# Patient Record
Sex: MALE | Race: WHITE | NOT HISPANIC OR LATINO | URBAN - METROPOLITAN AREA
[De-identification: names, ages, dates, MRNs, and addresses within clinical notes are randomized per-mention and may not be internally consistent; named-entity substitution may affect disease eponyms.]

---

## 2023-02-25 LAB
CREATININE (MG/DL) IN SER/PLAS: 0.77 MG/DL (ref 0.5–1.3)
GFR MALE: >90 ML/MIN/1.73M2

## 2023-04-18 LAB
ERYTHROCYTE DISTRIBUTION WIDTH (RATIO) BY AUTOMATED COUNT: 22.9 % (ref 11.5–14.5)
ERYTHROCYTE MEAN CORPUSCULAR HEMOGLOBIN CONCENTRATION (G/DL) BY AUTOMATED: 30.7 G/DL (ref 32–36)
ERYTHROCYTE MEAN CORPUSCULAR VOLUME (FL) BY AUTOMATED COUNT: 94 FL (ref 80–100)
ERYTHROCYTES (10*6/UL) IN BLOOD BY AUTOMATED COUNT: 4.15 X10E12/L (ref 4.5–5.9)
HEMATOCRIT (%) IN BLOOD BY AUTOMATED COUNT: 39.1 % (ref 41–52)
HEMOGLOBIN (G/DL) IN BLOOD: 12 G/DL (ref 13.5–17.5)
LEUKOCYTES (10*3/UL) IN BLOOD BY AUTOMATED COUNT: 41.7 X10E9/L (ref 4.4–11.3)
PLATELETS (10*3/UL) IN BLOOD AUTOMATED COUNT: 110 X10E9/L (ref 150–450)

## 2023-04-19 LAB
APPEARANCE, URINE: CLEAR
BILIRUBIN, URINE: NEGATIVE
BLOOD, URINE: NEGATIVE
COLOR, URINE: YELLOW
GLUCOSE, URINE: ABNORMAL MG/DL
KETONES, URINE: NEGATIVE MG/DL
LEUKOCYTE ESTERASE, URINE: NEGATIVE
NITRITE, URINE: NEGATIVE
PH, URINE: 7 (ref 5–8)
PROTEIN, URINE: NEGATIVE MG/DL
SPECIFIC GRAVITY, URINE: 1.03 (ref 1–1.03)
URINE CULTURE: NO GROWTH
UROBILINOGEN, URINE: <2 MG/DL (ref 0–1.9)

## 2023-05-02 LAB — SARS-COV-2 RESULT: NOT DETECTED

## 2023-05-03 ENCOUNTER — HOSPITAL ENCOUNTER (INPATIENT)
Dept: DATA CONVERSION | Facility: HOSPITAL | Age: 58
Discharge: HOME | End: 2023-05-11
Attending: SURGERY | Admitting: SURGERY
Payer: OTHER MISCELLANEOUS

## 2023-05-03 DIAGNOSIS — E11.649 TYPE 2 DIABETES MELLITUS WITH HYPOGLYCEMIA WITHOUT COMA (MULTI): ICD-10-CM

## 2023-05-03 DIAGNOSIS — C18.9 MALIGNANT NEOPLASM OF COLON, UNSPECIFIED (MULTI): ICD-10-CM

## 2023-05-03 DIAGNOSIS — N99.0 POSTPROCEDURAL (ACUTE) (CHRONIC) KIDNEY FAILURE: ICD-10-CM

## 2023-05-03 DIAGNOSIS — E78.5 HYPERLIPIDEMIA, UNSPECIFIED: ICD-10-CM
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
## 2023-05-03 DIAGNOSIS — C78.6 SECONDARY MALIGNANT NEOPLASM OF RETROPERITONEUM AND PERITONEUM (MULTI): ICD-10-CM

## 2023-05-03 DIAGNOSIS — K56.7 ILEUS, UNSPECIFIED (MULTI): ICD-10-CM

## 2023-05-03 DIAGNOSIS — C78.7 SECONDARY MALIGNANT NEOPLASM OF LIVER AND INTRAHEPATIC BILE DUCT (MULTI): ICD-10-CM

## 2023-05-03 LAB
ALANINE AMINOTRANSFERASE (SGPT) (U/L) IN SER/PLAS: 145 U/L (ref 10–52)
ALBUMIN (G/DL) IN SER/PLAS: 3.2 G/DL (ref 3.4–5)
ALKALINE PHOSPHATASE (U/L) IN SER/PLAS: 114 U/L (ref 33–120)
ANION GAP IN SER/PLAS: 12 MMOL/L (ref 10–20)
ASPARTATE AMINOTRANSFERASE (SGOT) (U/L) IN SER/PLAS: 140 U/L (ref 9–39)
BILIRUBIN TOTAL (MG/DL) IN SER/PLAS: 0.7 MG/DL (ref 0–1.2)
CALCIUM (MG/DL) IN SER/PLAS: 7.9 MG/DL (ref 8.6–10.6)
CARBON DIOXIDE, TOTAL (MMOL/L) IN SER/PLAS: 23 MMOL/L (ref 21–32)
CHLORIDE (MMOL/L) IN SER/PLAS: 106 MMOL/L (ref 98–107)
CREATININE (MG/DL) IN SER/PLAS: 0.74 MG/DL (ref 0.5–1.3)
GFR MALE: >90 ML/MIN/1.73M2
GLUCOSE (MG/DL) IN SER/PLAS: 265 MG/DL (ref 74–99)
PATIENT TEMPERATURE: 37 DEGREES C
POCT ANION GAP, ARTERIAL: 12 MMOL/L (ref 10–25)
POCT ANION GAP, ARTERIAL: 12 MMOL/L (ref 10–25)
POCT ANION GAP, ARTERIAL: 13 MMOL/L (ref 10–25)
POCT ANION GAP, ARTERIAL: 13 MMOL/L (ref 10–25)
POCT ANION GAP, ARTERIAL: 14 MMOL/L (ref 10–25)
POCT BASE EXCESS, ARTERIAL: -4.8 MMOL/L (ref -2–3)
POCT BASE EXCESS, ARTERIAL: -5.1 MMOL/L (ref -2–3)
POCT BASE EXCESS, ARTERIAL: -5.9 MMOL/L (ref -2–3)
POCT CHLORIDE, ARTERIAL: 100 MMOL/L (ref 98–107)
POCT CHLORIDE, ARTERIAL: 103 MMOL/L (ref 98–107)
POCT CHLORIDE, ARTERIAL: 104 MMOL/L (ref 98–107)
POCT GLUCOSE, ARTERIAL: 303 MG/DL (ref 74–99)
POCT GLUCOSE, ARTERIAL: 312 MG/DL (ref 74–99)
POCT GLUCOSE, ARTERIAL: 354 MG/DL (ref 74–99)
POCT GLUCOSE, ARTERIAL: 371 MG/DL (ref 74–99)
POCT GLUCOSE, ARTERIAL: 438 MG/DL (ref 74–99)
POCT GLUCOSE: 277 MG/DL (ref 74–99)
POCT GLUCOSE: 286 MG/DL (ref 74–99)
POCT GLUCOSE: 354 MG/DL (ref 74–99)
POCT HCO3, ARTERIAL: 20.2 MMOL/L (ref 22–26)
POCT HCO3, ARTERIAL: 20.7 MMOL/L (ref 22–26)
POCT HCO3, ARTERIAL: 20.7 MMOL/L (ref 22–26)
POCT HCO3, ARTERIAL: 21.1 MMOL/L (ref 22–26)
POCT HCO3, ARTERIAL: 21.2 MMOL/L (ref 22–26)
POCT HEMATOCRIT, ARTERIAL: 36 % (ref 41–52)
POCT HEMATOCRIT, ARTERIAL: 38 % (ref 41–52)
POCT HEMATOCRIT, ARTERIAL: 39 % (ref 41–52)
POCT HEMATOCRIT, ARTERIAL: 40 % (ref 41–52)
POCT HEMATOCRIT, ARTERIAL: 43 % (ref 41–52)
POCT HEMOGLOBIN, ARTERIAL: 11.9 G/DL (ref 13.5–17.5)
POCT HEMOGLOBIN, ARTERIAL: 12.7 G/DL (ref 13.5–17.5)
POCT HEMOGLOBIN, ARTERIAL: 13.1 G/DL (ref 13.5–17.5)
POCT HEMOGLOBIN, ARTERIAL: 13.4 G/DL (ref 13.5–17.5)
POCT HEMOGLOBIN, ARTERIAL: 14.3 G/DL (ref 13.5–17.5)
POCT IONIZED CALCIUM, ARTERIAL: 1.2 MMOL/L (ref 1.1–1.33)
POCT IONIZED CALCIUM, ARTERIAL: 1.21 MMOL/L (ref 1.1–1.33)
POCT IONIZED CALCIUM, ARTERIAL: 1.24 MMOL/L (ref 1.1–1.33)
POCT IONIZED CALCIUM, ARTERIAL: 1.24 MMOL/L (ref 1.1–1.33)
POCT IONIZED CALCIUM, ARTERIAL: 1.26 MMOL/L (ref 1.1–1.33)
POCT LACTATE, ARTERIAL: 1.7 MMOL/L (ref 0.4–2)
POCT LACTATE, ARTERIAL: 2.1 MMOL/L (ref 0.4–2)
POCT LACTATE, ARTERIAL: 2.2 MMOL/L (ref 0.4–2)
POCT LACTATE, ARTERIAL: 2.4 MMOL/L (ref 0.4–2)
POCT LACTATE, ARTERIAL: 3.4 MMOL/L (ref 0.4–2)
POCT OXY HEMOGLOBIN, ARTERIAL: 93.9 % (ref 94–98)
POCT OXY HEMOGLOBIN, ARTERIAL: 95.9 % (ref 94–98)
POCT OXY HEMOGLOBIN, ARTERIAL: 96.1 % (ref 94–98)
POCT OXY HEMOGLOBIN, ARTERIAL: 96.5 % (ref 94–98)
POCT OXY HEMOGLOBIN, ARTERIAL: 97 % (ref 94–98)
POCT PCO2, ARTERIAL: 41 MMHG (ref 38–42)
POCT PCO2, ARTERIAL: 41 MMHG (ref 38–42)
POCT PCO2, ARTERIAL: 43 MMHG (ref 38–42)
POCT PCO2, ARTERIAL: 44 MMHG (ref 38–42)
POCT PCO2, ARTERIAL: 44 MMHG (ref 38–42)
POCT PH, ARTERIAL: 7.28 (ref 7.38–7.42)
POCT PH, ARTERIAL: 7.28 (ref 7.38–7.42)
POCT PH, ARTERIAL: 7.3 (ref 7.38–7.42)
POCT PH, ARTERIAL: 7.3 (ref 7.38–7.42)
POCT PH, ARTERIAL: 7.32 (ref 7.38–7.42)
POCT PO2, ARTERIAL: 141 MMHG (ref 85–95)
POCT PO2, ARTERIAL: 144 MMHG (ref 85–95)
POCT PO2, ARTERIAL: 150 MMHG (ref 85–95)
POCT PO2, ARTERIAL: 154 MMHG (ref 85–95)
POCT PO2, ARTERIAL: 79 MMHG (ref 85–95)
POCT POTASSIUM, ARTERIAL: 3.8 MMOL/L (ref 3.5–5.3)
POCT POTASSIUM, ARTERIAL: 3.8 MMOL/L (ref 3.5–5.3)
POCT POTASSIUM, ARTERIAL: 4.1 MMOL/L (ref 3.5–5.3)
POCT POTASSIUM, ARTERIAL: 4.1 MMOL/L (ref 3.5–5.3)
POCT POTASSIUM, ARTERIAL: 5 MMOL/L (ref 3.5–5.3)
POCT SO2, ARTERIAL: 95 % (ref 94–100)
POCT SO2, ARTERIAL: 97 % (ref 94–100)
POCT SO2, ARTERIAL: 98 % (ref 94–100)
POCT SODIUM, ARTERIAL: 130 MMOL/L (ref 136–145)
POCT SODIUM, ARTERIAL: 132 MMOL/L (ref 136–145)
POCT SODIUM, ARTERIAL: 132 MMOL/L (ref 136–145)
POCT SODIUM, ARTERIAL: 133 MMOL/L (ref 136–145)
POCT SODIUM, ARTERIAL: 133 MMOL/L (ref 136–145)
POTASSIUM (MMOL/L) IN SER/PLAS: 3.5 MMOL/L (ref 3.5–5.3)
PROTEIN TOTAL: 4.8 G/DL (ref 6.4–8.2)
SODIUM (MMOL/L) IN SER/PLAS: 137 MMOL/L (ref 136–145)
UREA NITROGEN (MG/DL) IN SER/PLAS: 17 MG/DL (ref 6–23)

## 2023-05-04 LAB
ALANINE AMINOTRANSFERASE (SGPT) (U/L) IN SER/PLAS: 133 U/L (ref 10–52)
ALANINE AMINOTRANSFERASE (SGPT) (U/L) IN SER/PLAS: 150 U/L (ref 10–52)
ALBUMIN (G/DL) IN SER/PLAS: 3.6 G/DL (ref 3.4–5)
ALBUMIN (G/DL) IN SER/PLAS: 3.9 G/DL (ref 3.4–5)
ALKALINE PHOSPHATASE (U/L) IN SER/PLAS: 117 U/L (ref 33–120)
ALKALINE PHOSPHATASE (U/L) IN SER/PLAS: 136 U/L (ref 33–120)
ANION GAP IN SER/PLAS: 17 MMOL/L (ref 10–20)
ANION GAP IN SER/PLAS: 19 MMOL/L (ref 10–20)
ASPARTATE AMINOTRANSFERASE (SGOT) (U/L) IN SER/PLAS: 106 U/L (ref 9–39)
ASPARTATE AMINOTRANSFERASE (SGOT) (U/L) IN SER/PLAS: 84 U/L (ref 9–39)
BILIRUBIN TOTAL (MG/DL) IN SER/PLAS: 0.9 MG/DL (ref 0–1.2)
BILIRUBIN TOTAL (MG/DL) IN SER/PLAS: 1.1 MG/DL (ref 0–1.2)
CALCIUM (MG/DL) IN SER/PLAS: 9 MG/DL (ref 8.6–10.6)
CALCIUM (MG/DL) IN SER/PLAS: 9.4 MG/DL (ref 8.6–10.6)
CARBON DIOXIDE, TOTAL (MMOL/L) IN SER/PLAS: 24 MMOL/L (ref 21–32)
CARBON DIOXIDE, TOTAL (MMOL/L) IN SER/PLAS: 25 MMOL/L (ref 21–32)
CHLORIDE (MMOL/L) IN SER/PLAS: 98 MMOL/L (ref 98–107)
CHLORIDE (MMOL/L) IN SER/PLAS: 99 MMOL/L (ref 98–107)
CREATININE (MG/DL) IN SER/PLAS: 1.02 MG/DL (ref 0.5–1.3)
CREATININE (MG/DL) IN SER/PLAS: 1.31 MG/DL (ref 0.5–1.3)
ERYTHROCYTE DISTRIBUTION WIDTH (RATIO) BY AUTOMATED COUNT: 20.3 % (ref 11.5–14.5)
ERYTHROCYTE DISTRIBUTION WIDTH (RATIO) BY AUTOMATED COUNT: 20.4 % (ref 11.5–14.5)
ERYTHROCYTE MEAN CORPUSCULAR HEMOGLOBIN CONCENTRATION (G/DL) BY AUTOMATED: 30.8 G/DL (ref 32–36)
ERYTHROCYTE MEAN CORPUSCULAR HEMOGLOBIN CONCENTRATION (G/DL) BY AUTOMATED: 31 G/DL (ref 32–36)
ERYTHROCYTE MEAN CORPUSCULAR VOLUME (FL) BY AUTOMATED COUNT: 100 FL (ref 80–100)
ERYTHROCYTE MEAN CORPUSCULAR VOLUME (FL) BY AUTOMATED COUNT: 99 FL (ref 80–100)
ERYTHROCYTES (10*6/UL) IN BLOOD BY AUTOMATED COUNT: 4.17 X10E12/L (ref 4.5–5.9)
ERYTHROCYTES (10*6/UL) IN BLOOD BY AUTOMATED COUNT: 4.39 X10E12/L (ref 4.5–5.9)
GFR MALE: 63 ML/MIN/1.73M2
GFR MALE: 85 ML/MIN/1.73M2
GLUCOSE (MG/DL) IN SER/PLAS: 299 MG/DL (ref 74–99)
GLUCOSE (MG/DL) IN SER/PLAS: 328 MG/DL (ref 74–99)
HEMATOCRIT (%) IN BLOOD BY AUTOMATED COUNT: 41.3 % (ref 41–52)
HEMATOCRIT (%) IN BLOOD BY AUTOMATED COUNT: 44.1 % (ref 41–52)
HEMOGLOBIN (G/DL) IN BLOOD: 12.8 G/DL (ref 13.5–17.5)
HEMOGLOBIN (G/DL) IN BLOOD: 13.6 G/DL (ref 13.5–17.5)
LACTATE (MMOL/L) IN SER/PLAS: 2.6 MMOL/L (ref 0.4–2)
LACTATE (MMOL/L) IN SER/PLAS: 2.9 MMOL/L (ref 0.4–2)
LEUKOCYTES (10*3/UL) IN BLOOD BY AUTOMATED COUNT: 35.2 X10E9/L (ref 4.4–11.3)
LEUKOCYTES (10*3/UL) IN BLOOD BY AUTOMATED COUNT: 35.4 X10E9/L (ref 4.4–11.3)
MAGNESIUM (MG/DL) IN SER/PLAS: 1.66 MG/DL (ref 1.6–2.4)
MAGNESIUM (MG/DL) IN SER/PLAS: 2.78 MG/DL (ref 1.6–2.4)
NRBC (PER 100 WBCS) BY AUTOMATED COUNT: 0 /100 WBC (ref 0–0)
NRBC (PER 100 WBCS) BY AUTOMATED COUNT: 0 /100 WBC (ref 0–0)
PLATELETS (10*3/UL) IN BLOOD AUTOMATED COUNT: 183 X10E9/L (ref 150–450)
PLATELETS (10*3/UL) IN BLOOD AUTOMATED COUNT: 198 X10E9/L (ref 150–450)
POCT GLUCOSE: 233 MG/DL (ref 74–99)
POCT GLUCOSE: 282 MG/DL (ref 74–99)
POCT GLUCOSE: 328 MG/DL (ref 74–99)
POCT GLUCOSE: 360 MG/DL (ref 74–99)
POTASSIUM (MMOL/L) IN SER/PLAS: 4.7 MMOL/L (ref 3.5–5.3)
POTASSIUM (MMOL/L) IN SER/PLAS: 4.7 MMOL/L (ref 3.5–5.3)
PROTEIN TOTAL: 5.9 G/DL (ref 6.4–8.2)
PROTEIN TOTAL: 6.3 G/DL (ref 6.4–8.2)
SODIUM (MMOL/L) IN SER/PLAS: 134 MMOL/L (ref 136–145)
SODIUM (MMOL/L) IN SER/PLAS: 138 MMOL/L (ref 136–145)
UREA NITROGEN (MG/DL) IN SER/PLAS: 15 MG/DL (ref 6–23)
UREA NITROGEN (MG/DL) IN SER/PLAS: 21 MG/DL (ref 6–23)

## 2023-05-05 LAB
ALANINE AMINOTRANSFERASE (SGPT) (U/L) IN SER/PLAS: 87 U/L (ref 10–52)
ALBUMIN (G/DL) IN SER/PLAS: 3.1 G/DL (ref 3.4–5)
ALKALINE PHOSPHATASE (U/L) IN SER/PLAS: 109 U/L (ref 33–120)
ANION GAP IN SER/PLAS: 14 MMOL/L (ref 10–20)
ASPARTATE AMINOTRANSFERASE (SGOT) (U/L) IN SER/PLAS: 50 U/L (ref 9–39)
BILIRUBIN TOTAL (MG/DL) IN SER/PLAS: 0.8 MG/DL (ref 0–1.2)
CALCIUM (MG/DL) IN SER/PLAS: 8.4 MG/DL (ref 8.6–10.6)
CARBON DIOXIDE, TOTAL (MMOL/L) IN SER/PLAS: 25 MMOL/L (ref 21–32)
CHLORIDE (MMOL/L) IN SER/PLAS: 98 MMOL/L (ref 98–107)
CREATININE (MG/DL) IN SER/PLAS: 0.91 MG/DL (ref 0.5–1.3)
ERYTHROCYTE DISTRIBUTION WIDTH (RATIO) BY AUTOMATED COUNT: 19.6 % (ref 11.5–14.5)
ERYTHROCYTE MEAN CORPUSCULAR HEMOGLOBIN CONCENTRATION (G/DL) BY AUTOMATED: 32.9 G/DL (ref 32–36)
ERYTHROCYTE MEAN CORPUSCULAR VOLUME (FL) BY AUTOMATED COUNT: 95 FL (ref 80–100)
ERYTHROCYTES (10*6/UL) IN BLOOD BY AUTOMATED COUNT: 3.28 X10E12/L (ref 4.5–5.9)
ESTIMATED AVERAGE GLUCOSE FOR HBA1C: 223 MG/DL
GFR MALE: >90 ML/MIN/1.73M2
GLUCOSE (MG/DL) IN SER/PLAS: 220 MG/DL (ref 74–99)
HEMATOCRIT (%) IN BLOOD BY AUTOMATED COUNT: 31.3 % (ref 41–52)
HEMOGLOBIN (G/DL) IN BLOOD: 10.3 G/DL (ref 13.5–17.5)
HEMOGLOBIN A1C/HEMOGLOBIN TOTAL IN BLOOD: 9.4 %
LEUKOCYTES (10*3/UL) IN BLOOD BY AUTOMATED COUNT: 29.2 X10E9/L (ref 4.4–11.3)
MAGNESIUM (MG/DL) IN SER/PLAS: 2.07 MG/DL (ref 1.6–2.4)
NRBC (PER 100 WBCS) BY AUTOMATED COUNT: 0 /100 WBC (ref 0–0)
PLATELETS (10*3/UL) IN BLOOD AUTOMATED COUNT: 129 X10E9/L (ref 150–450)
POCT GLUCOSE: 150 MG/DL (ref 74–99)
POCT GLUCOSE: 209 MG/DL (ref 74–99)
POCT GLUCOSE: 219 MG/DL (ref 74–99)
POCT GLUCOSE: 220 MG/DL (ref 74–99)
POCT GLUCOSE: 254 MG/DL (ref 74–99)
POCT GLUCOSE: 264 MG/DL (ref 74–99)
POCT GLUCOSE: 286 MG/DL (ref 74–99)
POTASSIUM (MMOL/L) IN SER/PLAS: 4.1 MMOL/L (ref 3.5–5.3)
PROTEIN TOTAL: 5.1 G/DL (ref 6.4–8.2)
SODIUM (MMOL/L) IN SER/PLAS: 133 MMOL/L (ref 136–145)
UREA NITROGEN (MG/DL) IN SER/PLAS: 18 MG/DL (ref 6–23)

## 2023-05-06 LAB
ALBUMIN (G/DL) IN SER/PLAS: 3.2 G/DL (ref 3.4–5)
ANION GAP IN SER/PLAS: 16 MMOL/L (ref 10–20)
CALCIUM (MG/DL) IN SER/PLAS: 8.9 MG/DL (ref 8.6–10.6)
CARBON DIOXIDE, TOTAL (MMOL/L) IN SER/PLAS: 25 MMOL/L (ref 21–32)
CHLORIDE (MMOL/L) IN SER/PLAS: 98 MMOL/L (ref 98–107)
CREATININE (MG/DL) IN SER/PLAS: 0.62 MG/DL (ref 0.5–1.3)
ERYTHROCYTE DISTRIBUTION WIDTH (RATIO) BY AUTOMATED COUNT: 18.6 % (ref 11.5–14.5)
ERYTHROCYTE MEAN CORPUSCULAR HEMOGLOBIN CONCENTRATION (G/DL) BY AUTOMATED: 30.7 G/DL (ref 32–36)
ERYTHROCYTE MEAN CORPUSCULAR VOLUME (FL) BY AUTOMATED COUNT: 100 FL (ref 80–100)
ERYTHROCYTES (10*6/UL) IN BLOOD BY AUTOMATED COUNT: 3.32 X10E12/L (ref 4.5–5.9)
GFR MALE: >90 ML/MIN/1.73M2
GLUCOSE (MG/DL) IN SER/PLAS: 199 MG/DL (ref 74–99)
HEMATOCRIT (%) IN BLOOD BY AUTOMATED COUNT: 33.2 % (ref 41–52)
HEMOGLOBIN (G/DL) IN BLOOD: 10.2 G/DL (ref 13.5–17.5)
LEUKOCYTES (10*3/UL) IN BLOOD BY AUTOMATED COUNT: 23.8 X10E9/L (ref 4.4–11.3)
MAGNESIUM (MG/DL) IN SER/PLAS: 1.8 MG/DL (ref 1.6–2.4)
NRBC (PER 100 WBCS) BY AUTOMATED COUNT: 0 /100 WBC (ref 0–0)
PHOSPHATE (MG/DL) IN SER/PLAS: 2.6 MG/DL (ref 2.5–4.9)
PLATELETS (10*3/UL) IN BLOOD AUTOMATED COUNT: 144 X10E9/L (ref 150–450)
POCT GLUCOSE: 158 MG/DL (ref 74–99)
POCT GLUCOSE: 171 MG/DL (ref 74–99)
POCT GLUCOSE: 171 MG/DL (ref 74–99)
POCT GLUCOSE: 198 MG/DL (ref 74–99)
POCT GLUCOSE: 199 MG/DL (ref 74–99)
POCT GLUCOSE: 205 MG/DL (ref 74–99)
POTASSIUM (MMOL/L) IN SER/PLAS: 3.9 MMOL/L (ref 3.5–5.3)
SODIUM (MMOL/L) IN SER/PLAS: 135 MMOL/L (ref 136–145)
UREA NITROGEN (MG/DL) IN SER/PLAS: 15 MG/DL (ref 6–23)

## 2023-05-07 LAB
ALBUMIN (G/DL) IN SER/PLAS: 2.7 G/DL (ref 3.4–5)
ALBUMIN (G/DL) IN SER/PLAS: 3.2 G/DL (ref 3.4–5)
ANION GAP IN SER/PLAS: 12 MMOL/L (ref 10–20)
ANION GAP IN SER/PLAS: 19 MMOL/L (ref 10–20)
CALCIUM (MG/DL) IN SER/PLAS: 8.9 MG/DL (ref 8.6–10.6)
CALCIUM (MG/DL) IN SER/PLAS: 9.6 MG/DL (ref 8.6–10.6)
CARBON DIOXIDE, TOTAL (MMOL/L) IN SER/PLAS: 27 MMOL/L (ref 21–32)
CARBON DIOXIDE, TOTAL (MMOL/L) IN SER/PLAS: 29 MMOL/L (ref 21–32)
CHLORIDE (MMOL/L) IN SER/PLAS: 101 MMOL/L (ref 98–107)
CHLORIDE (MMOL/L) IN SER/PLAS: 97 MMOL/L (ref 98–107)
CREATININE (MG/DL) IN SER/PLAS: 0.62 MG/DL (ref 0.5–1.3)
CREATININE (MG/DL) IN SER/PLAS: 0.66 MG/DL (ref 0.5–1.3)
ERYTHROCYTE DISTRIBUTION WIDTH (RATIO) BY AUTOMATED COUNT: 18.2 % (ref 11.5–14.5)
ERYTHROCYTE MEAN CORPUSCULAR HEMOGLOBIN CONCENTRATION (G/DL) BY AUTOMATED: 31.1 G/DL (ref 32–36)
ERYTHROCYTE MEAN CORPUSCULAR VOLUME (FL) BY AUTOMATED COUNT: 98 FL (ref 80–100)
ERYTHROCYTES (10*6/UL) IN BLOOD BY AUTOMATED COUNT: 4.05 X10E12/L (ref 4.5–5.9)
GFR MALE: >90 ML/MIN/1.73M2
GFR MALE: >90 ML/MIN/1.73M2
GLUCOSE (MG/DL) IN SER/PLAS: 186 MG/DL (ref 74–99)
GLUCOSE (MG/DL) IN SER/PLAS: 262 MG/DL (ref 74–99)
HEMATOCRIT (%) IN BLOOD BY AUTOMATED COUNT: 39.6 % (ref 41–52)
HEMOGLOBIN (G/DL) IN BLOOD: 12.3 G/DL (ref 13.5–17.5)
LEUKOCYTES (10*3/UL) IN BLOOD BY AUTOMATED COUNT: 24.4 X10E9/L (ref 4.4–11.3)
MAGNESIUM (MG/DL) IN SER/PLAS: 1.9 MG/DL (ref 1.6–2.4)
NRBC (PER 100 WBCS) BY AUTOMATED COUNT: 0 /100 WBC (ref 0–0)
PHOSPHATE (MG/DL) IN SER/PLAS: 3.2 MG/DL (ref 2.5–4.9)
PHOSPHATE (MG/DL) IN SER/PLAS: 4 MG/DL (ref 2.5–4.9)
PLATELETS (10*3/UL) IN BLOOD AUTOMATED COUNT: 198 X10E9/L (ref 150–450)
POCT GLUCOSE: 180 MG/DL (ref 74–99)
POCT GLUCOSE: 196 MG/DL (ref 74–99)
POCT GLUCOSE: 250 MG/DL (ref 74–99)
POCT GLUCOSE: 261 MG/DL (ref 74–99)
POTASSIUM (MMOL/L) IN SER/PLAS: 3.5 MMOL/L (ref 3.5–5.3)
POTASSIUM (MMOL/L) IN SER/PLAS: 4 MMOL/L (ref 3.5–5.3)
SODIUM (MMOL/L) IN SER/PLAS: 138 MMOL/L (ref 136–145)
SODIUM (MMOL/L) IN SER/PLAS: 139 MMOL/L (ref 136–145)
UREA NITROGEN (MG/DL) IN SER/PLAS: 18 MG/DL (ref 6–23)
UREA NITROGEN (MG/DL) IN SER/PLAS: 21 MG/DL (ref 6–23)

## 2023-05-08 LAB
ALBUMIN (G/DL) IN SER/PLAS: 3 G/DL (ref 3.4–5)
ANION GAP IN SER/PLAS: 17 MMOL/L (ref 10–20)
CALCIUM (MG/DL) IN SER/PLAS: 8.8 MG/DL (ref 8.6–10.6)
CARBON DIOXIDE, TOTAL (MMOL/L) IN SER/PLAS: 26 MMOL/L (ref 21–32)
CHLORIDE (MMOL/L) IN SER/PLAS: 104 MMOL/L (ref 98–107)
CREATININE (MG/DL) IN SER/PLAS: 0.52 MG/DL (ref 0.5–1.3)
ERYTHROCYTE DISTRIBUTION WIDTH (RATIO) BY AUTOMATED COUNT: 18 % (ref 11.5–14.5)
ERYTHROCYTE MEAN CORPUSCULAR HEMOGLOBIN CONCENTRATION (G/DL) BY AUTOMATED: 30.3 G/DL (ref 32–36)
ERYTHROCYTE MEAN CORPUSCULAR VOLUME (FL) BY AUTOMATED COUNT: 100 FL (ref 80–100)
ERYTHROCYTES (10*6/UL) IN BLOOD BY AUTOMATED COUNT: 2.97 X10E12/L (ref 4.5–5.9)
GFR MALE: >90 ML/MIN/1.73M2
GLUCOSE (MG/DL) IN SER/PLAS: 138 MG/DL (ref 74–99)
HEMATOCRIT (%) IN BLOOD BY AUTOMATED COUNT: 29.7 % (ref 41–52)
HEMOGLOBIN (G/DL) IN BLOOD: 9 G/DL (ref 13.5–17.5)
LEUKOCYTES (10*3/UL) IN BLOOD BY AUTOMATED COUNT: 10.9 X10E9/L (ref 4.4–11.3)
MAGNESIUM (MG/DL) IN SER/PLAS: 1.61 MG/DL (ref 1.6–2.4)
NRBC (PER 100 WBCS) BY AUTOMATED COUNT: 0 /100 WBC (ref 0–0)
PHOSPHATE (MG/DL) IN SER/PLAS: 3.1 MG/DL (ref 2.5–4.9)
PLATELETS (10*3/UL) IN BLOOD AUTOMATED COUNT: 137 X10E9/L (ref 150–450)
POCT GLUCOSE: 115 MG/DL (ref 74–99)
POCT GLUCOSE: 151 MG/DL (ref 74–99)
POCT GLUCOSE: 83 MG/DL (ref 74–99)
POTASSIUM (MMOL/L) IN SER/PLAS: 3.8 MMOL/L (ref 3.5–5.3)
SODIUM (MMOL/L) IN SER/PLAS: 143 MMOL/L (ref 136–145)
UREA NITROGEN (MG/DL) IN SER/PLAS: 16 MG/DL (ref 6–23)

## 2023-05-09 LAB
ALBUMIN (G/DL) IN SER/PLAS: 3 G/DL (ref 3.4–5)
ANION GAP IN SER/PLAS: 13 MMOL/L (ref 10–20)
CALCIUM (MG/DL) IN SER/PLAS: 8.9 MG/DL (ref 8.6–10.6)
CARBON DIOXIDE, TOTAL (MMOL/L) IN SER/PLAS: 31 MMOL/L (ref 21–32)
CHLORIDE (MMOL/L) IN SER/PLAS: 105 MMOL/L (ref 98–107)
CREATININE (MG/DL) IN SER/PLAS: 0.5 MG/DL (ref 0.5–1.3)
GFR MALE: >90 ML/MIN/1.73M2
GLUCOSE (MG/DL) IN SER/PLAS: 87 MG/DL (ref 74–99)
MAGNESIUM (MG/DL) IN SER/PLAS: 1.73 MG/DL (ref 1.6–2.4)
PHOSPHATE (MG/DL) IN SER/PLAS: 4.2 MG/DL (ref 2.5–4.9)
POCT GLUCOSE: 107 MG/DL (ref 74–99)
POCT GLUCOSE: 117 MG/DL (ref 74–99)
POCT GLUCOSE: 93 MG/DL (ref 74–99)
POCT GLUCOSE: 95 MG/DL (ref 74–99)
POTASSIUM (MMOL/L) IN SER/PLAS: 3.8 MMOL/L (ref 3.5–5.3)
SODIUM (MMOL/L) IN SER/PLAS: 145 MMOL/L (ref 136–145)
UREA NITROGEN (MG/DL) IN SER/PLAS: 13 MG/DL (ref 6–23)

## 2023-05-10 LAB
ALBUMIN (G/DL) IN SER/PLAS: 2.6 G/DL (ref 3.4–5)
ANION GAP IN SER/PLAS: 14 MMOL/L (ref 10–20)
CALCIUM (MG/DL) IN SER/PLAS: 8.2 MG/DL (ref 8.6–10.6)
CARBON DIOXIDE, TOTAL (MMOL/L) IN SER/PLAS: 27 MMOL/L (ref 21–32)
CHLORIDE (MMOL/L) IN SER/PLAS: 103 MMOL/L (ref 98–107)
CREATININE (MG/DL) IN SER/PLAS: 0.48 MG/DL (ref 0.5–1.3)
GFR MALE: >90 ML/MIN/1.73M2
GLUCOSE (MG/DL) IN SER/PLAS: 73 MG/DL (ref 74–99)
MAGNESIUM (MG/DL) IN SER/PLAS: 1.55 MG/DL (ref 1.6–2.4)
PHOSPHATE (MG/DL) IN SER/PLAS: 3.3 MG/DL (ref 2.5–4.9)
POCT GLUCOSE: 139 MG/DL (ref 74–99)
POCT GLUCOSE: 143 MG/DL (ref 74–99)
POCT GLUCOSE: 82 MG/DL (ref 74–99)
POTASSIUM (MMOL/L) IN SER/PLAS: 3.8 MMOL/L (ref 3.5–5.3)
SODIUM (MMOL/L) IN SER/PLAS: 140 MMOL/L (ref 136–145)
UREA NITROGEN (MG/DL) IN SER/PLAS: 11 MG/DL (ref 6–23)

## 2023-05-11 LAB
POCT GLUCOSE: 113 MG/DL (ref 74–99)
POCT GLUCOSE: 131 MG/DL (ref 74–99)

## 2023-06-05 LAB
COMPLETE PATHOLOGY REPORT: NORMAL
CONVERTED CLINICAL DIAGNOSIS-HISTORY: NORMAL
CONVERTED FINAL DIAGNOSIS: NORMAL
CONVERTED FINAL REPORT PDF LINK TO COPY AND PASTE: NORMAL
CONVERTED GROSS DESCRIPTION: NORMAL
CONVERTED SYNOPTIC DIAGNOSIS: NORMAL

## 2023-09-14 NOTE — PROGRESS NOTES
Service: Endocrinology     Subjective Data:   JOSE JUAN MARIE is a 57 year old Male who is Hospital Day # 5 and POD #4 for 1. Hepatic artery infusion pump ;2. Regional lymphadenectomy;3. Open cholecystectomy;4. Intraoperative ultrasound with interpretation  of imaging ;5. Peritonectomy.    Overnight Events: Patient had an uneventful night.   Additional Information:    pt's glucose remains elevated. pt informed of plan to increase glargine at HS. pt agrees hyperglycemia is likely 2/2 pain, which is improving at this time.  we reviewed  cost of CGM supplies - pt and his wife agree to trial 1 month of tee samples to decide if $75/month copay is valuable to their home glucose management     ROS- denies CP, SOB, cough, HA, n/v, constipation/diarrhea, depression/anxiety, night sweats, new rash, or change in vision. endorses fatigue, metallic taste in his mouth    Objective Data:     Objective Information:      T   P  R  BP   MAP  SpO2   Value  36.6  121  18  115/82      93%  Date/Time 5/7 11:26 5/7 11:26 5/7 11:26 5/7 11:26    5/7 11:26  Range  (36C - 36.9C )  (84 - 121 )  (18 - 20 )  (115 - 145 )/ (73 - 86 )    (89% - 95% )   As of 07-May-2023 09:58:00, patient is on 2 L/min of oxygen via nasal cannula.  Highest temp of 36.9 C was recorded at 5/7 5:19      Pain reported at 5/7 7:44: 5 = Moderate         Weights   5/6 14:09: Weight in kg (Weight (kg))  107  5/6 14:09: Weight in lbs ((lbs))  235.8         Weights   5/6 14:09: Weight in kg (Weight (kg))  107  5/6 14:09: Weight in lbs ((lbs))  235.8    Physical Exam Narrative:  ·  Physical Exam:    Constitutional: ill appearing, awake/alert/oriented x3, lying in bed   Eyes: EOMI, clear sclera  ENMT: mucous membranes moist, no apparent injury, no lesions seen  Head/Neck: Neck supple, no apparent injury  Respiratory/Thorax: Patent airways, good chest expansion, thorax symmetric, CTAB  Cardiovascular: regular rate and rhythm, s1 and s2 distinct, 2+ equal pulses of the  extremities  Gastrointestinal: Nondistended, soft, non-tender, no rebound tenderness or guarding  Musculoskeletal: ROM intact, normal strength  Extremities: normal extremities, no edema  Neurological: alert and oriented x3, CN's grossly intact, normal strength  Psychological: appropriate mood and behavior  Skin: Warm and dry, no lesions, no rashes      Medication:    Medications:      CENTRAL NERVOUS SYSTEM AGENTS:    1. HYDROmorphone PCA 15 mg/ NaCL 0.9% 30 mL:  2.6  IV PCA  <Continuous>    2. Ondansetron Injectable:  4  mg  IntraVenous Push  Every 6 Hours   PRN       3. Scopolamine TransDermal:  1  patch  TransDermal  Every 72 Hours    4. Methocarbamol Injectable:  1000  mg  IntraVenous Push  Every 8 Hours      COAGULATION MODIFIERS:    1. Enoxaparin SubCutaneous:  40  mg  SubCutaneous  Every 24 Hours      GASTROINTESTINAL AGENTS:    1. Pantoprazole Injectable:  40  mg  IntraVenous Push  Every 24 Hours      METABOLIC AGENTS:    1. Insulin Glargine (Lantus) Injectable:  30  unit(s)  SubCutaneous  At Bedtime    2. Insulin Lispro Moderate Corrective Scale:  unit(s)  SubCutaneous  Every 6 Hours    3. Dextrose 50% in Water Injectable:  25  gram(s)  IntraVenous Push  Every 15 Minutes   PRN       4. Glucagon Injectable:  1  mg  IntraMuscular  Every 15 Minutes   PRN         MISCELLANEOUS AGENTS:    1. Naloxone Injectable:  0.2  mg  IntraVenous Push  Once   PRN         NUTRITIONAL PRODUCTS:    1. Lactated Ringers Infusion:  1000  mL  IntraVenous  <Continuous>    2. Lactated Ringers IV Bolus:  500  mL  IntraVenous Piggyback  Once    3. Lactated Ringers IV Bolus:  500  mL  IntraVenous Piggyback  Once    4. Lactated Ringers IV Bolus:  500  mL  IntraVenous Piggyback  Once      TOPICAL AGENTS:    1. Lidocaine 2% (UROJET) Topical Gel:  5  mL  Topical  Once    2. Phenol Topical Spray:  1  spray(s)  Topical  4 Times a Day   PRN             Currently Suspended Medications       --------------------------------    1. Pregabalin:   100  mg  Oral  2 Times a Day      Recent Lab Results:    Results:        I have reviewed these laboratory results:    Renal Function Panel  Trending View      Result 07-May-2023 09:59:00  06-May-2023 11:57:00    Glucose, Serum 262   H   199   H       135   L    K 4.0   3.9    CL 97   L   98    Bicarbonate, Serum 27   25    Anion Gap, Serum 19   16    BUN 21   15    CREAT 0.66   0.62    GFR Male >90   >90    Calcium, Serum 9.6   8.9    Phosphorus, Serum 4.0   2.6    ALB 3.2   L   3.2   L        Glucose_POCT  Trending View      Result 07-May-2023 03:19:00  06-May-2023 20:41:00  06-May-2023 18:09:00  06-May-2023 13:56:00  06-May-2023 10:04:00  06-May-2023 04:39:00  06-May-2023 00:34:00  05-May-2023 21:17:00  05-May-2023 17:06:00    Glucose-POCT 261   H   205   H   171   H   198   H   199   H   158   H   171   H   150   H   220   H            I have reviewed these laboratory results:    Glucose_POCT  07-May-2023 16:04:00      Result Value    Glucose-POCT  196   H       Assessment and Plan:   Daily Risk Screen:  ·  Does patient have an indwelling urinary catheter? n/a consulting service   ·  Does patient have a central line? n/a consulting service     Code Status:  ·  Code Status Full Code     Assessment:    JOSE JUAN MARIE is a 57 year old Male who presented for hepatic artery infusion pump with Dr Arenas.     Endocrinology consulted for DM2 with hyperglycemia   A1C due for measurement  home regimen: glyburide 5mg BID  managed by: PCP     ASSESSMENT: DM2 with hyperglycemia in setting of post-operative stress  - target glucose <180mg/dL     PLAN:   - increase glargine to 30u qHS  - continue lispro moderate ssi q6hr   - POCT glucose q6hr  - hypoglycemia protocol   - CGM costs reviewed with pt     Dispo: pt is likely to d/c on insulin with tee 2 CGM sensor samples and [facility] f/u on days of oncology appointments.     Patient seen and examined.   Plan communicated to primary team via doc halo to pager  [phone]    Sharon Osborne PA-C  Pager [phone] or Doc Halo  Please contact endocrinology fellow on-call after 5p - page [phone]      Attestation:   Note Completion:  Provider/Team Pager # [phone]         Electronic Signatures:  Sharon Osborne (PAC)  (Signed 07-May-2023 16:46)   Authored: Service, Subjective Data, Objective Data, Assessment  and Plan, Note Completion      Last Updated: 07-May-2023 16:46 by Sharon Osborne (PAC)

## 2023-09-14 NOTE — DISCHARGE SUMMARY
Send Summary:   Discharge Summary Providers:  Provider Role Provider Name   · Referring Rajinder Arenas   · Consulting Devin   · Attending Rajinder Arensa   · Consulting Jorge Alberto Gomez   · Nurse  Practitioner Arlette Mcrae   · Primary Collette, Son Tom       Note Recipients: Rajinder Arenas MD       Discharge:    Summary:   Admission Date: .03-May-2023 05:54:00   Discharge Date: 11-May-2023   Attending Physician at Discharge: Rajinder Arenas   Admission Reason: Colorectal cancer   Final Discharge Diagnoses: Colorectal cancer   Procedures: Date: 03-May-2023 12:34:00  Procedure Name: open right hemicolectomy, primary anastomosis    Date: 03-May-2023 20:49:00  Procedure Name: 1. Hepatic artery infusion pump   2. Regional lymphadenectomy  3. Open cholecystectomy  4. Intraoperative ultrasound with interpretation of imaging   5. Peritonectomy   6. Right colectomy (Dr. Raymundo, colorectal surgery)   Condition at Discharge: Satisfactory   Disposition at Discharge: Home Health Care - New   Vital Signs:        T   P  R  BP   MAP  SpO2   Value  36.6  85  18  132/84      96%  Date/Time 5/11 4:55 5/11 4:55 5/11 4:55 5/11 4:55    5/11 4:55  Range  (36.4C - 36.7C )  (77 - 85 )  (18 - 18 )  (122 - 145 )/ (75 - 85 )    (93% - 96% )    Date:            Weight/Scale Type:  Height:   06-May-2023 14:09  107  kg / bed       Physical Exam:    Neurological: Awake, alert, conversive  Respiratory/Thorax: even, unlabored  Genitourinary: voiding  Gastrointestinal: soft, NT, ND.  midline well approximated with staples; scant serosang drainage from inferior portion of incision.  LUQ incision well approximated with sutures.  Skin: warm, dry  Musculoskeletal: FLORIAN  Eyes: non-icteric  Extremities: no edema  Psychological: appropriate mood/affect   Hospital Course:    57 yr old male with right colon cancer and synchronous colorectal liver mets who underwent right colectomy, peritonectomy, IOUS, cholecystectomy, lymphadenectomy,  AMY pump  placement on 5/3 with James Arenas/Zackary).  [facility].  Post-op course c/b oliguria - resolved with fluid resuscitation and ileus requiring NGT placement.  Almanza removed POD # and passed TOV.  Diet advanced as tolerated after  NGT removal.  IV medication transitioned to oral with adequate PO intake.  HAP pump study POD 2 WNL.  DC home POD 8 on extended DVT prophylaxis.       Discharge Information:    and Continuing Care:   Lab Results - Pending:    Surgical Pathology Drawn at 03-May-2023 13:58:00  Radiology Results - Pending: None   Discharge Instructions:    Activity:           activity as tolerated.          May shower..            May not return to school/work.            May not drive while taking narcotics.            No pushing, pulling, or lifting objects greater than 10 pounds for 6 week(s).            Weight-bearing Instructions: weight-bearing as tolerated.      Nutrition/Diet:           resume normal diet,  diabetic/carbohydrate counted          Encourage Fluids:   Drink plenty of a variety of fluids to prevent dehydration. Signs of dehydration are: dry mouth, dark yellow urine in small amounts, and dizziness with change in position or increased feeling of weakness/tiredness.    Wound Care:           Wound Site:   abdomen          Wound Type:   surgical incision          Cleanse With:   soap and water          Cover With:   no dressing, leave open to air          Instructions:   no lotions, creams, or tub soaks          Other Instructions:   You have staples along your incision. These will come out in 1-2 weeks.  This incision may get wet, pat dry and cover as needed to protect your clothing, otherwise you may leave this incision open to air.          Wound Site:   abdomen          Wound Type:   surgical incision          Cleanse With:   soap and water          Cover With:   no dressing, leave open to air          Instructions:   no lotions, creams, or tub soaks          Other  Instructions:   You have surgical glue over your incision. You may shower and use soap and water over your incision. Pat dry. The surgical glue with slowly dissolve and fall away.    Additional Orders:           Blood Glucose Monitoring:   before meals          Additional Instructions:   Bowel function will be irregular at first. You may experience some loose stool alternating with constipation.  This is normal. As your diet advances and you begin to eat more regularly, your stool pattern will also become  more regular.    For diarrhea stools incorporate foods from the BRAT diet.  This is a bland diet that consists of foods low in fiber: bananas, rice, applesauce and toast are staples of the diet.  You can also add tea, tapioca and yogurt if you would like.  Be sure to  drink plenty of fluids if you are having diarrhea, as you can become dehydrated quickly.    Drink plenty of a variety of fluids to prevent dehydration. Signs of dehydration are: dry mouth, dark yellow urine in small amounts, and dizziness with change in position or increased feeling of weakness/tiredness.  ------------------------------------------------------------------------------------------------------------    Insulin Regimen  Lantus (long acting): 18 units daily  Lispro (short acting): per sliding scale 3x day before meals   Hypoglycemia Protocol 0 unit(s) if Blood glucose is between 0 - 70  0 unit(s) if Blood glucose is between 71 - 150  2 unit(s) if Blood glucose is between 151 - 200  4 unit(s) if Blood glucose is between 201 - 250  6 unit(s) if Blood glucose is between 251 - 300  8 unit(s) if Blood glucose is between 301 - 350  10 unit(s) if Blood glucose is between 351 - 400  Notify Physician if Blood glucose greater than 400    ------------------------------------------------------------------------------------------------------------  Enoxaparin (Lovenox®) is a blood thinner used to prevent or treat blood clots.  Enoxaparin (Lovenox®) is  administered only by injection under the skin using a small needle and syringe. Injections can be given once or twice a day.  You are taking enoxaparin (Lovenox®) for __prevention of blood clots___.  You will continue to take enoxaparin (Lovenox) for __28 days post-op__.    It is important that you give the injections at the same time each day.  It is important that you continue your enoxaparin until told to stop.  If you forget an injection, contact your doctor for advice as to when you should give your next injection.   Let everyone involved in your care, such as a dentist or other provider, know that you are taking enoxaparin (Lovenox®).  Side Effects:   - Skin bruises, itching, and bleeding around the injection site, can occur.  - If bleeding does occur, it may take a little longer than usual to stop. Let your doctor know if you develop a  rash of dark red spots under the skin.  Signs of bleeding that you should call your doctor:   - Gums that bleed after brushing your teeth lasting more than 15 minutes.  - A nose bleed that lasts for more than 15 minutes.  - Bleeding from a cut that does not stop in 15 minutes.  - Urine that looks red, or urine that is dark like coffee.  - Stool (BMs) that are covered with blood, or are black.  - Vomit that is bright red or vomit that looks like coffee.    How to administer the injection:  1. Wash your hands with soap and water. Dry your hands.  2. Sit down or lie flat in a comfortable position.  3. Select an area on the right or left side of your stomach (at least 2 inches away from your belly button), the back of your upper arm or side of your upper thigh.  4. Clean the area with an alcohol swabs. Allow the area to dry.  5. Carefully pull off the needle cap from the syringe and discard cap. Leave the air bubble in the syringe.   6. Hold the syringe like a pencil in the hand you write with.  7. With your other hand, gently pinch around the cleansed area to make a fold in the  skin. Continue to hold the skin fold throughout the injection.  8. Press the needle straight down and all the way into the skin fold.  9. Press down on the plunger as far as it will go with your finger until the syringe is empty.  10. Remove the needle by pulling it straight out.  11. DO NOT rub the injection site.  12. Point the needle down and away from yourself and others. Push down on the plunger to release the safety shield.  13. Drop the used syringe into a thick plastic disposable container.     Home Care Certification:           Skilled Disciplines Ordered:   RN/LPN,  PT    Home Care Services:           Home Care Skilled Service:   assessment,  follow up teaching,  infusions/injectables/medications,  Rehab (PT/OT/SP eval and treat),  wound care    Follow Up Appointments:    Follow-Up Appointment 01:           Physician/Dept/Service:   Dr Arenas          Reason for Referral:   post-op appointment          Scheduled Date/Time:   15-May-2023 11:20          Location:   [facility]: [address] [place] [address] A          Phone Number:   [phone]    Follow-Up Appointment 02:           Physician/Dept/Service:   Melinda Draw, Sandy Cruz CNP, & 2hr infusion (1st dose AMY pump)          Scheduled Date/Time:   17-May-2023 13:00          Location:   [address], [address] [place] [id number]    Follow-Up Appointment 03:           Physician/Dept/Service:   Dr. Regina Raymundo          Scheduled Date/Time:   30-May-2023 11:00          Location:   [address] [place] [address]          Phone Number:   [phone] option #2    Discharge Medications: Home Medication   pregabalin 100 mg oral capsule - 2 cap(s) orally in the morning, and 1 cap(s) orally in the evening  Multiple Vitamins with Minerals oral tablet - 1 tab(s) orally once a day  omega-3 polyunsaturated fatty acids - 1 cap(s) orally 2 times a day  simvastatin 40 mg oral tablet - 1  tab(s) orally once a day (at bedtime)  Vitamin D3 400 intl units (10 mcg) oral tablet - 1 tab(s) orally once a day  loratadine 10 mg oral tablet - 1 tab(s) orally once a day for 3 days following chemo  acetaminophen 325 mg oral tablet - 2 tab(s) orally every 6 hours  sennosides-docusate 8.6 mg-50 mg oral tablet - 2 tab(s) orally 2 times a day  Lantus Solostar Pen 100 units/mL subcutaneous solution - 18 unit(s) subcutaneous once a day   Insulin Lispro KwikPen 100 units/mL injectable solution - 3x day per sliding scale before meals  Pen Needles 31 G 8 mm - 4x day    lisinopril 10 mg oral tablet - 1 tab(s) oral once a day  pantoprazole 40 mg oral delayed release tablet - 1 tab(s) oral once a day  enoxaparin 40 mg/0.4 mL injectable solution - 40 milligram(s) injectable once a day      PRN Medication   polyethylene glycol 3350 oral powder for reconstitution - 17 gram(s) orally once a day, As needed, Constipation  traMADol 50 mg oral tablet - 1 tab(s) orally every 4 to 6 hours x 7 days, As Needed -for pain   Dx: G89.18  ondansetron 4 mg oral tablet - 1 tab(s) orally 3 times a day, As Needed - for nausea and vomiting  prochlorperazine 10 mg oral tablet - 1 tab(s) orally 3 times a day, As Needed - for nausea and vomiting     DNR Status:   ·  Code Status Code Status order at time of discharge: Full Code       Electronic Signatures:  Arlette Mcrae (APRN-CNP)  (Signed 11-May-2023 08:12)   Authored: Send Summary, Summary Content, Ongoing Care,  DNR Status, Note Completion      Last Updated: 11-May-2023 08:12 by Arlette Mcrae (APRN-CNP)

## 2023-09-14 NOTE — PROGRESS NOTES
Service: Colorectal Surgery     Subjective Data:   JOSE JUAN MARIE is a 57 year old Male who is Hospital Day # 8 and POD #7 for 1. Hepatic artery infusion pump ;2. Regional lymphadenectomy;3. Open cholecystectomy;4. Intraoperative ultrasound with interpretation  of imaging ;5. Peritonectomy.     Patient doing well. Pain controlled. Denies nausea. Passing flatus and having bowel movements.    Objective Data:     Objective Information:      T   P  R  BP   MAP  SpO2   Value  36.6  82  18  145/82      94%  Date/Time 5/10 5:50 5/10 5:50 5/10 5:50 5/10 5:50    5/10 5:50  Range  (36.2C - 37C )  (79 - 87 )  (17 - 18 )  (127 - 149 )/ (76 - 88 )    (93% - 95% )  Highest temp of 37 C was recorded at 5/9 21:33      Pain reported at 5/10 6:00: 4 = Moderate    Physical Exam Narrative:  ·  Physical Exam:    Gen: NAD, resting in bed, A&O x4  Pulm: nonlabored respiratory effort on room air  Cardio: regular rate and rhythm  Abd: soft, non distended, appropriate tenderness to palpation, incisions well-approximated with staples in place, no drainage or erythema  Ext: no edema  MSK: baseline ROM  Neuro: NFD   Psych: appropriate mood and behavior    Recent Lab Results:    Results:        RFP: 5/9/2023 09:32  NA+        Cl-     BUN  /                         145    105    13  /  --------------------------------  Glucose                ---------------------------  87    K+     HCO3-   Creat \                         3.8    31    0.50  \  Calcium : 8.9Anion Gap : 13          Albumin : 3.0 L    Phos : 4.2      Assessment and Plan:   Comorbidities:  ·  Comorbidity Other     Code Status:  ·  Code Status Full Code     Assessment:    57M post op from right hemicolectomy with Dr. Raymundo and AMY pump, regional lymphadenectomy, and cholecystectomy with Dr. Arenas  Developed post-op ileus 5/7, NGT placed with immediate return of 2.9L bilious fluid. Patient now has NG removed and is improving.    Recommend GI soft diet and all PO  medications  Encouraged incentive spirometry, ambulation  Rest of care per primary team  Possible discharge later today vs tomorrow if patient doing well and tolerating diet  Colorectal surgery will continue to follow. Appreciate excellent care per surgical oncology team.    Patient discussed with Dr. Zackary Trent, PGY4  [facility] Colorectal Surgery [id number]        Attestation:   Note Completion:  I am a:  Resident/Fellow   Attending Attestation I reviewed the resident/fellow?s documentation and discussed the patient with the resident/fellow.  I agree with the resident/fellow?s medical  decision making as documented in the note.          Electronic Signatures:  Kareem Trent (Resident))  (Signed 10-May-2023 08:43)   Authored: Service, Subjective Data, Objective Data, Assessment  and Plan, Note Completion  Regina Arriaga)  (Signed 11-May-2023 16:14)   Authored: Note Completion   Co-Signer: Service, Subjective Data, Objective Data, Assessment and Plan, Note Completion      Last Updated: 11-May-2023 16:14 by Regina Arriaga)

## 2023-09-14 NOTE — PROGRESS NOTES
Service: Surgical Oncology     Subjective Data:   JOSE JUAN MARIE is a 57 year old Male who is Hospital Day # 4 and POD #3 for 1. Hepatic artery infusion pump ;2. Regional lymphadenectomy;3. Open cholecystectomy;4. Intraoperative ultrasound with interpretation  of imaging ;5. Peritonectomy.     Patient had an isolated temperature to 38.5 overnight that resolved without additional intervention. Some mild nausea and burping this AM, but has been OOB. No flatus.    Objective Data:     Objective Information:      T   P  R  BP   MAP  SpO2   Value  36  92  20  128/78      93%  Date/Time 5/6 10:16 5/6 10:16 5/6 10:16 5/6 10:16    5/6 10:16  Range  (36C - 38.5C )  (92 - 105 )  (16 - 20 )  (98 - 128 )/ (61 - 78 )    (93% - 96% )   As of 06-May-2023 10:16:00, patient is on 2 L/min of oxygen via nasal cannula.  Highest temp of 38.5 C was recorded at 5/5 22:09      Pain reported at 5/6 8:39: 5 = Moderate    Physical Exam by System:    Constitutional: lying in bed, NAD   Eyes: EOMI. sclerae non-icteric   Respiratory/Thorax: Non labored on 2LNC   Cardiovascular: Regular rate   Gastrointestinal: abdomen soft, mildly distended,  appropriately tender to palpation.  Incisions c/d/i, pump site without erythema   Genitourinary: Almanza in place   Extremities: No edema   Neurological: Grossly intact   Psychological: Appropriate mood and affect   Skin: Warm and dry. No rash or lesion.     Medication:    Medications:          Continuous Medications       --------------------------------    1. HYDROmorphone PCA 15 mg/ NaCL 0.9% 30 mL:  2.6  IV PCA  <Continuous>    2. Lactated Ringers Infusion:  1000  mL  IntraVenous  <Continuous>         Scheduled Medications       --------------------------------    1. Acetaminophen:  650  mg  Oral  Every 6 Hours    2. Docusate:  100  mg  Oral  2 Times a Day    3. Enoxaparin SubCutaneous:  40  mg  SubCutaneous  Every 24 Hours    4. Insulin Glargine (Lantus) Injectable:  20  unit(s)  SubCutaneous  At  Bedtime    5. Insulin Lispro Moderate Corrective Scale:  unit(s)  SubCutaneous  Every 6 Hours    6. Methocarbamol Injectable:  1000  mg  IntraVenous Push  Every 8 Hours    7. Pregabalin:  100  mg  Oral  2 Times a Day    8. Scopolamine TransDermal:  1  patch  TransDermal  Every 72 Hours         PRN Medications       --------------------------------    1. Calcium Carbonate Chewable:  500  mg  Oral  Every 2 Hours    2. Dextrose 50% in Water Injectable:  25  gram(s)  IntraVenous Push  Every 15 Minutes    3. Glucagon Injectable:  1  mg  IntraMuscular  Every 15 Minutes    4. Naloxone Injectable:  0.2  mg  IntraVenous Push  Once    5. Ondansetron Injectable:  4  mg  IntraVenous Push  Every 6 Hours        Recent Lab Results:    Results:    CBC: 5/6/2023 11:57              \     Hgb     /                              \     10.2 L    /  WBC  ----------------  Plt               23.8 H    ----------------    144 L            /     Hct     \                              /     33.2 L    \            RBC: 3.32 L    MCV: 100           RFP: 5/6/2023 11:57  NA+        Cl-     BUN  /                         135 L   98    15  /  --------------------------------  Glucose                ---------------------------  199 H    K+     HCO3-   Creat \                         3.9    25    0.62  \  Calcium : 8.9Anion Gap : 16          Albumin : 3.2 L    Phos : 2.6      Radiology Results:    Results:        Impression:    1. Quantitative shunt detection imaging demonstrating a 2.5 percent  shunt of Tc 99m MAA activity to the lungs.  2. Heterogenous radiotracer activity throughout the right and left  lobe of the liver surrounding a photopenic mass predominantly within  hepatic segment 4B consistent with known hepatic metastasis; no  extrahepatic activity is identified.  3. Expected postsurgical changes including small volume  intraperitoneal fluid and gas.      NM Hepatic Artery Pump Flow [May  5 2023 12:10PM]      Impression:    1.  No  unexpected radiopaque foreign body.     Xray Abdomen AP View [May  3 2023  2:40PM]      Impression:    1. 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 Otherwise unremarkableappearance of abdominal vasculature.  2. Redemonstration of multiple liver lesions consistent with  metastases, better appreciated on MRI liver 03/20/2023.      CT Angio Abdomen [Apr 13 2023  3:16PM]      Assessment and Plan:   Daily Risk Screen:  ·  Does patient have an indwelling urinary catheter? yes   ·  Plan for indwelling urinary catheter removal today? no   ·  The patient continues to require indwelling urinary catheterization for perioperative use for selected surgical procedures     Comorbidities:  ·  Comorbidity Other     Code Status:  ·  Code Status Full Code     Assessment:    Narciso Marrero is a 57-year-old male postop day 1 from AMY pump, regional lymphadenopathy, open cholecystectomy, appendectomy and right hemicolectomy with Dr. Granados.    Neuro   - Continue PCA for pain control, Continue home Lyrica, ambit pump in place  -  Robaxin and Tylenol    CV   - No home meds    Resp  -  encourage IS and OOB, wean O2    GI   - continue clear liquid diet, As needed Zofran  - Pump study completed    /Renal   -Maintain Almanza --> DC  - MIVF at 50, strict I's/O  - Trend electrolytes replete as needed    Heme  - Daily CBC  - Lovenox    ID   - No abx    Endo   - Sliding scale insulin Q6H  - 10u Lantus  - Appreciate endocrinology recs    RNF, anticipate home when medically appropriate    discussed with attending physician Dr. Shaffer.    Alee Dorman PGY2  Surgical Oncology, [id number]        Attestation:   Note Completion:  I am a:  Resident/Fellow   Attending Attestation I saw and evaluated the patient.  I personally obtained the key and critical portions of the history and physical exam or was physically present for key and  critical portions performed by the resident/fellow. I reviewed the resident/fellow?s  documentation and discussed the patient with the resident/fellow.  I agree with the resident/fellow?s medical decision making as documented in the note.     I personally evaluated the patient on 06-May-2023   Comments/ Additional Findings    Distetnded- NGT placed. 3L out over day.          Electronic Signatures:  Alee Dorman (Resident))  (Signed 06-May-2023 13:51)   Authored: Service, Subjective Data, Objective Data, Assessment  and Plan, Note Completion  Wm Shaffer)  (Signed 08-May-2023 14:17)   Authored: Note Completion   Co-Signer: Service, Subjective Data, Objective Data, Assessment and Plan, Note Completion      Last Updated: 08-May-2023 14:17 by Wm Shaffer)

## 2023-09-14 NOTE — PROGRESS NOTES
Service: Colorectal Surgery     Subjective Data:   JOSE JUAN MARIE is a 57 year old Male who is Hospital Day # 4 and POD #3 for 1. Hepatic artery infusion pump ;2. Regional lymphadenectomy;3. Open cholecystectomy;4. Intraoperative ultrasound with interpretation  of imaging ;5. Peritonectomy.    Additional Information:    No acute events overnight. Feels a little nauseous this morning. Hiccuping. Feels distended.    Objective Data:     Objective Information:      T   P  R  BP   MAP  SpO2   Value  36  92  20  128/78      93%  Date/Time 5/6 10:16 5/6 10:16 5/6 10:16 5/6 10:16    5/6 10:16  Range  (36C - 38.5C )  (92 - 105 )  (16 - 20 )  (98 - 128 )/ (61 - 78 )    (93% - 96% )   As of 06-May-2023 10:16:00, patient is on 2 L/min of oxygen via nasal cannula.  Highest temp of 38.5 C was recorded at 5/5 22:09      Pain reported at 5/6 8:39: 5 = Moderate    Physical Exam Narrative:  ·  Physical Exam:    Gen: NAD, resting in bed, A&O x4  Pulm: nonlabored respiratory effort on room air  Cardio: regular heart rate  Abd: soft, mildly distended, incisions well-approximated with staples in place, no drainage or erythema  Ext: no edema  MSK: baseline ROM  Neuro: NFD   Psych: appropriate mood and behavior    Assessment and Plan:   Daily Risk Screen:  ·  Does patient have an indwelling urinary catheter? yes   ·  Plan for indwelling urinary catheter removal today? no    ·  The patient continues to require indwelling urinary catheterization for perioperative use for selected surgical procedures   ·  Does patient have a central line? n/a consulting service      Comorbidities:  ·  Comorbidity Other     Code Status:  ·  Code Status Full Code     Assessment:    57M post op from right hemicolectomy with Dr. Raymundo and AMY pump, regional lymphadenectomy, and cholecystectomy with Dr. Arenas    - Pain control per primary team  - Maintain CLD today, patient still endorsing some burping and hiccuping. Reiterated slowing down oral  intake to prevent nausea.  - Encourage IS use and OOB/ambulation  - Monitor for bowel function  - Recommend riggs removal to minimize risk of UTI  - Rest of care per primary team  - Colorectal surgery will continue to follow. Appreciate excellent care per surgical oncology team.    Patient discussed with Dr. Isaac Mehta MD  PGY-1, General Surgery  Colorectal Surgery [id number]      Attestation:   Note Completion:  I am a:  Resident/Fellow   Attending Attestation I saw and evaluated the patient.  I personally obtained the key and critical portions of the history and physical exam or was physically present for key and  critical portions performed by the resident/fellow. I reviewed the resident/fellow?s documentation and discussed the patient with the resident/fellow.  I agree with the resident/fellow?s medical decision making as documented in the note.     I personally evaluated the patient on 06-May-2023         Electronic Signatures:  Kristina Mehta (Resident))  (Signed 06-May-2023 11:46)   Authored: Service, Subjective Data, Objective Data, Assessment  and Plan, Note Completion  Edouard Gray)  (Signed 07-May-2023 06:49)   Authored: Assessment and Plan, Note Completion   Co-Signer: Service, Subjective Data, Objective Data, Assessment and Plan, Note Completion      Last Updated: 07-May-2023 06:49 by Edouard Gray)

## 2023-09-14 NOTE — H&P
History & Physical Reviewed:   I have reviewed the History and Physical dated:  17-Apr-2023   History and Physical reviewed and relevant findings noted. Patient examined to review pertinent physical  findings.: No significant changes   Home Medications Reviewed: no changes noted   Allergies Reviewed: no changes noted       ERAS (Enhanced Recovery After Surgery):  ·  ERAS Patient: no     Consent:   COVID-19 Consent:  ·  COVID-19 Risk Consent Surgeon has reviewed key risks related to the risk of edilia COVID-19 and if they contract COVID-19 what the risks are.     Attestation:   Note Completion:  I am a:  Resident/Fellow   Attending Attestation I saw and evaluated the patient.  I personally obtained the key and critical portions of the history and physical exam or was physically present for key and  critical portions performed by the resident/fellow. I reviewed the resident/fellow?s documentation and discussed the patient with the resident/fellow.  I agree with the resident/fellow?s medical decision making as documented in the note.     I personally evaluated the patient on 03-May-2023         Electronic Signatures:  Julio Ch (Fellow))  (Signed 03-May-2023 08:15)   Authored: History & Physical Reviewed, ERAS, Consent,  Note Completion  Rajinder Arenas)  (Signed 04-May-2023 09:01)   Authored: Note Completion   Co-Signer: History & Physical Reviewed, ERAS, Consent, Note Completion      Last Updated: 04-May-2023 09:01 by Rajinder Arenas)

## 2023-09-14 NOTE — PROGRESS NOTES
Service: Surgical Oncology     Subjective Data:   JOSE JUAN MARIE is a 57 year old Male who is Hospital Day # 7 and POD #6 for 1. Hepatic artery infusion pump ;2. Regional lymphadenectomy;3. Open cholecystectomy;4. Intraoperative ultrasound with interpretation  of imaging ;5. Peritonectomy.    Additional Information:    No acute events overnight. Nausea improved with placement of NGT with 4850 of bilious output in the past 24 hrs. Unsure if passing flatus, but thinks possible. No  BM. Denies chest pain, shortness of breath.    Objective Data:     Objective Information:      T   P  R  BP   MAP  SpO2   Value  36.5  86  18  129/79      94%  Date/Time 5/9 9:06 5/9 9:06 5/9 9:06 5/9 9:06    5/9 9:06  Range  (36.4C - 37C )  (56 - 97 )  (16 - 18 )  (107 - 149 )/ (59 - 88 )    (91% - 95% )   As of 08-May-2023 19:00:00, patient is on 1 L/min of oxygen via room air.  Highest temp of 37 C was recorded at 5/8 21:04      Pain reported at 5/9 6:01: 4 = Moderate    Physical Exam by System:    Constitutional: lying in bed, NAD   Eyes: EOMI. sclerae non-icteric   Respiratory/Thorax: Non labored on RA   Cardiovascular: Regular rate   Gastrointestinal: abdomen soft, mildly distended,  appropriately tender to palpation.  Incisions closed with staples, c/d/i, pump site closed with sutures without erythema. NGT in place with thin bilious output.   Genitourinary: voiding independently   Extremities: No edema   Neurological: Grossly intact   Psychological: Appropriate mood and affect   Skin: Warm and dry. No rash or lesion.     Medication:    Medications:          Continuous Medications       --------------------------------    1. HYDROmorphone PCA 15 mg/ NaCL 0.9% 30 mL:  2.6  IV PCA  <Continuous>    2. Lactated Ringers Infusion:  1000  mL  IntraVenous  <Continuous>         Scheduled Medications       --------------------------------    1. Enoxaparin SubCutaneous:  40  mg  SubCutaneous  Every 24 Hours    2. Insulin Glargine  (Lantus) Injectable:  25  unit(s)  SubCutaneous  At Bedtime    3. Insulin Lispro Moderate Corrective Scale:  unit(s)  SubCutaneous  Every 6 Hours    4. Lidocaine 2% (UROJET) Topical Gel:  5  mL  Topical  Once    5. Methocarbamol Injectable:  1000  mg  IntraVenous Push  Every 8 Hours    6. Pantoprazole Injectable:  40  mg  IntraVenous Push  Every 24 Hours    7. Scopolamine TransDermal:  1  patch  TransDermal  Every 72 Hours         PRN Medications       --------------------------------    1. Dextrose 50% in Water Injectable:  25  gram(s)  IntraVenous Push  Every 15 Minutes    2. Glucagon Injectable:  1  mg  IntraMuscular  Every 15 Minutes    3. Naloxone Injectable:  0.2  mg  IntraVenous Push  Once    4. Ondansetron Injectable:  4  mg  IntraVenous Push  Every 6 Hours    5. Phenol Topical Spray:  1  spray(s)  Topical  4 Times a Day           Currently Suspended Medications       --------------------------------    1. Pregabalin:  100  mg  Oral  2 Times a Day      Recent Lab Results:    Results:    CBC: 5/7/2023 09:59              \     Hgb     /                              \     12.3 L    /  WBC  ----------------  Plt               24.4 H    ----------------    198              /     Hct     \                              /     39.6 L    \            RBC: 4.05 L    MCV: 98           RFP: 5/7/2023 17:22  NA+        Cl-     BUN  /                         138    101    18  /  --------------------------------  Glucose                ---------------------------  186 H    K+     HCO3-   Creat \                         3.5    29    0.62  \  Calcium : 8.9Anion Gap : 12          Albumin : 2.7 L    Phos : 3.2      Radiology Results:    Results:        Impression:    1. Quantitative shunt detection imaging demonstrating a 2.5 percent  shunt of Tc 99m MAA activity to the lungs.  2. Heterogenous radiotracer activity throughout the right and left  lobe of the liver surrounding a photopenic mass predominantly within  hepatic  segment 4B consistent with known hepatic metastasis; no  extrahepatic activity is identified.  3. Expected postsurgical changes including small volume  intraperitoneal fluid and gas.      NM Hepatic Artery Pump Flow [May  5 2023 12:10PM]      Impression:    1.  No unexpected radiopaque foreign body.     Xray Abdomen AP View [May  3 2023  2:40PM]      Impression:    1. 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 Otherwise unremarkableappearance of abdominal vasculature.  2. Redemonstration of multiple liver lesions consistent with  metastases, better appreciated on MRI liver 03/20/2023.      CT Angio Abdomen [Apr 13 2023  3:16PM]      Assessment and Plan:   Daily Risk Screen:  ·  Does patient have an indwelling urinary catheter? yes   ·  Plan for indwelling urinary catheter removal today? yes     Comorbidities:  ·  Comorbidity Other     Code Status:  ·  Code Status Full Code     Assessment:    Narciso Marrero is a 57-year-old male postop day 1 from AMY pump, regional lymphadenopathy, open cholecystectomy, appendectomy and right hemicolectomy with Dr. Granados.    Neuro   - Continue PCA for pain control, Continue home Lyrica  -  Robaxin and Tylenol    CV   - No home meds  - if tachycardia or shortness of breath-> troponins and EKG    Resp  -  encourage IS and OOB, wean O2    GI  - NPO, NGT placed 5/7 --> gravity trial today  - Pump study completed    /Renal  - passed TOV 5/7  - MIVF at 100, strict I's/O  - replete fluids as needed  - Trend electrolytes, replete as needed    Heme  - Daily CBC  - Lovenox    ID   - No abx    Endo   - Sliding scale insulin Q6H  - 25U glargine  - Appreciate endocrinology recs    RNF, anticipate home when medically appropriate    Seen and discussed with senior Dr. Tomas and attending physician Dr. Arenas.    Alee Dorman PGY2  Surgical Oncology, [id number]        Attestation:   Note Completion:  I am a:  Resident/Fellow   Attending Attestation I saw and evaluated  the patient.  I personally obtained the key and critical portions of the history and physical exam or was physically present for key and  critical portions performed by the resident/fellow. I reviewed the resident/fellow?s documentation and discussed the patient with the resident/fellow.  I agree with the resident/fellow?s medical decision making as documented in the note.     I personally evaluated the patient on 09-May-2023         Electronic Signatures:  Alee Dorman (Resident))  (Signed 09-May-2023 09:27)   Authored: Service, Subjective Data, Objective Data, Assessment  and Plan, Note Completion  Rajinder Arenas)  (Signed 09-May-2023 22:06)   Authored: Note Completion   Co-Signer: Service, Subjective Data, Objective Data, Assessment and Plan, Note Completion      Last Updated: 09-May-2023 22:06 by Rajinder Arenas)

## 2023-09-14 NOTE — PROGRESS NOTES
Service: Endocrinology     Subjective Data:   JOSE JUAN MARIE is a 57 year old Male who is Hospital Day # 9 and POD #8 for 1. Hepatic artery infusion pump ;2. Regional lymphadenectomy;3. Open cholecystectomy;4. Intraoperative ultrasound with interpretation  of imaging ;5. Peritonectomy.    Overnight Events: Patient had an uneventful night.   Additional Information:    pt's glucose at goal this AM. CGM placed by betty colo RN and insulin handout given to pt for discharge recs. follow up scheduled in diabetes center     we reviewed cost of CGM supplies - pt and his wife agree to trial tee samples to decide if $75/month copay is valuable to their home glucose management     ROS- denies CP, SOB, cough, HA, n/v, constipation/diarrhea, depression/anxiety, night sweats, new rash, or change in vision. endorses fatigue, metallic taste in his mouth    Objective Data:     Objective Information:      T   P  R  BP   MAP  SpO2   Value  36  89  18  128/82      94%  Date/Time 5/11 9:49 5/11 9:49 5/11 9:49 5/11 9:49    5/11 9:49  Range  (36C - 36.7C )  (77 - 89 )  (18 - 18 )  (122 - 145 )/ (75 - 85 )    (93% - 96% )      Pain reported at 5/10 21:04: 3 = Mild    Physical Exam Narrative:  ·  Physical Exam:    Constitutional: ill appearing, awake/alert/oriented x3, lying in bed   Eyes: EOMI, clear sclera  ENMT: mucous membranes moist, no apparent injury, no lesions seen  Head/Neck: Neck supple, no apparent injury  Respiratory/Thorax: Patent airways, good chest expansion, thorax symmetric  Cardiovascular: 2+ equal pulses of the extremities  Gastrointestinal: Nondistended, soft, non-tender, no rebound tenderness or guarding  Musculoskeletal: ROM intact, normal strength  Extremities: normal extremities, no edema  Neurological: alert and oriented x3, CN's grossly intact, normal strength  Psychological: appropriate mood and behavior  Skin: Warm and dry, no lesions, no rashes      Medication:    Medications:      CENTRAL NERVOUS  SYSTEM AGENTS:    1. oxyCODONE Immediate Release:  5  mg  Oral  Every 4 Hours   PRN       2. traMADol:  50  mg  Oral  Every 6 Hours   PRN       3. Pregabalin:  100  mg  Oral  2 Times a Day    4. Ondansetron Injectable:  4  mg  IntraVenous Push  Every 6 Hours   PRN       5. Scopolamine TransDermal:  1  patch  TransDermal  Every 72 Hours    6. Methocarbamol:  500  mg  Oral  4 Times a Day      COAGULATION MODIFIERS:    1. Enoxaparin SubCutaneous:  40  mg  SubCutaneous  Every 24 Hours      GASTROINTESTINAL AGENTS:    1. Polyethylene Glycol:  17  gram(s)  Oral  Daily   PRN       2. Pantoprazole:  40  mg  Oral  Daily      METABOLIC AGENTS:    1. Insulin Glargine (Lantus) Injectable:  18  unit(s)  SubCutaneous  At Bedtime    2. Insulin Lispro Mild Corrective Scale:  unit(s)  SubCutaneous  Every 6 Hours    3. Dextrose 50% in Water Injectable:  25  gram(s)  IntraVenous Push  Every 15 Minutes   PRN       4. Glucagon Injectable:  1  mg  IntraMuscular  Every 15 Minutes   PRN         Recent Lab Results:    Results:        RFP: 5/9/2023 09:32  NA+        Cl-     BUN  /                         145    105    13  /  --------------------------------  Glucose                ---------------------------  87    K+     HCO3-   Creat \                         3.8    31    0.50  \  Calcium : 8.9Anion Gap : 13          Albumin : 3.0 L    Phos : 4.2        I have reviewed these laboratory results:    Glucose_POCT  Trending View      Result 11-May-2023 04:57:00  11-May-2023 00:03:00  10-May-2023 17:30:00  10-May-2023 11:37:00  10-May-2023 05:48:00  09-May-2023 23:49:00  09-May-2023 13:31:00    Glucose-POCT 113   H   131   H   143   H   139   H   82   107   H   93        Renal Function Panel  10-May-2023 07:09:00      Result Value    Glucose, Serum  73   L   NA  140    K  3.8    CL  103    Bicarbonate, Serum  27    Anion Gap, Serum  14    BUN  11    CREAT  0.48   L   GFR Male  >90    Calcium, Serum  8.2   L   Phosphorus, Serum  3.3   "  ALB  2.6   L       Assessment and Plan:   Daily Risk Screen:  ·  Does patient have an indwelling urinary catheter? n/a consulting service   ·  Does patient have a central line? n/a consulting service     Code Status:  ·  Code Status Full Code     Assessment:    JOSE JUAN MARIE is a 57 year old Male who presented for hepatic artery infusion pump with Dr Arenas.     Endocrinology consulted for DM2 with hyperglycemia   A1C due for measurement  home regimen: glyburide 5mg BID  managed by: PCP     ASSESSMENT: DM2 with hyperglycemia in setting of post-operative stress  - target glucose <180mg/dL     PLAN:   - continue glargine to 18u qHS  - adjust lispro to mild ssi q6hr   - POCT glucose q6hr  - hypoglycemia protocol   - CGM costs reviewed with pt   - CGM placed and homegoing insulin regimen handout given, met with Brunilda Baron RN for diabetes education    Dispo: pt to d/c home on glargine 18u qHS and lispro mild sliding scale (2u: 50 > 150) with meals TID   - please order insulin glargine/basaglar U-100 insulin pen \"inject 18 units under the skin once every morning\" 60 days = 4 pens  - please order insulin lispro U-100 insulin pen \"inject sliding scale with meals  = 0u, 151-200 = 2u, 201-250 = 4u, 251-300 = 6u, 301-350 = 8u, 351-400+ = 10u, use up to 25u daily\" 60 days = 4 pens   - please order insulin pen needles 32G 4mm for QID injections, 90 days = 400 pen needles  - please order glucose testing supplies for QID glucose measurement, 90 days = 1 glucose meter, 400 lancets and 400 strips  - please order alcohol swabs  - please order a form of glucagon rescue kit such as baqsimi or Gvoke hypopen  ***write in comment section on all supplies ordered: \"substitutions may be made by pharmacist depending on insurance coverage and what the pharmacy has available\"    Will follow up in  diabetes center with Kristina Be CNP 6/28/23 at 1:15 PM  [facility]- [address] [place] [address]        Patient " seen and examined.  Plan communicated to primary team via doc sebastien Flynn PA-C  Please contact me via doc halo with any questions  Please contact endocrinology fellow on-call after 5p and weekends - page [phone]        Attestation:   Note Completion:  Provider/Team Pager # [phone]         Electronic Signatures:  Yenny Flynn (PAC)  (Signed 11-May-2023 16:06)   Authored: Service, Subjective Data, Objective Data, Assessment  and Plan, Note Completion      Last Updated: 11-May-2023 16:06 by Yenny Flynn (PAC)

## 2023-09-14 NOTE — PROGRESS NOTES
Service: Colorectal Surgery     Subjective Data:   JOSE JUAN MARIE is a 57 year old Male who is Hospital Day # 3 and POD #2 for 1. Hepatic artery infusion pump ;2. Regional lymphadenectomy;3. Open cholecystectomy;4. Intraoperative ultrasound with interpretation  of imaging ;5. Peritonectomy.     NAEO. Still has some burping, no nausea. No emesis. Denies flatus or BM. Pain is well controlled. Almanza still in place.    Objective Data:     Objective Information:      T   P  R  BP   MAP  SpO2   Value  37.1  99  18  116/67      93%  Date/Time 5/5 10:57 5/5 10:57 5/5 10:57 5/5 10:57    5/5 10:57  Range  (36.5C - 37.4C )  (94 - 126 )  (16 - 18 )  (89 - 116 )/ (60 - 72 )    (93% - 98% )   As of 05-May-2023 09:00:00, patient is on 2 L/min of oxygen via nasal cannula with humidification.  Highest temp of 37.4 C was recorded at 5/4 18:15      Pain reported at 5/5 9:00: 5 = Moderate    Physical Exam Narrative:  ·  Physical Exam:    Gen: NAD, resting in bed, A&O x4  Pulm: CTAB, no increased WOB  Cardio: RRR, radial pulse 2+  Abd: soft, incisions c/d/i. Pumps removed by anesthesia. ND, no rebound or guarding  Ext: no edema  MSK: baseline ROM  Neuro: NFD     Recent Lab Results:    Results:    CBC: 5/5/2023 11:09              \     Hgb     /                              \     10.3 L    /  WBC  ----------------  Plt               29.2 H    ----------------    129 L            /     Hct     \                              /     31.3 L    \            RBC: 3.28 L    MCV: 95           CMP: 5/4/2023 16:25  NA+        Cl-     BUN  /                         134 L   98    21  /  --------------------------------  Glucose                ---------------------------  299 H    K+     HCO3-   Creat \                         4.7    24    1.31 H \           \  T Bili  /                    \  0.9  /  AST  x ---- x ALT        84 Hx ---- x 133 H         /  Alk P   \               /  117  \  Calcium : 9.0     Anion Gap : 17     Albumin :  3.6     T Protein : 5.9 L          Assessment and Plan:   Daily Risk Screen:  ·  Does patient have an indwelling urinary catheter? n/a consulting service      Comorbidities:  ·  Comorbidity Other     Code Status:  ·  Code Status Full Code     Assessment:    57M post op from right hemicolectomy with Dr. Raymundo and AMY pump, regional lymphadenectomy, and cholecystectomy with Dr. Arenas    - Pain control per primary team  - Maintain CLD today, patient still endorsing some burping and hiccuping. Discussed with patient about slowing down oral intake to prevent nausea  - Encourage IS use and OOB/ambulation  - Monitor for bowel function  - Nausea control per primary  - Rest of care per primary team  - Colorectal surgery will continue to follow    Patient seen and discussed with Dr. Zackary Montoya MD  PGY-1, General Surgery  Colorectal Surgery [id number]      Attestation:   Note Completion:  I am a:  Resident/Fellow   Attending Attestation I saw and evaluated the patient.  I personally obtained the key and critical portions of the history and physical exam or was physically present for key and  critical portions performed by the resident/fellow. I reviewed the resident/fellow?s documentation and discussed the patient with the resident/fellow.  I agree with the resident/fellow?s medical decision making as documented in the note.     I personally evaluated the patient on 05-May-2023         Electronic Signatures:  Laney Montoya (Resident))  (Signed 05-May-2023 13:10)   Authored: Service, Subjective Data, Objective Data, Assessment  and Plan, Note Completion  Regina Arriaga)  (Signed 05-May-2023 15:15)   Authored: Assessment and Plan, Note Completion   Co-Signer: Service, Subjective Data, Objective Data, Assessment and Plan, Note Completion      Last Updated: 05-May-2023 15:15 by Regina Arriaga)

## 2023-09-14 NOTE — PROGRESS NOTES
Service: Anesthesia - Pain     Subjective Data:   JOSE JUAN MARIE is a 57 year old Male who is Hospital Day # 2 and POD #1 for 1. Hepatic artery infusion pump ;2. Regional lymphadenectomy;3. Open cholecystectomy;4. Intraoperative ultrasound with interpretation  of imaging ;5. Peritonectomy.    Additional Information:    Postop Pain HPI -   Palliative: relieved with IV analgesics and regional local anesthetics  Provocative: movement  Quality:  burning and aching  Radiation:  none  Severity: 6/10  Timing: constant    24-HOUR OPIOID CONSUMPTION:  Dilaudid 0.8mg  Dilaudid PCA 8.2mg  Pregabalin 100mg    Objective Data:     Objective Information:      T   P  R  BP   MAP  SpO2   Value  36.9  116  16  104/72      94%  Date/Time 5/4 6:00 5/4 6:00 5/4 6:00 5/4 6:00    5/4 6:00  Range  (36.6C - 36.9C )  (92 - 116 )  (16 - 18 )  (101 - 118 )/ (63 - 76 )    (94% - 98% )   As of 03-May-2023 20:17:00, patient is on 2 L/min of oxygen via nasal cannula.  Highest temp of 36.9 C was recorded at 5/4 6:00      Pain reported at 5/4 5:54: 6 = Moderate    ---- Intake and Output  -----  Mn/Dy/Year Time  Intake   Output  Net  May 4, 2023 6:00 am  1532   200  1332  May 3, 2023 10:00 pm  [phone] [id number]    The Intake and Output Totals for the last 24 hours are:      Intake   Output  Net      [phone]    Physical Exam Narrative:  ·  Physical Exam:    Physical Exam:  Constitutional:  no distress, alert and cooperative  Eyes: clear sclera  Head/Neck: No apparent injury, trachea midline  Respiratory/Thorax: Patent airways, thorax symmetric, breathing comfortably  Cardiovascular: no pitting edema  Gastrointestinal: Nondistended  Musculoskeletal: ROM intact  Extremities: no clubbing  Neurological: alert, cabral x4  Psychological: Appropriate affect      Recent Lab Results:    Results:        CMP: 5/3/2023 15:06  NA+        Cl-     BUN  /                         137    106    17  /  --------------------------------  Glucose                 ---------------------------  265 H    K+     HCO3-   Creat \                         3.5    23    0.74  \           \  T Bili  /                    \  0.7  /  AST  x ---- x ALT        140 Hx ---- x 145 H         /  Alk P   \               /  114  \  Calcium : 7.9 L    Anion Gap : 12     Albumin : 3.2 L     T Protein : 4.8 L            ---------- Recent Arterial Blood Gas Results----------     5/3/2023 15:13  pO2 79  24 h range: ( 79 - 154 )  pH 7.30  24 h range: ( 7.28 - 7.32 )  pCO2 41  24 h range: ( 41 - 44 )  SO2 95  24 h range: ( 95 - 98 )  Base Excess -5.9  24 h range: ( -5.9 - -4.8 )null    Assessment and Plan:   Daily Risk Screen:  ·  Does patient have an indwelling urinary catheter? n/a consulting service     Comorbidities:  ·  Comorbidity Other     Code Status:  ·  Code Status Full Code     Assessment:    JOSE JUAN MARIE is a 57 year old Male who presents for hepatic artery infusion pump with Dr Arenas. Acute Pain consulted for block for postoperative pain control.     Plan:  - Bilateral quadratus lumborum blocks preoperatively with catheters placed on 5/3/23 and Ambit pump with Ropivacaine 0.2%/NaCl 0.9% 500mL, Rate 14 cc/hr, will refill today  - Ambit pump medication will not interfere with pain medication prescribed by primary team  - No Lidoderm patches while catheters in place  - Acute pain service will follow while catheters in place  - Continue Tylenol and Robaxin  - Consider adding Toradol     Acute Pain Service Resident  pg [phone] ph [phone]    Attestation:   Note Completion:  I am a:  Resident/Fellow   Attending Attestation I saw and evaluated the patient.  I personally obtained the key and critical portions of the history and physical exam or was physically present for key and  critical portions performed by the resident/fellow. I reviewed the resident/fellow?s documentation and discussed the patient with the resident/fellow.  I agree with the resident/fellow?s medical decision making  as documented in the note.     I personally evaluated the patient on 04-May-2023         Electronic Signatures:  Gwen Cobb)  (Signed 04-May-2023 13:30)   Authored: Assessment and Plan, Note Completion   Co-Signer: Subjective Data, Assessment and Plan, Note Completion  Keeley Modi (Resident))  (Signed 04-May-2023 10:22)   Authored: Service, Subjective Data, Objective Data, Assessment  and Plan, Note Completion      Last Updated: 04-May-2023 13:30 by Gwen Cobb)

## 2023-09-14 NOTE — PROGRESS NOTES
Service: Colorectal Surgery     Subjective Data:   JOSE JUAN MARIE is a 57 year old Male who is Hospital Day # 7 and POD #6 for 1. Hepatic artery infusion pump ;2. Regional lymphadenectomy;3. Open cholecystectomy;4. Intraoperative ultrasound with interpretation  of imaging ;5. Peritonectomy.     Patient slightly hesitant to remove NG. Wishes to finish gravity trial prior to removal. Denies nausea.    Objective Data:     Objective Information:      T   P  R  BP   MAP  SpO2   Value  36.5  86  18  129/79      94%  Date/Time 5/9 9:06 5/9 9:06 5/9 9:06 5/9 9:06 5/9 9:06  Range  (36.4C - 37C )  (56 - 97 )  (16 - 18 )  (107 - 149 )/ (59 - 88 )    (91% - 95% )   As of 08-May-2023 19:00:00, patient is on 1 L/min of oxygen via room air.  Highest temp of 37 C was recorded at 5/8 21:04      Pain reported at 5/9 6:01: 4 = Moderate    Physical Exam Narrative:  ·  Physical Exam:    Gen: NAD, resting in bed, A&O x4  HEENT: NGT in place with minimal output  Pulm: nonlabored respiratory effort on room air  Cardio: regular rate and rhythm  Abd: soft, distended, incisions well-approximated with staples in place, no drainage or erythema  Ext: no edema  MSK: baseline ROM  Neuro: NFD   Psych: appropriate mood and behavior    Assessment and Plan:   Comorbidities:  ·  Comorbidity Other     Code Status:  ·  Code Status Full Code     Assessment:    57M post op from right hemicolectomy with Dr. Raymundo and AMY pump, regional lymphadenectomy, and cholecystectomy with Dr. Arenas  Developed post-op ileus 5/7, NGT placed with immediate return of 2.9L bilious fluid. Output has slowed down since the initial placement however.    Recommend NPO with sips and chips once NG removed, IVF hydration per primary  NG tube placed on gravity trial today for 4 hours. Will follow up patient toleration and output once placed back to suction.  If minimal output and patient tolerates then he can have the NG tube removed.   Encouraged incentive  spirometry, ambulation  Rest of care per primary team  Colorectal surgery will continue to follow. Appreciate excellent care per surgical oncology team.    Patient discussed with Dr. Zackary Trent, PGY4  [facility] Colorectal Surgery [id number]        Attestation:   Note Completion:  I am a:  Resident/Fellow   Attending Attestation I saw and evaluated the patient.  I personally obtained the key and critical portions of the history and physical exam or was physically present for key and  critical portions performed by the resident/fellow. I reviewed the resident/fellow?s documentation and discussed the patient with the resident/fellow.  I agree with the resident/fellow?s medical decision making as documented in the note.     I personally evaluated the patient on 09-May-2023         Electronic Signatures:  Kareem Trent (Resident))  (Signed 09-May-2023 10:29)   Authored: Service, Subjective Data, Objective Data, Assessment  and Plan, Note Completion  Regina Arrigaa)  (Signed 10-May-2023 07:38)   Authored: Note Completion   Co-Signer: Service, Subjective Data, Objective Data, Assessment and Plan, Note Completion      Last Updated: 10-May-2023 07:38 by Regina Arriaga)

## 2023-09-14 NOTE — PROGRESS NOTES
Service: Anesthesia - Pain     Subjective Data:   JOSE JUAN MARIE is a 57 year old Male who is Hospital Day # 3 and POD #2 for 1. Hepatic artery infusion pump ;2. Regional lymphadenectomy;3. Open cholecystectomy;4. Intraoperative ultrasound with interpretation  of imaging ;5. Peritonectomy.    Additional Information:    Postop Pain HPI -   Palliative: relieved with IV analgesics and regional local anesthetics  Provocative: movement  Quality:  burning and aching  Radiation:  none  Severity: 6/10  Timing: constant    24-HOUR OPIOID CONSUMPTION: PCA - 3.6mg    Non-narcotics: Tylenol, Robaxin, lyrica      Objective Data:     Objective Information:      T   P  R  BP   MAP  SpO2   Value  37.2  97  18  105/68      96%  Date/Time 5/5 3:53 5/5 3:53 5/5 3:53 5/5 3:53    5/5 3:53  Range  (36.5C - 37.4C )  (94 - 126 )  (16 - 18 )  (89 - 114 )/ (60 - 72 )    (93% - 98% )   As of 04-May-2023 19:45:00, patient is on 2 L/min of oxygen via nasal cannula with humidification.  Highest temp of 37.4 C was recorded at 5/4 18:15      Pain reported at 5/5 3:53: 5 = Moderate    Physical Exam Narrative:  ·  Physical Exam:    Physical Exam:  Constitutional:  no distress, alert and cooperative  Eyes: clear sclera  Head/Neck: No apparent injury, trachea midline  Respiratory/Thorax: Patent airways, thorax symmetric, breathing comfortably  Cardiovascular: no pitting edema  Gastrointestinal: Nondistended  Musculoskeletal: ROM intact  Extremities: no clubbing  Neurological: alert, cabral x4  Psychological: Appropriate affect      Recent Lab Results:    Results:    CBC: 5/4/2023 16:25              \     Hgb     /                              \     12.8 L    /  WBC  ----------------  Plt               35.4 H    ----------------    183              /     Hct     \                              /     41.3       \            RBC: 4.17 L    MCV: 99           CMP: 5/4/2023 16:25  NA+        Cl-     BUN  /                         134 L   98    21   /  --------------------------------  Glucose                ---------------------------  299 H    K+     HCO3-   Creat \                         4.7    24    1.31 H \           \  T Bili  /                    \  0.9  /  AST  x ---- x ALT        84 Hx ---- x 133 H         /  Alk P   \               /  117  \  Calcium : 9.0     Anion Gap : 17     Albumin : 3.6     T Protein : 5.9 L          Assessment and Plan:   Daily Risk Screen:  ·  Does patient have an indwelling urinary catheter? n/a consulting service   ·  Does patient have a central line? n/a consulting service      Comorbidities:  ·  Comorbidity Other     Code Status:  ·  Code Status Full Code     Assessment:    JOSE JUAN MARIE is a 57 year old Male who presents for hepatic artery infusion pump with Dr Arenas. Acute Pain consulted for block for postoperative pain control.     Plan:  - Bilateral quadratus lumborum blocks preoperatively with catheters placed on 5/3/23 and Ambit pump with Ropivacaine 0.2%/NaCl 0.9% 500mL, Rate 14 cc/hr, administered 5cc ropivacaine 0.5% bolus each  side and catheters removed after bolus on 5/5  - Acute pain service will sign off    Acute Pain Service Resident  pg [phone] ph [phone]    Attestation:   Note Completion:  I am a:  Resident/Fellow   Attending Attestation I saw and evaluated the patient.  I personally obtained the key and critical portions of the history and physical exam or was physically present for key and  critical portions performed by the resident/fellow. I reviewed the resident/fellow?s documentation and discussed the patient with the resident/fellow.  I agree with the resident/fellow?s medical decision making as documented in the note.     I personally evaluated the patient on 05-May-2023         Electronic Signatures:  Delmi Alanis)  (Signed 05-May-2023 12:56)   Authored: Assessment and Plan, Note Completion   Co-Signer: Subjective Data, Objective Data, Assessment and Plan, Note Completion  Domenico Rubin  (MD (Resident))  (Signed 05-May-2023 06:21)   Authored: Service, Subjective Data, Objective Data, Assessment  and Plan  Keeley Modi (MD (Resident))  (Signed 05-May-2023 09:26)   Authored: Subjective Data, Objective Data, Assessment  and Plan, Note Completion      Last Updated: 05-May-2023 12:56 by Delmi Alanis)

## 2023-09-14 NOTE — PROGRESS NOTES
Service: Surgical Oncology     Subjective Data:   JOSE JUAN MARIE is a 57 year old Male who is Hospital Day # 8 and POD #7 for 1. Hepatic artery infusion pump ;2. Regional lymphadenectomy;3. Open cholecystectomy;4. Intraoperative ultrasound with interpretation  of imaging ;5. Peritonectomy.    Additional Information:    No acute events overnight. Patient tolerating CLD without nausea or vomiting.  Continuing to pass flatus and having bowel movements.     Objective Data:     Objective Information:      T   P  R  BP   MAP  SpO2   Value  36.6  81  18  124/75      96%  Date/Time 5/10 13:32 5/10 13:32 5/10 13:32 5/10 13:32    5/10 13:32  Range  (36.2C - 37C )  (77 - 87 )  (17 - 18 )  (124 - 149 )/ (75 - 88 )    (93% - 96% )  Highest temp of 37 C was recorded at  21:33      Pain reported at 5/10 6:00: 4 = Moderate    Physical Exam by System:    Constitutional: lying in bed, NAD   Eyes: EOMI. sclerae non-icteric   Respiratory/Thorax: Non labored on RA   Cardiovascular: Regular rate   Gastrointestinal: abdomen soft, mildly distended,  appropriately tender to palpation.  Incisions closed with staples, c/d/i, pump site closed with sutures without erythema.   Genitourinary: voiding independently   Extremities: No edema   Neurological: Grossly intact   Psychological: Appropriate mood and affect   Skin: Warm and dry. No rash or lesion.     Medication:    Medications:          Continuous Medications       --------------------------------    1. Lactated Ringers Infusion:  1000  mL  IntraVenous  <Continuous>         Scheduled Medications       --------------------------------    1. Docusate 50 mg - Senna 8.6 m  tablet(s)  Oral  2 Times a Day    2. Enoxaparin SubCutaneous:  40  mg  SubCutaneous  Every 24 Hours    3. Insulin Glargine (Lantus) Injectable:  18  unit(s)  SubCutaneous  At Bedtime    4. Insulin Lispro Mild Corrective Scale:  unit(s)  SubCutaneous  Every 6 Hours    5. Methocarbamol:  500  mg  Oral  4 Times  a Day    6. Pantoprazole:  40  mg  Oral  Daily    7. Pregabalin:  100  mg  Oral  2 Times a Day    8. Scopolamine TransDermal:  1  patch  TransDermal  Every 72 Hours         PRN Medications       --------------------------------    1. Dextrose 50% in Water Injectable:  25  gram(s)  IntraVenous Push  Every 15 Minutes    2. Glucagon Injectable:  1  mg  IntraMuscular  Every 15 Minutes    3. Ondansetron Injectable:  4  mg  IntraVenous Push  Every 6 Hours    4. oxyCODONE Immediate Release:  5  mg  Oral  Every 4 Hours    5. Polyethylene Glycol:  17  gram(s)  Oral  Daily    6. traMADol:  50  mg  Oral  Every 6 Hours        Recent Lab Results:    Results:        RFP: 5/10/2023 07:09  NA+        Cl-     BUN  /                         140    103    11  /  --------------------------------  Glucose                ---------------------------  73 L    K+     HCO3-   Creat \                         3.8    27    0.48 L \  Calcium : 8.2 LAnion Gap : 14          Albumin : 2.6 L     Phos : 3.3      Radiology Results:    Results:        Impression:    1. Quantitative shunt detection imaging demonstrating a 2.5 percent  shunt of Tc 99m MAA activity to the lungs.  2. Heterogenous radiotracer activity throughout the right and left  lobe of the liver surrounding a photopenic mass predominantly within  hepatic segment 4B consistent with known hepatic metastasis; no  extrahepatic activity is identified.  3. Expected postsurgical changes including small volume  intraperitoneal fluid and gas.      NM Hepatic Artery Pump Flow [May  5 2023 12:10PM]      Impression:    1.  No unexpected radiopaque foreign body.     Xray Abdomen AP View [May  3 2023  2:40PM]      Impression:    1. 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 Otherwise unremarkableappearance of abdominal vasculature.  2. Redemonstration of multiple liver lesions consistent with  metastases, better appreciated on MRI liver 03/20/2023.      CT Angio  Abdomen [Apr 13 2023  3:16PM]      Assessment and Plan:   Daily Risk Screen:  ·  Does patient have an indwelling urinary catheter? yes   ·  Plan for indwelling urinary catheter removal today? yes     Comorbidities:  ·  Comorbidity Other     Code Status:  ·  Code Status Full Code     Assessment:    Narciso Marrero is a 57-year-old male postop day 1 from AMY pump, regional lymphadenopathy, open cholecystectomy, appendectomy and right hemicolectomy with Dr. Granados.    Neuro   - Transition to PO pain meds, Continue home Lyrica  -  Robaxin and Tylenol    CV   - No home meds  - if tachycardia or shortness of breath-> troponins and EKG    Resp  -  encourage IS and OOB, wean O2    GI  - Advance to soft diet  - Pump study completed    /Renal  - passed TOV 5/7  - HLIV, strict I's/O  - replete fluids as needed  - Trend electrolytes, replete as needed    Heme  - Daily CBC  - Lovenox    ID   - No abx    Endo   - mild sliding scale insulin Q6H  - 18U glargine  - Appreciate endocrinology recs    RNF, anticipate home when medically appropriate    Seen and discussed with senior Dr. Tomas and attending physician Dr. Arenas.    May Al, PGY1  Surgical Oncology, [id number]        Attestation:   Note Completion:  I am a:  Resident/Fellow   Attending Attestation I reviewed the resident/fellow?s documentation and discussed the patient with the resident/fellow.  I agree with the resident/fellow?s medical  decision making as documented in the note.          Electronic Signatures:  Rajinder Arenas)  (Signed 11-May-2023 12:40)   Authored: Note Completion   Co-Signer: Service, Subjective Data, Objective Data, Assessment and Plan, Note Completion  May Al (Resident))  (Signed 10-May-2023 17:18)   Authored: Service, Subjective Data, Objective Data, Assessment  and Plan, Note Completion      Last Updated: 11-May-2023 12:40 by Rajinder Arenas)

## 2023-09-14 NOTE — PROGRESS NOTES
Service: Surgical Oncology     Subjective Data:   JOSE JUAN MARIE is a 57 year old Male who is Hospital Day # 3 and POD #2 for 1. Hepatic artery infusion pump ;2. Regional lymphadenectomy;3. Open cholecystectomy;4. Intraoperative ultrasound with interpretation  of imaging ;5. Peritonectomy.     1L bolus overnight for oliguria. Pain controlled. Mild nausea no flatus.    Objective Data:     Objective Information:      T   P  R  BP   MAP  SpO2   Value  37.1  99  18  116/67      93%  Date/Time 5/5 10:57 5/5 10:57 5/5 10:57 5/5 10:57    5/5 10:57  Range  (36.5C - 37.4C )  (94 - 126 )  (16 - 18 )  (89 - 116 )/ (60 - 72 )    (93% - 98% )   As of 05-May-2023 09:00:00, patient is on 2 L/min of oxygen via nasal cannula with humidification.  Highest temp of 37.4 C was recorded at 5/4 18:15      Pain reported at 5/5 9:00: 5 = Moderate    Physical Exam by System:    Constitutional: lying in bed, NAD   Eyes: EOMI. sclerae non-icteric   Respiratory/Thorax: Non labored on 2LNC   Cardiovascular: Regular rate   Gastrointestinal: abdomen soft, mildly distended,  appropriately tender to palpation.  Incisions c/d/i   Genitourinary: Almanza in place   Extremities: No edema   Neurological: Grossly intact   Psychological: Appropriate mood and affect   Skin: Warm and dry. No rash or lesion.     Recent Lab Results:    Results:    CBC: 5/4/2023 16:25              \     Hgb     /                              \     12.8 L    /  WBC  ----------------  Plt               35.4 H    ----------------    183              /     Hct     \                              /     41.3       \            RBC: 4.17 L    MCV: 99           CMP: 5/4/2023 16:25  NA+        Cl-     BUN  /                         134 L   98    21  /  --------------------------------  Glucose                ---------------------------  299 H    K+     HCO3-   Creat \                         4.7    24    1.31 H \           \  T Bili  /                    \  0.9  /  AST  x  ---- x ALT        84 Hx ---- x 133 H         /  Alk P   \               /  117  \  Calcium : 9.0     Anion Gap : 17     Albumin : 3.6     T Protein : 5.9 L          Assessment and Plan:   Daily Risk Screen:  ·  Does patient have an indwelling urinary catheter? yes   ·  Plan for indwelling urinary catheter removal today? no   ·  The patient continues to require indwelling urinary catheterization for perioperative use for selected surgical procedures     Comorbidities:  ·  Comorbidity Other     Code Status:  ·  Code Status Full Code     Assessment:    Narciso Marrero is a 57-year-old male postop day 1 from AMY pump, regional lymphadenopathy, open cholecystectomy, appendectomy and right hemicolectomy with Dr. Granados.    Neuro   - Continue PCA for pain control, Continue home Lyrica, ambit pump in place  -  Robaxin and Tylenol    CV   - No home meds    Resp  -  encourage IS and OOB, wean O2    GI   - continue clear liquid diet, As needed Zofran  - Pump study completed    /Renal   -Maintain Almanza - consider dc at midnight  - MIVF at 50, strict I's/O  - Trend electrolytes replete as needed    Heme  - Daily CBC  - Lovenox    ID   - No abx    Endo   - Sliding scale insulin Q6H  - 10u Lantus  - Appreciate endocrinology recs    RNF, anticipate home when medically appropriate    discussed with attending physician Dr. Shaffer.    China Tomas MD  General Surgery, PGY5  Surgical Oncology, [id number]        Attestation:   Note Completion:  I am a:  Resident/Fellow   Attending Attestation I saw and evaluated the patient.  I personally obtained the key and critical portions of the history and physical exam or was physically present for key and  critical portions performed by the resident/fellow. I reviewed the resident/fellow?s documentation and discussed the patient with the resident/fellow.  I agree with the resident/fellow?s medical decision making as documented in the note.     I personally evaluated the patient on  05-May-2023   Comments/ Additional Findings    A little bit of ileus, doing fine otherwise. Likely will need a few more days. He did have a fever and a high wbc, so watching closely.          Electronic Signatures:  Khushbu Tomas (Resident))  (Signed 05-May-2023 13:07)   Authored: Service, Subjective Data, Objective Data, Assessment  and Plan, Note Completion  Wm Shaffer)  (Signed 06-May-2023 10:31)   Authored: Note Completion   Co-Signer: Subjective Data, Objective Data, Assessment and Plan, Note Completion      Last Updated: 06-May-2023 10:31 by Wm Shaffer)

## 2023-09-14 NOTE — PROGRESS NOTES
Service: Colorectal Surgery     Subjective Data:   JOSE JUAN MARIE is a 57 year old Male who is Hospital Day # 6 and POD #5 for 1. Hepatic artery infusion pump ;2. Regional lymphadenectomy;3. Open cholecystectomy;4. Intraoperative ultrasound with interpretation  of imaging ;5. Peritonectomy.     Patient feels better with NG in place. Still having hiccups.    Objective Data:     Objective Information:      T   P  R  BP   MAP  SpO2   Value  36.5  88  16  124/79      94%  Date/Time 5/8 9:33 5/8 9:33 5/8 9:33 5/8 9:33    5/8 9:33  Range  (36C - 37.1C )  (84 - 121 )  (16 - 18 )  (107 - 145 )/ (65 - 84 )    (89% - 98% )   As of 08-May-2023 06:30:00, patient is on 1 L/min of oxygen via nasal cannula with humidification.  Highest temp of 37.1 C was recorded at 5/7 22:06      Pain reported at 5/8 8:36: 4 = Moderate    Physical Exam Narrative:  ·  Physical Exam:    Gen: NAD, resting in bed, A&O x4  HEENT: NGT in place  Pulm: nonlabored respiratory effort on room air  Cardio: regular rate and rhythm  Abd: soft, distended, incisions well-approximated with staples in place, no drainage or erythema  Ext: no edema  MSK: baseline ROM  Neuro: NFD   Psych: appropriate mood and behavior    Recent Lab Results:    Results:    CBC: 5/8/2023 08:31              \     Hgb     /                              \     9.0 L    /  WBC  ----------------  Plt               10.9       ----------------    137 L            /     Hct     \                              /     29.7 L    \            RBC: 2.97 L    MCV: 100           RFP: 5/8/2023 08:31  NA+        Cl-     BUN  /                         143    104    16  /  --------------------------------  Glucose                ---------------------------  138 H    K+     HCO3-   Creat \                         3.8    26    0.52  \  Calcium : 8.8Anion Gap : 17          Albumin : 3.0 L    Phos : 3.1      Assessment and Plan:   Comorbidities:  ·  Comorbidity Other     Code Status:  ·  Code  Status Full Code     Assessment:    57M post op from right hemicolectomy with Dr. Raymundo and AMY pump, regional lymphadenectomy, and cholecystectomy with Dr. Arenas  Developed post-op ileus 5/7, NGT placed with immediate return of 2.9L bilious fluid. Over 4L of output in last 24 hours.    Recommend NPO, IVF hydration, NGT drainage until resolution of ileus. Agree with fluid resuscitation for high volume nasogastric suction  Encouraged incentive spirometry, ambulation  Rest of care per primary team  Colorectal surgery will continue to follow. Appreciate excellent care per surgical oncology team.    Patient discussed with Dr. Isaac Trent, PGY4  [facility] Colorectal Surgery [id number]        Attestation:   Note Completion:  I am a:  Resident/Fellow   Attending Attestation I saw and evaluated the patient.  I personally obtained the key and critical portions of the history and physical exam or was physically present for key and  critical portions performed by the resident/fellow. I reviewed the resident/fellow?s documentation and discussed the patient with the resident/fellow.  I agree with the resident/fellow?s medical decision making as documented in the note.     I personally evaluated the patient on 08-May-2023         Electronic Signatures:  Kareem Trent (Resident))  (Signed 08-May-2023 11:30)   Authored: Service, Subjective Data, Objective Data, Assessment  and Plan, Note Completion  Regina Arriaga)  (Signed 08-May-2023 12:51)   Authored: Note Completion   Co-Signer: Service, Subjective Data, Objective Data, Assessment and Plan, Note Completion      Last Updated: 08-May-2023 12:51 by Regina Arriaga)

## 2023-09-14 NOTE — PROGRESS NOTES
Service: Colorectal Surgery     Subjective Data:   JOSE JUAN MARIE is a 57 year old Male who is Hospital Day # 2 and POD #1 for 1. Hepatic artery infusion pump ;2. Regional lymphadenectomy;3. Open cholecystectomy;4. Intraoperative ultrasound with interpretation  of imaging ;5. Peritonectomy.     NAEO. Endorses nausea with burping. No emesis. Denies flatus or BM. Pain is poorly controlled at this time.    Objective Data:     Objective Information:      T   P  R  BP   MAP  SpO2   Value  36.9  116  16  104/72      94%  Date/Time 5/4 6:00 5/4 6:00 5/4 6:00 5/4 6:00    5/4 6:00  Range  (36.6C - 36.9C )  (92 - 116 )  (16 - 18 )  (101 - 118 )/ (63 - 76 )    (94% - 98% )   As of 04-May-2023 07:49:00, patient is on 2 L/min of oxygen via nasal cannula.  Highest temp of 36.9 C was recorded at 5/4 6:00      Pain reported at 5/4 7:49: 7 = Severe    Physical Exam Narrative:  ·  Physical Exam:    Gen: NAD, resting in bed, A&O x4  Pulm: CTAB, no increased WOB  Cardio: RRR, radial pulse 2+  Abd: soft, incisional TTP, incisions with some mild strikethrough, pump in place, ND, no rebound or guarding  Ext: no edema  MSK: baseline ROM  Neuro: NFD     Recent Lab Results:    Results:    CBC: 5/4/2023 07:48              \     Hgb     /                              \     13.6       /  WBC  ----------------  Plt               35.2 H    ----------------    198              /     Hct     \                              /     44.1       \            RBC: 4.39 L    MCV: 100           CMP: 5/4/2023 07:43  NA+        Cl-     BUN  /                         138    99    15  /  --------------------------------  Glucose                ---------------------------  328 H    K+     HCO3-   Creat \                         4.7    25    1.02  \           \  T Bili  /                    \  1.1  /  AST  x ---- x ALT        106 Hx ---- x 150 H         /  Alk P   \               /  136 H \  Calcium : 9.4     Anion Gap : 19     Albumin : 3.9     T  Protein : 6.3 L            ---------- Recent Arterial Blood Gas Results----------     5/3/2023 15:13  pO2 79  24 h range: ( 79 - 154 )  pH 7.30  24 h range: ( 7.28 - 7.3 )  pCO2 41  24 h range: ( 41 - 44 )  SO2 95  24 h range: ( 95 - 98 )  Base Excess -5.9  24 h range: ( -5.9 - -5.1 )null    Assessment and Plan:   Daily Risk Screen:  ·  Does patient have an indwelling urinary catheter? n/a consulting service   ·  Does patient have a central line? n/a consulting service      Comorbidities:  ·  Comorbidity Other     Code Status:  ·  Code Status Full Code     Assessment:    57M post op from right hemicolectomy with Dr. Raymundo and AMY pump, regional lymphadenectomy, and cholecystectomy with Dr. Arenas    - Will discuss pain control with primary team  - Maintain CLD today. Discussed with patient about slowing down oral intake to prevent nausea  - Encourage IS use and OOB/ambulation today  - Monitor for bowel function  - Nausea control per primary  - Discussed with attending    Note requires attending review.    Kareem Trent, PGY4  Surgical Resident  On [facility].    Attestation:   Note Completion:  I am a:  Resident/Fellow   Attending Attestation I saw and evaluated the patient.  I personally obtained the key and critical portions of the history and physical exam or was physically present for key and  critical portions performed by the resident/fellow. I reviewed the resident/fellow?s documentation and discussed the patient with the resident/fellow.  I agree with the resident/fellow?s medical decision making as documented in the note.     I personally evaluated the patient on 04-May-2023         Electronic Signatures:  Kareem Trent (Resident))  (Signed 04-May-2023 10:10)   Authored: Service, Subjective Data, Objective Data, Assessment  and Plan, Note Completion  Regina Arriaga)  (Signed 05-May-2023 07:05)   Authored: Assessment and Plan, Note Completion   Co-Signer: Service, Subjective Data, Objective  Data, Assessment and Plan, Note Completion      Last Updated: 05-May-2023 07:05 by Regina Arriaga)

## 2023-09-14 NOTE — PROGRESS NOTES
Service: Endocrinology     Subjective Data:   JOSE JUAN MARIE is a 57 year old Male who is Hospital Day # 7 and POD #6 for 1. Hepatic artery infusion pump ;2. Regional lymphadenectomy;3. Open cholecystectomy;4. Intraoperative ultrasound with interpretation  of imaging ;5. Peritonectomy.    Overnight Events: Patient had an uneventful night.   Additional Information:    pt's glucose at goal this AM. pt informed of plan to decrease glargine at HS.     we reviewed cost of CGM supplies - pt and his wife agree to trial 1 month of tee samples to decide if $75/month copay is valuable to their home glucose management     ROS- denies CP, SOB, cough, HA, n/v, constipation/diarrhea, depression/anxiety, night sweats, new rash, or change in vision. endorses fatigue, metallic taste in his mouth    Objective Data:     Objective Information:      T   P  R  BP   MAP  SpO2   Value  36.2  84  17  144/87      95%  Date/Time 5/9 13:26 5/9 13:26 5/9 13:26 5/9 13:26    5/9 13:26  Range  (36.2C - 37C )  (56 - 97 )  (16 - 18 )  (107 - 149 )/ (59 - 88 )    (91% - 95% )   As of 08-May-2023 19:00:00, patient is on 1 L/min of oxygen via room air.  Highest temp of 37 C was recorded at 5/8 21:04      Pain reported at 5/9 6:01: 4 = Moderate    Physical Exam Narrative:  ·  Physical Exam:    Constitutional: ill appearing, awake/alert/oriented x3, lying in bed   Eyes: EOMI, clear sclera  ENMT: mucous membranes moist, no apparent injury, no lesions seen  Head/Neck: Neck supple, no apparent injury  Respiratory/Thorax: Patent airways, good chest expansion, thorax symmetric, CTAB  Cardiovascular: regular rate and rhythm, s1 and s2 distinct, 2+ equal pulses of the extremities  Gastrointestinal: Nondistended, soft, non-tender, no rebound tenderness or guarding  Musculoskeletal: ROM intact, normal strength  Extremities: normal extremities, no edema  Neurological: alert and oriented x3, CN's grossly intact, normal strength  Psychological:  appropriate mood and behavior  Skin: Warm and dry, no lesions, no rashes      Recent Lab Results:    Results:        RFP: 5/9/2023 09:32  NA+        Cl-     BUN  /                         145    105    13  /  --------------------------------  Glucose                ---------------------------  87    K+     HCO3-   Creat \                         3.8    31    0.50  \  Calcium : 8.9Anion Gap : 13          Albumin : 3.0 L    Phos : 4.2      Assessment and Plan:   Daily Risk Screen:  ·  Does patient have an indwelling urinary catheter? n/a consulting service   ·  Does patient have a central line? n/a consulting service     Code Status:  ·  Code Status Full Code     Assessment:    JOSE JUAN MARIE is a 57 year old Male who presented for hepatic artery infusion pump with Dr Arenas.     Endocrinology consulted for DM2 with hyperglycemia   A1C due for measurement  home regimen: glyburide 5mg BID  managed by: PCP     ASSESSMENT: DM2 with hyperglycemia in setting of post-operative stress  - target glucose <180mg/dL     PLAN:   - decrease glargine to 18u qHS  - adjust lispro to mild ssi q6hr   - POCT glucose q6hr  - hypoglycemia protocol   - CGM costs reviewed with pt     Dispo: pt is likely to d/c on insulin with tee 2 CGM sensor samples and [facility] f/u on days of oncology appointments.     Patient seen and examined.   Plan communicated to primary team via erasmo crowe to pager [phone]    Sharon Osborne PA-C  Pager [phone] or Erasmo Crowe  Please contact endocrinology fellow on-call after 5p - page [phone]      Attestation:   Note Completion:  Provider/Team Pager # [phone]         Electronic Signatures:  Sharon Osborne (PAC)  (Signed 09-May-2023 13:39)   Authored: Service, Subjective Data, Objective Data, Assessment  and Plan, Note Completion      Last Updated: 09-May-2023 13:39 by Sharon Osborne (PAC)

## 2023-09-14 NOTE — PROGRESS NOTES
Service: Endocrinology     Subjective Data:   JOSE JUAN MARIE is a 57 year old Male who is Hospital Day # 6 and POD #5 for 1. Hepatic artery infusion pump ;2. Regional lymphadenectomy;3. Open cholecystectomy;4. Intraoperative ultrasound with interpretation  of imaging ;5. Peritonectomy.    Overnight Events: Patient had an uneventful night.   Additional Information:    pt's glucose at goal this AM. pt informed of plan to decrease glargine at HS.     we reviewed cost of CGM supplies - pt and his wife agree to trial 1 month of tee samples to decide if $75/month copay is valuable to their home glucose management     ROS- denies CP, SOB, cough, HA, n/v, constipation/diarrhea, depression/anxiety, night sweats, new rash, or change in vision. endorses fatigue, metallic taste in his mouth    Objective Data:     Objective Information:      T   P  R  BP   MAP  SpO2   Value  36.5  88  16  124/79      94%  Date/Time 5/8 9:33 5/8 9:33 5/8 9:33 5/8 9:33    5/8 9:33  Range  (36C - 37.1C )  (84 - 121 )  (16 - 18 )  (107 - 145 )/ (65 - 84 )    (89% - 98% )   As of 08-May-2023 06:30:00, patient is on 1 L/min of oxygen via nasal cannula with humidification.  Highest temp of 37.1 C was recorded at 5/7 22:06      Pain reported at 5/8 8:36: 4 = Moderate    Physical Exam Narrative:  ·  Physical Exam:    Constitutional: ill appearing, awake/alert/oriented x3, lying in bed   Eyes: EOMI, clear sclera  ENMT: mucous membranes moist, no apparent injury, no lesions seen  Head/Neck: Neck supple, no apparent injury  Respiratory/Thorax: Patent airways, good chest expansion, thorax symmetric, CTAB  Cardiovascular: regular rate and rhythm, s1 and s2 distinct, 2+ equal pulses of the extremities  Gastrointestinal: Nondistended, soft, non-tender, no rebound tenderness or guarding  Musculoskeletal: ROM intact, normal strength  Extremities: normal extremities, no edema  Neurological: alert and oriented x3, CN's grossly intact, normal  strength  Psychological: appropriate mood and behavior  Skin: Warm and dry, no lesions, no rashes      Medication:    Medications:      CENTRAL NERVOUS SYSTEM AGENTS:    1. HYDROmorphone PCA 15 mg/ NaCL 0.9% 30 mL:  2.6  IV PCA  <Continuous>    2. Ondansetron Injectable:  4  mg  IntraVenous Push  Every 6 Hours   PRN       3. Scopolamine TransDermal:  1  patch  TransDermal  Every 72 Hours    4. Methocarbamol Injectable:  1000  mg  IntraVenous Push  Every 8 Hours      COAGULATION MODIFIERS:    1. Enoxaparin SubCutaneous:  40  mg  SubCutaneous  Every 24 Hours      GASTROINTESTINAL AGENTS:    1. Pantoprazole Injectable:  40  mg  IntraVenous Push  Every 24 Hours      METABOLIC AGENTS:    1. Insulin Glargine (Lantus) Injectable:  25  unit(s)  SubCutaneous  At Bedtime    2. Insulin Lispro Moderate Corrective Scale:  unit(s)  SubCutaneous  Every 6 Hours    3. Dextrose 50% in Water Injectable:  25  gram(s)  IntraVenous Push  Every 15 Minutes   PRN       4. Glucagon Injectable:  1  mg  IntraMuscular  Every 15 Minutes   PRN         MISCELLANEOUS AGENTS:    1. Naloxone Injectable:  0.2  mg  IntraVenous Push  Once   PRN         NUTRITIONAL PRODUCTS:    1. Lactated Ringers Infusion:  1000  mL  IntraVenous  <Continuous>      TOPICAL AGENTS:    1. Lidocaine 2% (UROJET) Topical Gel:  5  mL  Topical  Once    2. Phenol Topical Spray:  1  spray(s)  Topical  4 Times a Day   PRN             Currently Suspended Medications       --------------------------------    1. Pregabalin:  100  mg  Oral  2 Times a Day      Recent Lab Results:    Results:        I have reviewed these laboratory results:    Renal Function Panel  Trending View      Result 08-May-2023 08:31:00  07-May-2023 17:22:00  07-May-2023 09:59:00  06-May-2023 11:57:00    Glucose, Serum 138   H   186   H   262   H   199   H       138   139   135   L    K 3.8   3.5   4.0   3.9       101   97   L   98    Bicarbonate, Serum 26   29   27   25    Anion Gap, Serum 17   12    19   16    BUN 16   18   21   15    CREAT 0.52   0.62   0.66   0.62    GFR Male >90   >90   >90   >90    Calcium, Serum 8.8   8.9   9.6   8.9    Phosphorus, Serum 3.1   3.2   4.0   2.6    ALB 3.0   L   2.7   L   3.2   L   3.2   L        Glucose_POCT  Trending View      Result 08-May-2023 06:35:00  07-May-2023 22:11:00  07-May-2023 16:04:00  07-May-2023 11:34:00  07-May-2023 03:19:00  06-May-2023 20:41:00  06-May-2023 18:09:00  06-May-2023 13:56:00    Glucose-POCT 115   H   180   H   196   H   250   H   261   H   205   H   171   H   198   H            I have reviewed these laboratory results:    Glucose_POCT  08-May-2023 12:54:00      Result Value    Glucose-POCT  151   H       Assessment and Plan:   Daily Risk Screen:  ·  Does patient have an indwelling urinary catheter? n/a consulting service   ·  Does patient have a central line? n/a consulting service     Code Status:  ·  Code Status Full Code     Assessment:    JOSE JUAN MARIE is a 57 year old Male who presented for hepatic artery infusion pump with Dr Arenas.     Endocrinology consulted for DM2 with hyperglycemia   A1C due for measurement  home regimen: glyburide 5mg BID  managed by: PCP     ASSESSMENT: DM2 with hyperglycemia in setting of post-operative stress  - target glucose <180mg/dL     PLAN:   - decrease glargine to 25u qHS  - continue lispro moderate ssi q6hr   - POCT glucose q6hr  - hypoglycemia protocol   - CGM costs reviewed with pt     Dispo: pt is likely to d/c on insulin with tee 2 CGM sensor samples and  Endocrine f/u on days of oncology appointments.     Patient seen and examined.   Plan communicated to primary team via doc halo to pager [phone]    Sharon Osborne PA-C  Pager [phone] or Doc Halo  Please contact endocrinology fellow on-call after 5p - page [phone]      Attestation:   Note Completion:  Provider/Team Pager # [phone]         Electronic Signatures:  Sharon Osborne (PAC)  (Signed 08-May-2023 15:31)   Authored: Service,  Subjective Data, Objective Data, Assessment  and Plan, Note Completion      Last Updated: 08-May-2023 15:31 by Sharon Osborne (PAC)

## 2023-09-14 NOTE — PROGRESS NOTES
Service: Endocrinology     Subjective Data:   JOSE JUAN MARIE is a 57 year old Male who is Hospital Day # 4 and POD #3 for 1. Hepatic artery infusion pump ;2. Regional lymphadenectomy;3. Open cholecystectomy;4. Intraoperative ultrasound with interpretation  of imaging ;5. Peritonectomy.    Overnight Events: Patient had an uneventful night.   Additional Information:    pt's glucose remains elevated. pt informed of plan to decrease ssi frequency to q6hr. we reviewed cost of CGM supplies - pt and his wife agree to trial 1 month of  tee samples to decide if $75/month copay is valuable to their home glucose management     ROS- denies CP, SOB, cough, HA, n/v, constipation/diarrhea, depression/anxiety, night sweats, new rash, or change in vision. endorses fatigue, metallic taste in his mouth    Objective Data:     Objective Information:      T   P  R  BP   MAP  SpO2   Value  36  92  20  128/78      93%  Date/Time 5/6 10:16 5/6 10:16 5/6 10:16 5/6 10:16    5/6 10:16  Range  (36C - 38.5C )  (92 - 105 )  (16 - 20 )  (98 - 128 )/ (61 - 78 )    (93% - 96% )   As of 05-May-2023 22:09:00, patient is on 2 L/min of oxygen via nasal cannula.  Highest temp of 38.5 C was recorded at 5/5 22:09      Pain reported at 5/6 8:39: 5 = Moderate    Physical Exam Narrative:  ·  Physical Exam:    Constitutional: ill appearing, awake/alert/oriented x3, lying in bed   Eyes: EOMI, clear sclera  ENMT: mucous membranes moist, no apparent injury, no lesions seen  Head/Neck: Neck supple, no apparent injury  Respiratory/Thorax: Patent airways, good chest expansion, thorax symmetric, CTAB  Cardiovascular: regular rate and rhythm, s1 and s2 distinct, 2+ equal pulses of the extremities  Gastrointestinal: Nondistended, soft, non-tender, no rebound tenderness or guarding  Musculoskeletal: ROM intact, normal strength  Extremities: normal extremities, no edema  Neurological: alert and oriented x3, CN's grossly intact, normal strength  Psychological:  appropriate mood and behavior  Skin: Warm and dry, no lesions, no rashes      Medication:    Medications:      CENTRAL NERVOUS SYSTEM AGENTS:    1. Acetaminophen:  650  mg  Oral  Every 6 Hours    2. HYDROmorphone PCA 15 mg/ NaCL 0.9% 30 mL:  2.6  IV PCA  <Continuous>    3. Pregabalin:  100  mg  Oral  2 Times a Day    4. Ondansetron Injectable:  4  mg  IntraVenous Push  Every 6 Hours   PRN       5. Scopolamine TransDermal:  1  patch  TransDermal  Every 72 Hours    6. Methocarbamol Injectable:  1000  mg  IntraVenous Push  Every 8 Hours      COAGULATION MODIFIERS:    1. Enoxaparin SubCutaneous:  40  mg  SubCutaneous  Every 24 Hours      GASTROINTESTINAL AGENTS:    1. Calcium Carbonate Chewable:  500  mg  Oral  Every 2 Hours   PRN       2. Docusate:  100  mg  Oral  2 Times a Day      METABOLIC AGENTS:    1. Insulin Glargine (Lantus) Injectable:  20  unit(s)  SubCutaneous  At Bedtime    2. Insulin Lispro Moderate Corrective Scale:  unit(s)  SubCutaneous  Every 6 Hours    3. Dextrose 50% in Water Injectable:  25  gram(s)  IntraVenous Push  Every 15 Minutes   PRN       4. Glucagon Injectable:  1  mg  IntraMuscular  Every 15 Minutes   PRN         MISCELLANEOUS AGENTS:    1. Naloxone Injectable:  0.2  mg  IntraVenous Push  Once   PRN         NUTRITIONAL PRODUCTS:    1. Lactated Ringers Infusion:  1000  mL  IntraVenous  <Continuous>      Recent Lab Results:    Results:        I have reviewed these laboratory results:    Glucose_POCT  Trending View      Result 06-May-2023 10:04:00  06-May-2023 04:39:00  06-May-2023 00:34:00  05-May-2023 21:17:00  05-May-2023 17:06:00  05-May-2023 14:07:00  05-May-2023 11:36:00  05-May-2023 04:00:00  05-May-2023 02:34:00  05-May-2023 00:40:00  04-May-2023 21:17:00  04-May-2023 16:27:00  04-May-2023 12:15:00    Glucose-POCT 199   H   158   H   171   H   150   H   220   H   254   H   209   H   219   H   264   H   286   H   233   H   282   H   328   H        Comprehensive Metabolic Panel  Trending  View      Result 05-May-2023 11:08:00  04-May-2023 16:25:00    Glucose, Serum 220   H   299   H       L   134   L    K 4.1   4.7    CL 98   98    Bicarbonate, Serum 25   24    Anion Gap, Serum 14   17    BUN 18   21    CREAT 0.91   1.31   H    GFR Male >90   63    Calcium, Serum 8.4   L   9.0    ALB 3.1   L   3.6    ALKP 109   117    T Pro 5.1   L   5.9   L    T Bili 0.8   0.9    Alanine Aminotransferase, Serum 87   H   133   H    Aspartate Transaminase, Serum 50   H   84   H        Hemoglobin A1C, Level  05-May-2023 11:08:00      Result Value    Estimated Average Glucose  223    Hemoglobin A1C, Level  9.4 Diagnosis of Diabetes-Adults Non-Diabetic: < or = 5.6% Increased risk for developing diabetes: 5.7-6.4% Diagnostic of diabetes: > or = 6.5%.     Monitoring  of Diabetes              Age (y)     Therapeutic Goal (%) Adults:          >1   A       Assessment and Plan:   Daily Risk Screen:  ·  Does patient have an indwelling urinary catheter? n/a consulting service   ·  Does patient have a central line? n/a consulting service     Code Status:  ·  Code Status Full Code     Assessment:    JOSE JUAN MARIE is a 57 year old Male who presented for hepatic artery infusion pump with Dr Arenas.     Endocrinology consulted for DM2 with hyperglycemia   A1C due for measurement  home regimen: glyburide 5mg BID  managed by: PCP     ASSESSMENT: DM2 with hyperglycemia in setting of post-operative stress  - target glucose <180mg/dL     PLAN:   - continue glargine 20u qHS  - adjust lispro moderate ssi to q6hr   - POCT glucose q6hr  - hypoglycemia protocol   - CGM costs reviewed with pt     Dispo: pt is likely to d/c on insulin with tee 2 CGM sensor samples and  Endocrine f/u on days of oncology appointments.     Patient seen and examined.   Plan communicated to primary team via doc halo to pager [phone]    Sharon Osborne PA-C  Pager [phone] or Doc [facility]  Please contact endocrinology fellow on-call after 5p - page  [phone]      Attestation:   Note Completion:  Provider/Team Pager # [phone]         Electronic Signatures:  Sharon Osborne (PAC)  (Signed 06-May-2023 13:04)   Authored: Service, Subjective Data, Objective Data, Assessment  and Plan, Note Completion      Last Updated: 06-May-2023 13:04 by Sharon Osborne (PAC)

## 2023-09-14 NOTE — PROGRESS NOTES
Service: Surgical Oncology     Subjective Data:   JOSE JUAN MARIE is a 57 year old Male who is Hospital Day # 5 and POD #4 for 1. Hepatic artery infusion pump ;2. Regional lymphadenectomy;3. Open cholecystectomy;4. Intraoperative ultrasound with interpretation  of imaging ;5. Peritonectomy.    Additional Information:    Patient with nausea overnight. 2 episodes of small volume (<50cc) thin bilious emesis. Nausea slightly improved with zofran and changing of scop patch. Continuing  to endorse burping. Patient had BM, thin and liquid. Passed TOV.     Objective Data:     Objective Information:      T   P  R  BP   MAP  SpO2   Value  36  98  18  139/84      94%  Date/Time 5/7 0:58 5/7 0:58 5/7 0:58 5/7 0:58    5/7 0:58  Range  (36C - 36.6C )  (90 - 101 )  (18 - 20 )  (118 - 139 )/ (73 - 86 )    (93% - 95% )   As of 07-May-2023 00:58:00, patient is on 2 L/min of oxygen via nasal cannula.      Pain reported at 5/6 21:30: 5 = Moderate    Physical Exam by System:    Constitutional: lying in bed, NAD   Eyes: EOMI. sclerae non-icteric   Respiratory/Thorax: Non labored on 2LNC   Cardiovascular: Regular rate   Gastrointestinal: abdomen soft, mildly distended,  appropriately tender to palpation.  Incisions closed with staples, c/d/i, pump site without erythema   Genitourinary: voiding independently   Extremities: No edema   Neurological: Grossly intact   Psychological: Appropriate mood and affect   Skin: Warm and dry. No rash or lesion.     Medication:    Medications:          Continuous Medications       --------------------------------    1. HYDROmorphone PCA 15 mg/ NaCL 0.9% 30 mL:  2.6  IV PCA  <Continuous>    2. Lactated Ringers Infusion:  1000  mL  IntraVenous  <Continuous>         Scheduled Medications       --------------------------------    1. Acetaminophen:  650  mg  Oral  Every 6 Hours    2. Docusate:  100  mg  Oral  2 Times a Day    3. Enoxaparin SubCutaneous:  40  mg  SubCutaneous  Every 24 Hours    4.  Insulin Glargine (Lantus) Injectable:  20  unit(s)  SubCutaneous  At Bedtime    5. Insulin Lispro Moderate Corrective Scale:  unit(s)  SubCutaneous  Every 6 Hours    6. Methocarbamol Injectable:  1000  mg  IntraVenous Push  Every 8 Hours    7. Pregabalin:  100  mg  Oral  2 Times a Day    8. Scopolamine TransDermal:  1  patch  TransDermal  Every 72 Hours         PRN Medications       --------------------------------    1. Calcium Carbonate Chewable:  500  mg  Oral  Every 2 Hours    2. Dextrose 50% in Water Injectable:  25  gram(s)  IntraVenous Push  Every 15 Minutes    3. Glucagon Injectable:  1  mg  IntraMuscular  Every 15 Minutes    4. Naloxone Injectable:  0.2  mg  IntraVenous Push  Once    5. Ondansetron Injectable:  4  mg  IntraVenous Push  Every 6 Hours        Recent Lab Results:    Results:    CBC: 5/6/2023 11:57              \     Hgb     /                              \     10.2 L    /  WBC  ----------------  Plt               23.8 H    ----------------    144 L            /     Hct     \                              /     33.2 L    \            RBC: 3.32 L    MCV: 100           RFP: 5/6/2023 11:57  NA+        Cl-     BUN  /                         135 L   98    15  /  --------------------------------  Glucose                ---------------------------  199 H    K+     HCO3-   Creat \                         3.9    25    0.62  \  Calcium : 8.9Anion Gap : 16          Albumin : 3.2 L    Phos : 2.6      Radiology Results:    Results:        Impression:    1. Quantitative shunt detection imaging demonstrating a 2.5 percent  shunt of Tc 99m MAA activity to the lungs.  2. Heterogenous radiotracer activity throughout the right and left  lobe of the liver surrounding a photopenic mass predominantly within  hepatic segment 4B consistent with known hepatic metastasis; no  extrahepatic activity is identified.  3. Expected postsurgical changes including small volume  intraperitoneal fluid and gas.      NM  Hepatic Artery Pump Flow [May  5 2023 12:10PM]      Impression:    1.  No unexpected radiopaque foreign body.     Xray Abdomen AP View [May  3 2023  2:40PM]      Impression:    1. 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 Otherwise unremarkableappearance of abdominal vasculature.  2. Redemonstration of multiple liver lesions consistent with  metastases, better appreciated on MRI liver 03/20/2023.      CT Angio Abdomen [Apr 13 2023  3:16PM]      Assessment and Plan:   Daily Risk Screen:  ·  Does patient have an indwelling urinary catheter? yes   ·  Plan for indwelling urinary catheter removal today? no   ·  The patient continues to require indwelling urinary catheterization for perioperative use for selected surgical procedures     Comorbidities:  ·  Comorbidity Other     Code Status:  ·  Code Status Full Code     Assessment:    Narciso Marrero is a 57-year-old male postop day 1 from AMY pump, regional lymphadenopathy, open cholecystectomy, appendectomy and right hemicolectomy with Dr. Granados.    Neuro   - Continue PCA for pain control, Continue home Lyrica, ambit pump in place  -  Robaxin and Tylenol    CV   - No home meds  - if tachycardia or shortness of breath-> troponins and EKG    Resp  -  encourage IS and OOB, wean O2    GI  - NPO, place NGT, KUB for NGT placement  - Pump study completed    /Renal   -passed TOV 5/7  - MIVF at 100, strict I's/O  - Trend electrolytes replete as needed    Heme  - Daily CBC  - Lovenox    ID   - No abx    Endo   - Sliding scale insulin Q6H  - 10u Lantus  - Appreciate endocrinology recs    RNF, anticipate home when medically appropriate    Seen and discussed with fellow Dr. Ch and attending physician Dr. Shaffer.    May Al PGY1  Surgical Oncology, [id number]        Attestation:   Note Completion:  I am a:  Resident/Fellow   Attending Attestation I saw and evaluated the patient.  I personally obtained the key and critical portions of the history  and physical exam or was physically present for key and  critical portions performed by the resident/fellow. I reviewed the resident/fellow?s documentation and discussed the patient with the resident/fellow.  I agree with the resident/fellow?s medical decision making as documented in the note.     I personally evaluated the patient on 07-May-2023         Electronic Signatures:  May Al (Resident))  (Signed 07-May-2023 09:22)   Authored: Service, Subjective Data, Objective Data, Assessment  and Plan, Note Completion  Wm Shaffer)  (Signed 08-May-2023 14:21)   Authored: Note Completion   Co-Signer: Assessment and Plan, Note Completion      Last Updated: 08-May-2023 14:21 by Wm Shaffer)

## 2023-09-14 NOTE — PROGRESS NOTES
Service: Surgical Oncology     Subjective Data:   JOSE JUAN MARIE is a 57 year old Male who is Hospital Day # 6 and POD #5 for 1. Hepatic artery infusion pump ;2. Regional lymphadenectomy;3. Open cholecystectomy;4. Intraoperative ultrasound with interpretation  of imaging ;5. Peritonectomy.    Additional Information:    No acute events overnight. Nausea improved with placement of NGT with 4850 of bilious output in the past 24 hrs. Unsure if passing flatus, but thinks possible. No  BM. Denies chest pain, shortness of breath.    Objective Data:     Objective Information:      T   P  R  BP   MAP  SpO2   Value  36.9  87  16  116/73      95%  Date/Time 5/8 4:30 5/8 4:30 5/8 4:30 5/8 4:30    5/8 6:30  Range  (36C - 37.1C )  (84 - 121 )  (16 - 18 )  (107 - 145 )/ (65 - 84 )    (89% - 98% )   As of 08-May-2023 06:30:00, patient is on 1 L/min of oxygen via nasal cannula with humidification.  Highest temp of 37.1 C was recorded at 5/7 22:06      Pain reported at 5/8 5:53: 5 = Moderate    Physical Exam by System:    Constitutional: lying in bed, NAD   Eyes: EOMI. sclerae non-icteric   Respiratory/Thorax: Non labored on 2LNC   Cardiovascular: Regular rate   Gastrointestinal: abdomen soft, mildly distended,  appropriately tender to palpation.  Incisions closed with staples, c/d/i, pump site closed with sutures without erythema. NGT in place with thin bilious output.   Genitourinary: voiding independently   Extremities: No edema   Neurological: Grossly intact   Psychological: Appropriate mood and affect   Skin: Warm and dry. No rash or lesion.     Medication:    Medications:          Continuous Medications       --------------------------------    1. HYDROmorphone PCA 15 mg/ NaCL 0.9% 30 mL:  2.6  IV PCA  <Continuous>    2. Lactated Ringers Infusion:  1000  mL  IntraVenous  <Continuous>         Scheduled Medications       --------------------------------    1. Enoxaparin SubCutaneous:  40  mg  SubCutaneous  Every 24 Hours     2. Insulin Glargine (Lantus) Injectable:  30  unit(s)  SubCutaneous  At Bedtime    3. Insulin Lispro Moderate Corrective Scale:  unit(s)  SubCutaneous  Every 6 Hours    4. Lidocaine 2% (UROJET) Topical Gel:  5  mL  Topical  Once    5. Methocarbamol Injectable:  1000  mg  IntraVenous Push  Every 8 Hours    6. Pantoprazole Injectable:  40  mg  IntraVenous Push  Every 24 Hours    7. Scopolamine TransDermal:  1  patch  TransDermal  Every 72 Hours         PRN Medications       --------------------------------    1. Dextrose 50% in Water Injectable:  25  gram(s)  IntraVenous Push  Every 15 Minutes    2. Glucagon Injectable:  1  mg  IntraMuscular  Every 15 Minutes    3. Naloxone Injectable:  0.2  mg  IntraVenous Push  Once    4. Ondansetron Injectable:  4  mg  IntraVenous Push  Every 6 Hours    5. Phenol Topical Spray:  1  spray(s)  Topical  4 Times a Day           Currently Suspended Medications       --------------------------------    1. Pregabalin:  100  mg  Oral  2 Times a Day      Recent Lab Results:    Results:    CBC: 5/7/2023 09:59              \     Hgb     /                              \     12.3 L    /  WBC  ----------------  Plt               24.4 H    ----------------    198              /     Hct     \                              /     39.6 L    \            RBC: 4.05 L    MCV: 98           RFP: 5/7/2023 17:22  NA+        Cl-     BUN  /                         138    101    18  /  --------------------------------  Glucose                ---------------------------  186 H    K+     HCO3-   Creat \                         3.5    29    0.62  \  Calcium : 8.9Anion Gap : 12          Albumin : 2.7 L    Phos : 3.2      Radiology Results:    Results:        Impression:    1. Quantitative shunt detection imaging demonstrating a 2.5 percent  shunt of Tc 99m MAA activity to the lungs.  2. Heterogenous radiotracer activity throughout the right and left  lobe of the liver surrounding a photopenic mass  predominantly within  hepatic segment 4B consistent with known hepatic metastasis; no  extrahepatic activity is identified.  3. Expected postsurgical changes including small volume  intraperitoneal fluid and gas.      NM Hepatic Artery Pump Flow [May  5 2023 12:10PM]      Impression:    1.  No unexpected radiopaque foreign body.     Xray Abdomen AP View [May  3 2023  2:40PM]      Impression:    1. 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 Otherwise unremarkableappearance of abdominal vasculature.  2. Redemonstration of multiple liver lesions consistent with  metastases, better appreciated on MRI liver 03/20/2023.      CT Angio Abdomen [Apr 13 2023  3:16PM]      Assessment and Plan:   Daily Risk Screen:  ·  Does patient have an indwelling urinary catheter? yes   ·  Plan for indwelling urinary catheter removal today? no   ·  The patient continues to require indwelling urinary catheterization for perioperative use for selected surgical procedures     Comorbidities:  ·  Comorbidity Other     Code Status:  ·  Code Status Full Code     Assessment:    Narciso Marrero is a 57-year-old male postop day 1 from AMY pump, regional lymphadenopathy, open cholecystectomy, appendectomy and right hemicolectomy with Dr. Granados.    Neuro   - Continue PCA for pain control, Continue home Lyrica, ambit pump in place  -  Robaxin and Tylenol    CV   - No home meds  - if tachycardia or shortness of breath-> troponins and EKG    Resp  -  encourage IS and OOB, wean O2    GI  - NPO, NGT placed 5/7  - Pump study completed  - will hold off on CT scan if WBC downtrending    /Renal  - passed TOV 5/7  - MIVF at 100, strict I's/O  - replete fluids as needed  - Trend electrolytes, replete as needed    Heme  - Daily CBC  - Lovenox    ID   - No abx    Endo   - Sliding scale insulin Q6H  - 10u Lantus  - Appreciate endocrinology recs    RNF, anticipate home when medically appropriate    Seen and discussed with senior   Genoveva and attending physician Dr. Arenas.    May Al PGY1  Surgical Oncology, [id number]        Attestation:   Note Completion:  I am a:  Resident/Fellow   Attending Attestation I saw and evaluated the patient.  I personally obtained the key and critical portions of the history and physical exam or was physically present for key and  critical portions performed by the resident/fellow. I reviewed the resident/fellow?s documentation and discussed the patient with the resident/fellow.  I agree with the resident/fellow?s medical decision making as documented in the note.     I personally evaluated the patient on 08-May-2023         Electronic Signatures:  Rajinder Arenas)  (Signed 08-May-2023 19:52)   Authored: Note Completion   Co-Signer: Service, Subjective Data, Objective Data, Assessment and Plan, Note Completion  May Al (Resident))  (Signed 08-May-2023 07:57)   Authored: Service, Subjective Data, Objective Data, Assessment  and Plan, Note Completion      Last Updated: 08-May-2023 19:52 by Rajinder Arenas)

## 2023-09-14 NOTE — PROGRESS NOTES
Service: Endocrinology      Subjective Data:   JOSE JUAN MARIE is a 57 year old Male who is Hospital Day # 3 and POD #2 for 1. Hepatic artery infusion pump ;2. Regional lymphadenectomy;3. Open cholecystectomy;4. Intraoperative ultrasound with interpretation  of imaging ;5. Peritonectomy.    Overnight Events: Patient had an uneventful night.     Additional Information:    pt's glucose remains elevated. pt informed of plan to increase glargine at HS and to adhere to q4hr ssi with family at bedside.     ROS- denies CP, SOB, cough, HA, n/v, constipation/diarrhea, depression/anxiety, night sweats, new rash, or change in vision. endorses fatigue, metallic taste in his mouth      Objective Data:     Objective Information:      T   P  R  BP   MAP  SpO2   Value  37.1  99  18  116/67      93%  Date/Time 5/5 10:57 5/5 10:57 5/5 10:57 5/5 10:57    5/5 10:57  Range  (36.5C - 37.4C )  (94 - 126 )  (16 - 18 )  (89 - 116 )/ (60 - 72 )    (93% - 98% )   As of 05-May-2023 09:00:00, patient is on 2 L/min of oxygen via nasal cannula with humidification.  Highest temp of 37.4 C was recorded at 5/4 18:15      Pain reported at 5/5 9:00: 5 = Moderate    Physical Exam Narrative:  ·  Physical Exam:    Constitutional: ill appearing, awake/alert/oriented x3, lying in bed   Eyes: EOMI, clear sclera  ENMT: mucous membranes moist, no apparent injury, no lesions seen  Head/Neck: Neck supple, no apparent injury  Respiratory/Thorax: Patent airways, good chest expansion, thorax symmetric, CTAB  Cardiovascular: regular rate and rhythm, s1 and s2 distinct, 2+ equal pulses of the extremities  Gastrointestinal: Nondistended, soft, non-tender, no rebound tenderness or guarding  Musculoskeletal: ROM intact, normal strength  Extremities: normal extremities, no edema  Neurological: alert and oriented x3, CN's grossly intact, normal strength  Psychological: appropriate mood and behavior  Skin: Warm and dry, no lesions, no rashes         Medication:    Medications:      CENTRAL NERVOUS SYSTEM AGENTS:    1. Acetaminophen:  650  mg  Oral  Every 6 Hours    2. HYDROmorphone PCA 15 mg/ NaCL 0.9% 30 mL:  2.6  IV PCA  <Continuous>    3. Pregabalin:  100  mg  Oral  2 Times a Day    4. Ondansetron Injectable:  4  mg  IntraVenous Push  Every 6 Hours   PRN       5. Scopolamine TransDermal:  1  patch  TransDermal  Every 72 Hours    6. Methocarbamol Injectable:  1000  mg  IntraVenous Push  Every 8 Hours      COAGULATION MODIFIERS:    1. Enoxaparin SubCutaneous:  40  mg  SubCutaneous  Every 24 Hours      GASTROINTESTINAL AGENTS:    1. Docusate:  100  mg  Oral  2 Times a Day      METABOLIC AGENTS:    1. Insulin Glargine (Lantus) Injectable:  20  unit(s)  SubCutaneous  At Bedtime    2. Insulin Lispro Moderate Corrective Scale:  unit(s)  SubCutaneous  Every 4 Hours    3. Dextrose 50% in Water Injectable:  25  gram(s)  IntraVenous Push  Every 15 Minutes   PRN       4. Glucagon Injectable:  1  mg  IntraMuscular  Every 15 Minutes   PRN         MISCELLANEOUS AGENTS:    1. Naloxone Injectable:  0.2  mg  IntraVenous Push  Once   PRN         NUTRITIONAL PRODUCTS:    1. Lactated Ringers Infusion:  1000  mL  IntraVenous  <Continuous>        Recent Lab Results:    Results:        I have reviewed these laboratory results:    Glucose_POCT  Trending View      Result 05-May-2023 17:06:00  05-May-2023 14:07:00  05-May-2023 11:36:00  05-May-2023 04:00:00  05-May-2023 02:34:00  05-May-2023 00:40:00  04-May-2023 21:17:00  04-May-2023 16:27:00  04-May-2023 12:15:00  04-May-2023 06:02:00  03-May-2023 21:40:00    Glucose-POCT 220   H   254   H   209   H   219   H   264   H   286   H   233   H   282   H   328   H   360   H   277   H        Comprehensive Metabolic Panel  Trending View      Result 05-May-2023 11:08:00  04-May-2023 16:25:00  04-May-2023 07:43:00    Glucose, Serum 220   H   299   H   328   H       L   134   L   138    K 4.1   4.7   4.7    CL 98   98   99     Bicarbonate, Serum 25   24   25    Anion Gap, Serum 14   17   19    BUN 18   21   15    CREAT 0.91   1.31   H   1.02    GFR Male >90   63   85    Calcium, Serum 8.4   L   9.0   9.4    ALB 3.1   L   3.6   3.9    ALKP 109   117   136   H    T Pro 5.1   L   5.9   L   6.3   L    T Bili 0.8   0.9   1.1    Alanine Aminotransferase, Serum 87   H   133   H   150   H    Aspartate Transaminase, Serum 50   H   84   H   106   H        Hemoglobin A1C, Level  05-May-2023 11:08:00      Result Value    Estimated Average Glucose  223    Hemoglobin A1C, Level  9.4 Diagnosis of Diabetes-Adults Non-Diabetic: < or = 5.6% Increased risk for developing diabetes: 5.7-6.4% Diagnostic of diabetes: > or = 6.5%.     Monitoring  of Diabetes              Age (y)     Therapeutic Goal (%) Adults:          >1   A       Assessment and Plan:   Daily Risk Screen:  ·  Does patient have an indwelling urinary catheter? n/a consulting service   ·  Does patient have a central line? n/a consulting service      Code Status:  ·  Code Status Full Code     Assessment:    JOSE JUAN MARIE is a 57 year old Male who presented for hepatic artery infusion pump with Dr Arenas.     Endocrinology consulted for DM2 with hyperglycemia   A1C due for measurement  home regimen: glyburide 5mg BID  managed by: PCP     ASSESSMENT: DM2 with hyperglycemia in setting of post-operative stress  - target glucose <180mg/dL     PLAN:   - increase glargine 20u qHS  - continue lispro moderate ssi q4hr   - POCT glucose ACHS  - hypoglycemia protocol     Dispo: pt is likely to d/c on insulin. will assess cost of CGM to wear home for improved ease of compliance     Patient seen and examined.   Plan communicated to primary team via doc halo to pager [phone]    Sharon Osborne PA-C  Pager [phone] or Doc [facility]  Please contact endocrinology fellow on-call after 5p - page [phone]        Attestation:   Note Completion:  Provider/Team Pager # [phone]         Electronic Signatures:  [place]  Sharon (JAMEL)  (Signed 05-May-2023 17:29)   Authored: Service, Subjective Data, Objective Data, Assessment  and Plan, Note Completion      Last Updated: 05-May-2023 17:29 by Sharon Osborne (PAC)

## 2023-09-14 NOTE — PROGRESS NOTES
Service: Surgical Oncology     Subjective Data:   JOSE JUAN MARIE is a 57 year old Male who is Hospital Day # 2 and POD #1 for 1. Hepatic artery infusion pump ;2. Regional lymphadenectomy;3. Open cholecystectomy;4. Intraoperative ultrasound with interpretation  of imaging ;5. Peritonectomy.    Additional Information:    No acute events overnight.  Patient does endorse some nausea along with burping this AM.  Pain is minimally controlled with PCA pump, feels that it does not help  much when he pushes his button.  Not passing flatus.  Tachycardic to 116 this morning.    Objective Data:     Objective Information:      T   P  R  BP   MAP  SpO2   Value  36.9  116  16  104/72      94%  Date/Time 5/4 6:00 5/4 6:00 5/4 6:00 5/4 6:00    5/4 6:00  Range  (36.6C - 36.9C )  (92 - 116 )  (16 - 18 )  (101 - 118 )/ (63 - 76 )    (94% - 98% )   As of 03-May-2023 20:17:00, patient is on 2 L/min of oxygen via nasal cannula.  Highest temp of 36.9 C was recorded at 5/4 6:00      Pain reported at 5/4 5:54: 6 = Moderate    Physical Exam by System:    Constitutional: lying in bed, NAD   Eyes: EOMI. sclerae non-icteric   Head/Neck: Normocephalic, atraumatic   Respiratory/Thorax: non-labored breathing on 2L NC   Cardiovascular: non-cyanotic   Gastrointestinal: abdomen soft, non-distended, appropriately  tender to palpation. Midline and left lower quadrant incisions dressed with island dressing with some strikethrough.  Pump in place in left abdomen.   Genitourinary: Almanza in place draining greenish tinged  urine   Musculoskeletal: FLORIAN x 4   Extremities: No edema   Neurological: Grossly intact   Psychological: Appropriate mood and affect   Skin: Warm and dry. No rash or lesion.     Medication:    Medications:          Continuous Medications       --------------------------------    1. HYDROmorphone PCA 15 mg/ NaCL 0.9% 30 mL:  2.6  IV PCA  <Continuous>    2. Lactated Ringers Infusion:  1000  mL  IntraVenous  <Continuous>    3.  Ropivacaine 0.2%/ Ambit Pump 500 mL:  1000  mg  Peripheral Nerve  <Continuous>         Scheduled Medications       --------------------------------    1. Acetaminophen:  650  mg  Oral  Every 6 Hours    2. Docusate:  100  mg  Oral  2 Times a Day    3. Heparin SubCutaneous:  5000  unit(s)  SubCutaneous  Once    4. Insulin Lispro Mild Corrective Scale:  unit(s)  SubCutaneous  Every 6 Hours    5. Methocarbamol Injectable:  1000  mg  IntraVenous Push  Every 8 Hours    6. Pregabalin:  100  mg  Oral  2 Times a Day    7. Scopolamine TransDermal:  1  patch  TransDermal  Every 72 Hours         PRN Medications       --------------------------------    1. Dextrose 50% in Water Injectable:  25  gram(s)  IntraVenous Push  Every 15 Minutes    2. Glucagon Injectable:  1  mg  IntraMuscular  Every 15 Minutes    3. Naloxone Injectable:  0.2  mg  IntraVenous Push  Once    4. Ondansetron Injectable:  4  mg  IntraVenous Push  Every 6 Hours        Recent Lab Results:    Results:        CMP: 5/3/2023 15:06  NA+        Cl-     BUN  /                         137    106    17  /  --------------------------------  Glucose                ---------------------------  265 H    K+     HCO3-   Creat \                         3.5    23    0.74  \           \  T Bili  /                    \  0.7  /  AST  x ---- x ALT        140 Hx ---- x 145 H         /  Alk P   \               /  114  \  Calcium : 7.9 L    Anion Gap : 12     Albumin : 3.2 L     T Protein : 4.8 L            ---------- Recent Arterial Blood Gas Results----------     5/3/2023 15:13  pO2 79  24 h range: ( 79 - 154 )  pH 7.30  24 h range: ( 7.28 - 7.32 )  pCO2 41  24 h range: ( 41 - 44 )  SO2 95  24 h range: ( 95 - 98 )  Base Excess -5.9  24 h range: ( -5.9 - -4.8 )null    Radiology Results:    Results:        Impression:    1.  No unexpected radiopaque foreign body.     Xray Abdomen AP View [May  3 2023  2:40PM]      Assessment and Plan:   Daily Risk Screen:  ·  Does patient  have an indwelling urinary catheter? yes   ·  Plan for indwelling urinary catheter removal today? no   ·  The patient continues to require indwelling urinary catheterization for perioperative use for selected surgical procedures     Code Status:  ·  Code Status Full Code     Assessment:    Narciso Marrero is a 57-year-old male postop day 1 from AMY pump, regional lymphadenopathy, open cholecystectomy, appendectomy and right hemicolectomy with Dr. Granados.    Neuro - Continue PCA for pain control, Continue home Lyrica, ambit pump in place  add Robaxin and Tylenol  CV - No acute concerns, continue Q4 vitals  Resp -  encourage IS and OOB  GI - continue clear liquid diet, As needed Zofran, AMY pump study 5/5  /Renal -Maintain Almanza, MIVF at 75, strict I's/O, Trend electrolytes replete as needed  Heme - Start Lovenox if creatinine appropriate follow-up a.m. CBC  ID - Completed perioperative antibiotics  Endo - Sliding scale insulin Q6H  Prophy - SCDs, Heparin/Lovenox, Incentive Spirometer  Dispo - cares per RNF    Patient seen and discussed with senior resident Dr. Tomas, discussed with attending physician Dr. Arenas.    May Al MD  General Surgery, PGY1  Surgical Oncology, [id number]        Attestation:   Note Completion:  I am a:  Resident/Fellow   Attending Attestation I saw and evaluated the patient.  I personally obtained the key and critical portions of the history and physical exam or was physically present for key and  critical portions performed by the resident/fellow. I reviewed the resident/fellow?s documentation and discussed the patient with the resident/fellow.  I agree with the resident/fellow?s medical decision making as documented in the note.     I personally evaluated the patient on 04-May-2023         Electronic Signatures:  Rajinder Arenas)  (Signed 04-May-2023 09:11)   Authored: Note Completion   Co-Signer: Service, Subjective Data, Objective Data, Assessment and Plan, Note  Completion  May Al (Resident))  (Signed 04-May-2023 07:21)   Authored: Service, Subjective Data, Objective Data, Assessment  and Plan, Note Completion      Last Updated: 04-May-2023 09:11 by Rajinder Arenas)

## 2023-09-29 VITALS — WEIGHT: 218.03 LBS | HEIGHT: 71 IN | BODY MASS INDEX: 30.52 KG/M2

## 2023-09-29 PROBLEM — G89.18 POSTOPERATIVE PAIN: Status: RESOLVED | Noted: 2023-09-29 | Resolved: 2023-09-29

## 2023-09-29 PROBLEM — R73.9 HYPERGLYCEMIA, DRUG-INDUCED: Status: RESOLVED | Noted: 2023-09-29 | Resolved: 2023-09-29

## 2023-09-29 PROBLEM — T50.905A HYPERGLYCEMIA, DRUG-INDUCED: Status: ACTIVE | Noted: 2023-09-29

## 2023-09-29 PROBLEM — G89.18 POSTOPERATIVE PAIN: Status: ACTIVE | Noted: 2023-09-29

## 2023-09-29 PROBLEM — T81.89XA DELAYED SURGICAL WOUND HEALING: Status: ACTIVE | Noted: 2023-09-29

## 2023-09-29 PROBLEM — T50.905A HYPERGLYCEMIA, DRUG-INDUCED: Status: RESOLVED | Noted: 2023-09-29 | Resolved: 2023-09-29

## 2023-09-29 PROBLEM — T81.89XA DELAYED SURGICAL WOUND HEALING: Status: RESOLVED | Noted: 2023-09-29 | Resolved: 2023-09-29

## 2023-09-29 PROBLEM — C18.2 MALIGNANT NEOPLASM OF ASCENDING COLON (MULTI): Status: ACTIVE | Noted: 2023-09-29

## 2023-09-29 PROBLEM — R73.9 HYPERGLYCEMIA, DRUG-INDUCED: Status: ACTIVE | Noted: 2023-09-29

## 2023-09-29 PROBLEM — B02.9 SHINGLES: Status: ACTIVE | Noted: 2023-09-29

## 2023-09-29 PROBLEM — C78.7 METASTATIC CARCINOMA TO LIVER (MULTI): Status: ACTIVE | Noted: 2023-09-29

## 2023-09-29 RX ORDER — HEPARIN SODIUM,PORCINE/PF 10 UNIT/ML
50 SYRINGE (ML) INTRAVENOUS AS NEEDED
Status: CANCELLED | OUTPATIENT
Start: 2023-10-02

## 2023-09-29 RX ORDER — HEPARIN 100 UNIT/ML
500 SYRINGE INTRAVENOUS AS NEEDED
Status: CANCELLED | OUTPATIENT
Start: 2023-10-02

## 2023-10-02 DIAGNOSIS — C18.2 MALIGNANT NEOPLASM OF ASCENDING COLON (MULTI): Primary | ICD-10-CM

## 2023-10-02 NOTE — OP NOTE
PROCEDURE DETAILS    Preoperative Diagnosis:  synchronous colorectal liver metastases, right colon cancer  Postoperative Diagnosis:  synchronous colorectal liver metastases, right colon cancer  Surgeon: Dagoberto   Resident/Fellow/Other Assistant: Jing    Procedure:  1. Hepatic artery infusion pump   2. Regional lymphadenectomy  3. Open cholecystectomy  4. Intraoperative ultrasound with interpretation of imaging   5. Peritonectomy   6. Right colectomy (Dr. Raymundo, colorectal surgery)  Estimated Blood Loss: 50 ml   Findings: bilobar liver metastases, limited peritoneal disease (R diaphragm and R retroperitoneum)  Specimens(s) Collected: yes,  gallbladder, hepatic artery lymph node    IV Fluids: 3000 ml crystalloid, 750 ml 5% albumin   Urine Output: 600 ml  Drains and/or Catheters: na   Implants: AMY pump        Operative Report:   The patient was identified in  preop holding and brought to the OR, where he was placed on the OR table in supine position with L arm tucked. All pressure points were padded appropriately. Following induction of general endotracheal anesthesia, the patient's abdomen was prepped & draped  in standard sterile fashion.    A midline laparotomy incision was made from the xiphoid process to below the umbilicus. The midline fascia was scored and was grasped with Kocher claps, exposing the preperitoneal fat pad. This was mobilized en bloc with the round ligament off the anterior  abdominal wall and ligated at the level of the umbilical fissure and divided. Specimen was discarded. A Clark retractor was placed to aid with exposure.    I discovered small-volume peritoneal based disease on the peritoneum overlying Gerota's fascia and on the R hemidiaphragm. There was a total of ~6 nodules. These were excised and sent for routine pathology. I called Dr. Russ from the OR and discussed  the findings, and given the very low volume disease (PCI 2), we felt placing the pump was still in  the patient's best interest, but I would forego liver ablation.    At this point, the AMY pump was passed on to the field and was warmed and primed, confirming proper function and flow.    A top-down cholecystectomy was performed in standard fashion. The hepatocystic triangle was cleared of fibrofatty tissue, identifying the cystic artery and duct. These were then ligated with 3-0 silk ties and clips and divided. The gallbladder was sent  for routine analysis. I then proceeded with portal node dissection. The peritoneum at the base of the station 8A lymph node was scored. Using a combination of cautery and LigaSure, the node was mobilized off the common hepatic artery, exposing the GDA.  The node was sent routine. I excised additional nodes more proximally along the proper hepatic artery (12a) and lateral aspect of the bile duct (12b). The common hepatic artery was circumferentially dissected and encircled with a vessel loop. I also isolated  the proper hepatic artery and this was also encircled with a vessel loop. All small vessels along the lateral aspect of the proper hepatic artery, bile duct, and portal vein were divided using the LigaSure. I then dissected distally along the GDA. Small  lateral branches were ligated with 4-0 silk on the artery side and clips on the tissue side. Dissection proceeded distally for about 3cm. I then performed a partial Kocher maneuver, dividing all the small periduodenal vessels at the base of the hepatoduodenal  ligament.    At this point, I turned the case over to Dr. Raymundo, who performed a R colectomy. This will be dictated separately.    I scrubbed back in when Dr. Raymundo was finished. A transverse incision was made in the left lower quadrant at the midpoint between the ASIS and costal margin. Dissection was taken down to the anterior rectus fascia. I then raised an inferior lipocutaneous  flap to accommodate the AMY pump and ensure the access septum would be below  the level of the incision. I passed a tonsil clamp through the anterior abdominal wall into the pump pocket and the catheter was passed into the abdomen. The pump was then secured  to the anterior fascia at all four pump fixation points using 0 Prolene. The pump was accessed with the special bolus needle and flow was confirmed.    Next, the distal GDA was ligated with a 2-0 silk tie. Bulldog clamps were placed on the proper hepatic artery and common hepatic artery. I created a transverse arteriotomy with an 11-blade scalpel. The AMY pump catheter was cut to size and passed into  the GDA, terminating at the LINN/GDA junction. I secured the catheter in place with 2 3-0 silk ties, placed below each of the distal most beads on the catheter. I then secured the catheter at a third point using the same 2-0 silk tie used to ligate the  distal GDA. After securing each tie in place, I confirmed flow through the bolus port after removing the bulldog clamps. There was no evidence of back-bleeding from the arterial access site.    Next, we performed an on-table perfusion study using methylene blue. 10ml of methylene blue were injected through the bolus port. There was rapid perfusion of the hepatic parenchyma. I saw a couple of small lymphatics around the bile duct which were subsequently  divided using the LigaSure. There was no extrahepatic perfusion to the stomach, duodenum, or pancreas. The pump was then flushed with 10ml of heparinized saline.    The pump pocket was then closed in 2 layers using running 2-0 Vicryl deep and running 2-0 Nylons in vertical mattress fashion on the skin. Fascia of the midline was closed with running #1 looped PDS. Skin at the midline was closed with staples. Sponge/needle/instrument  counts were correct x2. Patient was awakened from GETA and taken to PACU in satisfactory condition.    Please note that given case complexity and lack of available, qualified house staff, Dr. Ch's presence was  necessary to safely perform my part of the procedure and will bill a modifier 82 assistant.                            Attestation:   Note Completion:  Attending Attestation I was present for the entire procedure   I am a: Resident/Fellow         Electronic Signatures:  Julio Ch (Fellow))  (Signed 03-May-2023 20:55)   Authored: Post-Operative Note, Chart Review, Note Completion  Rajinder Arenas)  (Signed 04-May-2023 09:24)   Authored: Post-Operative Note, Chart Review, Note Completion   Co-Signer: Post-Operative Note, Chart Review, Note Completion      Last Updated: 04-May-2023 09:24 by Rajinder Arenas)

## 2023-10-02 NOTE — OP NOTE
PROCEDURE DETAILS    Preoperative Diagnosis:  stage IV colon cancer  Postoperative Diagnosis:  stage IV colon cancer  Surgeon: Zackary  Resident/Fellow/Other Assistant: Radha    Procedure:  open right hemicolectomy, primary anastomosis  Estimated Blood Loss: 10 ml  Blood Replaced: none  Findings: ascending colon primary tumor, tattoo ink visualized. stapled end to side anastomosis  Specimens(s) Collected: yes,  right colon         Operative Report:   Procedure:  Informed consent was obtained including discussion of risks, benefits, and alternatives. The patient was brought to the operating room and placed supine. Sequential compression devices were placed. Perioperative antibiotics were given. Subcutaneous heparin  was given.  Huddle was completed in accordance with hospital procedures. Anesthesia was induced and endotracheal tube was placed. The patient was placed in lithotomy with all pressure points padded.   The abdomen was prepped and draped in the usual fashion. Almanza catheter was placed under sterile conditions. Time out was completed.   Dr Arenas began the procedure and opened and explored the abdomen. He did his dissection. Prior to placing the pump, I performed an open R colectomy. The right colon was mobilized laterally, opening up the white line and  the colon and mesentery  from the retroperitoneum in the avascular plane. The retroperitoneal structures, including the duodenum and ureter were swept down. This was taken around the hepatic flexure and the proximal transverse colon was mobilized. The omentum was taken off in  the avascular plane. The ileocolic and right branch of the middle colic were identified. The sites of transection on the colon and small bowel were determined. Windows were made on the mesentery on either side and it was divided. The R branch of the middle  colic and the ileocolic were both clamped and tied; high ligations were perofmred. Care was taken to stay away  from the duodenum and pancreas. Towels were laid down.  The terminal ileum was divided and a purse string suture of 0 prolene was placed. The  anvil from a 29 EEA stapler was placed. Some surrounding fat was cleared. A colotomy was made on the specimen side and the stapler handle was introduced. It was brought out adjacent to the tenia and the anastomosis was created. The stapler was withdrawn.  Donuts were intact. The anastomosis was inspected. The mucosa was healthy and the staple line hemostatic. The colon was then divided, inclusive of the colotomy, with a CHRISTINA 75 stapler.  The specimen was passed off the field. All staple lines were oversewn.     Gloves were changed and the dirty instruments removed. The bowel was placed back into the abdomen. Hemostasis was adequate. The procedure was then turned back over to Dr. Arenas.     Counts were reported correct at the end of this portion the procedure. I was present and scrubbed throughout..      Anna Synoptic Op Report:  Colon Resection (for colon cancer)  Operation performed with curative intent: no  Tumor location: ascending colon  Extent of colon and vascular resection: right hemicolectomy - ileocolic, right colic (if present)                    Attestation:   Note Completion:  Attending Attestation I was present for the entire procedure    I am a: Resident/Fellow         Electronic Signatures:  Regina Arriaga)  (Signed 05-May-2023 08:32)   Authored: Post-Operative Note, Chart Review, Note Completion   Co-Signer: Post-Operative Note, Chart Review, Note Completion  Antwan Garcia (Resident))  (Signed 03-May-2023 12:35)   Authored: Post-Operative Note, Chart Review, Note Completion      Last Updated: 05-May-2023 08:32 by Regina Arriaga)

## 2023-10-03 ENCOUNTER — HOSPITAL ENCOUNTER (OUTPATIENT)
Dept: RADIOLOGY | Facility: HOSPITAL | Age: 58
Discharge: HOME | End: 2023-10-03
Payer: COMMERCIAL

## 2023-10-03 ENCOUNTER — LAB (OUTPATIENT)
Dept: HEMATOLOGY/ONCOLOGY | Facility: CLINIC | Age: 58
End: 2023-10-03
Payer: COMMERCIAL

## 2023-10-03 VITALS
TEMPERATURE: 97.2 F | DIASTOLIC BLOOD PRESSURE: 75 MMHG | SYSTOLIC BLOOD PRESSURE: 111 MMHG | HEIGHT: 71 IN | WEIGHT: 216.05 LBS | RESPIRATION RATE: 16 BRPM | HEART RATE: 90 BPM | OXYGEN SATURATION: 96 % | BODY MASS INDEX: 30.25 KG/M2

## 2023-10-03 DIAGNOSIS — C78.7 SECONDARY MALIGNANT NEOPLASM OF LIVER AND INTRAHEPATIC BILE DUCT (MULTI): ICD-10-CM

## 2023-10-03 DIAGNOSIS — Z51.11 ENCOUNTER FOR ANTINEOPLASTIC CHEMOTHERAPY: ICD-10-CM

## 2023-10-03 DIAGNOSIS — C18.2 MALIGNANT NEOPLASM OF ASCENDING COLON (MULTI): ICD-10-CM

## 2023-10-03 DIAGNOSIS — C18.2 MALIGNANT NEOPLASM OF ASCENDING COLON (MULTI): Primary | ICD-10-CM

## 2023-10-03 DIAGNOSIS — C78.6 SECONDARY MALIGNANT NEOPLASM OF RETROPERITONEUM AND PERITONEUM (MULTI): ICD-10-CM

## 2023-10-03 LAB
ALBUMIN SERPL BCP-MCNC: 3.7 G/DL (ref 3.4–5)
ALP SERPL-CCNC: 175 U/L (ref 33–120)
ALT SERPL W P-5'-P-CCNC: 85 U/L (ref 10–52)
ANION GAP SERPL CALC-SCNC: 13 MMOL/L (ref 10–20)
AST SERPL W P-5'-P-CCNC: 58 U/L (ref 9–39)
BASOPHILS # BLD AUTO: 0.01 X10*3/UL (ref 0–0.1)
BASOPHILS NFR BLD AUTO: 0.1 %
BILIRUB SERPL-MCNC: 0.7 MG/DL (ref 0–1.2)
BUN SERPL-MCNC: 21 MG/DL (ref 6–23)
CALCIUM SERPL-MCNC: 9.3 MG/DL (ref 8.6–10.3)
CHLORIDE SERPL-SCNC: 104 MMOL/L (ref 98–107)
CO2 SERPL-SCNC: 24 MMOL/L (ref 21–32)
CREAT SERPL-MCNC: 0.74 MG/DL (ref 0.5–1.3)
EOSINOPHIL # BLD AUTO: 0.09 X10*3/UL (ref 0–0.7)
EOSINOPHIL NFR BLD AUTO: 1.1 %
ERYTHROCYTE [DISTWIDTH] IN BLOOD BY AUTOMATED COUNT: 17.1 % (ref 11.5–14.5)
GFR SERPL CREATININE-BSD FRML MDRD: >90 ML/MIN/1.73M*2
GLUCOSE SERPL-MCNC: 165 MG/DL (ref 74–99)
HCT VFR BLD AUTO: 40.2 % (ref 41–52)
HGB BLD-MCNC: 13.1 G/DL (ref 13.5–17.5)
IMM GRANULOCYTES # BLD AUTO: 0.02 X10*3/UL (ref 0–0.7)
IMM GRANULOCYTES NFR BLD AUTO: 0.2 % (ref 0–0.9)
LYMPHOCYTES # BLD AUTO: 1.09 X10*3/UL (ref 1.2–4.8)
LYMPHOCYTES NFR BLD AUTO: 13.1 %
MCH RBC QN AUTO: 30 PG (ref 26–34)
MCHC RBC AUTO-ENTMCNC: 32.6 G/DL (ref 32–36)
MCV RBC AUTO: 92 FL (ref 80–100)
MONOCYTES # BLD AUTO: 1 X10*3/UL (ref 0.1–1)
MONOCYTES NFR BLD AUTO: 12 %
NEUTROPHILS # BLD AUTO: 6.1 X10*3/UL (ref 1.2–7.7)
NEUTROPHILS NFR BLD AUTO: 73.5 %
NRBC BLD-RTO: ABNORMAL /100{WBCS}
PLATELET # BLD AUTO: 119 X10*3/UL (ref 150–450)
PMV BLD AUTO: 9.9 FL (ref 7.5–11.5)
POTASSIUM SERPL-SCNC: 4 MMOL/L (ref 3.5–5.3)
PROT SERPL-MCNC: 6.7 G/DL (ref 6.4–8.2)
RBC # BLD AUTO: 4.36 X10*6/UL (ref 4.5–5.9)
SODIUM SERPL-SCNC: 137 MMOL/L (ref 136–145)
WBC # BLD AUTO: 8.3 X10*3/UL (ref 4.4–11.3)

## 2023-10-03 PROCEDURE — 74177 CT ABD & PELVIS W/CONTRAST: CPT

## 2023-10-03 PROCEDURE — 36591 DRAW BLOOD OFF VENOUS DEVICE: CPT

## 2023-10-03 PROCEDURE — 36415 COLL VENOUS BLD VENIPUNCTURE: CPT

## 2023-10-03 PROCEDURE — 80053 COMPREHEN METABOLIC PANEL: CPT

## 2023-10-03 PROCEDURE — 85025 COMPLETE CBC W/AUTO DIFF WBC: CPT

## 2023-10-03 PROCEDURE — 74177 CT ABD & PELVIS W/CONTRAST: CPT | Performed by: RADIOLOGY

## 2023-10-03 PROCEDURE — 2550000001 HC RX 255 CONTRASTS: Performed by: STUDENT IN AN ORGANIZED HEALTH CARE EDUCATION/TRAINING PROGRAM

## 2023-10-03 PROCEDURE — 71260 CT THORAX DX C+: CPT | Performed by: RADIOLOGY

## 2023-10-03 RX ORDER — ALBUTEROL SULFATE 0.83 MG/ML
3 SOLUTION RESPIRATORY (INHALATION) AS NEEDED
Status: CANCELLED | OUTPATIENT
Start: 2023-10-04

## 2023-10-03 RX ORDER — PROCHLORPERAZINE EDISYLATE 5 MG/ML
10 INJECTION INTRAMUSCULAR; INTRAVENOUS EVERY 6 HOURS PRN
Status: CANCELLED | OUTPATIENT
Start: 2023-10-04

## 2023-10-03 RX ORDER — LORAZEPAM 2 MG/ML
1 INJECTION INTRAMUSCULAR AS NEEDED
Status: CANCELLED | OUTPATIENT
Start: 2023-10-04

## 2023-10-03 RX ORDER — DIPHENHYDRAMINE HYDROCHLORIDE 50 MG/ML
50 INJECTION INTRAMUSCULAR; INTRAVENOUS AS NEEDED
Status: CANCELLED | OUTPATIENT
Start: 2023-10-04

## 2023-10-03 RX ORDER — PROCHLORPERAZINE MALEATE 10 MG
10 TABLET ORAL EVERY 6 HOURS PRN
Status: CANCELLED | OUTPATIENT
Start: 2023-10-04

## 2023-10-03 RX ORDER — PALONOSETRON 0.05 MG/ML
0.25 INJECTION, SOLUTION INTRAVENOUS ONCE
Status: CANCELLED | OUTPATIENT
Start: 2023-10-04

## 2023-10-03 RX ORDER — EPINEPHRINE 0.3 MG/.3ML
0.3 INJECTION SUBCUTANEOUS EVERY 5 MIN PRN
Status: CANCELLED | OUTPATIENT
Start: 2023-10-04

## 2023-10-03 RX ORDER — FAMOTIDINE 10 MG/ML
20 INJECTION INTRAVENOUS ONCE AS NEEDED
Status: CANCELLED | OUTPATIENT
Start: 2023-10-04

## 2023-10-03 RX ADMIN — IOHEXOL 75 ML: 350 INJECTION, SOLUTION INTRAVENOUS at 14:35

## 2023-10-03 NOTE — PROGRESS NOTES
"Narciso Marrero is a 57 y.o. male  Pt verified by name and   Pt here for CT scan access and pretreatment labs. Pt verified he had a power port but states he does not have his card.   Pt was accessed with PP needle and pt request to stay access for treatment tomorrow at [facility] after FUV with Dr. Russ. Dressing was dated by this RN  Pt reports doing \"ok\" following chemo but states this last chemo was a little bit more challenging. Pt c/o diarrhea with minimal relief from imodium. Denies change in diet, denies abdominal pain or discomfort. Denies foul smell. Pt has provider visit tomorrow. Pt was sent to scan with needle flushed per policy. Pt denied further needs and was dc'd from infusion in stable condition.   "

## 2023-10-04 ENCOUNTER — INFUSION (OUTPATIENT)
Dept: HEMATOLOGY/ONCOLOGY | Facility: HOSPITAL | Age: 58
End: 2023-10-04
Payer: COMMERCIAL

## 2023-10-04 ENCOUNTER — APPOINTMENT (OUTPATIENT)
Dept: HEMATOLOGY/ONCOLOGY | Facility: HOSPITAL | Age: 58
End: 2023-10-04
Payer: COMMERCIAL

## 2023-10-04 ENCOUNTER — LAB (OUTPATIENT)
Dept: HEMATOLOGY/ONCOLOGY | Facility: HOSPITAL | Age: 58
End: 2023-10-04
Payer: COMMERCIAL

## 2023-10-04 ENCOUNTER — OFFICE VISIT (OUTPATIENT)
Dept: HEMATOLOGY/ONCOLOGY | Facility: HOSPITAL | Age: 58
End: 2023-10-04
Payer: COMMERCIAL

## 2023-10-04 ENCOUNTER — NUTRITION (OUTPATIENT)
Dept: HEMATOLOGY/ONCOLOGY | Facility: HOSPITAL | Age: 58
End: 2023-10-04
Payer: COMMERCIAL

## 2023-10-04 VITALS
HEART RATE: 89 BPM | WEIGHT: 210.54 LBS | RESPIRATION RATE: 18 BRPM | HEIGHT: 71 IN | TEMPERATURE: 97.5 F | OXYGEN SATURATION: 97 % | DIASTOLIC BLOOD PRESSURE: 73 MMHG | BODY MASS INDEX: 29.48 KG/M2 | SYSTOLIC BLOOD PRESSURE: 114 MMHG

## 2023-10-04 VITALS — HEIGHT: 71 IN | BODY MASS INDEX: 29.48 KG/M2 | WEIGHT: 210.54 LBS

## 2023-10-04 DIAGNOSIS — C18.2 MALIGNANT NEOPLASM OF ASCENDING COLON (MULTI): ICD-10-CM

## 2023-10-04 DIAGNOSIS — R19.5 LOOSE STOOLS: ICD-10-CM

## 2023-10-04 DIAGNOSIS — R19.5 LOOSE STOOLS: Primary | ICD-10-CM

## 2023-10-04 LAB
ALBUMIN SERPL BCP-MCNC: 3.7 G/DL (ref 3.4–5)
ALP SERPL-CCNC: 170 U/L (ref 33–120)
ALT SERPL W P-5'-P-CCNC: 84 U/L (ref 10–52)
ANION GAP SERPL CALC-SCNC: 12 MMOL/L (ref 10–20)
AST SERPL W P-5'-P-CCNC: 53 U/L (ref 9–39)
BASOPHILS # BLD AUTO: 0.02 X10*3/UL (ref 0–0.1)
BASOPHILS NFR BLD AUTO: 0.3 %
BILIRUB SERPL-MCNC: 0.8 MG/DL (ref 0–1.2)
BUN SERPL-MCNC: 17 MG/DL (ref 6–23)
CALCIUM SERPL-MCNC: 9.5 MG/DL (ref 8.6–10.3)
CHLORIDE SERPL-SCNC: 102 MMOL/L (ref 98–107)
CO2 SERPL-SCNC: 26 MMOL/L (ref 21–32)
CREAT SERPL-MCNC: 0.74 MG/DL (ref 0.5–1.3)
EOSINOPHIL # BLD AUTO: 0.02 X10*3/UL (ref 0–0.7)
EOSINOPHIL NFR BLD AUTO: 0.3 %
ERYTHROCYTE [DISTWIDTH] IN BLOOD BY AUTOMATED COUNT: 17.2 % (ref 11.5–14.5)
GFR SERPL CREATININE-BSD FRML MDRD: >90 ML/MIN/1.73M*2
GLUCOSE SERPL-MCNC: 161 MG/DL (ref 74–99)
HCT VFR BLD AUTO: 39.8 % (ref 41–52)
HGB BLD-MCNC: 13.1 G/DL (ref 13.5–17.5)
IMM GRANULOCYTES # BLD AUTO: 0.02 X10*3/UL (ref 0–0.7)
IMM GRANULOCYTES NFR BLD AUTO: 0.3 % (ref 0–0.9)
LYMPHOCYTES # BLD AUTO: 0.69 X10*3/UL (ref 1.2–4.8)
LYMPHOCYTES NFR BLD AUTO: 11.6 %
MCH RBC QN AUTO: 30.1 PG (ref 26–34)
MCHC RBC AUTO-ENTMCNC: 32.9 G/DL (ref 32–36)
MCV RBC AUTO: 92 FL (ref 80–100)
MONOCYTES # BLD AUTO: 1.07 X10*3/UL (ref 0.1–1)
MONOCYTES NFR BLD AUTO: 18 %
NEUTROPHILS # BLD AUTO: 4.12 X10*3/UL (ref 1.2–7.7)
NEUTROPHILS NFR BLD AUTO: 69.5 %
NRBC BLD-RTO: 0 /100 WBCS (ref 0–0)
PLATELET # BLD AUTO: 137 X10*3/UL (ref 150–450)
PMV BLD AUTO: 9.7 FL (ref 7.5–11.5)
POTASSIUM SERPL-SCNC: 3.7 MMOL/L (ref 3.5–5.3)
PROT SERPL-MCNC: 6.7 G/DL (ref 6.4–8.2)
RBC # BLD AUTO: 4.35 X10*6/UL (ref 4.5–5.9)
SODIUM SERPL-SCNC: 136 MMOL/L (ref 136–145)
WBC # BLD AUTO: 5.9 X10*3/UL (ref 4.4–11.3)

## 2023-10-04 PROCEDURE — A4216 STERILE WATER/SALINE, 10 ML: HCPCS | Performed by: STUDENT IN AN ORGANIZED HEALTH CARE EDUCATION/TRAINING PROGRAM

## 2023-10-04 PROCEDURE — 36591 DRAW BLOOD OFF VENOUS DEVICE: CPT

## 2023-10-04 PROCEDURE — 80053 COMPREHEN METABOLIC PANEL: CPT

## 2023-10-04 PROCEDURE — 74177 CT ABD & PELVIS W/CONTRAST: CPT

## 2023-10-04 PROCEDURE — 99215 OFFICE O/P EST HI 40 MIN: CPT | Mod: 25 | Performed by: STUDENT IN AN ORGANIZED HEALTH CARE EDUCATION/TRAINING PROGRAM

## 2023-10-04 PROCEDURE — 99215 OFFICE O/P EST HI 40 MIN: CPT | Performed by: STUDENT IN AN ORGANIZED HEALTH CARE EDUCATION/TRAINING PROGRAM

## 2023-10-04 PROCEDURE — 96374 THER/PROPH/DIAG INJ IV PUSH: CPT

## 2023-10-04 PROCEDURE — 2500000004 HC RX 250 GENERAL PHARMACY W/ HCPCS (ALT 636 FOR OP/ED): Performed by: STUDENT IN AN ORGANIZED HEALTH CARE EDUCATION/TRAINING PROGRAM

## 2023-10-04 PROCEDURE — 2500000001 HC RX 250 WO HCPCS SELF ADMINISTERED DRUGS (ALT 637 FOR MEDICARE OP): Performed by: STUDENT IN AN ORGANIZED HEALTH CARE EDUCATION/TRAINING PROGRAM

## 2023-10-04 PROCEDURE — 96361 HYDRATE IV INFUSION ADD-ON: CPT

## 2023-10-04 PROCEDURE — 3008F BODY MASS INDEX DOCD: CPT | Performed by: STUDENT IN AN ORGANIZED HEALTH CARE EDUCATION/TRAINING PROGRAM

## 2023-10-04 PROCEDURE — 96523 IRRIG DRUG DELIVERY DEVICE: CPT

## 2023-10-04 PROCEDURE — 85025 COMPLETE CBC W/AUTO DIFF WBC: CPT

## 2023-10-04 PROCEDURE — 1036F TOBACCO NON-USER: CPT | Performed by: STUDENT IN AN ORGANIZED HEALTH CARE EDUCATION/TRAINING PROGRAM

## 2023-10-04 RX ORDER — PROCHLORPERAZINE EDISYLATE 5 MG/ML
10 INJECTION INTRAMUSCULAR; INTRAVENOUS EVERY 6 HOURS PRN
Status: DISCONTINUED | OUTPATIENT
Start: 2023-10-04 | End: 2023-10-04 | Stop reason: HOSPADM

## 2023-10-04 RX ORDER — LORAZEPAM 2 MG/ML
1 INJECTION INTRAMUSCULAR AS NEEDED
Status: DISCONTINUED | OUTPATIENT
Start: 2023-10-04 | End: 2023-10-04 | Stop reason: HOSPADM

## 2023-10-04 RX ORDER — HEPARIN SODIUM,PORCINE/PF 10 UNIT/ML
50 SYRINGE (ML) INTRAVENOUS AS NEEDED
Status: CANCELLED | OUTPATIENT
Start: 2023-10-04

## 2023-10-04 RX ORDER — DIPHENHYDRAMINE HYDROCHLORIDE 50 MG/ML
50 INJECTION INTRAMUSCULAR; INTRAVENOUS AS NEEDED
Status: DISCONTINUED | OUTPATIENT
Start: 2023-10-04 | End: 2023-10-04 | Stop reason: HOSPADM

## 2023-10-04 RX ORDER — ALBUTEROL SULFATE 0.83 MG/ML
3 SOLUTION RESPIRATORY (INHALATION) AS NEEDED
Status: DISCONTINUED | OUTPATIENT
Start: 2023-10-04 | End: 2023-10-04 | Stop reason: HOSPADM

## 2023-10-04 RX ORDER — LOPERAMIDE HYDROCHLORIDE 2 MG/1
4 CAPSULE ORAL ONCE
Status: COMPLETED | OUTPATIENT
Start: 2023-10-04 | End: 2023-10-04

## 2023-10-04 RX ORDER — LOPERAMIDE HCL 2 MG
4 TABLET ORAL ONCE
Status: CANCELLED | OUTPATIENT
Start: 2023-10-04 | End: 2023-10-04

## 2023-10-04 RX ORDER — EPINEPHRINE 0.3 MG/.3ML
0.3 INJECTION SUBCUTANEOUS EVERY 5 MIN PRN
Status: DISCONTINUED | OUTPATIENT
Start: 2023-10-04 | End: 2023-10-04 | Stop reason: HOSPADM

## 2023-10-04 RX ORDER — SODIUM CHLORIDE 9 MG/ML
1000 INJECTION, SOLUTION INTRAVENOUS ONCE
Status: COMPLETED | OUTPATIENT
Start: 2023-10-04 | End: 2023-10-04

## 2023-10-04 RX ORDER — FAMOTIDINE 10 MG/ML
20 INJECTION INTRAVENOUS ONCE AS NEEDED
Status: DISCONTINUED | OUTPATIENT
Start: 2023-10-04 | End: 2023-10-04 | Stop reason: HOSPADM

## 2023-10-04 RX ORDER — HEPARIN 100 UNIT/ML
500 SYRINGE INTRAVENOUS AS NEEDED
Status: CANCELLED | OUTPATIENT
Start: 2023-10-04

## 2023-10-04 RX ORDER — PROCHLORPERAZINE MALEATE 10 MG
10 TABLET ORAL EVERY 6 HOURS PRN
Status: DISCONTINUED | OUTPATIENT
Start: 2023-10-04 | End: 2023-10-04 | Stop reason: HOSPADM

## 2023-10-04 RX ORDER — SODIUM CHLORIDE 9 MG/ML
1000 INJECTION, SOLUTION INTRAVENOUS ONCE
Status: CANCELLED | OUTPATIENT
Start: 2023-10-04 | End: 2023-10-04

## 2023-10-04 RX ADMIN — LOPERAMIDE HYDROCHLORIDE 4 MG: 2 CAPSULE ORAL at 11:47

## 2023-10-04 RX ADMIN — SODIUM CHLORIDE 1000 ML/HR: 9 INJECTION, SOLUTION INTRAVENOUS at 11:46

## 2023-10-04 RX ADMIN — DEXAMETHASONE SODIUM PHOSPHATE 12 MG: 4 INJECTION INTRA-ARTICULAR; INTRALESIONAL; INTRAMUSCULAR; INTRAVENOUS; SOFT TISSUE at 13:13

## 2023-10-04 RX ADMIN — HEPARIN SODIUM: 20000 INJECTION, SOLUTION INTRAVENOUS; SUBCUTANEOUS at 13:59

## 2023-10-04 ASSESSMENT — PATIENT HEALTH QUESTIONNAIRE - PHQ9
5. POOR APPETITE OR OVEREATING: SEVERAL DAYS
6. FEELING BAD ABOUT YOURSELF - OR THAT YOU ARE A FAILURE OR HAVE LET YOURSELF OR YOUR FAMILY DOWN: NOT AT ALL
2. FEELING DOWN, DEPRESSED, IRRITABLE, OR HOPELESS: 0
9. THOUGHTS THAT YOU WOULD BE BETTER OFF DEAD, OR OF HURTING YOURSELF: 0
7. TROUBLE CONCENTRATING ON THINGS, SUCH AS READING THE NEWSPAPER OR WATCHING TELEVISION: 0
1. LITTLE INTEREST OR PLEASURE IN DOING THINGS: 0
7. TROUBLE CONCENTRATING ON THINGS, SUCH AS READING THE NEWSPAPER OR WATCHING TELEVISION: NOT AT ALL
4. FEELING TIRED OR HAVING LITTLE ENERGY: 0
2. FEELING DOWN, DEPRESSED, IRRITABLE, OR HOPELESS: NOT AT ALL
3. TROUBLE FALLING OR STAYING ASLEEP OR SLEEPING TOO MUCH: 0
10. IF YOU CHECKED OFF ANY PROBLEMS, HOW DIFFICULT HAVE THESE PROBLEMS MADE IT FOR YOU TO DO YOUR WORK, TAKE CARE OF THINGS AT HOME, OR GET ALONG WITH OTHER PEOPLE: NOT DIFFICULT AT ALL
4. FEELING TIRED OR HAVING LITTLE ENERGY: 0
SUM OF ALL RESPONSES TO PHQ QUESTIONS 1-9: 1
7. TROUBLE CONCENTRATING ON THINGS, SUCH AS READING THE NEWSPAPER OR WATCHING TELEVISION: NOT AT ALL
5. POOR APPETITE OR OVEREATING: SEVERAL DAYS
6. FEELING BAD ABOUT YOURSELF - OR THAT YOU ARE A FAILURE OR HAVE LET YOURSELF OR YOUR FAMILY DOWN: 0
2. FEELING DOWN, DEPRESSED, IRRITABLE, OR HOPELESS: 0
9. THOUGHTS THAT YOU WOULD BE BETTER OFF DEAD, OR OF HURTING YOURSELF: 0
1. LITTLE INTEREST OR PLEASURE IN DOING THINGS: NOT AT ALL
1. LITTLE INTEREST OR PLEASURE IN DOING THINGS: 0
4. FEELING TIRED OR HAVING LITTLE ENERGY: NOT AT ALL
SUM OF ALL RESPONSES TO PHQ QUESTIONS 1-9: 1
8. MOVING OR SPEAKING SO SLOWLY THAT OTHER PEOPLE COULD HAVE NOTICED. OR THE OPPOSITE, BEING SO FIGETY OR RESTLESS THAT YOU HAVE BEEN MOVING AROUND A LOT MORE THAN USUAL: NOT AT ALL
8. MOVING OR SPEAKING SO SLOWLY THAT OTHER PEOPLE COULD HAVE NOTICED. OR THE OPPOSITE, BEING SO FIGETY OR RESTLESS THAT YOU HAVE BEEN MOVING AROUND A LOT MORE THAN USUAL: NOT AT ALL
1. LITTLE INTEREST OR PLEASURE IN DOING THINGS: 0
3. TROUBLE FALLING OR STAYING ASLEEP OR SLEEPING TOO MUCH: 0
3. TROUBLE FALLING OR STAYING ASLEEP OR SLEEPING TOO MUCH: 0
SUM OF ALL RESPONSES TO PHQ QUESTIONS 1-9: 1
4. FEELING TIRED OR HAVING LITTLE ENERGY: NOT AT ALL
4. FEELING TIRED OR HAVING LITTLE ENERGY: 0
5. POOR APPETITE OR OVEREATING: SEVERAL DAYS
3. TROUBLE FALLING OR STAYING ASLEEP OR SLEEPING TOO MUCH: NOT AT ALL
10. IF YOU CHECKED OFF ANY PROBLEMS, HOW DIFFICULT HAVE THESE PROBLEMS MADE IT FOR YOU TO DO YOUR WORK, TAKE CARE OF THINGS AT HOME, OR GET ALONG WITH OTHER PEOPLE: NOT DIFFICULT AT ALL
7. TROUBLE CONCENTRATING ON THINGS, SUCH AS READING THE NEWSPAPER OR WATCHING TELEVISION: 0
3. TROUBLE FALLING OR STAYING ASLEEP OR SLEEPING TOO MUCH: NOT AT ALL
5. POOR APPETITE OR OVEREATING: SEVERAL DAYS
1. LITTLE INTEREST OR PLEASURE IN DOING THINGS: NOT AT ALL
9. THOUGHTS THAT YOU WOULD BE BETTER OFF DEAD, OR OF HURTING YOURSELF: NOT AT ALL
3. TROUBLE FALLING OR STAYING ASLEEP OR SLEEPING TOO MUCH: NOT AT ALL
SUM OF ALL RESPONSES TO PHQ QUESTIONS 1-9: 1
SUM OF ALL RESPONSES TO PHQ QUESTIONS 1-9: 1
5. POOR APPETITE OR OVEREATING: SEVERAL DAYS
1. LITTLE INTEREST OR PLEASURE IN DOING THINGS: NOT AT ALL
8. MOVING OR SPEAKING SO SLOWLY THAT OTHER PEOPLE COULD HAVE NOTICED. OR THE OPPOSITE, BEING SO FIGETY OR RESTLESS THAT YOU HAVE BEEN MOVING AROUND A LOT MORE THAN USUAL: NOT AT ALL
5. POOR APPETITE OR OVEREATING: 1
9. THOUGHTS THAT YOU WOULD BE BETTER OFF DEAD, OR OF HURTING YOURSELF: 0
10. IF YOU CHECKED OFF ANY PROBLEMS, HOW DIFFICULT HAVE THESE PROBLEMS MADE IT FOR YOU TO DO YOUR WORK, TAKE CARE OF THINGS AT HOME, OR GET ALONG WITH OTHER PEOPLE: NOT DIFFICULT AT ALL
7. TROUBLE CONCENTRATING ON THINGS, SUCH AS READING THE NEWSPAPER OR WATCHING TELEVISION: NOT AT ALL
4. FEELING TIRED OR HAVING LITTLE ENERGY: NOT AT ALL
1. LITTLE INTEREST OR PLEASURE IN DOING THINGS: NOT AT ALL
8. MOVING OR SPEAKING SO SLOWLY THAT OTHER PEOPLE COULD HAVE NOTICED. OR THE OPPOSITE, BEING SO FIGETY OR RESTLESS THAT YOU HAVE BEEN MOVING AROUND A LOT MORE THAN USUAL: NOT AT ALL
8. MOVING OR SPEAKING SO SLOWLY THAT OTHER PEOPLE COULD HAVE NOTICED. OR THE OPPOSITE, BEING SO FIGETY OR RESTLESS THAT YOU HAVE BEEN MOVING AROUND A LOT MORE THAN USUAL: NOT AT ALL
2. FEELING DOWN, DEPRESSED OR HOPELESS: NOT AT ALL
2. FEELING DOWN, DEPRESSED, IRRITABLE, OR HOPELESS: 0
8. MOVING OR SPEAKING SO SLOWLY THAT OTHER PEOPLE COULD HAVE NOTICED. OR THE OPPOSITE, BEING SO FIGETY OR RESTLESS THAT YOU HAVE BEEN MOVING AROUND A LOT MORE THAN USUAL: NOT AT ALL
9. THOUGHTS THAT YOU WOULD BE BETTER OFF DEAD, OR OF HURTING YOURSELF: NOT AT ALL
8. MOVING OR SPEAKING SO SLOWLY THAT OTHER PEOPLE COULD HAVE NOTICED. OR THE OPPOSITE, BEING SO FIGETY OR RESTLESS THAT YOU HAVE BEEN MOVING AROUND A LOT MORE THAN USUAL: NOT AT ALL
8. MOVING OR SPEAKING SO SLOWLY THAT OTHER PEOPLE COULD HAVE NOTICED. OR THE OPPOSITE, BEING SO FIGETY OR RESTLESS THAT YOU HAVE BEEN MOVING AROUND A LOT MORE THAN USUAL: 0
1. LITTLE INTEREST OR PLEASURE IN DOING THINGS: NOT AT ALL
6. FEELING BAD ABOUT YOURSELF - OR THAT YOU ARE A FAILURE OR HAVE LET YOURSELF OR YOUR FAMILY DOWN: NOT AT ALL
6. FEELING BAD ABOUT YOURSELF - OR THAT YOU ARE A FAILURE OR HAVE LET YOURSELF OR YOUR FAMILY DOWN: NOT AT ALL
9. THOUGHTS THAT YOU WOULD BE BETTER OFF DEAD, OR OF HURTING YOURSELF: 0
2. FEELING DOWN, DEPRESSED, IRRITABLE, OR HOPELESS: 0
4. FEELING TIRED OR HAVING LITTLE ENERGY: NOT AT ALL
9. THOUGHTS THAT YOU WOULD BE BETTER OFF DEAD, OR OF HURTING YOURSELF: NOT AT ALL
7. TROUBLE CONCENTRATING ON THINGS, SUCH AS READING THE NEWSPAPER OR WATCHING TELEVISION: NOT AT ALL
8. MOVING OR SPEAKING SO SLOWLY THAT OTHER PEOPLE COULD HAVE NOTICED. OR THE OPPOSITE, BEING SO FIGETY OR RESTLESS THAT YOU HAVE BEEN MOVING AROUND A LOT MORE THAN USUAL: 0
5. POOR APPETITE OR OVEREATING: SEVERAL DAYS
3. TROUBLE FALLING OR STAYING ASLEEP OR SLEEPING TOO MUCH: NOT AT ALL
6. FEELING BAD ABOUT YOURSELF - OR THAT YOU ARE A FAILURE OR HAVE LET YOURSELF OR YOUR FAMILY DOWN: NOT AT ALL
6. FEELING BAD ABOUT YOURSELF - OR THAT YOU ARE A FAILURE OR HAVE LET YOURSELF OR YOUR FAMILY DOWN: NOT AT ALL
9. THOUGHTS THAT YOU WOULD BE BETTER OFF DEAD, OR OF HURTING YOURSELF: NOT AT ALL
6. FEELING BAD ABOUT YOURSELF - OR THAT YOU ARE A FAILURE OR HAVE LET YOURSELF OR YOUR FAMILY DOWN: 0
2. FEELING DOWN, DEPRESSED OR HOPELESS: NOT AT ALL
5. POOR APPETITE OR OVEREATING: SEVERAL DAYS
3. TROUBLE FALLING OR STAYING ASLEEP OR SLEEPING TOO MUCH: NOT AT ALL
6. FEELING BAD ABOUT YOURSELF - OR THAT YOU ARE A FAILURE OR HAVE LET YOURSELF OR YOUR FAMILY DOWN: NOT AT ALL
9. THOUGHTS THAT YOU WOULD BE BETTER OFF DEAD, OR OF HURTING YOURSELF: NOT AT ALL
2. FEELING DOWN, DEPRESSED, IRRITABLE, OR HOPELESS: NOT AT ALL
10. IF YOU CHECKED OFF ANY PROBLEMS, HOW DIFFICULT HAVE THESE PROBLEMS MADE IT FOR YOU TO DO YOUR WORK, TAKE CARE OF THINGS AT HOME, OR GET ALONG WITH OTHER PEOPLE: NOT DIFFICULT AT ALL
8. MOVING OR SPEAKING SO SLOWLY THAT OTHER PEOPLE COULD HAVE NOTICED. OR THE OPPOSITE, BEING SO FIGETY OR RESTLESS THAT YOU HAVE BEEN MOVING AROUND A LOT MORE THAN USUAL: NOT AT ALL
1. LITTLE INTEREST OR PLEASURE IN DOING THINGS: NOT AT ALL
4. FEELING TIRED OR HAVING LITTLE ENERGY: NOT AT ALL
7. TROUBLE CONCENTRATING ON THINGS, SUCH AS READING THE NEWSPAPER OR WATCHING TELEVISION: 0
1. LITTLE INTEREST OR PLEASURE IN DOING THINGS: 0
3. TROUBLE FALLING OR STAYING ASLEEP OR SLEEPING TOO MUCH: NOT AT ALL
9. THOUGHTS THAT YOU WOULD BE BETTER OFF DEAD, OR OF HURTING YOURSELF: NOT AT ALL
2. FEELING DOWN, DEPRESSED, IRRITABLE, OR HOPELESS: NOT AT ALL
5. POOR APPETITE OR OVEREATING: SEVERAL DAYS
5. POOR APPETITE OR OVEREATING: 1
6. FEELING BAD ABOUT YOURSELF - OR THAT YOU ARE A FAILURE OR HAVE LET YOURSELF OR YOUR FAMILY DOWN: NOT AT ALL
7. TROUBLE CONCENTRATING ON THINGS, SUCH AS READING THE NEWSPAPER OR WATCHING TELEVISION: NOT AT ALL
6. FEELING BAD ABOUT YOURSELF - OR THAT YOU ARE A FAILURE OR HAVE LET YOURSELF OR YOUR FAMILY DOWN: 0
9. THOUGHTS THAT YOU WOULD BE BETTER OFF DEAD, OR OF HURTING YOURSELF: NOT AT ALL
1. LITTLE INTEREST OR PLEASURE IN DOING THINGS: NOT AT ALL
2. FEELING DOWN, DEPRESSED, IRRITABLE, OR HOPELESS: NOT AT ALL
6. FEELING BAD ABOUT YOURSELF - OR THAT YOU ARE A FAILURE OR HAVE LET YOURSELF OR YOUR FAMILY DOWN: 0
SUM OF ALL RESPONSES TO PHQ QUESTIONS 1-9: 1
7. TROUBLE CONCENTRATING ON THINGS, SUCH AS READING THE NEWSPAPER OR WATCHING TELEVISION: 0
3. TROUBLE FALLING OR STAYING ASLEEP OR SLEEPING TOO MUCH: NOT AT ALL
2. FEELING DOWN, DEPRESSED OR HOPELESS: NOT AT ALL
4. FEELING TIRED OR HAVING LITTLE ENERGY: 0
3. TROUBLE FALLING OR STAYING ASLEEP OR SLEEPING TOO MUCH: 0
4. FEELING TIRED OR HAVING LITTLE ENERGY: NOT AT ALL
SUM OF ALL RESPONSES TO PHQ QUESTIONS 1-9: 1
7. TROUBLE CONCENTRATING ON THINGS, SUCH AS READING THE NEWSPAPER OR WATCHING TELEVISION: NOT AT ALL
8. MOVING OR SPEAKING SO SLOWLY THAT OTHER PEOPLE COULD HAVE NOTICED. OR THE OPPOSITE, BEING SO FIGETY OR RESTLESS THAT YOU HAVE BEEN MOVING AROUND A LOT MORE THAN USUAL: 0
8. MOVING OR SPEAKING SO SLOWLY THAT OTHER PEOPLE COULD HAVE NOTICED. OR THE OPPOSITE, BEING SO FIGETY OR RESTLESS THAT YOU HAVE BEEN MOVING AROUND A LOT MORE THAN USUAL: 0
SUM OF ALL RESPONSES TO PHQ QUESTIONS 1-9: 1
1. LITTLE INTEREST OR PLEASURE IN DOING THINGS: NOT AT ALL
7. TROUBLE CONCENTRATING ON THINGS, SUCH AS READING THE NEWSPAPER OR WATCHING TELEVISION: NOT AT ALL
5. POOR APPETITE OR OVEREATING: 1
7. TROUBLE CONCENTRATING ON THINGS, SUCH AS READING THE NEWSPAPER OR WATCHING TELEVISION: NOT AT ALL
3. TROUBLE FALLING OR STAYING ASLEEP OR SLEEPING TOO MUCH: NOT AT ALL
9. THOUGHTS THAT YOU WOULD BE BETTER OFF DEAD, OR OF HURTING YOURSELF: NOT AT ALL
6. FEELING BAD ABOUT YOURSELF - OR THAT YOU ARE A FAILURE OR HAVE LET YOURSELF OR YOUR FAMILY DOWN: NOT AT ALL
10. IF YOU CHECKED OFF ANY PROBLEMS, HOW DIFFICULT HAVE THESE PROBLEMS MADE IT FOR YOU TO DO YOUR WORK, TAKE CARE OF THINGS AT HOME, OR GET ALONG WITH OTHER PEOPLE: NOT DIFFICULT AT ALL
4. FEELING TIRED OR HAVING LITTLE ENERGY: NOT AT ALL
4. FEELING TIRED OR HAVING LITTLE ENERGY: NOT AT ALL
5. POOR APPETITE OR OVEREATING: 1
10. IF YOU CHECKED OFF ANY PROBLEMS, HOW DIFFICULT HAVE THESE PROBLEMS MADE IT FOR YOU TO DO YOUR WORK, TAKE CARE OF THINGS AT HOME, OR GET ALONG WITH OTHER PEOPLE: NOT DIFFICULT AT ALL
2. FEELING DOWN, DEPRESSED OR HOPELESS: NOT AT ALL

## 2023-10-04 ASSESSMENT — PAIN SCALES - GENERAL: PAINLEVEL: 0-NO PAIN

## 2023-10-04 NOTE — PROGRESS NOTES
"NUTRITION Assessment NOTE  Reason for Visit:  Narciso Marrero is a 57 y.o. male who presents for nausea, diarrhea, taste changes, and poor intake    FOLFOX D1C1, HepNS via AMY. Pt. Seen in infusion w/ wife. Sleeping at time of assessment, but wife was able to provide information.    Anthropometrics:  Anthropometrics  Height: 180.5 cm (5' 11.06\")  Weight: 95.5 kg (210 lb 8.6 oz)  BMI (Calculated): 29.31  IBW/kg (Dietitian Calculated): 78.2 kg  Percent of IBW: 122 %  Weight Change  Weight History / % Weight Change: Recent wt. loss in the last week d/t poor intake.  Significant Weight Loss: Yes  Interpretation of Weight Loss: >2% in 1 week        Wt Readings from Last 10 Encounters:   10/04/23 95.5 kg (210 lb 8.6 oz)   10/04/23 95.5 kg (210 lb 8.6 oz)   10/03/23 98 kg (216 lb 0.8 oz)   09/29/23 98.9 kg (218 lb 0.6 oz)   06/29/23 101 kg (223 lb)   06/28/23 101 kg (223 lb 1.7 oz)   06/09/23 101 kg (222 lb 7 oz)   04/17/23 104 kg (229 lb 5.9 oz)   03/24/23 105 kg (231 lb 7.7 oz)   03/24/23 102 kg (224 lb 1 oz)        Food And Nutrient Intake:  Food and Nutrient History  Food and Nutrient History: Typically good intake, last few days is been worse  Fluid Intake: \"Usually good\" Sugar free gingerale, sugar free powerade, mostly water.  Food Allergy: None.  GI Symptoms: nausea, diarrhea (Zofran, good effect. Diarrhea in the last \"few\" days.)  Oral Problems: dysgeusia (Takes sugar free Jolly ranchers to good effect.)     Food Intake  Amount of Food: Limited food in the last ~3 days d/t not feeling well.  Meal 3: Fajita steak, onions, and pepper (a few bites)    Food Supplement Intake  Oral Nutrition Supplements: Premier Protein (1/day)    Medication and Complementary/Alternative Medicine Use  Prescription Medication Use: FOLFOX      Nutrition Focused Physical Exam:  Subcutaneous Fat Loss  Orbital Fat Pads: Well nourshed (slightly bulging fat pads)  Buccal Fat Pads: Well nourished (full, rounded cheeks)  Triceps: Defer  Ribs: " "Defer  Muscle Wasting  Temporalis: Well nourished (well-defined muscle)  Pectoralis (Clavicular Region): Defer  Deltoid/Trapezius: Defer  Interosseous: Defer  Trapezius/Infraspinatus/Supraspinatus (Scapular Region): Defer  Quadriceps: Defer  Gastrocnemius: Defer  Edema  Edema: none  Physical Findings (Nutrition Deficiency/Toxicity)  Hair: Negative  Eyes: Negative  Mouth: Negative  Nails: Negative  Skin: Negative  Shakes, recipes.       Energy Needs  Calculated Energy Needs Using Equations  Height: 180.5 cm (5' 11.06\")  Weight Used for Equation Calculations: 95.5 kg (210 lb 8.6 oz)  [place]- . Jeor Equation (Overweight or Obese Patients): 1798  Total Energy Needs:  (7548-1537)  Estimated Energy Needs  Total Energy Estimated Needs (kCal):  (4796-6175)  Method for Estimating Needs: 25-28 kcals/kg IBW.  Estimated Fluid Needs  Total Fluid Estimated Needs (mL): 2200 mL  Method for Estimating Needs: 1 mL/kcal  Estimated Protein Needs  Total Protein Estimated Needs (g):  ()  Method for Estimating Needs: 1.3 g protein/kg IBW.  Weight and Growth Recommendation  Desired Growth Pattern: Maintain current wt.        Diagnosis   Malnutrition Diagnosis  Patient has Malnutrition Diagnosis: Yes  Diagnosis Status: New  Malnutrition Diagnosis: Severe malnutrition related to disease or condition  As Evidenced by: </= 50% of EEN for >/= 5 days; wt. loss of >2% in 1 week.       Interventions/Recommendations   Food and Nutrition Delivery  Meals & Snacks: Energy-modified diet, Carbohydrate-modified diet  Goals: High calorie food choices w/ consistent carbs.  Medical Food Supplement: Premier Protein  Goals: TID  Nutrition Education  Nutrition Education Content: Content related nutrition education (Handouts for: High Calorie Shakes, Taste changes)  Coordination of Nutrition Care by a Nutrition Professional  Collaboration and referral of nutrition care: Collaboration by nutrition professional with other providers  Goals: Will " follow.        There are no Patient Instructions on file for this visit.    Monitoring and Evaluation   Food/Nutrient Related History Monitoring  Monitoring and Evaluation Plan: Energy intake  Energy Intake: Estimated energy intake  Criteria: 2000  Additional Plan: Premier protein shakes TID        Time Spent  Prep time on day of patient encounter: 10 minutes  Time spent directly with patient, family or caregiver: 20 minutes  Additional Time Spent on Patient Care Activities: 5 minutes  Documentation Time: 30 minutes  Other Time Spent: 0 minutes  Total: 65 minutes        Readiness to Change : Excellent  Level of Understanding : Excellent  Anticipated Compliant : Excellent

## 2023-10-05 DIAGNOSIS — C18.2 MALIGNANT NEOPLASM OF ASCENDING COLON (MULTI): Primary | ICD-10-CM
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
## 2023-10-06 ENCOUNTER — HOSPITAL ENCOUNTER (INPATIENT)
Facility: HOSPITAL | Age: 58
LOS: 6 days | Discharge: HOME | DRG: 392 | End: 2023-10-12
Attending: STUDENT IN AN ORGANIZED HEALTH CARE EDUCATION/TRAINING PROGRAM | Admitting: STUDENT IN AN ORGANIZED HEALTH CARE EDUCATION/TRAINING PROGRAM
Payer: COMMERCIAL

## 2023-10-06 ENCOUNTER — APPOINTMENT (OUTPATIENT)
Dept: HEMATOLOGY/ONCOLOGY | Facility: CLINIC | Age: 58
End: 2023-10-06
Payer: COMMERCIAL

## 2023-10-06 ENCOUNTER — APPOINTMENT (OUTPATIENT)
Dept: RADIOLOGY | Facility: HOSPITAL | Age: 58
DRG: 392 | End: 2023-10-06
Payer: COMMERCIAL

## 2023-10-06 ENCOUNTER — APPOINTMENT (OUTPATIENT)
Dept: CARDIOLOGY | Facility: HOSPITAL | Age: 58
DRG: 392 | End: 2023-10-06
Payer: COMMERCIAL

## 2023-10-06 DIAGNOSIS — E11.9 TYPE 2 DIABETES MELLITUS WITHOUT COMPLICATION, UNSPECIFIED WHETHER LONG TERM INSULIN USE (MULTI): ICD-10-CM

## 2023-10-06 DIAGNOSIS — R19.7 DIARRHEA, UNSPECIFIED TYPE: ICD-10-CM

## 2023-10-06 DIAGNOSIS — R50.9 FEVER, UNSPECIFIED FEVER CAUSE: Primary | ICD-10-CM

## 2023-10-06 LAB
ALBUMIN SERPL BCP-MCNC: 3.5 G/DL (ref 3.4–5)
ALP SERPL-CCNC: 181 U/L (ref 33–120)
ALT SERPL W P-5'-P-CCNC: 65 U/L (ref 10–52)
ANION GAP BLDV CALCULATED.4IONS-SCNC: 11 MMOL/L (ref 10–25)
ANION GAP SERPL CALC-SCNC: 18 MMOL/L (ref 10–20)
APPEARANCE UR: ABNORMAL
AST SERPL W P-5'-P-CCNC: 30 U/L (ref 9–39)
ATRIAL RATE: 124 BPM
BASE EXCESS BLDV CALC-SCNC: -2 MMOL/L (ref -2–3)
BASOPHILS # BLD AUTO: 0.02 X10*3/UL (ref 0–0.1)
BASOPHILS NFR BLD AUTO: 0.4 %
BILIRUB SERPL-MCNC: 0.9 MG/DL (ref 0–1.2)
BILIRUB UR STRIP.AUTO-MCNC: NEGATIVE MG/DL
BODY TEMPERATURE: 37 DEGREES CELSIUS
BUN SERPL-MCNC: 16 MG/DL (ref 6–23)
CA-I BLDV-SCNC: 1.21 MMOL/L (ref 1.1–1.33)
CALCIUM SERPL-MCNC: 8.9 MG/DL (ref 8.6–10.6)
CHLORIDE BLDV-SCNC: 102 MMOL/L (ref 98–107)
CHLORIDE SERPL-SCNC: 102 MMOL/L (ref 98–107)
CO2 SERPL-SCNC: 20 MMOL/L (ref 21–32)
COLOR UR: ABNORMAL
CREAT SERPL-MCNC: 0.83 MG/DL (ref 0.5–1.3)
EOSINOPHIL # BLD AUTO: 0 X10*3/UL (ref 0–0.7)
EOSINOPHIL NFR BLD AUTO: 0 %
ERYTHROCYTE [DISTWIDTH] IN BLOOD BY AUTOMATED COUNT: 17.1 % (ref 11.5–14.5)
GFR SERPL CREATININE-BSD FRML MDRD: >90 ML/MIN/1.73M*2
GLUCOSE BLD MANUAL STRIP-MCNC: 130 MG/DL (ref 74–99)
GLUCOSE BLDV-MCNC: 196 MG/DL (ref 74–99)
GLUCOSE SERPL-MCNC: 181 MG/DL (ref 74–99)
GLUCOSE UR STRIP.AUTO-MCNC: NEGATIVE MG/DL
HCO3 BLDV-SCNC: 22.3 MMOL/L (ref 22–26)
HCT VFR BLD AUTO: 41 % (ref 41–52)
HCT VFR BLD EST: 41 % (ref 41–52)
HGB BLD-MCNC: 13.9 G/DL (ref 13.5–17.5)
HGB BLDV-MCNC: 13.7 G/DL (ref 13.5–17.5)
IMM GRANULOCYTES # BLD AUTO: 0.03 X10*3/UL (ref 0–0.7)
IMM GRANULOCYTES NFR BLD AUTO: 0.5 % (ref 0–0.9)
KETONES UR STRIP.AUTO-MCNC: ABNORMAL MG/DL
LACTATE BLDV-SCNC: 2 MMOL/L (ref 0.4–2)
LDH SERPL L TO P-CCNC: 130 U/L (ref 84–246)
LEUKOCYTE ESTERASE UR QL STRIP.AUTO: NEGATIVE
LYMPHOCYTES # BLD AUTO: 0.55 X10*3/UL (ref 1.2–4.8)
LYMPHOCYTES NFR BLD AUTO: 9.9 %
MAGNESIUM SERPL-MCNC: 1.31 MG/DL (ref 1.6–2.4)
MCH RBC QN AUTO: 30.4 PG (ref 26–34)
MCHC RBC AUTO-ENTMCNC: 33.9 G/DL (ref 32–36)
MCV RBC AUTO: 90 FL (ref 80–100)
MONOCYTES # BLD AUTO: 1.24 X10*3/UL (ref 0.1–1)
MONOCYTES NFR BLD AUTO: 22.4 %
MUCOUS THREADS #/AREA URNS AUTO: ABNORMAL /LPF
NEUTROPHILS # BLD AUTO: 3.7 X10*3/UL (ref 1.2–7.7)
NEUTROPHILS NFR BLD AUTO: 66.8 %
NITRITE UR QL STRIP.AUTO: NEGATIVE
NRBC BLD-RTO: 0 /100 WBCS (ref 0–0)
OXYHGB MFR BLDV: 76.5 % (ref 45–75)
P AXIS: 67 DEGREES
P OFFSET: 199 MS
P ONSET: 125 MS
PCO2 BLDV: 36 MM HG (ref 41–51)
PH BLDV: 7.4 PH (ref 7.33–7.43)
PH UR STRIP.AUTO: 5 [PH]
PHOSPHATE SERPL-MCNC: 3.3 MG/DL (ref 2.5–4.9)
PLATELET # BLD AUTO: 137 X10*3/UL (ref 150–450)
PMV BLD AUTO: 9.2 FL (ref 7.5–11.5)
PO2 BLDV: 48 MM HG (ref 35–45)
POTASSIUM BLDV-SCNC: 3.4 MMOL/L (ref 3.5–5.3)
POTASSIUM SERPL-SCNC: 3.4 MMOL/L (ref 3.5–5.3)
PR INTERVAL: 176 MS
PROT SERPL-MCNC: 6.1 G/DL (ref 6.4–8.2)
PROT UR STRIP.AUTO-MCNC: ABNORMAL MG/DL
Q ONSET: 213 MS
QRS COUNT: 21 BEATS
QRS DURATION: 92 MS
QT INTERVAL: 306 MS
QTC CALCULATION(BAZETT): 439 MS
QTC FREDERICIA: 389 MS
R AXIS: -24 DEGREES
RBC # BLD AUTO: 4.57 X10*6/UL (ref 4.5–5.9)
RBC # UR STRIP.AUTO: ABNORMAL /UL
RBC #/AREA URNS AUTO: >20 /HPF
SAO2 % BLDV: 77 % (ref 45–75)
SODIUM BLDV-SCNC: 132 MMOL/L (ref 136–145)
SODIUM SERPL-SCNC: 137 MMOL/L (ref 136–145)
SP GR UR STRIP.AUTO: 1.03
SQUAMOUS #/AREA URNS AUTO: ABNORMAL /HPF
T AXIS: 57 DEGREES
T OFFSET: 366 MS
UROBILINOGEN UR STRIP.AUTO-MCNC: <2 MG/DL
VENTRICULAR RATE: 124 BPM
WBC # BLD AUTO: 5.5 X10*3/UL (ref 4.4–11.3)
WBC #/AREA URNS AUTO: ABNORMAL /HPF

## 2023-10-06 PROCEDURE — 87506 IADNA-DNA/RNA PROBE TQ 6-11: CPT

## 2023-10-06 PROCEDURE — 82947 ASSAY GLUCOSE BLOOD QUANT: CPT

## 2023-10-06 PROCEDURE — 2500000004 HC RX 250 GENERAL PHARMACY W/ HCPCS (ALT 636 FOR OP/ED)

## 2023-10-06 PROCEDURE — 96366 THER/PROPH/DIAG IV INF ADDON: CPT

## 2023-10-06 PROCEDURE — 99285 EMERGENCY DEPT VISIT HI MDM: CPT | Performed by: STUDENT IN AN ORGANIZED HEALTH CARE EDUCATION/TRAINING PROGRAM

## 2023-10-06 PROCEDURE — 96375 TX/PRO/DX INJ NEW DRUG ADDON: CPT

## 2023-10-06 PROCEDURE — 99222 1ST HOSP IP/OBS MODERATE 55: CPT

## 2023-10-06 PROCEDURE — 99285 EMERGENCY DEPT VISIT HI MDM: CPT | Mod: 25 | Performed by: STUDENT IN AN ORGANIZED HEALTH CARE EDUCATION/TRAINING PROGRAM

## 2023-10-06 PROCEDURE — 83615 LACTATE (LD) (LDH) ENZYME: CPT | Performed by: EMERGENCY MEDICINE

## 2023-10-06 PROCEDURE — 96365 THER/PROPH/DIAG IV INF INIT: CPT

## 2023-10-06 PROCEDURE — 74177 CT ABD & PELVIS W/CONTRAST: CPT | Performed by: RADIOLOGY

## 2023-10-06 PROCEDURE — 82374 ASSAY BLOOD CARBON DIOXIDE: CPT | Performed by: EMERGENCY MEDICINE

## 2023-10-06 PROCEDURE — 74177 CT ABD & PELVIS W/CONTRAST: CPT

## 2023-10-06 PROCEDURE — 81001 URINALYSIS AUTO W/SCOPE: CPT | Performed by: STUDENT IN AN ORGANIZED HEALTH CARE EDUCATION/TRAINING PROGRAM

## 2023-10-06 PROCEDURE — 84100 ASSAY OF PHOSPHORUS: CPT | Performed by: STUDENT IN AN ORGANIZED HEALTH CARE EDUCATION/TRAINING PROGRAM

## 2023-10-06 PROCEDURE — 2500000001 HC RX 250 WO HCPCS SELF ADMINISTERED DRUGS (ALT 637 FOR MEDICARE OP)

## 2023-10-06 PROCEDURE — 87493 C DIFF AMPLIFIED PROBE: CPT | Performed by: STUDENT IN AN ORGANIZED HEALTH CARE EDUCATION/TRAINING PROGRAM

## 2023-10-06 PROCEDURE — 87040 BLOOD CULTURE FOR BACTERIA: CPT | Performed by: STUDENT IN AN ORGANIZED HEALTH CARE EDUCATION/TRAINING PROGRAM

## 2023-10-06 PROCEDURE — 2550000001 HC RX 255 CONTRASTS: Performed by: EMERGENCY MEDICINE

## 2023-10-06 PROCEDURE — 2500000004 HC RX 250 GENERAL PHARMACY W/ HCPCS (ALT 636 FOR OP/ED): Performed by: STUDENT IN AN ORGANIZED HEALTH CARE EDUCATION/TRAINING PROGRAM

## 2023-10-06 PROCEDURE — 1210000001 HC SEMI-PRIVATE ROOM DAILY

## 2023-10-06 PROCEDURE — 2500000005 HC RX 250 GENERAL PHARMACY W/O HCPCS

## 2023-10-06 PROCEDURE — 2580000001 HC RX 258 IV SOLUTIONS: Performed by: STUDENT IN AN ORGANIZED HEALTH CARE EDUCATION/TRAINING PROGRAM

## 2023-10-06 PROCEDURE — 80053 COMPREHEN METABOLIC PANEL: CPT | Performed by: STUDENT IN AN ORGANIZED HEALTH CARE EDUCATION/TRAINING PROGRAM

## 2023-10-06 PROCEDURE — 85025 COMPLETE CBC W/AUTO DIFF WBC: CPT | Performed by: EMERGENCY MEDICINE

## 2023-10-06 PROCEDURE — 93010 ELECTROCARDIOGRAM REPORT: CPT | Performed by: STUDENT IN AN ORGANIZED HEALTH CARE EDUCATION/TRAINING PROGRAM

## 2023-10-06 PROCEDURE — 2500000002 HC RX 250 W HCPCS SELF ADMINISTERED DRUGS (ALT 637 FOR MEDICARE OP, ALT 636 FOR OP/ED)

## 2023-10-06 PROCEDURE — 87075 CULTR BACTERIA EXCEPT BLOOD: CPT | Performed by: STUDENT IN AN ORGANIZED HEALTH CARE EDUCATION/TRAINING PROGRAM

## 2023-10-06 PROCEDURE — 83735 ASSAY OF MAGNESIUM: CPT | Performed by: STUDENT IN AN ORGANIZED HEALTH CARE EDUCATION/TRAINING PROGRAM

## 2023-10-06 PROCEDURE — 85025 COMPLETE CBC W/AUTO DIFF WBC: CPT | Performed by: STUDENT IN AN ORGANIZED HEALTH CARE EDUCATION/TRAINING PROGRAM

## 2023-10-06 PROCEDURE — 36415 COLL VENOUS BLD VENIPUNCTURE: CPT | Performed by: STUDENT IN AN ORGANIZED HEALTH CARE EDUCATION/TRAINING PROGRAM

## 2023-10-06 PROCEDURE — 96361 HYDRATE IV INFUSION ADD-ON: CPT

## 2023-10-06 PROCEDURE — 96368 THER/DIAG CONCURRENT INF: CPT

## 2023-10-06 PROCEDURE — 82435 ASSAY OF BLOOD CHLORIDE: CPT | Performed by: STUDENT IN AN ORGANIZED HEALTH CARE EDUCATION/TRAINING PROGRAM

## 2023-10-06 PROCEDURE — 2580000001 HC RX 258 IV SOLUTIONS

## 2023-10-06 PROCEDURE — 84075 ASSAY ALKALINE PHOSPHATASE: CPT | Performed by: EMERGENCY MEDICINE

## 2023-10-06 PROCEDURE — 96367 TX/PROPH/DG ADDL SEQ IV INF: CPT

## 2023-10-06 PROCEDURE — S0119 ONDANSETRON 4 MG: HCPCS

## 2023-10-06 PROCEDURE — 83615 LACTATE (LD) (LDH) ENZYME: CPT | Performed by: STUDENT IN AN ORGANIZED HEALTH CARE EDUCATION/TRAINING PROGRAM

## 2023-10-06 PROCEDURE — 93005 ELECTROCARDIOGRAM TRACING: CPT

## 2023-10-06 RX ORDER — PREGABALIN 50 MG/1
50 CAPSULE ORAL 3 TIMES DAILY
COMMUNITY
Start: 2023-09-04 | End: 2023-11-01 | Stop reason: SDUPTHER

## 2023-10-06 RX ORDER — ONDANSETRON 4 MG/1
TABLET, FILM COATED ORAL
Status: COMPLETED
Start: 2023-10-06 | End: 2023-10-06

## 2023-10-06 RX ORDER — POTASSIUM CHLORIDE 20 MEQ/1
TABLET, EXTENDED RELEASE ORAL
Status: COMPLETED
Start: 2023-10-06 | End: 2023-10-06

## 2023-10-06 RX ORDER — SIMVASTATIN 40 MG/1
40 TABLET, FILM COATED ORAL NIGHTLY
COMMUNITY
Start: 2023-09-07 | End: 2024-04-17 | Stop reason: HOSPADM

## 2023-10-06 RX ORDER — VANCOMYCIN HYDROCHLORIDE 1 G/200ML
1000 INJECTION, SOLUTION INTRAVENOUS ONCE
Status: COMPLETED | OUTPATIENT
Start: 2023-10-06 | End: 2023-10-06

## 2023-10-06 RX ORDER — MAGNESIUM SULFATE HEPTAHYDRATE 40 MG/ML
2 INJECTION, SOLUTION INTRAVENOUS ONCE
Status: COMPLETED | OUTPATIENT
Start: 2023-10-06 | End: 2023-10-06

## 2023-10-06 RX ORDER — ONDANSETRON 4 MG/1
4 TABLET, FILM COATED ORAL EVERY 8 HOURS PRN
Status: DISCONTINUED | OUTPATIENT
Start: 2023-10-06 | End: 2023-10-12 | Stop reason: HOSPADM

## 2023-10-06 RX ORDER — PREGABALIN 100 MG/1
100 CAPSULE ORAL 3 TIMES DAILY
COMMUNITY
Start: 2023-01-08 | End: 2023-12-29 | Stop reason: ALTCHOICE

## 2023-10-06 RX ORDER — INSULIN LISPRO 100 [IU]/ML
3 INJECTION, SOLUTION INTRAVENOUS; SUBCUTANEOUS
COMMUNITY
Start: 2023-06-29 | End: 2023-10-12 | Stop reason: HOSPADM

## 2023-10-06 RX ORDER — HEPARIN SODIUM,PORCINE/PF 10 UNIT/ML
50 SYRINGE (ML) INTRAVENOUS EVERY 12 HOURS
Status: DISCONTINUED | OUTPATIENT
Start: 2023-10-06 | End: 2023-10-08

## 2023-10-06 RX ORDER — PANTOPRAZOLE SODIUM 40 MG/1
40 TABLET, DELAYED RELEASE ORAL
COMMUNITY
Start: 2023-02-16

## 2023-10-06 RX ORDER — HEPARIN SODIUM (PORCINE) LOCK FLUSH IV SOLN 100 UNIT/ML 100 UNIT/ML
500 SOLUTION INTRAVENOUS ONCE AS NEEDED
Status: DISCONTINUED | OUTPATIENT
Start: 2023-10-06 | End: 2023-10-08

## 2023-10-06 RX ORDER — SIMVASTATIN 40 MG/1
40 TABLET, FILM COATED ORAL NIGHTLY
Status: DISCONTINUED | OUTPATIENT
Start: 2023-10-06 | End: 2023-10-12 | Stop reason: HOSPADM

## 2023-10-06 RX ORDER — PREGABALIN 75 MG/1
150 CAPSULE ORAL 3 TIMES DAILY
Status: DISCONTINUED | OUTPATIENT
Start: 2023-10-06 | End: 2023-10-12 | Stop reason: HOSPADM

## 2023-10-06 RX ORDER — LOPERAMIDE HYDROCHLORIDE 2 MG/1
2 CAPSULE ORAL 4 TIMES DAILY PRN
COMMUNITY
End: 2023-10-12 | Stop reason: HOSPADM

## 2023-10-06 RX ORDER — GLUCOSAM/CHONDRO/HERB 149/HYAL 750-100 MG
1 TABLET ORAL DAILY
COMMUNITY
End: 2024-01-22 | Stop reason: WASHOUT

## 2023-10-06 RX ORDER — INSULIN LISPRO 100 [IU]/ML
0-5 INJECTION, SOLUTION INTRAVENOUS; SUBCUTANEOUS
Status: DISCONTINUED | OUTPATIENT
Start: 2023-10-06 | End: 2023-10-12 | Stop reason: HOSPADM

## 2023-10-06 RX ORDER — PREGABALIN 100 MG/1
CAPSULE ORAL
Status: COMPLETED
Start: 2023-10-06 | End: 2023-10-06

## 2023-10-06 RX ORDER — ONDANSETRON HYDROCHLORIDE 8 MG/1
8 TABLET, FILM COATED ORAL EVERY 8 HOURS PRN
Status: ON HOLD | COMMUNITY
Start: 2023-01-16 | End: 2024-03-16 | Stop reason: ALTCHOICE

## 2023-10-06 RX ORDER — ENOXAPARIN SODIUM 100 MG/ML
40 INJECTION SUBCUTANEOUS EVERY 24 HOURS
Status: DISCONTINUED | OUTPATIENT
Start: 2023-10-06 | End: 2023-10-12 | Stop reason: HOSPADM

## 2023-10-06 RX ORDER — OXYCODONE AND ACETAMINOPHEN 5; 325 MG/1; MG/1
1 TABLET ORAL EVERY 6 HOURS PRN
Status: DISCONTINUED | OUTPATIENT
Start: 2023-10-06 | End: 2023-10-12 | Stop reason: HOSPADM

## 2023-10-06 RX ORDER — OXYCODONE HYDROCHLORIDE AND ACETAMINOPHEN 5; 325 MG/1; MG/1
1 TABLET ORAL 2 TIMES DAILY PRN
COMMUNITY
Start: 2023-09-19 | End: 2023-12-29 | Stop reason: ALTCHOICE

## 2023-10-06 RX ORDER — HEPARIN SODIUM,PORCINE/PF 10 UNIT/ML
50 SYRINGE (ML) INTRAVENOUS AS NEEDED
Status: DISCONTINUED | OUTPATIENT
Start: 2023-10-06 | End: 2023-10-08

## 2023-10-06 RX ORDER — SODIUM CHLORIDE, SODIUM LACTATE, POTASSIUM CHLORIDE, CALCIUM CHLORIDE 600; 310; 30; 20 MG/100ML; MG/100ML; MG/100ML; MG/100ML
150 INJECTION, SOLUTION INTRAVENOUS CONTINUOUS
Status: ACTIVE | OUTPATIENT
Start: 2023-10-06 | End: 2023-10-07

## 2023-10-06 RX ORDER — INSULIN GLARGINE 100 [IU]/ML
10 INJECTION, SOLUTION SUBCUTANEOUS NIGHTLY
Status: DISCONTINUED | OUTPATIENT
Start: 2023-10-06 | End: 2023-10-12 | Stop reason: HOSPADM

## 2023-10-06 RX ORDER — DEXTROSE 50 % IN WATER (D50W) INTRAVENOUS SYRINGE
25
Status: DISCONTINUED | OUTPATIENT
Start: 2023-10-06 | End: 2023-10-12 | Stop reason: HOSPADM

## 2023-10-06 RX ORDER — PROCHLORPERAZINE MALEATE 10 MG
10 TABLET ORAL EVERY 6 HOURS PRN
Status: DISCONTINUED | OUTPATIENT
Start: 2023-10-06 | End: 2023-10-12 | Stop reason: HOSPADM

## 2023-10-06 RX ORDER — PREGABALIN 50 MG/1
100 CAPSULE ORAL 3 TIMES DAILY
Status: DISCONTINUED | OUTPATIENT
Start: 2023-10-06 | End: 2023-10-06

## 2023-10-06 RX ORDER — INSULIN GLARGINE 100 [IU]/ML
22 INJECTION, SOLUTION SUBCUTANEOUS NIGHTLY
COMMUNITY
Start: 2023-07-04 | End: 2023-10-12 | Stop reason: HOSPADM

## 2023-10-06 RX ORDER — ACETAMINOPHEN 325 MG/1
650 TABLET ORAL EVERY 6 HOURS PRN
Status: ON HOLD | COMMUNITY
End: 2024-03-16 | Stop reason: ALTCHOICE

## 2023-10-06 RX ORDER — PROCHLORPERAZINE MALEATE 10 MG
10 TABLET ORAL EVERY 6 HOURS PRN
COMMUNITY
Start: 2023-03-01 | End: 2024-01-09 | Stop reason: ALTCHOICE

## 2023-10-06 RX ORDER — ONDANSETRON HYDROCHLORIDE 2 MG/ML
4 INJECTION, SOLUTION INTRAVENOUS ONCE
Status: COMPLETED | OUTPATIENT
Start: 2023-10-06 | End: 2023-10-06

## 2023-10-06 RX ORDER — CHOLECALCIFEROL (VITAMIN D3) 25 MCG
1000 TABLET ORAL DAILY
Status: ON HOLD | COMMUNITY
End: 2024-03-16 | Stop reason: ALTCHOICE

## 2023-10-06 RX ORDER — DEXTROSE MONOHYDRATE 100 MG/ML
0.3 INJECTION, SOLUTION INTRAVENOUS ONCE AS NEEDED
Status: DISCONTINUED | OUTPATIENT
Start: 2023-10-06 | End: 2023-10-12 | Stop reason: HOSPADM

## 2023-10-06 RX ORDER — PANTOPRAZOLE SODIUM 40 MG/1
40 TABLET, DELAYED RELEASE ORAL
Status: DISCONTINUED | OUTPATIENT
Start: 2023-10-07 | End: 2023-10-12 | Stop reason: HOSPADM

## 2023-10-06 RX ORDER — POTASSIUM CHLORIDE 20 MEQ/1
40 TABLET, EXTENDED RELEASE ORAL ONCE
Status: COMPLETED | OUTPATIENT
Start: 2023-10-06 | End: 2023-10-06

## 2023-10-06 RX ADMIN — PREGABALIN 150 MG: 75 CAPSULE ORAL at 21:51

## 2023-10-06 RX ADMIN — SIMVASTATIN 40 MG: 20 TABLET, FILM COATED ORAL at 21:51

## 2023-10-06 RX ADMIN — SODIUM CHLORIDE, POTASSIUM CHLORIDE, SODIUM LACTATE AND CALCIUM CHLORIDE 1000 ML: 600; 310; 30; 20 INJECTION, SOLUTION INTRAVENOUS at 17:06

## 2023-10-06 RX ADMIN — MAGNESIUM SULFATE HEPTAHYDRATE 2 G: 40 INJECTION, SOLUTION INTRAVENOUS at 09:00

## 2023-10-06 RX ADMIN — IOHEXOL 80 ML: 350 INJECTION, SOLUTION INTRAVENOUS at 09:02

## 2023-10-06 RX ADMIN — PREGABALIN 100 MG: 100 CAPSULE ORAL at 14:53

## 2023-10-06 RX ADMIN — PREGABALIN 100 MG: 50 CAPSULE ORAL at 14:53

## 2023-10-06 RX ADMIN — POTASSIUM CHLORIDE 40 MEQ: 20 TABLET, EXTENDED RELEASE ORAL at 14:52

## 2023-10-06 RX ADMIN — VANCOMYCIN HYDROCHLORIDE 1000 MG: 1 INJECTION, SOLUTION INTRAVENOUS at 06:00

## 2023-10-06 RX ADMIN — SODIUM CHLORIDE, POTASSIUM CHLORIDE, SODIUM LACTATE AND CALCIUM CHLORIDE 150 ML/HR: 600; 310; 30; 20 INJECTION, SOLUTION INTRAVENOUS at 18:54

## 2023-10-06 RX ADMIN — SODIUM CHLORIDE, POTASSIUM CHLORIDE, SODIUM LACTATE AND CALCIUM CHLORIDE 1000 ML: 600; 310; 30; 20 INJECTION, SOLUTION INTRAVENOUS at 04:26

## 2023-10-06 RX ADMIN — ONDANSETRON HYDROCHLORIDE 4 MG: 4 TABLET, FILM COATED ORAL at 15:10

## 2023-10-06 RX ADMIN — PIPERACILLIN SODIUM AND TAZOBACTAM SODIUM 4.5 G: 4; .5 INJECTION, SOLUTION INTRAVENOUS at 06:11

## 2023-10-06 RX ADMIN — POTASSIUM CHLORIDE 40 MEQ: 1500 TABLET, EXTENDED RELEASE ORAL at 14:52

## 2023-10-06 RX ADMIN — ONDANSETRON 4 MG: 2 INJECTION INTRAMUSCULAR; INTRAVENOUS at 06:11

## 2023-10-06 RX ADMIN — INSULIN GLARGINE 10 UNITS: 100 INJECTION, SOLUTION SUBCUTANEOUS at 21:51

## 2023-10-06 ASSESSMENT — PAIN SCALES - GENERAL
PAINLEVEL_OUTOF10: 0 - NO PAIN

## 2023-10-06 ASSESSMENT — LIFESTYLE VARIABLES
HAVE YOU EVER FELT YOU SHOULD CUT DOWN ON YOUR DRINKING: NO
HAVE PEOPLE ANNOYED YOU BY CRITICIZING YOUR DRINKING: NO
EVER HAD A DRINK FIRST THING IN THE MORNING TO STEADY YOUR NERVES TO GET RID OF A HANGOVER: NO
EVER FELT BAD OR GUILTY ABOUT YOUR DRINKING: NO

## 2023-10-06 ASSESSMENT — PAIN DESCRIPTION - PROGRESSION: CLINICAL_PROGRESSION: NOT CHANGED

## 2023-10-06 ASSESSMENT — COLUMBIA-SUICIDE SEVERITY RATING SCALE - C-SSRS
1. IN THE PAST MONTH, HAVE YOU WISHED YOU WERE DEAD OR WISHED YOU COULD GO TO SLEEP AND NOT WAKE UP?: NO
2. HAVE YOU ACTUALLY HAD ANY THOUGHTS OF KILLING YOURSELF?: NO
6. HAVE YOU EVER DONE ANYTHING, STARTED TO DO ANYTHING, OR PREPARED TO DO ANYTHING TO END YOUR LIFE?: NO

## 2023-10-06 ASSESSMENT — PAIN - FUNCTIONAL ASSESSMENT: PAIN_FUNCTIONAL_ASSESSMENT: 0-10

## 2023-10-06 NOTE — H&P
Subjective   Chief Complaint: diarrhea x 4 days    HPI:   Narciso Marrero is  57 y.o. male w/ Stage IV KRAS + colon cancer of the ascending colon with liver and peritoneal mets (on treatment of FUdR through AMY pump and FOLFOX, last dose 9/20) presenting to ED w/ diarrhea x 4 days.     He notes that his diarrhea started abruptly 4 days ago. Says it is watery. Denies blood or dark bowel movements. Noted 1 episode of red stool but reportedly ate red jello that morning. He does not associate the diarrhea with meals. He also notes accompanying lower abdominal pain that worsens if he eats or drinks anything. He is also nauseous for the last day, and has had 1 episodes of dark green vomiting. He endorsed 1 episode of red-tinged vomit, but that was after he ate the red jello. Pt also reports a fever of 102 F at home. He reports no recent antibiotic use, no one else in the household has had symptoms, no recent travel.     Recently seen on 10/4 in infusion clinic for next cycle of FOLFOX, which was held d/t nausea and diarrhea. He was given 1 L NS and decadron with attempt to get stool test for C diff. He was to RTC in 2 weeks to review CT results and assess if he can receive next cycle of FOLFOX + FUdR.     In the ED  Vitals:   T 98.4, HR 93, /65, RR 18, SpO2 95 on RA  Labs  - CBC: wbc 5.5, Hgb 13.9, plt 137  - BMP: Na 137, K 3.4, Cl 102, HCO3 20, BUN 16, Cr 0.83, glu 181  - LFT: Ca 8.9, tprot 6.1, alb 3.5, alkphos 181, AST 30, ALT 65, tbili 0.9  - Electrolytes: PO4 3.3, Mg 1.31  - Blood gas: pH 7.4, pCO2 36, pO2 48  - ,   - U/A: 1+ protein, >20 rbc, 1+ ketones, no bili, <2 urobilinogen, no nitrite, no leukesterase  - Bcx x2 drawn    Imaging:  CT A/P 10/6: There is diffuse small and large bowel thickening with mural hyperenhancement and fluid content noted, consistent with enterocolitis likely secondary to ongoing chemotherapy treatments versus infectious etiology    In the ED,   Mag repleted, vanc 2 g, zosyn,  1 L LR bolus    Cardiac/GI Hx  Infectious/Oncologic Timeline:   Primary oncologist: Dr. Russ  1/2023: 20 lb unexpected weight loss and RUQ pain.  - 1/8/2023: CT Abdomen and Pelvis w/ IV contrast: Multiple infiltrative hepatic lesions, the largest measuring upwards of 13.4 cm in dimension, affecting multiple hepatic segments  - 1/8/2023 CT chest: no acute findings or evidence of metastatic disease in the chest  -1/9/2023 liver core biopsy: metastatic mucinous adenocarcinoma involving liver, consistent with colonic primary  - 1/19/2023 colonoscopy: invasive moderately differentiated adenocarcinoma, colonic type  - 1/2023-4/12/23: FOLFOX x 6 cycles. First 2 cycles with bevacizumab  - 3/14/23: CT chest: no evidence of metastatic diseae in the chest  - 3/14/23: CT liver: focal circumferential wall thickening in the ascending colon with adjacent pericoloc lymphadenopathy and mesenteric stranding, consistent with patient's known malignancy  - 5/3/23: AMY pump placed, right hemicolectomy, open cholecystectomy and peritonectomy  - 5/31/23: midline incision dehisced, holding systemic chemotherapy pending wound healing  - 6/28/23: wound vac to abdominal wound  - 7/21/23: wound vac removed  - 8/8/23: CT Chest Abdomen Pelvis: (1) multiple pulmonary nodules concerning for intrathoracic spread of malignancy. In particular, there is a new 1.4 cm nodule w/in base of lingula that was not visualized on CT abdomen examination. (2) Interval development of peritoneal carcinomatosis with numerous small peritoneal implants. (3) Mild interval decrease in size of hepatic metastatic lesions  - 8/23/23: restart FOLFOX  - 10/3/23: Interval decrease in size of dominant hepatic metastatic lesion and several peritoneal nodules  - 10/4/23: Held FOLFOX d/t nausea and diarrhea.     CEA 2/1/23: 4400  ctDNA 4/17/23: 0  - systemic chemotherapy being held d/t wound vac  - FOLFOX 8/9/23    PMHx:   - HLD  - HTN  - herpes zoster     SurgHx:   Past  Surgical History:   Procedure Laterality Date    CT ABDOMEN ANGIOGRAM W AND/OR WO IV CONTRAST  4/13/2023    CT ABDOMEN ANGIOGRAM W AND/OR WO IV CONTRAST POR [id number] CT       Allergies:   No Known Allergies    SocHx:   - former smoker 2ppd x 10 years  - no etoh  - no recreational drug use    FamHx:   No family history on file.    Home Medications@ fd  - simvastatin 40 mg   - ondansetron 4 mg q8hr prn  - pantoprazole 40 mg   - prochlorperazine 10 mg q6hr prn  - pregabalin 150 mg tid  - lantus 22 units  - humalog 3 units w/ meals     Review of Symptoms:   Please see HPI for description of pertinent ROS  Constitutional: negative for chills, weight loss, weight gain fatigue, weakness. Notes decreased appetite.  HEENT: negative for headache, changes in vision or hearing, congestion, sore throat.  Respiratory: negative for SOB, cough, hemoptysis, wheezing  Cardiovascular: negative for chest pain, palpitations, orthopnea, PND  GI: please see HPI  : negative for frequency, urgency, dysuria, hematuria, incontinence  MSK: negative for myalgia, arthralgia, decreased joint ROM, LE swelling  Skin: negative for rash, wounds  Heme/lymph: negative for easy bruising, bleeding, epistaxis  Neuro: negative for LOC, numbness, tingling, tremor, vertigo, dizziness       Objective     Vitals:  Vitals:    10/06/23 1040   BP: 109/65   Pulse: 93   Resp: 18   Temp:    SpO2: 95%       No intake/output data recorded.    Physical exam:  Constitutional: in NAD  HEENT: sclerae anicteric, EOM grossly intact  CV: RRR, no murmurs noted  Pulm: CTAB, no increased WOB  GI: tender to palpation in lower quadrants b/l, hypertrophic skin present at midline 4 cm scar  Skin: warm and dry  Ext: bilateral LE without pitting edema   Neuro: alert and conversant  Psych: affect appropriate    Labs:  Results for orders placed or performed during the hospital encounter of 10/06/23 (from the past 24 hour(s))   CBC with Differential   Result Value Ref Range    WBC 5.5  4.4 - 11.3 x10*3/uL    nRBC 0.0 0.0 - 0.0 /100 WBCs    RBC 4.57 4.50 - 5.90 x10*6/uL    Hemoglobin 13.9 13.5 - 17.5 g/dL    Hematocrit 41.0 41.0 - 52.0 %    MCV 90 80 - 100 fL    MCH 30.4 26.0 - 34.0 pg    MCHC 33.9 32.0 - 36.0 g/dL    RDW 17.1 (H) 11.5 - 14.5 %    Platelets 137 (L) 150 - 450 x10*3/uL    MPV 9.2 7.5 - 11.5 fL    Neutrophils % 66.8 40.0 - 80.0 %    Immature Granulocytes %, Automated 0.5 0.0 - 0.9 %    Lymphocytes % 9.9 13.0 - 44.0 %    Monocytes % 22.4 2.0 - 10.0 %    Eosinophils % 0.0 0.0 - 6.0 %    Basophils % 0.4 0.0 - 2.0 %    Neutrophils Absolute 3.70 1.20 - 7.70 x10*3/uL    Immature Granulocytes Absolute, Automated 0.03 0.00 - 0.70 x10*3/uL    Lymphocytes Absolute 0.55 (L) 1.20 - 4.80 x10*3/uL    Monocytes Absolute 1.24 (H) 0.10 - 1.00 x10*3/uL    Eosinophils Absolute 0.00 0.00 - 0.70 x10*3/uL    Basophils Absolute 0.02 0.00 - 0.10 x10*3/uL   Comprehensive Metabolic Panel   Result Value Ref Range    Glucose 181 (H) 74 - 99 mg/dL    Sodium 137 136 - 145 mmol/L    Potassium 3.4 (L) 3.5 - 5.3 mmol/L    Chloride 102 98 - 107 mmol/L    Bicarbonate 20 (L) 21 - 32 mmol/L    Anion Gap 18 10 - 20 mmol/L    Urea Nitrogen 16 6 - 23 mg/dL    Creatinine 0.83 0.50 - 1.30 mg/dL    eGFR >90 >60 mL/min/1.73m*2    Calcium 8.9 8.6 - 10.6 mg/dL    Albumin 3.5 3.4 - 5.0 g/dL    Alkaline Phosphatase 181 (H) 33 - 120 U/L    Total Protein 6.1 (L) 6.4 - 8.2 g/dL    AST 30 9 - 39 U/L    Bilirubin, Total 0.9 0.0 - 1.2 mg/dL    ALT 65 (H) 10 - 52 U/L   Lactate Dehydrogenase   Result Value Ref Range     84 - 246 U/L   Phosphorus   Result Value Ref Range    Phosphorus 3.3 2.5 - 4.9 mg/dL   Magnesium   Result Value Ref Range    Magnesium 1.31 (L) 1.60 - 2.40 mg/dL   Blood Gas Venous Full Panel   Result Value Ref Range    POCT pH, Venous 7.40 7.33 - 7.43 pH    POCT pCO2, Venous 36 (L) 41 - 51 mm Hg    POCT pO2, Venous 48 (H) 35 - 45 mm Hg    POCT SO2, Venous 77 (H) 45 - 75 %    POCT Oxy Hemoglobin, Venous 76.5 (H) 45.0  - 75.0 %    POCT Hematocrit Calculated, Venous 41.0 41.0 - 52.0 %    POCT Sodium, Venous 132 (L) 136 - 145 mmol/L    POCT Potassium, Venous 3.4 (L) 3.5 - 5.3 mmol/L    POCT Chloride, Venous 102 98 - 107 mmol/L    POCT Ionized Calicum, Venous 1.21 1.10 - 1.33 mmol/L    POCT Glucose, Venous 196 (H) 74 - 99 mg/dL    POCT Lactate, Venous 2.0 0.4 - 2.0 mmol/L    POCT Base Excess, Venous -2.0 -2.0 - 3.0 mmol/L    POCT HCO3 Calculated, Venous 22.3 22.0 - 26.0 mmol/L    POCT Hemoglobin, Venous 13.7 13.5 - 17.5 g/dL    POCT Anion Gap, Venous 11.0 10.0 - 25.0 mmol/L    Patient Temperature 37.0 degrees Celsius   Urinalysis with Reflex Microscopic   Result Value Ref Range    Color, Urine Lucy (N) Straw, Yellow    Appearance, Urine Hazy (N) Clear    Specific Gravity, Urine 1.033 1.005 - 1.035    pH, Urine 5.0 5.0, 5.5, 6.0, 6.5, 7.0, 7.5, 8.0    Protein, Urine 30 (1+) (N) NEGATIVE mg/dL    Glucose, Urine NEGATIVE NEGATIVE mg/dL    Blood, Urine MODERATE (2+) (A) NEGATIVE    Ketones, Urine 20 (1+) (A) NEGATIVE mg/dL    Bilirubin, Urine NEGATIVE NEGATIVE    Urobilinogen, Urine <2.0 <2.0 mg/dL    Nitrite, Urine NEGATIVE NEGATIVE    Leukocyte Esterase, Urine NEGATIVE NEGATIVE   Urinalysis Microscopic Only   Result Value Ref Range    WBC, Urine 1-5 1-5, NONE /HPF    RBC, Urine >20 (A) NONE, 1-2, 3-5 /HPF    Squamous Epithelial Cells, Urine 1-9 (SPARSE) Reference range not established. /HPF    Mucus, Urine 4+ Reference range not established. /LPF   Blood Culture    Specimen: Peripheral Venipuncture; Blood culture   Result Value Ref Range    Blood Culture Loaded on Instrument - Culture in progress    Blood Culture    Specimen: Peripheral Venipuncture; Blood culture   Result Value Ref Range    Blood Culture Loaded on Instrument - Culture in progress    ECG 12 lead   Result Value Ref Range    Ventricular Rate 124 BPM    Atrial Rate 124 BPM    MI Interval 176 ms    QRS Duration 92 ms    QT Interval 306 ms    QTC Calculation(Bazett) 439 ms     P Axis 67 degrees    R Axis -24 degrees    T Axis 57 degrees    QRS Count 21 beats    Q Onset 213 ms    P Onset 125 ms    P Offset 199 ms    T Offset 366 ms    QTC Fredericia 389 ms       Imaging:  CT abdomen pelvis w IV contrast    Result Date: 10/6/2023  Interpreted By:  Hodan No, STUDY: CT ABDOMEN PELVIS W IV CONTRAST;  10/6/2023 9:01 am   INDICATION: Per EMR: 57-year-old male with history adenocarcinoma of the ascending colon with Mets of the liver diagnosed January 2023. Presents to ED with  diarrhea and fever. Currently on FOLFOX and floxuridine treatment.   COMPARISON: CT abdomen and pelvis 10/03/2023   ACCESSION NUMBER(S): [id number]   ORDERING CLINICIAN: BARBIE RUBIO   TECHNIQUE: CT of the abdomen and pelvis was performed.  Standard contiguous axial images were obtained at 3 mm slice thickness through the abdomen and pelvis. Coronal and sagittal reconstructions at 3 mm slice thickness were performed.   80 ml of contrast Omnipaque 350 were administered intravenously without immediate complication.   FINDINGS: LOWER CHEST: Stable appearance pulmonary nodule in the left lingula measuring 0.9 cm (series 205, image 69), seen previously on prior CT 10/03/2023. Mild dependent atelectasis in the right lower lung base. The heart is normal in size without pericardial effusion. No pleural effusion is present. Visualized distal esophagus appears normal.   ABDOMEN:   LIVER: Redemonstration of multiple hypodense lesions in the liver with similar size and appearance when compared to prior CT 10/03/2023, and consistent with known liver metastasis.   BILE DUCTS: The intrahepatic and extrahepatic ducts are not dilated.   GALLBLADDER: The gallbladder is nondistended and without evidence of radiopaque stones.   PANCREAS: The pancreas appears unremarkable.   SPLEEN: The spleen is normal in size.   ADRENAL GLANDS: Bilateral adrenal glands appear normal.   KIDNEYS AND URETERS: The kidneys are normal in size and enhance  symmetrically. Multiple hypodense, fluid attenuating lesions overall similar in size and appearance when compared to prior CT 10/03/2023, and consistent with simple renal cysts. No hydroureteronephrosis identified. The previously seen radiopaque calculus within the right distal ureter has migrated into the bladder lumen. No additional nephroureterolithiasis is identified.   PELVIS:   BLADDER: The urinary bladder appears normal without abnormal wall thickening. Radiopaque stone noted identified in the right side of the bladder lumen.   REPRODUCTIVE ORGANS: No pelvic masses.   BOWEL: The stomach is normal in appearance with mildly thickened walls, likely secondary to stomach decompression. Stable postsurgical change with right partial colectomy. There is diffuse small and large bowel thickening with mural hyperenhancement and fluid content noted, consistent with enterocolitis. Sigmoid diverticulosis without evidence of diverticulitis. The appendix is not definitely visualized. There is however no pericecal stranding or fluid.   VESSELS: There is no aneurysmal dilatation of the abdominal aorta. The IVC appears normal. Hepatic artery infusion pump in place with catheter tip within the common hepatic artery. Moderate atherosclerosis in the abdominal aorta and its branches.   PERITONEUM/RETROPERITONEUM/LYMPH NODES: Overall similar appearance numerous peritoneal deposits consistent with peritoneal carcinomatosis that was previously described on CT 10/03/2023. No ascites or free air, no fluid collection.   BONES AND ABDOMINAL WALL: Stable appearance of right iliac screw and healing fractures involving the right posterior ribs 8 and 9. Degenerative discogenic disease is noted in the lower thoracic and lumbar spine. Stable postsurgical changes involving the ventral abdominal wall.       1.  There is diffuse small and large bowel thickening with mural hyperenhancement and fluid content noted, consistent with enterocolitis  likely secondary to ongoing chemotherapy treatments versus infectious etiology. 2. Additional findings as above.   I personally reviewed the images/study and I agree with the findings as stated. This study was interpreted at [facility], [place], [place].   MACRO: None     Dictation workstation:   [id number]    ECG 12 lead    Result Date: 10/6/2023  Please see ED Provider Note for formal interpretation Confirmed by Norm Roberto ([id number]) on 10/6/2023 5:02:07 AM       Assessment/Plan   Narciso Marrero is  57 y.o. male w/ Stage IV KRAS + colon cancer of the ascending colon with liver and peritoneal mets (on treatment of FUdR through AMY pump and FOLFOX, last dose 9/20) presenting to ED w/ diarrhea x 4 days. Patient presents with acute non-bloody diarrhea. Given acuity, most likely infectious given temperature at home. Although has been HDS and afebrile in the ED. Infection needs to be ruled out. It could be chemotherapy-induced, but his last dose of FOLFOX was on 9/20 and he has tolerated his previous cycles with minimal sx which makes this less likely on the differential. Even lower on the differential is IBD. He has no hx of crohn's or UC, and the acuity suggests against this.     #Diarrhea, most likely infectious  #Nausea  :: s/p vanc/zosyn in ED  ::CT A/P 10/6 shows diffuse small and large bowel thickening with mural hyperenhancement and fluid content noted, consistent with enterocolitis  ::afebrile, no leukocytosis  :: missed last dose of FOLFOX d/t nausea and vomiting  - C diff  - stool pathogen panel   - will give loperamide if infectious work-up negative  - continue home ondansetron 4 mg q8hr prn   - continue home prochlorperazine 10 mg q6hr prn    #Hypomagnesemia  :: s/p 2 mg IV mag in ED  - continue to monitor    #Hypokalemia  :: s/p K Cl 20 mEq   - continue to monitor    #Stage IV Colon Cancer  :: Follows with Dr. Russ   :: currently on FOLFOX and FUDR through AMY  pump  :: ctDNA 9/20/23: 3.67  :: 9/20/23: 905  - follow-up with Dr. Russ and Sandy Cruz in 2 weeks for start of next FOLFOX cycle    #HLD  - continue home simvastatin 40 mg daily    #Chronic pain  ::Has crushed shoulder injury 2/2 work-related accident  ::follows with [facility] management [facility]  - continue home pregabalin 150 mg tid  - continue percoset 5-325 mg q6hr as needed    #GERD  - continue home pantoprazole 40 mg daily    #Type 2 DM  :: home regimen: lantus 22 units, humalog 3 units w/ meals  - lantus 10 units  - mild insulin sliding scale    F: none  E: replete as needed  N: carb-controlled  A: lovenox 40 mg   Code status: FULL CODE (confirmed on admission)  NOK: Emperatriz Marrero [phone]    To be seen and discussed with Dr. Enciso,  Alea Sherman MD MPH  PGY-1 Internal Medicine

## 2023-10-06 NOTE — ED TRIAGE NOTES
Pt. With hx of colon cancer, last chemo 9/20. Fever of 102 @ home, took tylenol @ 2300. No cough, shortness of breath, chest pain, denies urinary symptoms.

## 2023-10-06 NOTE — PROGRESS NOTES
Patient was handed off to me from the previous team. For full history, physical, and prior ED course, please see previous provider note prior to patient handoff. This is an addendum to the record.     HOSPITAL COURSE/MEDICAL DECISION MAKING:  Patient is a 57-year-old male with past medical history of colon cancer currently on chemotherapy, last session was Friday presenting to the emergency department today due to diarrhea and fever.  At time of signout was pending CT abdomen pelvis to rule out any underlying infectious process that may be contributing to this patient's fever and GI symptoms.  No acute intra-abdominal processes were noted, diarrhea was however noted, no acute progression of his cancer noted either.  As such, patient was stable for admission.  Patient was admitted to medical oncology for further management.    Final diagnoses:   [R50.9] Fever, unspecified fever cause   [R19.7] Diarrhea, unspecified type       DISPOSITION:   Admit to med onc    Nena Mathur MD   Emergency Medicine Resident, PGY3  [facility]

## 2023-10-06 NOTE — PROGRESS NOTES
"Pharmacy Medication History Review    Narciso Marrero is a 57 y.o. male admitted for Fever, unspecified fever cause. Pharmacy reviewed the patient's [id number] medications and allergies for accuracy.    The list below reflectives the updated PTA list. Please review each medication in order reconciliation for additional clarification and justification.  Prior to Admission Medications   Prescriptions Last Dose Informant Patient Reported? Taking?   HumaLOG KwikPen Insulin 100 unit/mL injection 10/5/2023  Yes Yes   Sig: Inject 3 Units under the skin 3 times a day with meals. With sliding scale as directed   Lantus Solostar U-100 Insulin 100 unit/mL (3 mL) pen 10/5/2023  Yes Yes   Sig: Inject 22 Units under the skin once daily at bedtime.   Lyrica 100 mg capsule 10/5/2023  Yes Yes   Sig: Take 1 capsule (100 mg) by mouth 3 times a day. Take with 50 mg for a total daily dose of 150 mg three times daily   Percocet 5-325 mg tablet Unknown  Yes Yes   Sig: Take 1 tablet by mouth 2 times a day as needed for moderate pain (4 - 6) or severe pain (7 - 10).   acetaminophen (Tylenol) 325 mg tablet Unknown  Yes No   Sig: Take 2 tablets (650 mg) by mouth every 6 hours if needed for mild pain (1 - 3).   cholecalciferol (Vitamin D-3) 25 MCG (1000 UT) tablet 10/5/2023  Yes No   Sig: Take 1 tablet (1,000 Units) by mouth once daily.   loperamide (Imodium) 2 mg capsule 10/5/2023  Yes No   Sig: Take 1 capsule (2 mg) by mouth 4 times a day as needed for diarrhea.   multivitamin capsule 10/5/2023  Yes Yes   Sig: Take 1 capsule by mouth once daily.   ondansetron (Zofran) 8 mg tablet Unknown  Yes Yes   Sig: Take 1 tablet (8 mg) by mouth every 8 hours if needed for nausea or vomiting.   pantoprazole (ProtoNix) 40 mg EC tablet 10/5/2023  Yes Yes   Sig: Take 1 tablet (40 mg) by mouth once daily in the morning. Take before meals.   pen needle, diabetic 31 gauge x 5/16\" needle   No No   Sig: USE FOUR TIMES A DAY   pregabalin (Lyrica) 50 mg " capsule Unknown  Yes Yes   Sig: Take 1 capsule (50 mg) by mouth 3 times a day. Take with 100 mg for a total daily dose of 150 mg three times daily   prochlorperazine (Compazine) 10 mg tablet Unknown  Yes Yes   Sig: Take 1 tablet (10 mg) by mouth every 6 hours if needed for nausea or vomiting.   psyllium (Metamucil) 3.4 gram packet Unknown  Yes No   Sig: Take 1 packet by mouth once daily as needed (constipation).   simvastatin (Zocor) 40 mg tablet 10/5/2023  Yes Yes   Sig: Take 1 tablet (40 mg) by mouth once daily at bedtime.      Facility-Administered Medications: None     The list below reflectives the updated allergy list. Please review each documented allergy for additional clarification and justification.  Allergies  Reviewed by Patricia Mitchell PharmD on 10/6/2023   No Known Allergies       Sources used: Pharmacy dispense history, OARRs, patient interview (great historian- knew drug dose and frequency; spouse available for additional support), heme/onc note from 9/20    Below are additional concerns with the patient's PTA list.  -pt states that 100 mg lyrica is from pain management and 50 mg lyrica is from oncology. States that both team knows about total daily dose of 150 TID    Patricia Mitchell PharmD  Transitions of Care Pharmacist  Medication reconciliation complete  Please reach out via [employer] secure chat for questions, or if no response call [phone] or [facility]

## 2023-10-06 NOTE — ED PROVIDER NOTES
HPI   Chief Complaint   Patient presents with    Fever       57-year-old male with history of colon cancer currently on chemotherapy presenting to the emergency department with diarrhea as well as fever.  Ports his last chemo session was this past week.  Reports having significant amounts of diarrhea the past week, inability to keep food or liquids down, fever at home today for which she took Tylenol.  Was unable to get infusion yesterday because he has not been feeling well.  Denies any headache, neck pain, abdominal pain, chest pain, shortness of breath, rashes, joint pain or swelling.  Denies any blood in his stool.  Denies any urinary frequency or urgency.      History provided by:  Patient                      No data recorded                Patient History   History reviewed. No pertinent past medical history.  Past Surgical History:   Procedure Laterality Date    CT ABDOMEN ANGIOGRAM W AND/OR WO IV CONTRAST  4/13/2023    CT ABDOMEN ANGIOGRAM W AND/OR WO IV CONTRAST POR [id number] CT     No family history on file.  Social History     Tobacco Use    Smoking status: Former     Types: Cigarettes    Smokeless tobacco: Never   Substance Use Topics    Alcohol use: Not on file    Drug use: Not on file       Physical Exam   ED Triage Vitals [10/06/23 0211]   Temp Heart Rate Resp BP   36.4 °C (97.6 °F) (!) 125 16 110/71      SpO2 Temp Source Heart Rate Source Patient Position   94 % Oral -- --      BP Location FiO2 (%)     -- --       Physical Exam  Vitals and nursing note reviewed.   Constitutional:       General: He is not in acute distress.     Appearance: He is well-developed.   HENT:      Head: Normocephalic and atraumatic.   Eyes:      Conjunctiva/sclera: Conjunctivae normal.   Cardiovascular:      Rate and Rhythm: Regular rhythm. Tachycardia present.      Heart sounds: No murmur heard.  Pulmonary:      Effort: Pulmonary effort is normal. No respiratory distress.      Breath sounds: Normal breath sounds.    Abdominal:      General: Abdomen is flat.      Palpations: Abdomen is soft.      Tenderness: There is no abdominal tenderness.      Comments: Multiple well-healed abdominal scars are present.   Musculoskeletal:         General: No swelling.      Cervical back: Neck supple.   Skin:     General: Skin is warm and dry.      Capillary Refill: Capillary refill takes less than 2 seconds.   Neurological:      Mental Status: He is alert.   Psychiatric:         Mood and Affect: Mood normal.         ED Course & MDM   ED Course as of 10/07/23 1804   Fri Oct 06, 2023   0512 Blood gas with normal lactate, white blood cell count normal.  Metabolic panel largely unremarkable. [SH]   0515 EKG obtained at 0218 demonstrating sinus tachycardia with rate of 124, leftward axis, normal intervals, no ST segment or T wave signs of ischemia. [SH]      ED Course User Index  [SH] Chuck Conte MD         Diagnoses as of 10/07/23 1804   Fever, unspecified fever cause   Diarrhea, unspecified type       Medical Decision Making  57-year-old male with history of colon cancer on chemotherapy presenting to the emergency department with fever.  Has been having diarrhea for the past few days, had chemo canceled yesterday because of not feeling well.  On arrival to the ED, patient vital signs notable for tachycardia, but he is hemodynamically stable, no respiratory distress.  Given his likely immunocompromise status on chemotherapy, will initiate sepsis work-up, and start patient on broad-spectrum antibiotics.  Will obtain CT of the abdomen pelvis to look for potential infectious etiology in the abdomen as well as possible source of his diarrheal illness.  We will send C. difficile sample.    Patient will be signed out at 7 AM pending results of his CT.  He remained hemodynamically stable under my care.  Anticipate patient will require admission for further work-up of his possible sepsis.  Patient agreeable to this.    Problems Addressed:  Diarrhea,  unspecified type: acute illness or injury with systemic symptoms  Fever, unspecified fever cause: acute illness or injury    Amount and/or Complexity of Data Reviewed  Labs: ordered. Decision-making details documented in ED Course.  Radiology: ordered.  ECG/medicine tests: ordered and independent interpretation performed. Decision-making details documented in ED Course.    Risk  Decision regarding hospitalization.        Procedure  Procedures     Chuck Conte MD  Resident  10/07/23 7270

## 2023-10-06 NOTE — SIGNIFICANT EVENT
Please see intern H&P for full details.     HPI  Narciso Marrero is a 57-year-old man with a past medical history of stage IV ascending colon cancer (primary oncologist Dr. Russ, metastasis to the liver, diagnosed in 2023, on FOLFOX, last infusion , on FUdR via AMY pump), T2DM, HTN who presents with four days who presented to the ED today following several days of nausea and diarrhea. He states that his diarrhea started about 4 days ago, and he has been going hourly. The diarrhea appears clear/like mucous. He endorses feeling slightly lightheaded after standing up. He does report a one-time fever of 102 last night, however denies any fevers/chills since then. He was supposed to get systemic chemotherapy this past Wednesday, however this was held due to his symptoms. He denies shortness of breath, chest pain, cough, abdominal pain.     In the ED:  - Vitals:   T 36.3, , /71, RR 16, SpO2 94% on RA  - Labs:   CBC: WBC 5.5, Hgb 13.9, plt 137   BMP: Na 137, K 3.4, Cl 102, HCO3 20, BUN 16, Cr 0.83, glu 181   LFT: Ca 8.9, tprot 6.1, alb 3.5, alkphos 181, AST 30, ALT 65, tbili 0.9   Electrolytes: Mg 1.31   VBG: pH 7.4, pCO2 36, lactate 2.0              UA: 1+ protein, negative nitrite/LE   BCx x2 drawn    - Imagin. There is diffuse small and large bowel thickening with mural  hyperenhancement and fluid content noted, consistent with  enterocolitis likely secondary to ongoing chemotherapy treatments  versus infectious etiology.    ED COURSE:   1L fluids given in addition to one dose of vancomycin/zosyn. Magnesium repleted.     Physical exam:   General: Appears stated age, normal hygiene, in no acute distress.  Head: Normocephalic, atraumatic.  Eyes: No icterus, EOMI.  ENT: No nasal discharge, dry mucus membranes.  Cardiovascular: Regular rate/rhythm, no murmurs, trace leg edema.  Respiratory: Clear to auscultation bilaterally, no wheezes/rales/rhonchi, normal respiratory effort.  Abdominal:  Soft, non-tender to palpation, abdominal scars present from AMY pump.   Skin: Warm and dry, no bruises or rashes noted.  Neurologic: Alert & oriented x 3, no focal deficits.  MSK: Normal ROM, 5/5 strength in all extremities, no joint swelling noted.  Psychiatric: Cooperative, appropriate mood and affect.    Assessment & Plan  Narciso Marrero is a 57-year-old man with a past medical history of stage IV ascending colon cancer (primary oncologist Dr. Russ, metastasis to the liver, diagnosed in January 2023, on FOLFOX, last infusion 9/20, on FUdR via AMY pump), T2DM, HTN who presents with four days who presented to the ED today following several days of nausea and diarrhea. Suspect acute chemotherapy-related diarrhea. Patient is not neutropenic and afebrile, thus will defer further antibiotics. C Diff/enteric pathogen panel pending. Continuing fluid resuscitation.     #Diarrhea  :: Suspect chemotherapy-related  :: No recent immunotherapy (last in February, bevacizumab)  :: CT A/P showing enterocolitis likely secondary to ongoing chemotherapy  - S/p 1L in ED, will bolus another L LR now, start maintenance 150 cc/hr overnight  - F/u C Diff/stool pathogens; if C Diff negative, start loperamide  - Defer further antibiotics for now given afebrile, not neutropenic; follow-up blood cultures    #Stage IV colon cancer  - Dr. Russ notified of admission    #T2DM  - Glargine 10 units (home dose 22) given minimal PO intake  - SSI    #MISC  - Continue pregabalin 100 mg TID for chronic work-related injury  - Continue simvastatin 40 mg  - Continue pantoprazole 40 mg    F: 1L bolus, 150 cc/hr maintenance  E: Replete PRN  N: Diabetic  A: PIV    DVT ppx: Lovenox  GI ppx: Home PPI    Code Status: Full (confirmed on admission)  Surrogate Medical Decision-maker: Emperatriz Marrero (wife, [phone])    Patient to be formally staffed in the AM.    Al Luna  Internal Medicine, PGY-2

## 2023-10-06 NOTE — ED NOTES
Report given to oncoming RN who will assume care at this time     Surekha Silverman RN  10/06/23 0710

## 2023-10-07 LAB
ALBUMIN SERPL BCP-MCNC: 3 G/DL (ref 3.4–5)
ALP SERPL-CCNC: 142 U/L (ref 33–120)
ALT SERPL W P-5'-P-CCNC: 39 U/L (ref 10–52)
ANION GAP SERPL CALC-SCNC: 14 MMOL/L (ref 10–20)
AST SERPL W P-5'-P-CCNC: 19 U/L (ref 9–39)
BILIRUB SERPL-MCNC: 0.7 MG/DL (ref 0–1.2)
BUN SERPL-MCNC: 9 MG/DL (ref 6–23)
C COLI+JEJ+UPSA DNA STL QL NAA+PROBE: NOT DETECTED
CALCIUM SERPL-MCNC: 8.6 MG/DL (ref 8.6–10.6)
CHLORIDE SERPL-SCNC: 105 MMOL/L (ref 98–107)
CO2 SERPL-SCNC: 21 MMOL/L (ref 21–32)
CREAT SERPL-MCNC: 0.62 MG/DL (ref 0.5–1.3)
EC STX1 GENE STL QL NAA+PROBE: NOT DETECTED
EC STX2 GENE STL QL NAA+PROBE: NOT DETECTED
ERYTHROCYTE [DISTWIDTH] IN BLOOD BY AUTOMATED COUNT: 17.2 % (ref 11.5–14.5)
GFR SERPL CREATININE-BSD FRML MDRD: >90 ML/MIN/1.73M*2
GLUCOSE BLD MANUAL STRIP-MCNC: 159 MG/DL (ref 74–99)
GLUCOSE BLD MANUAL STRIP-MCNC: 169 MG/DL (ref 74–99)
GLUCOSE SERPL-MCNC: 129 MG/DL (ref 74–99)
HCT VFR BLD AUTO: 36.3 % (ref 41–52)
HGB BLD-MCNC: 12.9 G/DL (ref 13.5–17.5)
MAGNESIUM SERPL-MCNC: 1.69 MG/DL (ref 1.6–2.4)
MCH RBC QN AUTO: 31.1 PG (ref 26–34)
MCHC RBC AUTO-ENTMCNC: 35.5 G/DL (ref 32–36)
MCV RBC AUTO: 88 FL (ref 80–100)
NOROVIRUS GI + GII RNA STL NAA+PROBE: NOT DETECTED
NRBC BLD-RTO: 0 /100 WBCS (ref 0–0)
PHOSPHATE SERPL-MCNC: 3.4 MG/DL (ref 2.5–4.9)
PLATELET # BLD AUTO: 145 X10*3/UL (ref 150–450)
PMV BLD AUTO: 9.1 FL (ref 7.5–11.5)
POTASSIUM SERPL-SCNC: 3.4 MMOL/L (ref 3.5–5.3)
PROT SERPL-MCNC: 5.3 G/DL (ref 6.4–8.2)
RBC # BLD AUTO: 4.15 X10*6/UL (ref 4.5–5.9)
RV RNA STL NAA+PROBE: NOT DETECTED
SALMONELLA DNA STL QL NAA+PROBE: NOT DETECTED
SHIGELLA DNA SPEC QL NAA+PROBE: NOT DETECTED
SODIUM SERPL-SCNC: 137 MMOL/L (ref 136–145)
V CHOLERAE DNA STL QL NAA+PROBE: NOT DETECTED
WBC # BLD AUTO: 5.9 X10*3/UL (ref 4.4–11.3)
Y ENTEROCOL DNA STL QL NAA+PROBE: NOT DETECTED

## 2023-10-07 PROCEDURE — S0119 ONDANSETRON 4 MG: HCPCS

## 2023-10-07 PROCEDURE — 2500000002 HC RX 250 W HCPCS SELF ADMINISTERED DRUGS (ALT 637 FOR MEDICARE OP, ALT 636 FOR OP/ED)

## 2023-10-07 PROCEDURE — 2500000005 HC RX 250 GENERAL PHARMACY W/O HCPCS

## 2023-10-07 PROCEDURE — 84100 ASSAY OF PHOSPHORUS: CPT

## 2023-10-07 PROCEDURE — 84075 ASSAY ALKALINE PHOSPHATASE: CPT

## 2023-10-07 PROCEDURE — 2500000001 HC RX 250 WO HCPCS SELF ADMINISTERED DRUGS (ALT 637 FOR MEDICARE OP)

## 2023-10-07 PROCEDURE — 2580000001 HC RX 258 IV SOLUTIONS

## 2023-10-07 PROCEDURE — 82947 ASSAY GLUCOSE BLOOD QUANT: CPT

## 2023-10-07 PROCEDURE — 2500000004 HC RX 250 GENERAL PHARMACY W/ HCPCS (ALT 636 FOR OP/ED)

## 2023-10-07 PROCEDURE — 85027 COMPLETE CBC AUTOMATED: CPT

## 2023-10-07 PROCEDURE — 83735 ASSAY OF MAGNESIUM: CPT

## 2023-10-07 PROCEDURE — 1210000001 HC SEMI-PRIVATE ROOM DAILY

## 2023-10-07 RX ORDER — VANCOMYCIN HYDROCHLORIDE 25 MG/ML
125 KIT ORAL 4 TIMES DAILY
Status: DISCONTINUED | OUTPATIENT
Start: 2023-10-07 | End: 2023-10-08 | Stop reason: RX

## 2023-10-07 RX ORDER — POTASSIUM CHLORIDE 20 MEQ/1
40 TABLET, EXTENDED RELEASE ORAL ONCE
Status: COMPLETED | OUTPATIENT
Start: 2023-10-07 | End: 2023-10-07

## 2023-10-07 RX ADMIN — VANCOMYCIN HYDROCHLORIDE 125 MG: KIT at 21:45

## 2023-10-07 RX ADMIN — PREGABALIN 150 MG: 75 CAPSULE ORAL at 21:44

## 2023-10-07 RX ADMIN — PREGABALIN 150 MG: 75 CAPSULE ORAL at 15:17

## 2023-10-07 RX ADMIN — ONDANSETRON HYDROCHLORIDE 4 MG: 4 TABLET, FILM COATED ORAL at 17:37

## 2023-10-07 RX ADMIN — PREGABALIN 150 MG: 75 CAPSULE ORAL at 08:56

## 2023-10-07 RX ADMIN — SODIUM CHLORIDE, POTASSIUM CHLORIDE, SODIUM LACTATE AND CALCIUM CHLORIDE 150 ML/HR: 600; 310; 30; 20 INJECTION, SOLUTION INTRAVENOUS at 01:48

## 2023-10-07 RX ADMIN — POTASSIUM CHLORIDE 40 MEQ: 1500 TABLET, EXTENDED RELEASE ORAL at 07:36

## 2023-10-07 RX ADMIN — PANTOPRAZOLE SODIUM 40 MG: 40 TABLET, DELAYED RELEASE ORAL at 07:36

## 2023-10-07 RX ADMIN — ENOXAPARIN SODIUM 40 MG: 40 INJECTION SUBCUTANEOUS at 15:16

## 2023-10-07 RX ADMIN — INSULIN GLARGINE 10 UNITS: 100 INJECTION, SOLUTION SUBCUTANEOUS at 21:45

## 2023-10-07 RX ADMIN — SIMVASTATIN 40 MG: 20 TABLET, FILM COATED ORAL at 21:45

## 2023-10-07 ASSESSMENT — PAIN SCALES - GENERAL
PAINLEVEL_OUTOF10: 0 - NO PAIN
PAINLEVEL_OUTOF10: 0 - NO PAIN

## 2023-10-07 NOTE — HOSPITAL COURSE
Narciso Marrero is  57 y.o. male w/ Stage IV KRAS + colon cancer of the ascending colon with liver and peritoneal mets (on treatment of FUdR through AMY pump and FOLFOX, last dose 9/20) presenting to ED w/ diarrhea x 4 days. He was slightly hypotensive with BP of 109/65 on presentation. Stool pathogen panel was negative and C diff was negative. While waiting for the C diff to result, he was started on empiric vancomycin for C diff treatment on 10/7. On 10/9, his vancomycin was discontinued. His diarrhea was controlled with loperamide 4 mg q6hr and lomotil 2.5 mg. He had fluid repletion and electrolyte repletion as needed.    While in the hospital, his insulin regimen was changed d/t poor PO intake. His lantus was decreased to 10 units and his mealtime insulin was discontinued. He is to follow-up with endocrinology for titration up back to home dosing once his oral intake starts to increase

## 2023-10-07 NOTE — DISCHARGE INSTRUCTIONS
Dear Narciso Marrero,    You were admitted to [facility] for diarrhea. We tested your diarrhea for infectious causes and these tests were negative. As your blood pressure was low from your multiple episodes of diarrhea, we maintained your hydration through IV fluids. We were able to help you control your diarrhea with loperamide 4 mg q6hr and lomotil 2.5 mg. While in the hospital, we    Thank you for choosing  for your care,   It was a pleasure taking care of you,   Your  Team     Please follow-up with the following providers:   - Sandy Farmer (oncology) 10/18  - Mariela Meyers (endocrinology) 10/24    Medication changes:   -START: lomotil 2.5 mg as needed  -CHANGE:   loperamide 2 mg (1 pill) to 4 mg (2 pills) every 6 hours as needed  - lantus 22 units -> lantus 10 units->please reach out to your endocrinologist about adjusting your lantus once you start to eat more regularly    -STOP:   - aspart 3 units with meals->please reach out to your endocrinologist about restarting your aspart once you start to eat more regularly

## 2023-10-07 NOTE — PROGRESS NOTES
Subjective   NAEON    Still having multiple bowel movements. Hungry and wishes to try eating. Still has lower abdominal pain.     Denies fevers, chills, chest pain, shortness of breath        Objective     Vitals:  Vitals:    10/07/23 1415   BP: 114/76   Pulse: 77   Resp: 20   Temp: 36 °C (96.8 °F)   SpO2: 96%       I/O last 3 completed shifts:  In: 1000 (10.5 mL/kg) [IV Piggyback:1000]  Out: - (0 mL/kg)   Weight: 95.3 kg   No intake/output data recorded.    Physical exam:  Constitutional: in NAD  HEENT: sclerae anicteric, EOM grossly intact  CV: RRR, no murmurs noted  Pulm: CTAB, no increased WOB  GI: tender to palpation in lower quadrants b/l (improved from yesterday), hypertrophic skin present at midline 4 cm scar  Skin: warm and dry  Ext: bilateral LE without pitting edema   Neuro: alert and conversant  Psych: affect appropriate    Medications:  enoxaparin, 40 mg, subcutaneous, q24h  heparin flush, 50 Units, intra-catheter, q12h  insulin glargine, 10 Units, subcutaneous, Nightly  insulin lispro, 0-5 Units, subcutaneous, TID with meals  pantoprazole, 40 mg, oral, Daily before breakfast  pregabalin, 150 mg, oral, TID  simvastatin, 40 mg, oral, Nightly  vancomycin, 125 mg, oral, 4x daily         PRN medications: dextrose 10 % in water (D10W), dextrose, glucagon, heparin flush, heparin lock flush (porcine), ondansetron, oxyCODONE-acetaminophen, prochlorperazine    Labs:  Results for orders placed or performed during the hospital encounter of 10/06/23 (from the past 24 hour(s))   Stool Pathogen Panel, PCR    Specimen: Stool   Result Value Ref Range    Campylobacter Group Not Detected Not Detected    Salmonella species Not Detected Not Detected    Shigella species Not Detected Not Detected    Vibrio Group Not Detected Not Detected    Yersinia Enterocolitica Not Detected Not Detected    Shiga Toxin 1 Not Detected Not Detected    Shiga Toxin 2 Not Detected Not Detected    Norovirus GI/GII Not Detected Not Detected     Rotavirus A Not Detected Not Detected   POCT GLUCOSE   Result Value Ref Range    POCT Glucose 130 (H) 74 - 99 mg/dL   Comprehensive Metabolic Panel   Result Value Ref Range    Glucose 129 (H) 74 - 99 mg/dL    Sodium 137 136 - 145 mmol/L    Potassium 3.4 (L) 3.5 - 5.3 mmol/L    Chloride 105 98 - 107 mmol/L    Bicarbonate 21 21 - 32 mmol/L    Anion Gap 14 10 - 20 mmol/L    Urea Nitrogen 9 6 - 23 mg/dL    Creatinine 0.62 0.50 - 1.30 mg/dL    eGFR >90 >60 mL/min/1.73m*2    Calcium 8.6 8.6 - 10.6 mg/dL    Albumin 3.0 (L) 3.4 - 5.0 g/dL    Alkaline Phosphatase 142 (H) 33 - 120 U/L    Total Protein 5.3 (L) 6.4 - 8.2 g/dL    AST 19 9 - 39 U/L    Bilirubin, Total 0.7 0.0 - 1.2 mg/dL    ALT 39 10 - 52 U/L   Magnesium   Result Value Ref Range    Magnesium 1.69 1.60 - 2.40 mg/dL   CBC   Result Value Ref Range    WBC 5.9 4.4 - 11.3 x10*3/uL    nRBC 0.0 0.0 - 0.0 /100 WBCs    RBC 4.15 (L) 4.50 - 5.90 x10*6/uL    Hemoglobin 12.9 (L) 13.5 - 17.5 g/dL    Hematocrit 36.3 (L) 41.0 - 52.0 %    MCV 88 80 - 100 fL    MCH 31.1 26.0 - 34.0 pg    MCHC 35.5 32.0 - 36.0 g/dL    RDW 17.2 (H) 11.5 - 14.5 %    Platelets 145 (L) 150 - 450 x10*3/uL    MPV 9.1 7.5 - 11.5 fL   Phosphorus   Result Value Ref Range    Phosphorus 3.4 2.5 - 4.9 mg/dL       Imaging:       Assessment/Plan     Narciso Marrero is  57 y.o. male w/ Stage IV KRAS + colon cancer of the ascending colon with liver and peritoneal mets (on treatment of FUdR through AMY pump and FOLFOX, last dose 9/20) presenting to ED w/ diarrhea x 4 days. Patient presents with acute non-bloody diarrhea. Given acuity, most likely infectious given temperature at home. Although has been HDS and afebrile in the ED. Infection needs to be ruled out. It could be chemotherapy-induced, but his last dose of FOLFOX was on 9/20 and he has tolerated his previous cycles with minimal sx which makes this less likely on the differential. Even lower on the differential is IBD. He has no hx of crohn's or UC, and the  acuity suggests against this.     #Diarrhea, most likely infectious  #Nausea  :: s/p vanc/zosyn in ED  ::CT A/P 10/6 shows diffuse small and large bowel thickening with mural hyperenhancement and fluid content noted, consistent with enterocolitis  ::afebrile, no leukocytosis  :: missed last dose of FOLFOX d/t nausea and vomiting  :: stool pathogen panel negative  - C diff still pending, will start empiric treatment  - vancomycin 125 qid  - will give loperamide if C diff negative  - continue home ondansetron 4 mg q8hr prn   - continue home prochlorperazine 10 mg q6hr prn     #Hypomagnesemia  :: s/p 2 mg IV mag in ED  - continue to monitor     #Hypokalemia  :: s/p K Cl 20 mEq   :: K 3.4 10/7  - continue to monitor  - 40 mEq     #Stage IV Colon Cancer  :: Follows with Dr. Russ   :: currently on FOLFOX and FUDR through AMY pump  :: ctDNA 9/20/23: 3.67  :: 9/20/23: 905  - follow-up with Dr. Russ and Sandy Cruz in 2 weeks for start of next FOLFOX cycle     #HLD  - continue home simvastatin 40 mg daily     #Chronic pain  ::Has crushed shoulder injury 2/2 work-related accident  ::follows with pain management at [facility]  - continue home pregabalin 150 mg tid  - continue percoset 5-325 mg q6hr as needed     #GERD  - continue home pantoprazole 40 mg daily     #Type 2 DM  :: home regimen: lantus 22 units, humalog 3 units w/ meals  - lantus 10 units  - mild insulin sliding scale     F: none  E: replete as needed  N: carb-controlled  A: lovenox 40 mg   Code status: FULL CODE (confirmed on admission)  NOK: Emperatriz Janes [phone]     Patient seen and discussed with Alea Zamora MD MPH  PGY-1 Internal Medicine

## 2023-10-08 LAB
GLUCOSE BLD MANUAL STRIP-MCNC: 124 MG/DL (ref 74–99)
GLUCOSE BLD MANUAL STRIP-MCNC: 150 MG/DL (ref 74–99)
GLUCOSE BLD MANUAL STRIP-MCNC: 150 MG/DL (ref 74–99)
GLUCOSE BLD MANUAL STRIP-MCNC: 155 MG/DL (ref 74–99)
GLUCOSE BLD MANUAL STRIP-MCNC: 173 MG/DL (ref 74–99)
MAGNESIUM SERPL-MCNC: 1.6 MG/DL (ref 1.6–2.4)
PHOSPHATE SERPL-MCNC: 3.6 MG/DL (ref 2.5–4.9)

## 2023-10-08 PROCEDURE — 99232 SBSQ HOSP IP/OBS MODERATE 35: CPT | Performed by: STUDENT IN AN ORGANIZED HEALTH CARE EDUCATION/TRAINING PROGRAM

## 2023-10-08 PROCEDURE — 84100 ASSAY OF PHOSPHORUS: CPT

## 2023-10-08 PROCEDURE — 2500000005 HC RX 250 GENERAL PHARMACY W/O HCPCS

## 2023-10-08 PROCEDURE — 96372 THER/PROPH/DIAG INJ SC/IM: CPT

## 2023-10-08 PROCEDURE — 2500000004 HC RX 250 GENERAL PHARMACY W/ HCPCS (ALT 636 FOR OP/ED)

## 2023-10-08 PROCEDURE — 2500000001 HC RX 250 WO HCPCS SELF ADMINISTERED DRUGS (ALT 637 FOR MEDICARE OP)

## 2023-10-08 PROCEDURE — 2500000002 HC RX 250 W HCPCS SELF ADMINISTERED DRUGS (ALT 637 FOR MEDICARE OP, ALT 636 FOR OP/ED)

## 2023-10-08 PROCEDURE — 2500000004 HC RX 250 GENERAL PHARMACY W/ HCPCS (ALT 636 FOR OP/ED): Performed by: STUDENT IN AN ORGANIZED HEALTH CARE EDUCATION/TRAINING PROGRAM

## 2023-10-08 PROCEDURE — 82947 ASSAY GLUCOSE BLOOD QUANT: CPT

## 2023-10-08 PROCEDURE — 1170000001 HC PRIVATE ONCOLOGY ROOM DAILY

## 2023-10-08 PROCEDURE — 83735 ASSAY OF MAGNESIUM: CPT

## 2023-10-08 RX ORDER — VANCOMYCIN HCL 50 MG/ML
125 SOLUTION, RECONSTITUTED, ORAL ORAL 4 TIMES DAILY
Status: DISCONTINUED | OUTPATIENT
Start: 2023-10-08 | End: 2023-10-09

## 2023-10-08 RX ORDER — MAGNESIUM SULFATE HEPTAHYDRATE 40 MG/ML
4 INJECTION, SOLUTION INTRAVENOUS ONCE
Status: COMPLETED | OUTPATIENT
Start: 2023-10-08 | End: 2023-10-08

## 2023-10-08 RX ADMIN — VANCOMYCIN HYDROCHLORIDE 125 MG: KIT at 20:34

## 2023-10-08 RX ADMIN — SIMVASTATIN 40 MG: 20 TABLET, FILM COATED ORAL at 20:34

## 2023-10-08 RX ADMIN — PREGABALIN 150 MG: 75 CAPSULE ORAL at 20:34

## 2023-10-08 RX ADMIN — PREGABALIN 150 MG: 75 CAPSULE ORAL at 14:56

## 2023-10-08 RX ADMIN — VANCOMYCIN HYDROCHLORIDE 125 MG: KIT at 18:03

## 2023-10-08 RX ADMIN — VANCOMYCIN HYDROCHLORIDE 125 MG: KIT at 09:03

## 2023-10-08 RX ADMIN — MAGNESIUM SULFATE HEPTAHYDRATE 4 G: 40 INJECTION, SOLUTION INTRAVENOUS at 12:41

## 2023-10-08 RX ADMIN — VANCOMYCIN HYDROCHLORIDE 125 MG: KIT at 12:41

## 2023-10-08 RX ADMIN — ENOXAPARIN SODIUM 40 MG: 40 INJECTION SUBCUTANEOUS at 14:56

## 2023-10-08 RX ADMIN — PANTOPRAZOLE SODIUM 40 MG: 40 TABLET, DELAYED RELEASE ORAL at 06:25

## 2023-10-08 RX ADMIN — INSULIN GLARGINE 10 UNITS: 100 INJECTION, SOLUTION SUBCUTANEOUS at 20:35

## 2023-10-08 RX ADMIN — PREGABALIN 150 MG: 75 CAPSULE ORAL at 09:03

## 2023-10-08 SDOH — ECONOMIC STABILITY: INCOME INSECURITY: IN THE LAST 12 MONTHS, WAS THERE A TIME WHEN YOU WERE NOT ABLE TO PAY THE MORTGAGE OR RENT ON TIME?: PATIENT REFUSED

## 2023-10-08 SDOH — SOCIAL STABILITY: SOCIAL INSECURITY: ARE THERE ANY APPARENT SIGNS OF INJURIES/BEHAVIORS THAT COULD BE RELATED TO ABUSE/NEGLECT?: NO

## 2023-10-08 SDOH — SOCIAL STABILITY: SOCIAL INSECURITY: DO YOU FEEL ANYONE HAS EXPLOITED OR TAKEN ADVANTAGE OF YOU FINANCIALLY OR OF YOUR PERSONAL PROPERTY?: NO

## 2023-10-08 SDOH — SOCIAL STABILITY: SOCIAL INSECURITY: HAVE YOU HAD THOUGHTS OF HARMING ANYONE ELSE?: NO

## 2023-10-08 SDOH — HEALTH STABILITY: PHYSICAL HEALTH
ON AVERAGE, HOW MANY DAYS PER WEEK DO YOU ENGAGE IN MODERATE TO STRENUOUS EXERCISE (LIKE A BRISK WALK)?: PATIENT DECLINED

## 2023-10-08 SDOH — SOCIAL STABILITY: SOCIAL INSECURITY: ARE YOU OR HAVE YOU BEEN THREATENED OR ABUSED PHYSICALLY, EMOTIONALLY, OR SEXUALLY BY ANYONE?: NO

## 2023-10-08 SDOH — HEALTH STABILITY: PHYSICAL HEALTH: ON AVERAGE, HOW MANY MINUTES DO YOU ENGAGE IN EXERCISE AT THIS LEVEL?: PATIENT DECLINED

## 2023-10-08 SDOH — SOCIAL STABILITY: SOCIAL INSECURITY: HAS ANYONE EVER THREATENED TO HURT YOUR FAMILY OR YOUR PETS?: NO

## 2023-10-08 SDOH — SOCIAL STABILITY: SOCIAL INSECURITY: ABUSE: ADULT

## 2023-10-08 SDOH — ECONOMIC STABILITY: TRANSPORTATION INSECURITY
IN THE PAST 12 MONTHS, HAS LACK OF TRANSPORTATION KEPT YOU FROM MEETINGS, WORK, OR FROM GETTING THINGS NEEDED FOR DAILY LIVING?: PATIENT DECLINED

## 2023-10-08 SDOH — SOCIAL STABILITY: SOCIAL INSECURITY: DOES ANYONE TRY TO KEEP YOU FROM HAVING/CONTACTING OTHER FRIENDS OR DOING THINGS OUTSIDE YOUR HOME?: NO

## 2023-10-08 SDOH — ECONOMIC STABILITY: INCOME INSECURITY: HOW HARD IS IT FOR YOU TO PAY FOR THE VERY BASICS LIKE FOOD, HOUSING, MEDICAL CARE, AND HEATING?: PATIENT DECLINED

## 2023-10-08 SDOH — ECONOMIC STABILITY: HOUSING INSECURITY
IN THE LAST 12 MONTHS, WAS THERE A TIME WHEN YOU DID NOT HAVE A STEADY PLACE TO SLEEP OR SLEPT IN A SHELTER (INCLUDING NOW)?: PATIENT REFUSED

## 2023-10-08 SDOH — ECONOMIC STABILITY: TRANSPORTATION INSECURITY
IN THE PAST 12 MONTHS, HAS THE LACK OF TRANSPORTATION KEPT YOU FROM MEDICAL APPOINTMENTS OR FROM GETTING MEDICATIONS?: PATIENT DECLINED

## 2023-10-08 SDOH — SOCIAL STABILITY: SOCIAL INSECURITY: DO YOU FEEL UNSAFE GOING BACK TO THE PLACE WHERE YOU ARE LIVING?: NO

## 2023-10-08 ASSESSMENT — COGNITIVE AND FUNCTIONAL STATUS - GENERAL
PATIENT BASELINE BEDBOUND: NO
DAILY ACTIVITIY SCORE: 24
DAILY ACTIVITIY SCORE: 24
MOBILITY SCORE: 24

## 2023-10-08 ASSESSMENT — PAIN SCALES - GENERAL
PAINLEVEL_OUTOF10: 0 - NO PAIN

## 2023-10-08 ASSESSMENT — PATIENT HEALTH QUESTIONNAIRE - PHQ9
1. LITTLE INTEREST OR PLEASURE IN DOING THINGS: NOT AT ALL
SUM OF ALL RESPONSES TO PHQ9 QUESTIONS 1 & 2: 0
2. FEELING DOWN, DEPRESSED OR HOPELESS: NOT AT ALL

## 2023-10-08 ASSESSMENT — ACTIVITIES OF DAILY LIVING (ADL)
ASSISTIVE_DEVICE: BRACE LLE
JUDGMENT_ADEQUATE_SAFELY_COMPLETE_DAILY_ACTIVITIES: YES
DRESSING YOURSELF: INDEPENDENT
TOILETING: INDEPENDENT
HEARING - LEFT EAR: FUNCTIONAL
GROOMING: INDEPENDENT
FEEDING YOURSELF: INDEPENDENT
BATHING: INDEPENDENT
PATIENT'S MEMORY ADEQUATE TO SAFELY COMPLETE DAILY ACTIVITIES?: YES
ADEQUATE_TO_COMPLETE_ADL: YES
HEARING - RIGHT EAR: FUNCTIONAL
WALKS IN HOME: INDEPENDENT

## 2023-10-08 ASSESSMENT — LIFESTYLE VARIABLES
PRESCIPTION_ABUSE_PAST_12_MONTHS: NO
HOW OFTEN DO YOU HAVE A DRINK CONTAINING ALCOHOL: NEVER
SUBSTANCE_ABUSE_PAST_12_MONTHS: NO
SKIP TO QUESTIONS 9-10: 1
HOW MANY STANDARD DRINKS CONTAINING ALCOHOL DO YOU HAVE ON A TYPICAL DAY: PATIENT DOES NOT DRINK
AUDIT-C TOTAL SCORE: 0
HOW OFTEN DO YOU HAVE 6 OR MORE DRINKS ON ONE OCCASION: NEVER
AUDIT-C TOTAL SCORE: 0

## 2023-10-08 ASSESSMENT — PAIN - FUNCTIONAL ASSESSMENT
PAIN_FUNCTIONAL_ASSESSMENT: 0-10

## 2023-10-08 NOTE — PROGRESS NOTES
"Subjective   NAOE.    Patient reports feeling \"not terrible.\" States that he was finally able to keep some food down for the first time in two days, but is still having bad diarrhea (every 1.5-2 hours). C. Dif PCR still pending. Is taking his oral abx medication (the \"terrible tasting stuff\") .       Objective     Vitals:  Vitals:    10/08/23 1028   BP: 102/59   Pulse: 94   Resp: 18   Temp: 36.6 °C (97.9 °F)   SpO2: 94%       No intake/output data recorded.  I/O this shift:  In: 20 [P.O.:20]  Out: -     Physical exam:  Constitutional: in NAD  HEENT: sclerae anicteric, EOM grossly intact  CV: RRR, no murmurs noted  Pulm:  no increased WOB  GI: hypertrophic skin present at midline 4 cm scar  Skin: warm and dry  Ext: bilateral LE without pitting edema   Neuro: alert and conversant  Psych: affect appropriate    Medications:  enoxaparin, 40 mg, subcutaneous, q24h  heparin flush, 50 Units, intra-catheter, q12h  insulin glargine, 10 Units, subcutaneous, Nightly  insulin lispro, 0-5 Units, subcutaneous, TID with meals  pantoprazole, 40 mg, oral, Daily before breakfast  pregabalin, 150 mg, oral, TID  simvastatin, 40 mg, oral, Nightly  vancomycin, 125 mg, oral, 4x daily         PRN medications: dextrose 10 % in water (D10W), dextrose, glucagon, heparin flush, heparin lock flush (porcine), ondansetron, oxyCODONE-acetaminophen, prochlorperazine    Labs:  Results for orders placed or performed during the hospital encounter of 10/06/23 (from the past 24 hour(s))   POCT GLUCOSE   Result Value Ref Range    POCT Glucose 159 (H) 74 - 99 mg/dL   POCT GLUCOSE   Result Value Ref Range    POCT Glucose 169 (H) 74 - 99 mg/dL   POCT GLUCOSE   Result Value Ref Range    POCT Glucose 173 (H) 74 - 99 mg/dL   POCT GLUCOSE   Result Value Ref Range    POCT Glucose 150 (H) 74 - 99 mg/dL   Phosphorus   Result Value Ref Range    Phosphorus 3.6 2.5 - 4.9 mg/dL   Magnesium   Result Value Ref Range    Magnesium 1.60 1.60 - 2.40 mg/dL       Imaging:   "     Assessment/Plan     Narciso Marrero is  57 y.o. male w/ Stage IV KRAS + colon cancer of the ascending colon with liver and peritoneal mets (on treatment of FUdR through AMY pump and FOLFOX, last dose 9/20) presenting to ED w/ diarrhea x 4 days. Patient presents with acute non-bloody diarrhea. Given acuity, most likely infectious given temperature at home. Although has been HDS and afebrile in the ED. Infection needs to be ruled out. It could be chemotherapy-induced, but his last dose of FOLFOX was on 9/20 and he has tolerated his previous cycles with minimal sx which makes this less likely on the differential. Even lower on the differential is IBD. He has no hx of crohn's or UC, and the acuity suggests against this.     Updates 10/8:  - C. Dif PCR still pending  - Repleted Mg IV 4mg   - Able to tolerate PO intake now    #Diarrhea, most likely infectious  #Nausea  :: s/p vanc/zosyn in ED  ::CT A/P 10/6 shows diffuse small and large bowel thickening with mural hyperenhancement and fluid content noted, consistent with enterocolitis  ::afebrile, no leukocytosis  :: missed last dose of FOLFOX d/t nausea and vomiting  :: stool pathogen panel negative  - C diff still pending, will start empiric treatment  - vancomycin 125 qid  - will give loperamide if C diff negative  - continue home ondansetron 4 mg q8hr prn   - continue home prochlorperazine 10 mg q6hr prn     #Hypomagnesemia  :: s/p 2 mg IV mag in ED  - continue to monitor     #Hypokalemia  :: s/p K Cl 20 mEq   :: K 3.4 10/7  - continue to monitor  - 40 mEq     #Stage IV Colon Cancer  :: Follows with Dr. Russ   :: currently on FOLFOX and FUDR through AMY pump  :: ctDNA 9/20/23: 3.67  :: 9/20/23: 905  - follow-up with Dr. Russ and Sandy Cruz in 2 weeks for start of next FOLFOX cycle     #HLD  - continue home simvastatin 40 mg daily     #Chronic pain  ::Has crushed shoulder injury 2/2 work-related accident  ::follows with pain management at [facility]  Pain management  - continue home pregabalin 150 mg tid  - continue percoset 5-325 mg q6hr as needed     #GERD  - continue home pantoprazole 40 mg daily     #Type 2 DM  :: home regimen: lantus 22 units, humalog 3 units w/ meals  - lantus 10 units  - mild insulin sliding scale     F: none  E: replete as needed  N: carb-controlled  A: lovenox 40 mg   Code status: FULL CODE (confirmed on admission)  NOK: Emperatriz Marrero [phone]     Patient seen and discussed with Pablo Zamora   IM PGY3

## 2023-10-09 LAB
ANION GAP SERPL CALC-SCNC: 17 MMOL/L (ref 10–20)
BASOPHILS # BLD MANUAL: 0 X10*3/UL (ref 0–0.1)
BASOPHILS NFR BLD MANUAL: 0 %
BUN SERPL-MCNC: 12 MG/DL (ref 6–23)
BURR CELLS BLD QL SMEAR: ABNORMAL
C DIF TOX TCDA+TCDB STL QL NAA+PROBE: NOT DETECTED
CALCIUM SERPL-MCNC: 8.2 MG/DL (ref 8.6–10.6)
CHLORIDE SERPL-SCNC: 104 MMOL/L (ref 98–107)
CO2 SERPL-SCNC: 21 MMOL/L (ref 21–32)
CREAT SERPL-MCNC: 0.59 MG/DL (ref 0.5–1.3)
EOSINOPHIL # BLD MANUAL: 0.06 X10*3/UL (ref 0–0.7)
EOSINOPHIL NFR BLD MANUAL: 0.9 %
ERYTHROCYTE [DISTWIDTH] IN BLOOD BY AUTOMATED COUNT: 16.7 % (ref 11.5–14.5)
GFR SERPL CREATININE-BSD FRML MDRD: >90 ML/MIN/1.73M*2
GLUCOSE BLD MANUAL STRIP-MCNC: 130 MG/DL (ref 74–99)
GLUCOSE BLD MANUAL STRIP-MCNC: 149 MG/DL (ref 74–99)
GLUCOSE BLD MANUAL STRIP-MCNC: 170 MG/DL (ref 74–99)
GLUCOSE BLD MANUAL STRIP-MCNC: 181 MG/DL (ref 74–99)
GLUCOSE SERPL-MCNC: 136 MG/DL (ref 74–99)
HCT VFR BLD AUTO: 39 % (ref 41–52)
HGB BLD-MCNC: 12.7 G/DL (ref 13.5–17.5)
IMM GRANULOCYTES # BLD AUTO: 0.22 X10*3/UL (ref 0–0.7)
IMM GRANULOCYTES NFR BLD AUTO: 3.1 % (ref 0–0.9)
LYMPHOCYTES # BLD MANUAL: 0.96 X10*3/UL (ref 1.2–4.8)
LYMPHOCYTES NFR BLD MANUAL: 13.5 %
MAGNESIUM SERPL-MCNC: 1.87 MG/DL (ref 1.6–2.4)
MCH RBC QN AUTO: 30 PG (ref 26–34)
MCHC RBC AUTO-ENTMCNC: 32.6 G/DL (ref 32–36)
MCV RBC AUTO: 92 FL (ref 80–100)
MONOCYTES # BLD MANUAL: 1.53 X10*3/UL (ref 0.1–1)
MONOCYTES NFR BLD MANUAL: 21.6 %
NEUTROPHILS # BLD MANUAL: 4.55 X10*3/UL (ref 1.2–7.7)
NEUTS BAND # BLD MANUAL: 0.45 X10*3/UL (ref 0–0.7)
NEUTS BAND NFR BLD MANUAL: 6.3 %
NEUTS SEG # BLD MANUAL: 4.1 X10*3/UL (ref 1.2–7)
NEUTS SEG NFR BLD MANUAL: 57.7 %
NRBC BLD-RTO: 0 /100 WBCS (ref 0–0)
OVALOCYTES BLD QL SMEAR: ABNORMAL
PLATELET # BLD AUTO: 170 X10*3/UL (ref 150–450)
PMV BLD AUTO: 9.7 FL (ref 7.5–11.5)
POLYCHROMASIA BLD QL SMEAR: ABNORMAL
POTASSIUM SERPL-SCNC: 3.1 MMOL/L (ref 3.5–5.3)
RBC # BLD AUTO: 4.23 X10*6/UL (ref 4.5–5.9)
RBC MORPH BLD: ABNORMAL
SODIUM SERPL-SCNC: 139 MMOL/L (ref 136–145)
TOTAL CELLS COUNTED BLD: 111
WBC # BLD AUTO: 7.1 X10*3/UL (ref 4.4–11.3)

## 2023-10-09 PROCEDURE — 82947 ASSAY GLUCOSE BLOOD QUANT: CPT

## 2023-10-09 PROCEDURE — 2500000004 HC RX 250 GENERAL PHARMACY W/ HCPCS (ALT 636 FOR OP/ED)

## 2023-10-09 PROCEDURE — 80048 BASIC METABOLIC PNL TOTAL CA: CPT | Performed by: STUDENT IN AN ORGANIZED HEALTH CARE EDUCATION/TRAINING PROGRAM

## 2023-10-09 PROCEDURE — 2500000002 HC RX 250 W HCPCS SELF ADMINISTERED DRUGS (ALT 637 FOR MEDICARE OP, ALT 636 FOR OP/ED)

## 2023-10-09 PROCEDURE — 2500000005 HC RX 250 GENERAL PHARMACY W/O HCPCS

## 2023-10-09 PROCEDURE — 83735 ASSAY OF MAGNESIUM: CPT | Performed by: STUDENT IN AN ORGANIZED HEALTH CARE EDUCATION/TRAINING PROGRAM

## 2023-10-09 PROCEDURE — 1170000001 HC PRIVATE ONCOLOGY ROOM DAILY

## 2023-10-09 PROCEDURE — 96372 THER/PROPH/DIAG INJ SC/IM: CPT

## 2023-10-09 PROCEDURE — 2500000001 HC RX 250 WO HCPCS SELF ADMINISTERED DRUGS (ALT 637 FOR MEDICARE OP)

## 2023-10-09 PROCEDURE — 85007 BL SMEAR W/DIFF WBC COUNT: CPT | Performed by: STUDENT IN AN ORGANIZED HEALTH CARE EDUCATION/TRAINING PROGRAM

## 2023-10-09 PROCEDURE — 85027 COMPLETE CBC AUTOMATED: CPT | Performed by: STUDENT IN AN ORGANIZED HEALTH CARE EDUCATION/TRAINING PROGRAM

## 2023-10-09 PROCEDURE — 99231 SBSQ HOSP IP/OBS SF/LOW 25: CPT | Performed by: STUDENT IN AN ORGANIZED HEALTH CARE EDUCATION/TRAINING PROGRAM

## 2023-10-09 RX ORDER — POTASSIUM CHLORIDE 20 MEQ/1
40 TABLET, EXTENDED RELEASE ORAL ONCE
Status: COMPLETED | OUTPATIENT
Start: 2023-10-09 | End: 2023-10-09

## 2023-10-09 RX ORDER — LOPERAMIDE HYDROCHLORIDE 2 MG/1
2 CAPSULE ORAL EVERY 4 HOURS
Status: DISCONTINUED | OUTPATIENT
Start: 2023-10-09 | End: 2023-10-10

## 2023-10-09 RX ORDER — LOPERAMIDE HYDROCHLORIDE 2 MG/1
2 CAPSULE ORAL 4 TIMES DAILY PRN
Status: DISCONTINUED | OUTPATIENT
Start: 2023-10-09 | End: 2023-10-09

## 2023-10-09 RX ADMIN — PREGABALIN 150 MG: 75 CAPSULE ORAL at 21:23

## 2023-10-09 RX ADMIN — INSULIN LISPRO 1 UNITS: 100 INJECTION, SOLUTION INTRAVENOUS; SUBCUTANEOUS at 17:27

## 2023-10-09 RX ADMIN — ENOXAPARIN SODIUM 40 MG: 40 INJECTION SUBCUTANEOUS at 14:00

## 2023-10-09 RX ADMIN — POTASSIUM CHLORIDE 40 MEQ: 1500 TABLET, EXTENDED RELEASE ORAL at 11:27

## 2023-10-09 RX ADMIN — PREGABALIN 150 MG: 75 CAPSULE ORAL at 08:42

## 2023-10-09 RX ADMIN — LOPERAMIDE HYDROCHLORIDE 2 MG: 2 CAPSULE ORAL at 13:59

## 2023-10-09 RX ADMIN — SIMVASTATIN 40 MG: 20 TABLET, FILM COATED ORAL at 21:22

## 2023-10-09 RX ADMIN — LOPERAMIDE HYDROCHLORIDE 2 MG: 2 CAPSULE ORAL at 18:57

## 2023-10-09 RX ADMIN — PREGABALIN 150 MG: 75 CAPSULE ORAL at 15:22

## 2023-10-09 RX ADMIN — INSULIN GLARGINE 10 UNITS: 100 INJECTION, SOLUTION SUBCUTANEOUS at 21:21

## 2023-10-09 RX ADMIN — LOPERAMIDE HYDROCHLORIDE 2 MG: 2 CAPSULE ORAL at 21:22

## 2023-10-09 RX ADMIN — PANTOPRAZOLE SODIUM 40 MG: 40 TABLET, DELAYED RELEASE ORAL at 05:21

## 2023-10-09 RX ADMIN — VANCOMYCIN HYDROCHLORIDE 125 MG: KIT at 05:21

## 2023-10-09 ASSESSMENT — PAIN SCALES - GENERAL
PAINLEVEL_OUTOF10: 2
PAINLEVEL_OUTOF10: 0 - NO PAIN

## 2023-10-09 ASSESSMENT — PAIN - FUNCTIONAL ASSESSMENT
PAIN_FUNCTIONAL_ASSESSMENT: 0-10

## 2023-10-09 NOTE — PROGRESS NOTES
Subjective   NAEON    Continues to have diarrhea. Reported about 4-5 BM overnight. States that he feels the frequency has lessened since starting the vancomycin. Able to tolerate breakfast ([place] toast and turkey sausage).     Denies chest pain, fevers, chills, shortness of breath        Objective     Vitals:  Vitals:    10/09/23 0500   BP: 108/64   Pulse: 79   Resp: 16   Temp: 36.4 °C (97.5 °F)   SpO2: 96%       I/O last 3 completed shifts:  In: 220 (2.3 mL/kg) [P.O.:170; I.V.:50 (0.5 mL/kg)]  Out: - (0 mL/kg)   Weight: 95.3 kg   No intake/output data recorded.    Physical exam:  Constitutional: in NAD  HEENT: sclerae anicteric, EOM grossly intact  CV: RRR, no murmurs noted  Pulm:  no increased WOB  GI: hypertrophic skin present at midline 4 cm scar  Skin: warm and dry  Ext: bilateral LE without pitting edema   Neuro: alert and conversant  Psych: affect appropriate    Medications:  enoxaparin, 40 mg, subcutaneous, q24h  insulin glargine, 10 Units, subcutaneous, Nightly  insulin lispro, 0-5 Units, subcutaneous, TID with meals  pantoprazole, 40 mg, oral, Daily before breakfast  pregabalin, 150 mg, oral, TID  simvastatin, 40 mg, oral, Nightly  vancomycin, 125 mg, oral, 4x daily         PRN medications: dextrose 10 % in water (D10W), dextrose, glucagon, ondansetron, oxyCODONE-acetaminophen, prochlorperazine    Labs:  Results for orders placed or performed during the hospital encounter of 10/06/23 (from the past 24 hour(s))   Magnesium   Result Value Ref Range    Magnesium 1.60 1.60 - 2.40 mg/dL   Phosphorus   Result Value Ref Range    Phosphorus 3.6 2.5 - 4.9 mg/dL   POCT GLUCOSE   Result Value Ref Range    POCT Glucose 150 (H) 74 - 99 mg/dL   POCT GLUCOSE   Result Value Ref Range    POCT Glucose 150 (H) 74 - 99 mg/dL   POCT GLUCOSE   Result Value Ref Range    POCT Glucose 155 (H) 74 - 99 mg/dL   POCT GLUCOSE   Result Value Ref Range    POCT Glucose 124 (H) 74 - 99 mg/dL       Imaging:       Assessment/Plan      Narciso Marrero is  57 y.o. male w/ Stage IV KRAS + colon cancer of the ascending colon with liver and peritoneal mets (on treatment of FUdR through AMY pump and FOLFOX, last dose 9/20) presenting to ED w/ diarrhea x 4 days. Patient presents with acute non-bloody diarrhea. Given acuity, most likely infectious given temperature at home. Although has been HDS and afebrile in the ED. Infection needs to be ruled out. It could be chemotherapy-induced, but his last dose of FOLFOX was on 9/20 and he has tolerated his previous cycles with minimal sx which makes this less likely on the differential. Even lower on the differential is IBD. He has no hx of crohn's or UC, and the acuity suggests against this.     Updates 10/9:  - C. Dif PCR negative  - discontinued vancomycin  - started loperamide 2 mg qid prn diarrhea  - Tolerating PO intake    #Diarrhea, most likely infectious  #Nausea  :: s/p vanc/zosyn in ED  ::CT A/P 10/6 shows diffuse small and large bowel thickening with mural hyperenhancement and fluid content noted, consistent with enterocolitis  ::afebrile, no leukocytosis  :: missed last dose of FOLFOX d/t nausea and vomiting  :: stool pathogen panel negative  :: C diff negative  :: discontinued vancomycin 125 qid  - loperamide 2 mg qid prn diarrhea  - continue home ondansetron 4 mg q8hr prn   - continue home prochlorperazine 10 mg q6hr prn     #Hypomagnesemia  :: s/p 2 mg IV mag in ED  - continue to monitor     #Hypokalemia d/t diarrhea  :: s/p K Cl 20 mEq   :: K 3.1 10.9  - continue to monitor  - 40 mEq repleted     #Stage IV Colon Cancer  :: Follows with Dr. Russ   :: currently on FOLFOX and FUDR through AMY pump  :: ctDNA 9/20/23: 3.67  :: 9/20/23: 905  - follow-up with Dr. Russ and Sandy Cruz in 2 weeks for start of next FOLFOX cycle     #HLD  - continue home simvastatin 40 mg daily     #Chronic pain  ::Has crushed shoulder injury 2/2 work-related accident  ::[facility]  - continue home pregabalin 150 mg tid  - continue percoset 5-325 mg q6hr as needed     #GERD  - continue home pantoprazole 40 mg daily     #Type 2 DM  :: home regimen: lantus 22 units, humalog 3 units w/ meals  - lantus 10 units  - mild insulin sliding scale     F: none  E: replete as needed  N: carb-controlled  A: lovenox 40 mg   Code status: FULL CODE (confirmed on admission)  NOK: Emperatriz Marrero [phone]     Patient discussed with Dr. Russ,   Alea Sherman  Internal Medicine, PGY-1

## 2023-10-09 NOTE — CARE PLAN
Problem: Fall/Injury  Goal: Be free from injury by end of the shift  Outcome: Progressing  Goal: Verbalize understanding of risk factor reduction measures to prevent injury from fall in the home  Outcome: Progressing  Goal: Use assistive devices by end of the shift  Outcome: Progressing  Goal: Pace activities to prevent fatigue by end of the shift  Outcome: Progressing   The patient's goals for the shift include      The clinical goals for the shift include patient will remain safe and injury free by 1800 on 10/9/2023.    Patient was safe and injury free throughout shift.  Recommendations to address these barriers include continue with plan of care.

## 2023-10-10 LAB
ALBUMIN SERPL BCP-MCNC: 2.9 G/DL (ref 3.4–5)
ALBUMIN SERPL BCP-MCNC: 2.9 G/DL (ref 3.4–5)
ALP SERPL-CCNC: 204 U/L (ref 33–120)
ALT SERPL W P-5'-P-CCNC: 23 U/L (ref 10–52)
ANION GAP SERPL CALC-SCNC: 14 MMOL/L (ref 10–20)
ANION GAP SERPL CALC-SCNC: 15 MMOL/L (ref 10–20)
AST SERPL W P-5'-P-CCNC: 16 U/L (ref 9–39)
BACTERIA BLD CULT: NORMAL
BACTERIA BLD CULT: NORMAL
BILIRUB SERPL-MCNC: 0.7 MG/DL (ref 0–1.2)
BUN SERPL-MCNC: 7 MG/DL (ref 6–23)
BUN SERPL-MCNC: 9 MG/DL (ref 6–23)
CALCIUM SERPL-MCNC: 8.3 MG/DL (ref 8.6–10.6)
CALCIUM SERPL-MCNC: 8.4 MG/DL (ref 8.6–10.6)
CHLORIDE SERPL-SCNC: 104 MMOL/L (ref 98–107)
CHLORIDE SERPL-SCNC: 105 MMOL/L (ref 98–107)
CO2 SERPL-SCNC: 21 MMOL/L (ref 21–32)
CO2 SERPL-SCNC: 23 MMOL/L (ref 21–32)
CREAT SERPL-MCNC: 0.57 MG/DL (ref 0.5–1.3)
CREAT SERPL-MCNC: 0.62 MG/DL (ref 0.5–1.3)
ERYTHROCYTE [DISTWIDTH] IN BLOOD BY AUTOMATED COUNT: 16.4 % (ref 11.5–14.5)
GFR SERPL CREATININE-BSD FRML MDRD: >90 ML/MIN/1.73M*2
GFR SERPL CREATININE-BSD FRML MDRD: >90 ML/MIN/1.73M*2
GLUCOSE BLD MANUAL STRIP-MCNC: 137 MG/DL (ref 74–99)
GLUCOSE BLD MANUAL STRIP-MCNC: 143 MG/DL (ref 74–99)
GLUCOSE BLD MANUAL STRIP-MCNC: 146 MG/DL (ref 74–99)
GLUCOSE SERPL-MCNC: 157 MG/DL (ref 74–99)
GLUCOSE SERPL-MCNC: 179 MG/DL (ref 74–99)
HCT VFR BLD AUTO: 36.3 % (ref 41–52)
HGB BLD-MCNC: 12.7 G/DL (ref 13.5–17.5)
MAGNESIUM SERPL-MCNC: 1.52 MG/DL (ref 1.6–2.4)
MAGNESIUM SERPL-MCNC: 1.79 MG/DL (ref 1.6–2.4)
MCH RBC QN AUTO: 30 PG (ref 26–34)
MCHC RBC AUTO-ENTMCNC: 35 G/DL (ref 32–36)
MCV RBC AUTO: 86 FL (ref 80–100)
NRBC BLD-RTO: 0 /100 WBCS (ref 0–0)
PHOSPHATE SERPL-MCNC: 3 MG/DL (ref 2.5–4.9)
PHOSPHATE SERPL-MCNC: 3.1 MG/DL (ref 2.5–4.9)
PLATELET # BLD AUTO: 167 X10*3/UL (ref 150–450)
PMV BLD AUTO: 9.2 FL (ref 7.5–11.5)
POTASSIUM SERPL-SCNC: 3.2 MMOL/L (ref 3.5–5.3)
POTASSIUM SERPL-SCNC: 3.8 MMOL/L (ref 3.5–5.3)
PROT SERPL-MCNC: 5 G/DL (ref 6.4–8.2)
RBC # BLD AUTO: 4.24 X10*6/UL (ref 4.5–5.9)
SODIUM SERPL-SCNC: 137 MMOL/L (ref 136–145)
SODIUM SERPL-SCNC: 138 MMOL/L (ref 136–145)
WBC # BLD AUTO: 7.2 X10*3/UL (ref 4.4–11.3)

## 2023-10-10 PROCEDURE — 2500000004 HC RX 250 GENERAL PHARMACY W/ HCPCS (ALT 636 FOR OP/ED)

## 2023-10-10 PROCEDURE — 83735 ASSAY OF MAGNESIUM: CPT

## 2023-10-10 PROCEDURE — 84100 ASSAY OF PHOSPHORUS: CPT

## 2023-10-10 PROCEDURE — 36415 COLL VENOUS BLD VENIPUNCTURE: CPT

## 2023-10-10 PROCEDURE — 2500000002 HC RX 250 W HCPCS SELF ADMINISTERED DRUGS (ALT 637 FOR MEDICARE OP, ALT 636 FOR OP/ED)

## 2023-10-10 PROCEDURE — 84075 ASSAY ALKALINE PHOSPHATASE: CPT

## 2023-10-10 PROCEDURE — 85027 COMPLETE CBC AUTOMATED: CPT

## 2023-10-10 PROCEDURE — 2500000001 HC RX 250 WO HCPCS SELF ADMINISTERED DRUGS (ALT 637 FOR MEDICARE OP)

## 2023-10-10 PROCEDURE — 1170000001 HC PRIVATE ONCOLOGY ROOM DAILY

## 2023-10-10 PROCEDURE — 99231 SBSQ HOSP IP/OBS SF/LOW 25: CPT

## 2023-10-10 PROCEDURE — 82947 ASSAY GLUCOSE BLOOD QUANT: CPT

## 2023-10-10 PROCEDURE — 96372 THER/PROPH/DIAG INJ SC/IM: CPT

## 2023-10-10 PROCEDURE — 80069 RENAL FUNCTION PANEL: CPT

## 2023-10-10 RX ORDER — POTASSIUM CHLORIDE 1.5 G/1.58G
40 POWDER, FOR SOLUTION ORAL ONCE
Status: COMPLETED | OUTPATIENT
Start: 2023-10-10 | End: 2023-10-10

## 2023-10-10 RX ORDER — HEPARIN SODIUM,PORCINE/PF 10 UNIT/ML
50 SYRINGE (ML) INTRAVENOUS EVERY 12 HOURS
Status: DISCONTINUED | OUTPATIENT
Start: 2023-10-10 | End: 2023-10-11

## 2023-10-10 RX ORDER — MAGNESIUM SULFATE HEPTAHYDRATE 40 MG/ML
2 INJECTION, SOLUTION INTRAVENOUS ONCE
Status: COMPLETED | OUTPATIENT
Start: 2023-10-10 | End: 2023-10-10

## 2023-10-10 RX ORDER — LOPERAMIDE HYDROCHLORIDE 2 MG/1
4 CAPSULE ORAL EVERY 6 HOURS
Status: DISCONTINUED | OUTPATIENT
Start: 2023-10-10 | End: 2023-10-12 | Stop reason: HOSPADM

## 2023-10-10 RX ORDER — DIPHENOXYLATE HYDROCHLORIDE AND ATROPINE SULFATE 2.5; .025 MG/1; MG/1
1 TABLET ORAL 4 TIMES DAILY PRN
Status: DISCONTINUED | OUTPATIENT
Start: 2023-10-10 | End: 2023-10-11

## 2023-10-10 RX ORDER — HEPARIN SODIUM,PORCINE/PF 10 UNIT/ML
50 SYRINGE (ML) INTRAVENOUS AS NEEDED
Status: DISCONTINUED | OUTPATIENT
Start: 2023-10-10 | End: 2023-10-12 | Stop reason: HOSPADM

## 2023-10-10 RX ADMIN — PREGABALIN 150 MG: 75 CAPSULE ORAL at 08:44

## 2023-10-10 RX ADMIN — PREGABALIN 150 MG: 75 CAPSULE ORAL at 14:24

## 2023-10-10 RX ADMIN — PREGABALIN 150 MG: 75 CAPSULE ORAL at 20:01

## 2023-10-10 RX ADMIN — PANTOPRAZOLE SODIUM 40 MG: 40 TABLET, DELAYED RELEASE ORAL at 06:27

## 2023-10-10 RX ADMIN — INSULIN GLARGINE 10 UNITS: 100 INJECTION, SOLUTION SUBCUTANEOUS at 20:01

## 2023-10-10 RX ADMIN — POTASSIUM CHLORIDE 40 MEQ: 1.5 POWDER, FOR SOLUTION ORAL at 04:59

## 2023-10-10 RX ADMIN — POTASSIUM CHLORIDE 40 MEQ: 1.5 POWDER, FOR SOLUTION ORAL at 06:27

## 2023-10-10 RX ADMIN — LOPERAMIDE HYDROCHLORIDE 2 MG: 2 CAPSULE ORAL at 02:00

## 2023-10-10 RX ADMIN — LOPERAMIDE HYDROCHLORIDE 2 MG: 2 CAPSULE ORAL at 06:27

## 2023-10-10 RX ADMIN — LOPERAMIDE HYDROCHLORIDE 4 MG: 2 CAPSULE ORAL at 13:41

## 2023-10-10 RX ADMIN — LOPERAMIDE HYDROCHLORIDE 4 MG: 2 CAPSULE ORAL at 20:01

## 2023-10-10 RX ADMIN — MAGNESIUM SULFATE HEPTAHYDRATE 2 G: 40 INJECTION, SOLUTION INTRAVENOUS at 04:58

## 2023-10-10 RX ADMIN — SIMVASTATIN 40 MG: 20 TABLET, FILM COATED ORAL at 20:01

## 2023-10-10 RX ADMIN — ENOXAPARIN SODIUM 40 MG: 40 INJECTION SUBCUTANEOUS at 14:24

## 2023-10-10 RX ADMIN — LOPERAMIDE HYDROCHLORIDE 4 MG: 2 CAPSULE ORAL at 23:35

## 2023-10-10 RX ADMIN — DIPHENOXYLATE HYDROCHLORIDE AND ATROPINE SULFATE 1 TABLET: 2.5; .025 TABLET ORAL at 18:45

## 2023-10-10 ASSESSMENT — COGNITIVE AND FUNCTIONAL STATUS - GENERAL
CLIMB 3 TO 5 STEPS WITH RAILING: A LITTLE
DAILY ACTIVITIY SCORE: 24
MOBILITY SCORE: 23

## 2023-10-10 ASSESSMENT — PAIN - FUNCTIONAL ASSESSMENT: PAIN_FUNCTIONAL_ASSESSMENT: 0-10

## 2023-10-10 ASSESSMENT — PAIN SCALES - GENERAL: PAINLEVEL_OUTOF10: 0 - NO PAIN

## 2023-10-10 NOTE — PROGRESS NOTES
Subjective   NAEON    Continues to have diarrhea. Reports BM every 4 hours. No blood in stool. Abdominal pain improved, but hurts when defecating.    Denies chest pain, fevers, chills, shortness of breath        Objective     Vitals:  Vitals:    10/10/23 1357   BP: 105/70   Pulse: 79   Resp: 16   Temp: 36.4 °C (97.5 °F)   SpO2: 97%       I/O last 3 completed shifts:  In: 1100 (11.5 mL/kg) [P.O.:1100]  Out: 0 (0 mL/kg)   Weight: 95.3 kg   I/O this shift:  In: 791 [P.O.:791]  Out: 2 [Stool:2]    Physical exam:  Constitutional: in NAD  HEENT: sclerae anicteric, EOM grossly intact  CV: RRR, no murmurs noted  Pulm:  no increased WOB  GI: hypertrophic skin present at midline 4 cm scar, slight tenderness to palpation in lower quadrants b/l   Skin: warm and dry  Ext: bilateral LE without pitting edema   Neuro: alert and conversant  Psych: affect appropriate    Medications:  enoxaparin, 40 mg, subcutaneous, q24h  insulin glargine, 10 Units, subcutaneous, Nightly  insulin lispro, 0-5 Units, subcutaneous, TID with meals  loperamide, 4 mg, oral, q6h  pantoprazole, 40 mg, oral, Daily before breakfast  pregabalin, 150 mg, oral, TID  simvastatin, 40 mg, oral, Nightly         PRN medications: dextrose 10 % in water (D10W), dextrose, diphenoxylate-atropine, glucagon, ondansetron, oxyCODONE-acetaminophen, prochlorperazine    Labs:  Results for orders placed or performed during the hospital encounter of 10/06/23 (from the past 24 hour(s))   POCT GLUCOSE   Result Value Ref Range    POCT Glucose 170 (H) 74 - 99 mg/dL   POCT GLUCOSE   Result Value Ref Range    POCT Glucose 181 (H) 74 - 99 mg/dL   CBC   Result Value Ref Range    WBC 7.2 4.4 - 11.3 x10*3/uL    nRBC 0.0 0.0 - 0.0 /100 WBCs    RBC 4.24 (L) 4.50 - 5.90 x10*6/uL    Hemoglobin 12.7 (L) 13.5 - 17.5 g/dL    Hematocrit 36.3 (L) 41.0 - 52.0 %    MCV 86 80 - 100 fL    MCH 30.0 26.0 - 34.0 pg    MCHC 35.0 32.0 - 36.0 g/dL    RDW 16.4 (H) 11.5 - 14.5 %    Platelets 167 150 - 450  x10*3/uL    MPV 9.2 7.5 - 11.5 fL   Comprehensive Metabolic Panel   Result Value Ref Range    Glucose 179 (H) 74 - 99 mg/dL    Sodium 138 136 - 145 mmol/L    Potassium 3.2 (L) 3.5 - 5.3 mmol/L    Chloride 104 98 - 107 mmol/L    Bicarbonate 23 21 - 32 mmol/L    Anion Gap 14 10 - 20 mmol/L    Urea Nitrogen 9 6 - 23 mg/dL    Creatinine 0.62 0.50 - 1.30 mg/dL    eGFR >90 >60 mL/min/1.73m*2    Calcium 8.3 (L) 8.6 - 10.6 mg/dL    Albumin 2.9 (L) 3.4 - 5.0 g/dL    Alkaline Phosphatase 204 (H) 33 - 120 U/L    Total Protein 5.0 (L) 6.4 - 8.2 g/dL    AST 16 9 - 39 U/L    Bilirubin, Total 0.7 0.0 - 1.2 mg/dL    ALT 23 10 - 52 U/L   Magnesium   Result Value Ref Range    Magnesium 1.52 (L) 1.60 - 2.40 mg/dL   Phosphorus   Result Value Ref Range    Phosphorus 3.1 2.5 - 4.9 mg/dL   POCT GLUCOSE   Result Value Ref Range    POCT Glucose 143 (H) 74 - 99 mg/dL   POCT GLUCOSE   Result Value Ref Range    POCT Glucose 146 (H) 74 - 99 mg/dL       Imaging:       Assessment/Plan     Narciso Marrero is  57 y.o. male w/ Stage IV KRAS + colon cancer of the ascending colon with liver and peritoneal mets (on treatment of FUdR through AMY pump and FOLFOX, last dose 9/20) presenting to ED w/ diarrhea x 4 days. Patient presents with acute non-bloody diarrhea. Given acuity, most likely infectious given temperature at home. Although has been HDS and afebrile in the ED. Infection needs to be ruled out. It could be chemotherapy-induced, but his last dose of FOLFOX was on 9/20 and he has tolerated his previous cycles with minimal sx which makes this less likely on the differential. Even lower on the differential is IBD. He has no hx of crohn's or UC, and the acuity suggests against this.     Updates 10/9:  - increased loperamide to 4 mg q6hr   - added lomotil 2.5 mg 4x daily prn diarrhea  - Tolerating PO intake    #Diarrhea, most likely infectious  #Nausea  :: s/p vanc/zosyn in ED  ::CT A/P 10/6 shows diffuse small and large bowel thickening with mural  hyperenhancement and fluid content noted, consistent with enterocolitis  ::afebrile, no leukocytosis  :: missed last dose of FOLFOX d/t nausea and vomiting  :: stool pathogen panel negative  :: C diff negative  :: discontinued vancomycin 125 qid  - loperamide 4 mg q6hr  - lomotil 2.5 mg 4x daily prn diarrhea  - continue home ondansetron 4 mg q8hr prn   - continue home prochlorperazine 10 mg q6hr prn     #Hypomagnesemia  :: s/p 2 mg IV mag in ED  - continue to monitor     #Hypokalemia d/t diarrhea  :: s/p K Cl 20 mEq   :: K 3.1 10.9  - continue to monitor  - 40 mEq repleted     #Stage IV Colon Cancer  :: Follows with Dr. Russ   :: currently on FOLFOX and FUDR through AMY pump  :: ctDNA 9/20/23: 3.67  :: 9/20/23: 905  - follow-up with Dr. Russ and Sandy Cruz in 2 weeks for start of next FOLFOX cycle     #HLD  - continue home simvastatin 40 mg daily     #Chronic pain  ::Has crushed shoulder injury 2/2 work-related accident  ::follows with [facility] management at [facility]  - continue home pregabalin 150 mg tid  - continue percoset 5-325 mg q6hr as needed     #GERD  - continue home pantoprazole 40 mg daily     #Type 2 DM  :: home regimen: lantus 22 units, humalog 3 units w/ meals  - lantus 10 units  - mild insulin sliding scale     F: none  E: replete as needed  N: carb-controlled  A: lovenox 40 mg   Code status: FULL CODE (confirmed on admission)  NOK: Emperatriz Marrero [phone]     Patient discussed with Dr. Russ,   Alea Sherman  Internal Medicine, PGY-1

## 2023-10-10 NOTE — CARE PLAN
Problem: Fall/Injury  Goal: Be free from injury by end of the shift  Outcome: Progressing  Goal: Verbalize understanding of risk factor reduction measures to prevent injury from fall in the home  Outcome: Progressing  Goal: Use assistive devices by end of the shift  Outcome: Progressing  Goal: Pace activities to prevent fatigue by end of the shift  Outcome: Progressing   The patient's goals for the shift include      The clinical goals for the shift include patient will remain safe and injury free by 1800 on 10/10/2023.    Over the shift, the patient did not make progress toward the following goals. Barriers to progression include . Recommendations to address these barriers include continue with plan of care.     Patient was safe and injury free throughout shift. Patient denied pain.

## 2023-10-11 LAB
ALBUMIN SERPL BCP-MCNC: 3 G/DL (ref 3.4–5)
ALP SERPL-CCNC: 278 U/L (ref 33–120)
ALT SERPL W P-5'-P-CCNC: 26 U/L (ref 10–52)
ANION GAP SERPL CALC-SCNC: 14 MMOL/L (ref 10–20)
AST SERPL W P-5'-P-CCNC: 24 U/L (ref 9–39)
BILIRUB SERPL-MCNC: 0.8 MG/DL (ref 0–1.2)
BUN SERPL-MCNC: 7 MG/DL (ref 6–23)
CALCIUM SERPL-MCNC: 8.4 MG/DL (ref 8.6–10.6)
CHLORIDE SERPL-SCNC: 103 MMOL/L (ref 98–107)
CO2 SERPL-SCNC: 22 MMOL/L (ref 21–32)
CREAT SERPL-MCNC: 0.54 MG/DL (ref 0.5–1.3)
ERYTHROCYTE [DISTWIDTH] IN BLOOD BY AUTOMATED COUNT: 16.3 % (ref 11.5–14.5)
GFR SERPL CREATININE-BSD FRML MDRD: >90 ML/MIN/1.73M*2
GLUCOSE BLD MANUAL STRIP-MCNC: 135 MG/DL (ref 74–99)
GLUCOSE BLD MANUAL STRIP-MCNC: 154 MG/DL (ref 74–99)
GLUCOSE BLD MANUAL STRIP-MCNC: 198 MG/DL (ref 74–99)
GLUCOSE SERPL-MCNC: 161 MG/DL (ref 74–99)
HCT VFR BLD AUTO: 37.7 % (ref 41–52)
HGB BLD-MCNC: 12.3 G/DL (ref 13.5–17.5)
MAGNESIUM SERPL-MCNC: 1.62 MG/DL (ref 1.6–2.4)
MCH RBC QN AUTO: 29.7 PG (ref 26–34)
MCHC RBC AUTO-ENTMCNC: 32.6 G/DL (ref 32–36)
MCV RBC AUTO: 91 FL (ref 80–100)
NRBC BLD-RTO: 0 /100 WBCS (ref 0–0)
PLATELET # BLD AUTO: 187 X10*3/UL (ref 150–450)
PMV BLD AUTO: 9.5 FL (ref 7.5–11.5)
POTASSIUM SERPL-SCNC: 3.7 MMOL/L (ref 3.5–5.3)
PROT SERPL-MCNC: 5.6 G/DL (ref 6.4–8.2)
RBC # BLD AUTO: 4.14 X10*6/UL (ref 4.5–5.9)
SODIUM SERPL-SCNC: 135 MMOL/L (ref 136–145)
WBC # BLD AUTO: 10.4 X10*3/UL (ref 4.4–11.3)

## 2023-10-11 PROCEDURE — 82947 ASSAY GLUCOSE BLOOD QUANT: CPT

## 2023-10-11 PROCEDURE — 1170000001 HC PRIVATE ONCOLOGY ROOM DAILY

## 2023-10-11 PROCEDURE — 2500000001 HC RX 250 WO HCPCS SELF ADMINISTERED DRUGS (ALT 637 FOR MEDICARE OP)

## 2023-10-11 PROCEDURE — 83735 ASSAY OF MAGNESIUM: CPT

## 2023-10-11 PROCEDURE — 99231 SBSQ HOSP IP/OBS SF/LOW 25: CPT

## 2023-10-11 PROCEDURE — 2500000002 HC RX 250 W HCPCS SELF ADMINISTERED DRUGS (ALT 637 FOR MEDICARE OP, ALT 636 FOR OP/ED)

## 2023-10-11 PROCEDURE — 85027 COMPLETE CBC AUTOMATED: CPT

## 2023-10-11 PROCEDURE — 96372 THER/PROPH/DIAG INJ SC/IM: CPT

## 2023-10-11 PROCEDURE — 2500000004 HC RX 250 GENERAL PHARMACY W/ HCPCS (ALT 636 FOR OP/ED)

## 2023-10-11 PROCEDURE — 80053 COMPREHEN METABOLIC PANEL: CPT

## 2023-10-11 RX ORDER — DIPHENOXYLATE HYDROCHLORIDE AND ATROPINE SULFATE 2.5; .025 MG/1; MG/1
1 TABLET ORAL 4 TIMES DAILY
Status: DISCONTINUED | OUTPATIENT
Start: 2023-10-11 | End: 2023-10-12 | Stop reason: HOSPADM

## 2023-10-11 RX ADMIN — PREGABALIN 150 MG: 75 CAPSULE ORAL at 08:54

## 2023-10-11 RX ADMIN — PREGABALIN 150 MG: 75 CAPSULE ORAL at 20:45

## 2023-10-11 RX ADMIN — DIPHENOXYLATE HYDROCHLORIDE AND ATROPINE SULFATE 1 TABLET: 2.5; .025 TABLET ORAL at 02:25

## 2023-10-11 RX ADMIN — DIPHENOXYLATE HYDROCHLORIDE AND ATROPINE SULFATE 1 TABLET: 2.5; .025 TABLET ORAL at 18:23

## 2023-10-11 RX ADMIN — PREGABALIN 150 MG: 75 CAPSULE ORAL at 15:00

## 2023-10-11 RX ADMIN — LOPERAMIDE HYDROCHLORIDE 4 MG: 2 CAPSULE ORAL at 06:00

## 2023-10-11 RX ADMIN — ENOXAPARIN SODIUM 40 MG: 40 INJECTION SUBCUTANEOUS at 15:36

## 2023-10-11 RX ADMIN — LOPERAMIDE HYDROCHLORIDE 4 MG: 2 CAPSULE ORAL at 18:22

## 2023-10-11 RX ADMIN — INSULIN LISPRO 1 UNITS: 100 INJECTION, SOLUTION INTRAVENOUS; SUBCUTANEOUS at 08:55

## 2023-10-11 RX ADMIN — INSULIN GLARGINE 10 UNITS: 100 INJECTION, SOLUTION SUBCUTANEOUS at 20:45

## 2023-10-11 RX ADMIN — DIPHENOXYLATE HYDROCHLORIDE AND ATROPINE SULFATE 1 TABLET: 2.5; .025 TABLET ORAL at 20:45

## 2023-10-11 RX ADMIN — PANTOPRAZOLE SODIUM 40 MG: 40 TABLET, DELAYED RELEASE ORAL at 06:01

## 2023-10-11 RX ADMIN — SIMVASTATIN 40 MG: 20 TABLET, FILM COATED ORAL at 20:45

## 2023-10-11 RX ADMIN — INSULIN LISPRO 1 UNITS: 100 INJECTION, SOLUTION INTRAVENOUS; SUBCUTANEOUS at 18:25

## 2023-10-11 RX ADMIN — LOPERAMIDE HYDROCHLORIDE 4 MG: 2 CAPSULE ORAL at 13:35

## 2023-10-11 RX ADMIN — DIPHENOXYLATE HYDROCHLORIDE AND ATROPINE SULFATE 1 TABLET: 2.5; .025 TABLET ORAL at 13:36

## 2023-10-11 ASSESSMENT — COGNITIVE AND FUNCTIONAL STATUS - GENERAL
DAILY ACTIVITIY SCORE: 24
MOBILITY SCORE: 24

## 2023-10-11 ASSESSMENT — PAIN SCALES - WONG BAKER: WONGBAKER_NUMERICALRESPONSE: NO HURT

## 2023-10-11 ASSESSMENT — PAIN SCALES - GENERAL
PAINLEVEL_OUTOF10: 0 - NO PAIN
PAINLEVEL_OUTOF10: 0 - NO PAIN

## 2023-10-11 NOTE — CARE PLAN
The patient's goals for the shift include      The clinical goals for the shift include patient will remain safe and injury free by 1800 on 10/10/2023.    Over the shift, the patient did not make progress toward the following goals. Barriers to progression include be free from injury by end of shift. Recommendations to address these barriers include call light within reach.

## 2023-10-11 NOTE — PROGRESS NOTES
Subjective   NAEON    Notes diarrhea has lessened in volume. He still has to get up to go to the bathroom every hour, but mainly gas and a little bit of poop comes out. He notes improvement after taking the lomotil.     Denies chest pain, fevers, chills, shortness of breath        Objective     Vitals:  Vitals:    10/11/23 1225   BP: 100/63   Pulse: 71   Resp: 18   Temp: 36.3 °C (97.3 °F)   SpO2: 96%       I/O last 3 completed shifts:  In: 3771 (39.6 mL/kg) [P.O.:3771]  Out: 2 (0 mL/kg) [Stool:2]  Weight: 95.3 kg   No intake/output data recorded.    Physical exam:  Constitutional: in NAD  HEENT: sclerae anicteric, EOM grossly intact  CV: RRR, no murmurs noted  Pulm:  no increased WOB  GI: hypertrophic skin present at midline 4 cm scar, slight tenderness to palpation in lower quadrants b/l   Skin: warm and dry  Ext: bilateral LE without pitting edema   Neuro: alert and conversant  Psych: affect appropriate    Medications:  diphenoxylate-atropine, 1 tablet, oral, 4x daily  enoxaparin, 40 mg, subcutaneous, q24h  heparin flush, 50 Units, intra-catheter, q12h  insulin glargine, 10 Units, subcutaneous, Nightly  insulin lispro, 0-5 Units, subcutaneous, TID with meals  loperamide, 4 mg, oral, q6h  pantoprazole, 40 mg, oral, Daily before breakfast  pregabalin, 150 mg, oral, TID  simvastatin, 40 mg, oral, Nightly         PRN medications: alteplase, dextrose 10 % in water (D10W), dextrose, glucagon, heparin flush, ondansetron, oxyCODONE-acetaminophen, prochlorperazine    Labs:  Results for orders placed or performed during the hospital encounter of 10/06/23 (from the past 24 hour(s))   POCT GLUCOSE   Result Value Ref Range    POCT Glucose 146 (H) 74 - 99 mg/dL   Renal Function Panel   Result Value Ref Range    Glucose 157 (H) 74 - 99 mg/dL    Sodium 137 136 - 145 mmol/L    Potassium 3.8 3.5 - 5.3 mmol/L    Chloride 105 98 - 107 mmol/L    Bicarbonate 21 21 - 32 mmol/L    Anion Gap 15 10 - 20 mmol/L    Urea Nitrogen 7 6 - 23  mg/dL    Creatinine 0.57 0.50 - 1.30 mg/dL    eGFR >90 >60 mL/min/1.73m*2    Calcium 8.4 (L) 8.6 - 10.6 mg/dL    Phosphorus 3.0 2.5 - 4.9 mg/dL    Albumin 2.9 (L) 3.4 - 5.0 g/dL   Magnesium   Result Value Ref Range    Magnesium 1.79 1.60 - 2.40 mg/dL   POCT GLUCOSE   Result Value Ref Range    POCT Glucose 137 (H) 74 - 99 mg/dL   CBC   Result Value Ref Range    WBC 10.4 4.4 - 11.3 x10*3/uL    nRBC 0.0 0.0 - 0.0 /100 WBCs    RBC 4.14 (L) 4.50 - 5.90 x10*6/uL    Hemoglobin 12.3 (L) 13.5 - 17.5 g/dL    Hematocrit 37.7 (L) 41.0 - 52.0 %    MCV 91 80 - 100 fL    MCH 29.7 26.0 - 34.0 pg    MCHC 32.6 32.0 - 36.0 g/dL    RDW 16.3 (H) 11.5 - 14.5 %    Platelets 187 150 - 450 x10*3/uL    MPV 9.5 7.5 - 11.5 fL   Comprehensive Metabolic Panel   Result Value Ref Range    Glucose 161 (H) 74 - 99 mg/dL    Sodium 135 (L) 136 - 145 mmol/L    Potassium 3.7 3.5 - 5.3 mmol/L    Chloride 103 98 - 107 mmol/L    Bicarbonate 22 21 - 32 mmol/L    Anion Gap 14 10 - 20 mmol/L    Urea Nitrogen 7 6 - 23 mg/dL    Creatinine 0.54 0.50 - 1.30 mg/dL    eGFR >90 >60 mL/min/1.73m*2    Calcium 8.4 (L) 8.6 - 10.6 mg/dL    Albumin 3.0 (L) 3.4 - 5.0 g/dL    Alkaline Phosphatase 278 (H) 33 - 120 U/L    Total Protein 5.6 (L) 6.4 - 8.2 g/dL    AST 24 9 - 39 U/L    Bilirubin, Total 0.8 0.0 - 1.2 mg/dL    ALT 26 10 - 52 U/L   Magnesium   Result Value Ref Range    Magnesium 1.62 1.60 - 2.40 mg/dL   POCT GLUCOSE   Result Value Ref Range    POCT Glucose 154 (H) 74 - 99 mg/dL   POCT GLUCOSE   Result Value Ref Range    POCT Glucose 135 (H) 74 - 99 mg/dL       Imaging:       Assessment/Plan     Narciso Marrero is  57 y.o. male w/ Stage IV KRAS + colon cancer of the ascending colon with liver and peritoneal mets (on treatment of FUdR through AMY pump and FOLFOX, last dose 9/20) presenting to ED w/ diarrhea x 4 days. Patient presents with acute non-bloody diarrhea. Given acuity, most likely infectious given temperature at home. Although has been HDS and afebrile in  the ED. Infection needs to be ruled out. It could be chemotherapy-induced, but his last dose of FOLFOX was on 9/20 and he has tolerated his previous cycles with minimal sx which makes this less likely on the differential. Even lower on the differential is IBD. He has no hx of crohn's or UC, and the acuity suggests against this.     Updates 10/10:  - diarrhea may be starting to decrease     #Diarrhea, most likely infectious  #Nausea  :: s/p vanc/zosyn in ED  ::CT A/P 10/6 shows diffuse small and large bowel thickening with mural hyperenhancement and fluid content noted, consistent with enterocolitis  ::afebrile, no leukocytosis  :: missed last dose of FOLFOX d/t nausea and vomiting  :: stool pathogen panel negative  :: C diff negative  :: discontinued vancomycin 125 qid  - loperamide 4 mg q6hr  - lomotil 2.5 mg 4x daily prn diarrhea  - continue home ondansetron 4 mg q8hr prn   - continue home prochlorperazine 10 mg q6hr prn     #Hypomagnesemia  :: s/p 2 mg IV mag in ED  - continue to monitor     #Hypokalemia d/t diarrhea  :: s/p K Cl 20 mEq   :: K 3.1 10.9  - continue to monitor  - 40 mEq repleted     #Stage IV Colon Cancer  :: Follows with Dr. Russ   :: currently on FOLFOX and FUDR through AMY pump  :: ctDNA 9/20/23: 3.67  :: 9/20/23: 905  - follow-up with Dr. Russ and Sandy Cruz in 2 weeks for start of next FOLFOX cycle     #HLD  - continue home simvastatin 40 mg daily     #Chronic pain  ::Has crushed shoulder injury 2/2 work-related accident  ::follows with [facility] management at [facility]  - continue home pregabalin 150 mg tid  - continue percoset 5-325 mg q6hr as needed     #GERD  - continue home pantoprazole 40 mg daily     #Type 2 DM  :: home regimen: lantus 22 units, humalog 3 units w/ meals  - lantus 10 units  - mild insulin sliding scale     F: none  E: replete as needed  N: carb-controlled  A: lovenox 40 mg   Code status: FULL CODE (confirmed on admission)  NOK: Emperatriz Marrero  [phone]     Patient discussed with Dr. Russ,   Alea Sherman  Internal Medicine, PGY-1

## 2023-10-12 ENCOUNTER — PHARMACY VISIT (OUTPATIENT)
Dept: PHARMACY | Facility: CLINIC | Age: 58
End: 2023-10-12
Payer: COMMERCIAL

## 2023-10-12 VITALS
OXYGEN SATURATION: 97 % | SYSTOLIC BLOOD PRESSURE: 94 MMHG | WEIGHT: 210.1 LBS | HEART RATE: 94 BPM | RESPIRATION RATE: 18 BRPM | DIASTOLIC BLOOD PRESSURE: 67 MMHG | HEIGHT: 72 IN | BODY MASS INDEX: 28.46 KG/M2 | TEMPERATURE: 96.6 F

## 2023-10-12 LAB
ALBUMIN SERPL BCP-MCNC: 2.9 G/DL (ref 3.4–5)
ALP SERPL-CCNC: 242 U/L (ref 33–120)
ALT SERPL W P-5'-P-CCNC: 21 U/L (ref 10–52)
ANION GAP SERPL CALC-SCNC: 14 MMOL/L (ref 10–20)
AST SERPL W P-5'-P-CCNC: 15 U/L (ref 9–39)
BILIRUB SERPL-MCNC: 0.6 MG/DL (ref 0–1.2)
BUN SERPL-MCNC: 9 MG/DL (ref 6–23)
CALCIUM SERPL-MCNC: 8.6 MG/DL (ref 8.6–10.6)
CHLORIDE SERPL-SCNC: 102 MMOL/L (ref 98–107)
CO2 SERPL-SCNC: 23 MMOL/L (ref 21–32)
CREAT SERPL-MCNC: 0.5 MG/DL (ref 0.5–1.3)
ERYTHROCYTE [DISTWIDTH] IN BLOOD BY AUTOMATED COUNT: 16.4 % (ref 11.5–14.5)
GFR SERPL CREATININE-BSD FRML MDRD: >90 ML/MIN/1.73M*2
GLUCOSE BLD MANUAL STRIP-MCNC: 176 MG/DL (ref 74–99)
GLUCOSE SERPL-MCNC: 139 MG/DL (ref 74–99)
HCT VFR BLD AUTO: 35.8 % (ref 41–52)
HGB BLD-MCNC: 11.8 G/DL (ref 13.5–17.5)
MAGNESIUM SERPL-MCNC: 1.54 MG/DL (ref 1.6–2.4)
MCH RBC QN AUTO: 30.2 PG (ref 26–34)
MCHC RBC AUTO-ENTMCNC: 33 G/DL (ref 32–36)
MCV RBC AUTO: 92 FL (ref 80–100)
NRBC BLD-RTO: 0 /100 WBCS (ref 0–0)
PLATELET # BLD AUTO: 199 X10*3/UL (ref 150–450)
PMV BLD AUTO: 9.6 FL (ref 7.5–11.5)
POTASSIUM SERPL-SCNC: 3.3 MMOL/L (ref 3.5–5.3)
PROT SERPL-MCNC: 5.6 G/DL (ref 6.4–8.2)
RBC # BLD AUTO: 3.91 X10*6/UL (ref 4.5–5.9)
SODIUM SERPL-SCNC: 136 MMOL/L (ref 136–145)
WBC # BLD AUTO: 8.3 X10*3/UL (ref 4.4–11.3)

## 2023-10-12 PROCEDURE — 80053 COMPREHEN METABOLIC PANEL: CPT

## 2023-10-12 PROCEDURE — 85027 COMPLETE CBC AUTOMATED: CPT

## 2023-10-12 PROCEDURE — 99238 HOSP IP/OBS DSCHRG MGMT 30/<: CPT

## 2023-10-12 PROCEDURE — 2500000004 HC RX 250 GENERAL PHARMACY W/ HCPCS (ALT 636 FOR OP/ED)

## 2023-10-12 PROCEDURE — 82947 ASSAY GLUCOSE BLOOD QUANT: CPT

## 2023-10-12 PROCEDURE — 2500000001 HC RX 250 WO HCPCS SELF ADMINISTERED DRUGS (ALT 637 FOR MEDICARE OP)

## 2023-10-12 PROCEDURE — 83735 ASSAY OF MAGNESIUM: CPT

## 2023-10-12 PROCEDURE — 2500000002 HC RX 250 W HCPCS SELF ADMINISTERED DRUGS (ALT 637 FOR MEDICARE OP, ALT 636 FOR OP/ED)

## 2023-10-12 PROCEDURE — RXMED WILLOW AMBULATORY MEDICATION CHARGE

## 2023-10-12 RX ORDER — DIPHENOXYLATE HYDROCHLORIDE AND ATROPINE SULFATE 2.5; .025 MG/1; MG/1
1 TABLET ORAL 4 TIMES DAILY
Qty: 40 TABLET | Refills: 0 | Status: SHIPPED | OUTPATIENT
Start: 2023-10-12 | End: 2023-10-22

## 2023-10-12 RX ORDER — LOPERAMIDE HYDROCHLORIDE 2 MG/1
4 CAPSULE ORAL EVERY 6 HOURS
Start: 2023-10-12 | End: 2024-02-26 | Stop reason: WASHOUT

## 2023-10-12 RX ORDER — INSULIN GLARGINE 100 [IU]/ML
10 INJECTION, SOLUTION SUBCUTANEOUS NIGHTLY
Start: 2023-10-12 | End: 2024-01-09 | Stop reason: SDUPTHER

## 2023-10-12 RX ADMIN — PREGABALIN 150 MG: 75 CAPSULE ORAL at 08:49

## 2023-10-12 RX ADMIN — LOPERAMIDE HYDROCHLORIDE 4 MG: 2 CAPSULE ORAL at 06:04

## 2023-10-12 RX ADMIN — LOPERAMIDE HYDROCHLORIDE 4 MG: 2 CAPSULE ORAL at 00:10

## 2023-10-12 RX ADMIN — PANTOPRAZOLE SODIUM 40 MG: 40 TABLET, DELAYED RELEASE ORAL at 06:04

## 2023-10-12 RX ADMIN — DIPHENOXYLATE HYDROCHLORIDE AND ATROPINE SULFATE 1 TABLET: 2.5; .025 TABLET ORAL at 06:04

## 2023-10-12 ASSESSMENT — PAIN SCALES - GENERAL: PAINLEVEL_OUTOF10: 0 - NO PAIN

## 2023-10-12 NOTE — PROGRESS NOTES
Late Entry: Met with the pt to complete admission assessment. Pt stated he lives with wife and he is normally independent with care. Pt stated he has been receiving chemotherapy and his wife assist him to his appointments and will help transfer him home. Pt stated he will be discharged today and does  not have any home going needs. Denies and ambulation difficulties.   Laney Ocampo RN [facility]

## 2023-10-12 NOTE — CARE PLAN
The patient's goals for the shift include      The clinical goals for the shift include pt will remain safe    Over the shift, the patient did not make progress toward the following goals. Barriers to progression include . Recommendations to address these barriers include   Problem: Fall/Injury  Goal: Be free from injury by end of the shift  Outcome: Progressing  Goal: Verbalize understanding of risk factor reduction measures to prevent injury from fall in the home  Outcome: Progressing  Goal: Use assistive devices by end of the shift  Outcome: Progressing  Goal: Pace activities to prevent fatigue by end of the shift  Outcome: Progressing   .

## 2023-10-12 NOTE — DISCHARGE SUMMARY
Discharge Diagnosis  Fever, unspecified fever cause    Issues Requiring Follow-Up  -insulin regimen     Test Results Pending At Discharge  Pending Labs       Order Current Status    Lactate Collected (10/06/23 0430)    Extra Tubes In process    Urine Mahan Tube In process            Hospital Course  Narciso Marrero is  57 y.o. male w/ Stage IV KRAS + colon cancer of the ascending colon with liver and peritoneal mets (on treatment of FUdR through AMY pump and FOLFOX, last dose 9/20) presenting to ED w/ diarrhea x 4 days. He was slightly hypotensive with BP of 109/65 on presentation. Stool pathogen panel was negative and C diff was negative. While waiting for the C diff to result, he was started on empiric vancomycin for C diff treatment on 10/7. On 10/9, his vancomycin was discontinued. His diarrhea was controlled with loperamide 4 mg q6hr and lomotil 2.5 mg. He had fluid repletion and electrolyte repletion as needed.    While in the hospital, his insulin regimen was changed d/t poor PO intake. His lantus was decreased to 10 units and his mealtime insulin was discontinued. He is to follow-up with endocrinology for titration up back to home dosing once his oral intake starts to increase    Pertinent Physical Exam At Time of Discharge  Physical Exam    Home Medications     Medication List      START taking these medications     diphenoxylate-atropine 2.5-0.025 mg tablet; Commonly known as: Lomotil;   Take 1 tablet by mouth 4 times a day for 10 days.   insulin glargine 100 unit/mL injection; Commonly known as: Lantus;   Inject 10 Units under the skin once daily at bedtime. Take as directed per   insulin instructions.; Replaces: Lantus Solostar U-100 Insulin 100 unit/mL   (3 mL) pen     CHANGE how you take these medications     loperamide 2 mg capsule; Commonly known as: Imodium; Take 2 capsules (4   mg) by mouth every 6 hours.; What changed: how much to take, when to take   this, reasons to take this     CONTINUE taking  "these medications     acetaminophen 325 mg tablet; Commonly known as: Tylenol   BD Ultra-Fine Short Pen Needle 31 gauge x 5/16\" needle; Generic drug:   pen needle, diabetic; USE FOUR TIMES A DAY   cholecalciferol 25 MCG (1000 UT) tablet; Commonly known as: Vitamin D-3   Fish OiL 1,000 mg (120 mg-180 mg) capsule; Generic drug: omega   3-dha-epa-fish oil   * Lyrica 100 mg capsule; Generic drug: pregabalin   * pregabalin 50 mg capsule; Commonly known as: Lyrica   multivitamin capsule   ondansetron 8 mg tablet; Commonly known as: Zofran   pantoprazole 40 mg EC tablet; Commonly known as: ProtoNix   Percocet 5-325 mg tablet; Generic drug: oxyCODONE-acetaminophen   prochlorperazine 10 mg tablet; Commonly known as: Compazine   psyllium 3.4 gram packet; Commonly known as: Metamucil   simvastatin 40 mg tablet; Commonly known as: Zocor  * This list has 2 medication(s) that are the same as other medications   prescribed for you. Read the directions carefully, and ask your doctor or   other care provider to review them with you.     STOP taking these medications     HumaLOG KwikPen Insulin 100 unit/mL injection; Generic drug: insulin   lispro   Lantus Solostar U-100 Insulin 100 unit/mL (3 mL) pen; Generic drug:   insulin glargine; Replaced by: insulin glargine 100 unit/mL injection       Outpatient Follow-Up  Future Appointments   Date Time Provider Department [place]   10/18/2023 12:20 PM [facility] CENTRAL LINE 02 [id number] [facility]   10/18/2023  1:00 PM TORRI Evans [id number] [facility]   10/18/2023  2:00 PM INF 30 [facility] [id number] [facility]   10/20/2023  1:00 PM INF 01 [id number]   10/24/2023 11:15 AM TORRI Santamaria [id number] [facility]       Alea Sherman MD  "

## 2023-10-12 NOTE — NURSING NOTE
IV removed, tolerated without difficulty. Reviewed discharge instructions, pt/spouse verbalized understanding. Taken to discharge area by wheelchair, transported home via POV.

## 2023-10-18 ENCOUNTER — INFUSION (OUTPATIENT)
Dept: HEMATOLOGY/ONCOLOGY | Facility: HOSPITAL | Age: 58
End: 2023-10-18
Payer: COMMERCIAL

## 2023-10-18 ENCOUNTER — OFFICE VISIT (OUTPATIENT)
Dept: HEMATOLOGY/ONCOLOGY | Facility: HOSPITAL | Age: 58
End: 2023-10-18
Payer: COMMERCIAL

## 2023-10-18 ENCOUNTER — NUTRITION (OUTPATIENT)
Dept: HEMATOLOGY/ONCOLOGY | Facility: HOSPITAL | Age: 58
End: 2023-10-18

## 2023-10-18 ENCOUNTER — LAB (OUTPATIENT)
Dept: HEMATOLOGY/ONCOLOGY | Facility: HOSPITAL | Age: 58
End: 2023-10-18
Payer: COMMERCIAL

## 2023-10-18 VITALS
OXYGEN SATURATION: 97 % | TEMPERATURE: 96.8 F | DIASTOLIC BLOOD PRESSURE: 67 MMHG | RESPIRATION RATE: 16 BRPM | HEART RATE: 86 BPM | SYSTOLIC BLOOD PRESSURE: 110 MMHG | WEIGHT: 212.7 LBS | BODY MASS INDEX: 28.85 KG/M2

## 2023-10-18 DIAGNOSIS — C18.2 MALIGNANT NEOPLASM OF ASCENDING COLON (MULTI): ICD-10-CM

## 2023-10-18 DIAGNOSIS — C18.2 MALIGNANT NEOPLASM OF ASCENDING COLON (MULTI): Primary | ICD-10-CM

## 2023-10-18 LAB
ALBUMIN SERPL BCP-MCNC: 3.2 G/DL (ref 3.4–5)
ALP SERPL-CCNC: 276 U/L (ref 33–120)
ALT SERPL W P-5'-P-CCNC: 24 U/L (ref 10–52)
ANION GAP SERPL CALC-SCNC: 11 MMOL/L (ref 10–20)
AST SERPL W P-5'-P-CCNC: 26 U/L (ref 9–39)
BASOPHILS # BLD AUTO: 0.03 X10*3/UL (ref 0–0.1)
BASOPHILS NFR BLD AUTO: 0.4 %
BILIRUB SERPL-MCNC: 0.4 MG/DL (ref 0–1.2)
BUN SERPL-MCNC: 12 MG/DL (ref 6–23)
CALCIUM SERPL-MCNC: 8.7 MG/DL (ref 8.6–10.3)
CEA SERPL-MCNC: >100 UG/L
CHLORIDE SERPL-SCNC: 102 MMOL/L (ref 98–107)
CO2 SERPL-SCNC: 31 MMOL/L (ref 21–32)
CREAT SERPL-MCNC: 0.5 MG/DL (ref 0.5–1.3)
EOSINOPHIL # BLD AUTO: 0.1 X10*3/UL (ref 0–0.7)
EOSINOPHIL NFR BLD AUTO: 1.2 %
ERYTHROCYTE [DISTWIDTH] IN BLOOD BY AUTOMATED COUNT: 16.4 % (ref 11.5–14.5)
GFR SERPL CREATININE-BSD FRML MDRD: >90 ML/MIN/1.73M*2
GLUCOSE SERPL-MCNC: 145 MG/DL (ref 74–99)
HCT VFR BLD AUTO: 33 % (ref 41–52)
HGB BLD-MCNC: 10.8 G/DL (ref 13.5–17.5)
IMM GRANULOCYTES # BLD AUTO: 0.08 X10*3/UL (ref 0–0.7)
IMM GRANULOCYTES NFR BLD AUTO: 1 % (ref 0–0.9)
LYMPHOCYTES # BLD AUTO: 1.25 X10*3/UL (ref 1.2–4.8)
LYMPHOCYTES NFR BLD AUTO: 15.4 %
MCH RBC QN AUTO: 30.6 PG (ref 26–34)
MCHC RBC AUTO-ENTMCNC: 32.7 G/DL (ref 32–36)
MCV RBC AUTO: 94 FL (ref 80–100)
MONOCYTES # BLD AUTO: 0.61 X10*3/UL (ref 0.1–1)
MONOCYTES NFR BLD AUTO: 7.5 %
NEUTROPHILS # BLD AUTO: 6.07 X10*3/UL (ref 1.2–7.7)
NEUTROPHILS NFR BLD AUTO: 74.5 %
NRBC BLD-RTO: 0 /100 WBCS (ref 0–0)
PLATELET # BLD AUTO: 202 X10*3/UL (ref 150–450)
PMV BLD AUTO: 8.7 FL (ref 7.5–11.5)
POTASSIUM SERPL-SCNC: 4 MMOL/L (ref 3.5–5.3)
PROT SERPL-MCNC: 5.9 G/DL (ref 6.4–8.2)
RBC # BLD AUTO: 3.53 X10*6/UL (ref 4.5–5.9)
SODIUM SERPL-SCNC: 140 MMOL/L (ref 136–145)
WBC # BLD AUTO: 8.1 X10*3/UL (ref 4.4–11.3)

## 2023-10-18 PROCEDURE — 85025 COMPLETE CBC W/AUTO DIFF WBC: CPT

## 2023-10-18 PROCEDURE — 80053 COMPREHEN METABOLIC PANEL: CPT

## 2023-10-18 PROCEDURE — 82378 CARCINOEMBRYONIC ANTIGEN: CPT

## 2023-10-18 PROCEDURE — 96522 REFILL/MAINT PUMP/RESVR SYST: CPT

## 2023-10-18 PROCEDURE — 2500000004 HC RX 250 GENERAL PHARMACY W/ HCPCS (ALT 636 FOR OP/ED): Performed by: STUDENT IN AN ORGANIZED HEALTH CARE EDUCATION/TRAINING PROGRAM

## 2023-10-18 PROCEDURE — 3008F BODY MASS INDEX DOCD: CPT | Performed by: STUDENT IN AN ORGANIZED HEALTH CARE EDUCATION/TRAINING PROGRAM

## 2023-10-18 PROCEDURE — 36591 DRAW BLOOD OFF VENOUS DEVICE: CPT

## 2023-10-18 PROCEDURE — 1036F TOBACCO NON-USER: CPT | Performed by: STUDENT IN AN ORGANIZED HEALTH CARE EDUCATION/TRAINING PROGRAM

## 2023-10-18 PROCEDURE — A4216 STERILE WATER/SALINE, 10 ML: HCPCS | Performed by: STUDENT IN AN ORGANIZED HEALTH CARE EDUCATION/TRAINING PROGRAM

## 2023-10-18 PROCEDURE — 99214 OFFICE O/P EST MOD 30 MIN: CPT | Performed by: STUDENT IN AN ORGANIZED HEALTH CARE EDUCATION/TRAINING PROGRAM

## 2023-10-18 PROCEDURE — 96523 IRRIG DRUG DELIVERY DEVICE: CPT

## 2023-10-18 PROCEDURE — 82378 CARCINOEMBRYONIC ANTIGEN: CPT | Mod: CMCLAB

## 2023-10-18 RX ORDER — EPINEPHRINE 0.3 MG/.3ML
0.3 INJECTION SUBCUTANEOUS EVERY 5 MIN PRN
Status: CANCELLED | OUTPATIENT
Start: 2023-10-18

## 2023-10-18 RX ORDER — HEPARIN 100 UNIT/ML
500 SYRINGE INTRAVENOUS AS NEEDED
Status: CANCELLED | OUTPATIENT
Start: 2023-10-18

## 2023-10-18 RX ORDER — HEPARIN 100 UNIT/ML
500 SYRINGE INTRAVENOUS AS NEEDED
Status: DISCONTINUED | OUTPATIENT
Start: 2023-10-18 | End: 2023-10-18 | Stop reason: HOSPADM

## 2023-10-18 RX ORDER — LORAZEPAM 2 MG/ML
1 INJECTION INTRAMUSCULAR AS NEEDED
Status: CANCELLED | OUTPATIENT
Start: 2023-10-18

## 2023-10-18 RX ORDER — DIPHENHYDRAMINE HYDROCHLORIDE 50 MG/ML
50 INJECTION INTRAMUSCULAR; INTRAVENOUS AS NEEDED
Status: CANCELLED | OUTPATIENT
Start: 2023-10-18

## 2023-10-18 RX ORDER — ALBUTEROL SULFATE 0.83 MG/ML
3 SOLUTION RESPIRATORY (INHALATION) AS NEEDED
Status: CANCELLED | OUTPATIENT
Start: 2023-10-18

## 2023-10-18 RX ORDER — HEPARIN SODIUM,PORCINE/PF 10 UNIT/ML
50 SYRINGE (ML) INTRAVENOUS AS NEEDED
Status: CANCELLED | OUTPATIENT
Start: 2023-10-18

## 2023-10-18 RX ORDER — FAMOTIDINE 10 MG/ML
20 INJECTION INTRAVENOUS ONCE AS NEEDED
Status: CANCELLED | OUTPATIENT
Start: 2023-10-18

## 2023-10-18 RX ADMIN — HEPARIN SODIUM: 20000 INJECTION, SOLUTION INTRAVENOUS; SUBCUTANEOUS at 16:02

## 2023-10-18 RX ADMIN — HEPARIN 500 UNITS: 100 SYRINGE at 14:38

## 2023-10-18 ASSESSMENT — ENCOUNTER SYMPTOMS
DEPRESSION: 0
OCCASIONAL FEELINGS OF UNSTEADINESS: 0
LOSS OF SENSATION IN FEET: 1

## 2023-10-18 ASSESSMENT — PAIN SCALES - GENERAL: PAINLEVEL: 0-NO PAIN

## 2023-10-18 NOTE — PROGRESS NOTES
"NUTRITION Follow-up NOTE  Reason for Visit:  Narciso Marrero is a 57 y.o. male who presents for taste changes, wt loss, and diarrhea. Pt seen in infusion w/ wife, Emperatriz. Current treatment: FOLFOX via AMY D3C2. Pt reports feeling much better after being admitted for uncontrollable diarrhea - no symptoms currently except for taste changes and some abdominal discomfort. Intake has markedly improved and wt is stable.        Lab Results   Component Value Date/Time    GLUCOSE 145 (H) 10/18/2023 1211     10/18/2023 1211    K 4.0 10/18/2023 1211     10/18/2023 1211    CO2 31 10/18/2023 1211    ANIONGAP 11 10/18/2023 1211    BUN 12 10/18/2023 1211    CREATININE 0.50 10/18/2023 1211    EGFR >90 10/18/2023 1211    CALCIUM 8.7 10/18/2023 1211    ALBUMIN 3.2 (L) 10/18/2023 1211    ALKPHOS 276 (H) 10/18/2023 1211    PROT 5.9 (L) 10/18/2023 1211    AST 26 10/18/2023 1211    BILITOT 0.4 10/18/2023 1211    ALT 24 10/18/2023 1211     No results found for: \"VITD25\"    Anthropometrics:  Anthropometrics  Height: 180.5 cm (5' 11.06\")  Weight: 96.5 kg (212 lb 11.2 oz)  BMI (Calculated): 29.61  IBW/kg (Dietitian Calculated): 78.2 kg  Percent of IBW: 122 %  Weight Change  Weight History / % Weight Change: Non-sig. wt gain from loss - was also admitted for 1 week d/t uncontrolled diarrhea.  Significant Weight Loss: No        Wt Readings from Last 10 Encounters:   10/19/23 96.5 kg (212 lb 11.2 oz)   10/18/23 96.5 kg (212 lb 11.2 oz)   10/06/23 95.3 kg (210 lb 1.6 oz)   10/04/23 95.5 kg (210 lb 8.6 oz)   10/04/23 95.5 kg (210 lb 8.6 oz)   10/03/23 98 kg (216 lb 0.8 oz)   09/29/23 98.9 kg (218 lb 0.6 oz)   06/29/23 101 kg (223 lb)   06/28/23 101 kg (223 lb 1.7 oz)   06/09/23 101 kg (222 lb 7 oz)        Food And Nutrient Intake:  Food and Nutrient History  Food and Nutrient History: Appetite is \"not terrible, but I can't find stuff that tastes/sounds good.\"  Energy Intake: Fair 50-75 %  Fluid Intake: 1-2 20 oz. Gatorade " "Zeros  Food Allergy: None.  GI Symptoms: abdominal pain (Having some abd discomfort. Was recently admitted for 1 week d/t uncontrolled diarrhea and fever of 102.5.)  GI Symptoms greater than 2 weeks: no  Oral Problems: dysgeusia (Takes sugar free Jolly ranchers to good effect. Has cold sensitivity for 3-4 days after chemo.)     Food Intake  Meal 1: Omlete, smoothie  Meal 2: Grilled cheese sandwich  Meal 3: Mashed potatoes, meat balls, beef gravy.  Snacks: 1 dark chocolate bar, celery w/ peanut butter, sugar free jello w/ fruit    Food Supplement Intake  Oral Nutrition Supplements:  (Not taking premier at the moment, replacing it w/ 1 smoothie. (strawberries, blueberries, diet fruit juice, Kraus protein powder w/ 120 kcals, 20 g protein). States not liking milk-based protein shakes.)           Nutrition Focused Physical Exam:  Subcutaneous Fat Loss  Orbital Fat Pads: Well nourshed (slightly bulging fat pads)  Buccal Fat Pads: Well nourished (full, rounded cheeks)  Muscle Wasting  Temporalis: Well nourished (well-defined muscle)        Energy Needs  Calculated Energy Needs Using Equations  Height: 180.5 cm (5' 11.06\")  Weight Used for Equation Calculations: 95.5 kg (210 lb 8.6 oz)  [place]- . Joe Equation (Overweight or Obese Patients): 1798  Total Energy Needs:  (3252-3584)  Estimated Energy Needs  Total Energy Estimated Needs (kCal):  (3565-6145)  Method for Estimating Needs: 25-28 kcals/kg IBW.  Estimated Fluid Needs  Total Fluid Estimated Needs (mL): 2200 mL  Method for Estimating Needs: 1 mL/kcal  Estimated Protein Needs  Total Protein Estimated Needs (g):  ()  Method for Estimating Needs: 1.3 g protein/kg IBW.  Weight and Growth Recommendation  Desired Growth Pattern: Maintain current wt.        Diagnosis   Malnutrition Diagnosis  Patient has Malnutrition Diagnosis: No  Diagnosis Status: Resolved (Pt's intake has increased and he has gained wt from previous loss.)       Interventions/Recommendations "   Food and Nutrition Delivery  Meals & Snacks: Energy-modified diet, Carbohydrate-modified diet  Goals: High calorie food choices w/ consistent carbs.  Medical Food Supplement: Other, Specify: (Homemade smoothie w/ Kraus protein powder)  Goals: BID  Nutrition Education  Nutrition Education Content:  (Handouts for: High Calorie Shakes, Taste changes)  Coordination of Nutrition Care by a Nutrition Professional  Goals: Will follow.        Patient Instructions   Try drinking 2 of the homemade protein shakes each day.    Continue choosing high calorie consistent carbohydrate food choices.    Monitoring and Evaluation   Food/Nutrient Related History Monitoring  Monitoring and Evaluation Plan: Energy intake  Energy Intake: Estimated energy intake  Criteria: 2000  Additional Plan: Homemade shake BID.        Time Spent  Prep time on day of patient encounter: 10 minutes  Time spent directly with patient, family or caregiver: 25 minutes  Additional Time Spent on Patient Care Activities: 0 minutes  Documentation Time: 30 minutes  Other Time Spent: 0 minutes  Total: 65 minutes        Readiness to Change : Excellent  Level of Understanding : Excellent  Anticipated Compliant : Excellent

## 2023-10-19 VITALS — BODY MASS INDEX: 29.78 KG/M2 | WEIGHT: 212.7 LBS | HEIGHT: 71 IN
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
## 2023-10-19 NOTE — PATIENT INSTRUCTIONS
Try drinking 2 of the homemade protein shakes each day.    Continue choosing high calorie consistent carbohydrate food choices.

## 2023-10-24 ENCOUNTER — OFFICE VISIT (OUTPATIENT)
Dept: ENDOCRINOLOGY | Facility: CLINIC | Age: 58
End: 2023-10-24
Payer: COMMERCIAL

## 2023-10-24 VITALS
DIASTOLIC BLOOD PRESSURE: 68 MMHG | SYSTOLIC BLOOD PRESSURE: 115 MMHG | HEART RATE: 82 BPM | BODY MASS INDEX: 29.93 KG/M2 | WEIGHT: 215 LBS

## 2023-10-24 DIAGNOSIS — E11.65 TYPE 2 DIABETES MELLITUS WITH HYPERGLYCEMIA, WITH LONG-TERM CURRENT USE OF INSULIN (MULTI): Primary | ICD-10-CM

## 2023-10-24 DIAGNOSIS — Z79.4 TYPE 2 DIABETES MELLITUS WITH HYPERGLYCEMIA, WITH LONG-TERM CURRENT USE OF INSULIN (MULTI): Primary | ICD-10-CM

## 2023-10-24 LAB — POC HEMOGLOBIN A1C: 6.6 % (ref 4.2–6.5)

## 2023-10-24 PROCEDURE — 3008F BODY MASS INDEX DOCD: CPT | Performed by: NURSE PRACTITIONER

## 2023-10-24 PROCEDURE — 1036F TOBACCO NON-USER: CPT | Performed by: NURSE PRACTITIONER

## 2023-10-24 PROCEDURE — 83036 HEMOGLOBIN GLYCOSYLATED A1C: CPT | Performed by: NURSE PRACTITIONER

## 2023-10-24 PROCEDURE — 3074F SYST BP LT 130 MM HG: CPT | Performed by: NURSE PRACTITIONER

## 2023-10-24 PROCEDURE — 99214 OFFICE O/P EST MOD 30 MIN: CPT | Performed by: NURSE PRACTITIONER

## 2023-10-24 PROCEDURE — 3046F HEMOGLOBIN A1C LEVEL >9.0%: CPT | Performed by: NURSE PRACTITIONER

## 2023-10-24 PROCEDURE — 3078F DIAST BP <80 MM HG: CPT | Performed by: NURSE PRACTITIONER

## 2023-10-24 NOTE — PROGRESS NOTES
Subjective   Narciso Marrero is a 57 y.o. male presents today for a follow up of DM Type 2.  Initial diagnosis with diabetes was 20+ years ago..   The patient has a family history in mother.   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   Personal history of colon cancer with liver mets    Previously seeing PCP for diabetes care.   A1c today 6.6%.  Previous A1c 9.4% on May 5th 2023.   Since last visit, recently hospitalized for severe diarrhea and fever   His insulin was cut back at that  time   He was noted elevations with chemo but after 3 days he reports it comes back down.        Historical meds;   metformin   glyburide     Current diabetes regimen is as follows:   Lantus 10 units once daily  Humalog 3 units with meals plus sliding scale:   If blood sugar is < 150 add 0 units   If blood sugar is 150-200 add 3 units   If blood sugar is 201-250 add 6 units  If blood sugar is 251-300 add 9 units   If blood sugar is 301-350 add 12 units   If blood sugar is 351-400 add 15 units   If blood sugar is 401-450 add 18 units  If blood sugar is over 450, call the office    **has not needed to add scale often     Patient is using continuous glucose monitor Alessia 2  The patient is currently checking the blood glucose as needed        Hypoglycemia frequency: Denies   Hypoglycemia awareness: Yes     Regarding symptoms of hyperglycemia, the patient is not experiencing any symptoms such as polyuria, polydipsia, nocturia or rapid weight loss or blurry vision. The patient comes into the office today without concerns today.        ROS  General: no fever, chills or acute changes in weight in the last 6 months  Skin: no rashes, pruritis or dry skin  Cardiac: denies chest pain, heart palpitations or orthopnea  Pulmonary: denies wheezing, productive cough or exertional dyspnea      Objective    Physical Exam  Blood pressure 115/68, pulse 82, weight 97.5 kg (215 lb).  General: not in acute distress,  "cooperative   Respiratory: normal respiratory effort  Musculoskeletal: normal gait       Current Outpatient Medications:     acetaminophen (Tylenol) 325 mg tablet, Take 2 tablets (650 mg) by mouth every 6 hours if needed for mild pain (1 - 3)., Disp: , Rfl:     cholecalciferol (Vitamin D-3) 25 MCG (1000 UT) tablet, Take 1 tablet (1,000 Units) by mouth once daily., Disp: , Rfl:     insulin glargine (Lantus) 100 unit/mL injection, Inject 10 Units under the skin once daily at bedtime. Take as directed per insulin instructions., Disp: , Rfl:     loperamide (Imodium) 2 mg capsule, Take 2 capsules (4 mg) by mouth every 6 hours., Disp: , Rfl:     Lyrica 100 mg capsule, Take 1 capsule (100 mg) by mouth 3 times a day. Take with 50 mg for a total daily dose of 150 mg three times daily, Disp: , Rfl:     multivitamin capsule, Take 1 capsule by mouth once daily., Disp: , Rfl:     omega 3-dha-epa-fish oil (Fish OiL) 1,000 mg (120 mg-180 mg) capsule, Take 1 capsule (1,000 mg) by mouth once daily., Disp: , Rfl:     ondansetron (Zofran) 8 mg tablet, Take 1 tablet (8 mg) by mouth every 8 hours if needed for nausea or vomiting., Disp: , Rfl:     pantoprazole (ProtoNix) 40 mg EC tablet, Take 1 tablet (40 mg) by mouth once daily in the morning. Take before meals., Disp: , Rfl:     pen needle, diabetic 31 gauge x 5/16\" needle, USE FOUR TIMES A DAY, Disp: 100 each, Rfl: 2    Percocet 5-325 mg tablet, Take 1 tablet by mouth 2 times a day as needed for moderate pain (4 - 6) or severe pain (7 - 10)., Disp: , Rfl:     pregabalin (Lyrica) 50 mg capsule, Take 1 capsule (50 mg) by mouth 3 times a day. Take with 100 mg for a total daily dose of 150 mg three times daily, Disp: , Rfl:     prochlorperazine (Compazine) 10 mg tablet, Take 1 tablet (10 mg) by mouth every 6 hours if needed for nausea or vomiting., Disp: , Rfl:     psyllium (Metamucil) 3.4 gram packet, Take 1 packet by mouth once daily as needed (constipation)., Disp: , Rfl:     " simvastatin (Zocor) 40 mg tablet, Take 1 tablet (40 mg) by mouth once daily at bedtime., Disp: , Rfl:     Assessment/Plan    Type 2 diabetes hyperglycemia:   - A1c at goal today.  Much improved from previous.  He notes off chemo and with recent hospitalization his sugars are improved.  Discussed increased basal insulin on chemo days.  Discussed how to adjust once he is eating again.  Short term follow  up with PharmD when he restarts chemo.      Plan:   For now, ok to continue Lantus 10 units once daily at bedtime  On your chemo day and 2 days after, consider increasing the Lantus 12-13 units then transition back to 10 units once daily    Once appetite returns and if the blood sugar are elevating overnight, you can adjust the Lantus as follows:   Please adjust the dose weekly based on the fasting blood sugars as follows:  If blood sugars are greater than 180, increase 4 units  If blood sugars are 140-180, please add 2 units  If blood sugars are ,  please continue current dose  If blood sugars are less than 80, please reduce 2-4 units.    Please notify me sooner if you are having persistent low blood sugars.    Continue Humalog 3 units with meals plus sliding scale if needed  If blood sugar is < 150 add 0 units   If blood sugar is 150-200 add 3 units   If blood sugar is 201-250 add 6 units  If blood sugar is 251-300 add 9 units   If blood sugar is 301-350 add 12 units   If blood sugar is 351-400 add 15 units   If blood sugar is 401-450 add 18 units  If blood sugar is over 450, call the office    Continue Alessia 2  Follow up with Marisa our pharmacist in 3-4 weeks virtually   Follow up in 4 months

## 2023-10-24 NOTE — PATIENT INSTRUCTIONS
For now, ok to continue Lantus 10 units once daily at bedtime  On your chemo day and 2 days after, consider increasing the Lantus 12-13 units then transition back to 10 units once daily      Once appetite returns and if the blood sugar are elevating overnight, you can adjust the Lantus as follows:   Please adjust the dose weekly based on the fasting blood sugars as follows:  If blood sugars are greater than 180, increase 4 units  If blood sugars are 140-180, please add 2 units  If blood sugars are ,  please continue current dose  If blood sugars are less than 80, please reduce 2-4 units.    Please notify me sooner if you are having persistent low blood sugars.          Continue Humalog 3 units with meals plus sliding scale if needed  If blood sugar is < 150 add 0 units   If blood sugar is 150-200 add 3 units   If blood sugar is 201-250 add 6 units  If blood sugar is 251-300 add 9 units   If blood sugar is 301-350 add 12 units   If blood sugar is 351-400 add 15 units   If blood sugar is 401-450 add 18 units  If blood sugar is over 450, call the office      Continue Alessia 2    Follow up with Marisa our pharmacist in 3-4 weeks virtually     Follow up in 4 months

## 2023-10-26 LAB — HOLD SPECIMEN: NORMAL

## 2023-11-01 ENCOUNTER — LAB (OUTPATIENT)
Dept: HEMATOLOGY/ONCOLOGY | Facility: HOSPITAL | Age: 58
End: 2023-11-01
Payer: COMMERCIAL

## 2023-11-01 ENCOUNTER — INFUSION (OUTPATIENT)
Dept: HEMATOLOGY/ONCOLOGY | Facility: HOSPITAL | Age: 58
End: 2023-11-01
Payer: COMMERCIAL

## 2023-11-01 ENCOUNTER — NUTRITION (OUTPATIENT)
Dept: HEMATOLOGY/ONCOLOGY | Facility: HOSPITAL | Age: 58
End: 2023-11-01
Payer: COMMERCIAL

## 2023-11-01 ENCOUNTER — OFFICE VISIT (OUTPATIENT)
Dept: HEMATOLOGY/ONCOLOGY | Facility: HOSPITAL | Age: 58
End: 2023-11-01
Payer: COMMERCIAL

## 2023-11-01 VITALS
SYSTOLIC BLOOD PRESSURE: 134 MMHG | HEART RATE: 81 BPM | RESPIRATION RATE: 18 BRPM | BODY MASS INDEX: 29.99 KG/M2 | OXYGEN SATURATION: 98 % | WEIGHT: 215.39 LBS | DIASTOLIC BLOOD PRESSURE: 81 MMHG

## 2023-11-01 VITALS — WEIGHT: 215.39 LBS | HEIGHT: 71 IN | BODY MASS INDEX: 30.15 KG/M2

## 2023-11-01 DIAGNOSIS — C18.2 MALIGNANT NEOPLASM OF ASCENDING COLON (MULTI): ICD-10-CM

## 2023-11-01 DIAGNOSIS — C18.2 MALIGNANT NEOPLASM OF ASCENDING COLON (MULTI): Primary | ICD-10-CM

## 2023-11-01 DIAGNOSIS — B02.23 POST-HERPETIC POLYNEUROPATHY: ICD-10-CM

## 2023-11-01 LAB
ALBUMIN SERPL BCP-MCNC: 3.7 G/DL (ref 3.4–5)
ALP SERPL-CCNC: 198 U/L (ref 33–120)
ALT SERPL W P-5'-P-CCNC: 17 U/L (ref 10–52)
ANION GAP SERPL CALC-SCNC: 11 MMOL/L (ref 10–20)
AST SERPL W P-5'-P-CCNC: 19 U/L (ref 9–39)
BASOPHILS # BLD AUTO: 0.05 X10*3/UL (ref 0–0.1)
BASOPHILS NFR BLD AUTO: 0.7 %
BILIRUB SERPL-MCNC: 0.4 MG/DL (ref 0–1.2)
BUN SERPL-MCNC: 13 MG/DL (ref 6–23)
CALCIUM SERPL-MCNC: 9.3 MG/DL (ref 8.6–10.3)
CEA SERPL-MCNC: 613.3 UG/L
CHLORIDE SERPL-SCNC: 102 MMOL/L (ref 98–107)
CO2 SERPL-SCNC: 31 MMOL/L (ref 21–32)
CREAT SERPL-MCNC: 0.71 MG/DL (ref 0.5–1.3)
EOSINOPHIL # BLD AUTO: 0.32 X10*3/UL (ref 0–0.7)
EOSINOPHIL NFR BLD AUTO: 4.6 %
ERYTHROCYTE [DISTWIDTH] IN BLOOD BY AUTOMATED COUNT: 15.8 % (ref 11.5–14.5)
GFR SERPL CREATININE-BSD FRML MDRD: >90 ML/MIN/1.73M*2
GLUCOSE SERPL-MCNC: 176 MG/DL (ref 74–99)
HCT VFR BLD AUTO: 35.6 % (ref 41–52)
HGB BLD-MCNC: 11.5 G/DL (ref 13.5–17.5)
IMM GRANULOCYTES # BLD AUTO: 0.03 X10*3/UL (ref 0–0.7)
IMM GRANULOCYTES NFR BLD AUTO: 0.4 % (ref 0–0.9)
LYMPHOCYTES # BLD AUTO: 1.25 X10*3/UL (ref 1.2–4.8)
LYMPHOCYTES NFR BLD AUTO: 17.9 %
MCH RBC QN AUTO: 30.6 PG (ref 26–34)
MCHC RBC AUTO-ENTMCNC: 32.3 G/DL (ref 32–36)
MCV RBC AUTO: 95 FL (ref 80–100)
MONOCYTES # BLD AUTO: 0.69 X10*3/UL (ref 0.1–1)
MONOCYTES NFR BLD AUTO: 9.9 %
NEUTROPHILS # BLD AUTO: 4.64 X10*3/UL (ref 1.2–7.7)
NEUTROPHILS NFR BLD AUTO: 66.5 %
NRBC BLD-RTO: 0 /100 WBCS (ref 0–0)
PLATELET # BLD AUTO: 230 X10*3/UL (ref 150–450)
PMV BLD AUTO: 8.6 FL (ref 7.5–11.5)
POTASSIUM SERPL-SCNC: 4.1 MMOL/L (ref 3.5–5.3)
PROT SERPL-MCNC: 6.7 G/DL (ref 6.4–8.2)
RBC # BLD AUTO: 3.76 X10*6/UL (ref 4.5–5.9)
SODIUM SERPL-SCNC: 140 MMOL/L (ref 136–145)
WBC # BLD AUTO: 7 X10*3/UL (ref 4.4–11.3)

## 2023-11-01 PROCEDURE — 82040 ASSAY OF SERUM ALBUMIN: CPT

## 2023-11-01 PROCEDURE — 96413 CHEMO IV INFUSION 1 HR: CPT

## 2023-11-01 PROCEDURE — 36591 DRAW BLOOD OFF VENOUS DEVICE: CPT

## 2023-11-01 PROCEDURE — 82378 CARCINOEMBRYONIC ANTIGEN: CPT

## 2023-11-01 PROCEDURE — 1036F TOBACCO NON-USER: CPT | Performed by: STUDENT IN AN ORGANIZED HEALTH CARE EDUCATION/TRAINING PROGRAM

## 2023-11-01 PROCEDURE — 99215 OFFICE O/P EST HI 40 MIN: CPT | Performed by: STUDENT IN AN ORGANIZED HEALTH CARE EDUCATION/TRAINING PROGRAM

## 2023-11-01 PROCEDURE — 96415 CHEMO IV INFUSION ADDL HR: CPT

## 2023-11-01 PROCEDURE — 96416 CHEMO PROLONG INFUSE W/PUMP: CPT

## 2023-11-01 PROCEDURE — 3008F BODY MASS INDEX DOCD: CPT | Performed by: STUDENT IN AN ORGANIZED HEALTH CARE EDUCATION/TRAINING PROGRAM

## 2023-11-01 PROCEDURE — 85025 COMPLETE CBC W/AUTO DIFF WBC: CPT

## 2023-11-01 PROCEDURE — 2500000004 HC RX 250 GENERAL PHARMACY W/ HCPCS (ALT 636 FOR OP/ED): Performed by: STUDENT IN AN ORGANIZED HEALTH CARE EDUCATION/TRAINING PROGRAM

## 2023-11-01 PROCEDURE — A4216 STERILE WATER/SALINE, 10 ML: HCPCS | Performed by: STUDENT IN AN ORGANIZED HEALTH CARE EDUCATION/TRAINING PROGRAM

## 2023-11-01 PROCEDURE — 96523 IRRIG DRUG DELIVERY DEVICE: CPT

## 2023-11-01 PROCEDURE — 96375 TX/PRO/DX INJ NEW DRUG ADDON: CPT | Mod: INF

## 2023-11-01 PROCEDURE — 96522 REFILL/MAINT PUMP/RESVR SYST: CPT | Mod: CCI

## 2023-11-01 RX ORDER — LORAZEPAM 2 MG/ML
1 INJECTION INTRAMUSCULAR AS NEEDED
Status: DISCONTINUED | OUTPATIENT
Start: 2023-11-01 | End: 2023-11-01 | Stop reason: HOSPADM

## 2023-11-01 RX ORDER — HEPARIN SODIUM,PORCINE/PF 10 UNIT/ML
50 SYRINGE (ML) INTRAVENOUS AS NEEDED
Status: CANCELLED | OUTPATIENT
Start: 2023-11-01

## 2023-11-01 RX ORDER — FAMOTIDINE 10 MG/ML
20 INJECTION INTRAVENOUS ONCE AS NEEDED
Status: DISCONTINUED | OUTPATIENT
Start: 2023-11-01 | End: 2023-11-01 | Stop reason: HOSPADM

## 2023-11-01 RX ORDER — HEPARIN 100 UNIT/ML
500 SYRINGE INTRAVENOUS AS NEEDED
Status: CANCELLED | OUTPATIENT
Start: 2023-11-01

## 2023-11-01 RX ORDER — EPINEPHRINE 0.3 MG/.3ML
0.3 INJECTION SUBCUTANEOUS EVERY 5 MIN PRN
Status: DISCONTINUED | OUTPATIENT
Start: 2023-11-01 | End: 2023-11-01 | Stop reason: HOSPADM

## 2023-11-01 RX ORDER — FAMOTIDINE 10 MG/ML
20 INJECTION INTRAVENOUS ONCE AS NEEDED
Status: CANCELLED | OUTPATIENT
Start: 2023-11-01

## 2023-11-01 RX ORDER — PROCHLORPERAZINE EDISYLATE 5 MG/ML
10 INJECTION INTRAMUSCULAR; INTRAVENOUS EVERY 6 HOURS PRN
Status: CANCELLED | OUTPATIENT
Start: 2023-11-01

## 2023-11-01 RX ORDER — ALBUTEROL SULFATE 0.83 MG/ML
3 SOLUTION RESPIRATORY (INHALATION) AS NEEDED
Status: CANCELLED | OUTPATIENT
Start: 2023-11-01

## 2023-11-01 RX ORDER — PALONOSETRON 0.05 MG/ML
0.25 INJECTION, SOLUTION INTRAVENOUS ONCE
Status: COMPLETED | OUTPATIENT
Start: 2023-11-01 | End: 2023-11-01

## 2023-11-01 RX ORDER — ALBUTEROL SULFATE 0.83 MG/ML
3 SOLUTION RESPIRATORY (INHALATION) AS NEEDED
Status: DISCONTINUED | OUTPATIENT
Start: 2023-11-01 | End: 2023-11-01 | Stop reason: HOSPADM

## 2023-11-01 RX ORDER — PALONOSETRON 0.05 MG/ML
0.25 INJECTION, SOLUTION INTRAVENOUS ONCE
Status: CANCELLED | OUTPATIENT
Start: 2023-11-01

## 2023-11-01 RX ORDER — DIPHENHYDRAMINE HYDROCHLORIDE 50 MG/ML
50 INJECTION INTRAMUSCULAR; INTRAVENOUS AS NEEDED
Status: DISCONTINUED | OUTPATIENT
Start: 2023-11-01 | End: 2023-11-01 | Stop reason: HOSPADM

## 2023-11-01 RX ORDER — EPINEPHRINE 0.3 MG/.3ML
0.3 INJECTION SUBCUTANEOUS EVERY 5 MIN PRN
Status: CANCELLED | OUTPATIENT
Start: 2023-11-01

## 2023-11-01 RX ORDER — LORAZEPAM 2 MG/ML
1 INJECTION INTRAMUSCULAR AS NEEDED
Status: CANCELLED | OUTPATIENT
Start: 2023-11-01

## 2023-11-01 RX ORDER — PROCHLORPERAZINE MALEATE 10 MG
10 TABLET ORAL EVERY 6 HOURS PRN
Status: DISCONTINUED | OUTPATIENT
Start: 2023-11-01 | End: 2023-11-01 | Stop reason: HOSPADM

## 2023-11-01 RX ORDER — PROCHLORPERAZINE MALEATE 10 MG
10 TABLET ORAL EVERY 6 HOURS PRN
Status: CANCELLED | OUTPATIENT
Start: 2023-11-01

## 2023-11-01 RX ORDER — PROCHLORPERAZINE EDISYLATE 5 MG/ML
10 INJECTION INTRAMUSCULAR; INTRAVENOUS EVERY 6 HOURS PRN
Status: DISCONTINUED | OUTPATIENT
Start: 2023-11-01 | End: 2023-11-01 | Stop reason: HOSPADM

## 2023-11-01 RX ORDER — PREGABALIN 50 MG/1
50 CAPSULE ORAL 3 TIMES DAILY
Qty: 90 CAPSULE | Refills: 1 | Status: SHIPPED
Start: 2023-11-01 | End: 2023-12-29 | Stop reason: ALTCHOICE

## 2023-11-01 RX ORDER — DIPHENHYDRAMINE HYDROCHLORIDE 50 MG/ML
50 INJECTION INTRAMUSCULAR; INTRAVENOUS AS NEEDED
Status: CANCELLED | OUTPATIENT
Start: 2023-11-01

## 2023-11-01 RX ADMIN — FLOXURIDINE: 500 INJECTION, POWDER, LYOPHILIZED, FOR SOLUTION INTRA-ARTERIAL at 16:45

## 2023-11-01 RX ADMIN — FLUOROURACIL 4000 MG: 50 INJECTION, SOLUTION INTRAVENOUS at 18:09

## 2023-11-01 RX ADMIN — DEXAMETHASONE SODIUM PHOSPHATE 12 MG: 10 INJECTION, SOLUTION INTRAMUSCULAR; INTRAVENOUS at 15:10

## 2023-11-01 RX ADMIN — OXALIPLATIN 140 MG: 5 INJECTION, SOLUTION, CONCENTRATE INTRAVENOUS at 15:51

## 2023-11-01 RX ADMIN — PALONOSETRON HYDROCHLORIDE 250 MCG: 0.25 INJECTION INTRAVENOUS at 15:10

## 2023-11-01 ASSESSMENT — PAIN SCALES - GENERAL: PAINLEVEL: 0-NO PAIN

## 2023-11-01 NOTE — PROGRESS NOTES
"NUTRITION Follow-up NOTE  Reason for Visit:  Narciso Marrero is a 57 y.o. male who presents for taste changes, wt loss, and diarrhea. Pt seen in infusion w/ wife, Emperatriz. Current treatment: FOLFOX via AMY D1C3. Pt reports eating well and has wt gain. Does have a shingles flare at this time, pain management to good effect but has run out, but is feeling tired d/t the pain.         Lab Results   Component Value Date/Time    GLUCOSE 176 (H) 11/01/2023 1145     11/01/2023 1145    K 4.1 11/01/2023 1145     11/01/2023 1145    CO2 31 11/01/2023 1145    ANIONGAP 11 11/01/2023 1145    BUN 13 11/01/2023 1145    CREATININE 0.71 11/01/2023 1145    EGFR >90 11/01/2023 1145    CALCIUM 9.3 11/01/2023 1145    ALBUMIN 3.7 11/01/2023 1145    ALKPHOS 198 (H) 11/01/2023 1145    PROT 6.7 11/01/2023 1145    AST 19 11/01/2023 1145    BILITOT 0.4 11/01/2023 1145    ALT 17 11/01/2023 1145     No results found for: \"VITD25\"    Anthropometrics:  Anthropometrics  Height: 180.5 cm (5' 11.06\")  Weight: 97.7 kg (215 lb 6.2 oz)  BMI (Calculated): 29.99  IBW/kg (Dietitian Calculated): 78.2 kg  Percent of IBW: 122 %  Weight Change  Weight History / % Weight Change: Continued non-sig. wt gain from loss.  Significant Weight Loss: No        Wt Readings from Last 10 Encounters:   11/01/23 97.7 kg (215 lb 6.2 oz)   11/01/23 97.7 kg (215 lb 6.2 oz)   10/24/23 97.5 kg (215 lb)   10/19/23 96.5 kg (212 lb 11.2 oz)   10/18/23 96.5 kg (212 lb 11.2 oz)   10/06/23 95.3 kg (210 lb 1.6 oz)   10/04/23 95.5 kg (210 lb 8.6 oz)   10/04/23 95.5 kg (210 lb 8.6 oz)   10/03/23 98 kg (216 lb 0.8 oz)   09/29/23 98.9 kg (218 lb 0.6 oz)        Food And Nutrient Intake:  Food and Nutrient History  Food and Nutrient History: Appetite is good  Energy Intake: Good > 75 %  Fluid Intake: 2-3 16 oz. nalyor. 1-2 Gatorade Zeros  GI Symptoms: none  GI Symptoms greater than 2 weeks: no  Oral Problems: dysgeusia (Takes sugar free Jolly ranchers to good effect. Has cold " "sensitivity for 3-4 days after chemo.)     Food Intake  Amount of Food: 3 meals  Meal 1: Sauage egg and cheese sandwich  Meal 2: 1 1/2 cup Chicken pot pie  Meal 3: 1 1/2 Sausage gravy, eggs, biscuits  Snacks: Peanuts    Food Supplement Intake  Oral Nutrition Supplements:  (Tries for 1/day smoothie of 1/2 cup strawberries, 1 cup blueberries, healthy balance juice, 1 scoop protein powder. Occasionally drinking Premier protein shakes (160 kcals, 25 g protein) sometimes.)           Nutrition Focused Physical Exam:  Subcutaneous Fat Loss  Orbital Fat Pads: Well nourshed (slightly bulging fat pads)  Buccal Fat Pads: Well nourished (full, rounded cheeks)  Triceps: Defer  Ribs: Defer  Muscle Wasting  Temporalis: Well nourished (well-defined muscle)  Pectoralis (Clavicular Region): Defer  Deltoid/Trapezius: Defer  Interosseous: Defer  Trapezius/Infraspinatus/Supraspinatus (Scapular Region): Defer  Quadriceps: Defer  Gastrocnemius: Defer  Edema  Edema: none  Physical Findings (Nutrition Deficiency/Toxicity)  Hair: Negative  Eyes: Negative  Mouth: Negative  Nails: Negative  Skin: Negative        Energy Needs  Calculated Energy Needs Using Equations  Height: 180.5 cm (5' 11.06\")  Weight Used for Equation Calculations: 95.5 kg (210 lb 8.6 oz)  Valentino- St. Diaz Equation (Overweight or Obese Patients): 1798  Estimated Energy Needs  Total Energy Estimated Needs (kCal):  (1522-3844)  Method for Estimating Needs: 25-28 kcals/kg IBW.  Estimated Fluid Needs  Total Fluid Estimated Needs (mL): 2200 mL  Method for Estimating Needs: 1 mL/kcal  Estimated Protein Needs  Total Protein Estimated Needs (g):  ()  Method for Estimating Needs: 1.3 g protein/kg IBW.  Weight and Growth Recommendation  Desired Growth Pattern: Maintain current wt.        Nutrition Diagnosis      Nutrition Diagnosis  Patient has Nutrition Diagnosis: Yes  Diagnosis Status (1): New  Nutrition Diagnosis 1: Increased nutrient needs  Related to (1): dx of colon " cancer  As Evidenced by (1): increased metabolic demand d/t dx.    Nutrition Interventions/Recommendations   Food and Nutrition Delivery  Meals & Snacks: General Healthful Diet  Goals: Continue current intake levels >75% of days.  Medical Food Supplement: Other, Specify: (Homemade smoothie w/ Kraus protein powder. Also occasionally drinks Premier protein (160 kcals, 25 g protein).)  Goals: Drink 1 protein shake/day >75% of days.  Coordination of Nutrition Care by a Nutrition Professional  Goals: Will continue to follow. Business card provided.        There are no Patient Instructions on file for this visit.    Nutrition Monitoring and Evaluation   Food/Nutrient Related History Monitoring  Monitoring and Evaluation Plan: Energy intake  Energy Intake: Estimated energy intake  Criteria: 2000  Additional Plan: Homemade shake BID.        Time Spent  Prep time on day of patient encounter: 5 minutes  Time spent directly with patient, family or caregiver: 10 minutes  Additional Time Spent on Patient Care Activities: 0 minutes  Documentation Time: 15 minutes  Other Time Spent: 0 minutes  Total: 30 minutes          Readiness to Change : Excellent  Level of Understanding : Excellent  Anticipated Compliant : Excellent

## 2023-11-01 NOTE — PROGRESS NOTES
Pt arrived ambulatory to infusion following clinic visit with Sandy Cruz NP. Has been feeling well and denies sx of colitis or infection. Provider decision to resume mFOLFOX and floxuridine AMY chemotherapy. Pt received treatment without incident. Given copy of upcoming appts, aware his pump disconnect is on 11/3 at 4pm at [facility]. Handoff given to LINWOOD Abdi RN at 1730.

## 2023-11-03 ENCOUNTER — APPOINTMENT (OUTPATIENT)
Dept: HEMATOLOGY/ONCOLOGY | Facility: CLINIC | Age: 58
End: 2023-11-03
Payer: COMMERCIAL

## 2023-11-03 ENCOUNTER — INFUSION (OUTPATIENT)
Dept: HEMATOLOGY/ONCOLOGY | Facility: CLINIC | Age: 58
End: 2023-11-03
Payer: COMMERCIAL

## 2023-11-03 VITALS
OXYGEN SATURATION: 95 % | DIASTOLIC BLOOD PRESSURE: 82 MMHG | SYSTOLIC BLOOD PRESSURE: 131 MMHG | HEIGHT: 71 IN | TEMPERATURE: 98.2 F | BODY MASS INDEX: 29.99 KG/M2 | HEART RATE: 96 BPM | RESPIRATION RATE: 16 BRPM

## 2023-11-03 DIAGNOSIS — C18.2 MALIGNANT NEOPLASM OF ASCENDING COLON (MULTI): ICD-10-CM

## 2023-11-03 PROCEDURE — 2500000004 HC RX 250 GENERAL PHARMACY W/ HCPCS (ALT 636 FOR OP/ED): Performed by: STUDENT IN AN ORGANIZED HEALTH CARE EDUCATION/TRAINING PROGRAM

## 2023-11-03 PROCEDURE — 96523 IRRIG DRUG DELIVERY DEVICE: CPT

## 2023-11-03 RX ORDER — HEPARIN SODIUM,PORCINE/PF 10 UNIT/ML
50 SYRINGE (ML) INTRAVENOUS AS NEEDED
Status: CANCELLED | OUTPATIENT
Start: 2023-11-03

## 2023-11-03 RX ORDER — HEPARIN 100 UNIT/ML
500 SYRINGE INTRAVENOUS AS NEEDED
Status: CANCELLED | OUTPATIENT
Start: 2023-11-03

## 2023-11-03 RX ORDER — HEPARIN 100 UNIT/ML
500 SYRINGE INTRAVENOUS AS NEEDED
Status: DISCONTINUED | OUTPATIENT
Start: 2023-11-03 | End: 2023-11-03 | Stop reason: HOSPADM

## 2023-11-03 RX ADMIN — Medication 500 UNITS: at 15:38

## 2023-11-03 ASSESSMENT — PAIN SCALES - GENERAL: PAINLEVEL: 2

## 2023-11-03 NOTE — PROGRESS NOTES
CADD pump disconnected, verified total volume infused.  + blood return noted, flushed with 10cc normal saline and 5cc 10 units heparin.  Lee needle removed and site covered with bandaid.  Pump and bag sent to Main infusion.  Patient tolerated procedure well.

## 2023-11-15 ENCOUNTER — LAB (OUTPATIENT)
Dept: HEMATOLOGY/ONCOLOGY | Facility: HOSPITAL | Age: 58
End: 2023-11-15
Payer: COMMERCIAL

## 2023-11-15 ENCOUNTER — OFFICE VISIT (OUTPATIENT)
Dept: HEMATOLOGY/ONCOLOGY | Facility: HOSPITAL | Age: 58
End: 2023-11-15
Payer: COMMERCIAL

## 2023-11-15 ENCOUNTER — INFUSION (OUTPATIENT)
Dept: HEMATOLOGY/ONCOLOGY | Facility: HOSPITAL | Age: 58
End: 2023-11-15
Payer: COMMERCIAL

## 2023-11-15 ENCOUNTER — NUTRITION (OUTPATIENT)
Dept: HEMATOLOGY/ONCOLOGY | Facility: HOSPITAL | Age: 58
End: 2023-11-15

## 2023-11-15 VITALS
TEMPERATURE: 97.3 F | DIASTOLIC BLOOD PRESSURE: 83 MMHG | BODY MASS INDEX: 29.4 KG/M2 | RESPIRATION RATE: 17 BRPM | HEART RATE: 79 BPM | SYSTOLIC BLOOD PRESSURE: 139 MMHG | OXYGEN SATURATION: 94 % | WEIGHT: 211.2 LBS

## 2023-11-15 VITALS — WEIGHT: 211.2 LBS | BODY MASS INDEX: 29.4 KG/M2

## 2023-11-15 DIAGNOSIS — Z51.11 ENCOUNTER FOR ANTINEOPLASTIC CHEMOTHERAPY: ICD-10-CM

## 2023-11-15 DIAGNOSIS — C18.2 MALIGNANT NEOPLASM OF ASCENDING COLON (MULTI): ICD-10-CM

## 2023-11-15 DIAGNOSIS — C18.2 MALIGNANT NEOPLASM OF ASCENDING COLON (MULTI): Primary | ICD-10-CM

## 2023-11-15 LAB
ALBUMIN SERPL BCP-MCNC: 4.1 G/DL (ref 3.4–5)
ALP SERPL-CCNC: 235 U/L (ref 33–120)
ALT SERPL W P-5'-P-CCNC: 62 U/L (ref 10–52)
ANION GAP SERPL CALC-SCNC: 12 MMOL/L (ref 10–20)
AST SERPL W P-5'-P-CCNC: 53 U/L (ref 9–39)
BASOPHILS # BLD AUTO: 0.03 X10*3/UL (ref 0–0.1)
BASOPHILS NFR BLD AUTO: 0.3 %
BILIRUB SERPL-MCNC: 0.5 MG/DL (ref 0–1.2)
BUN SERPL-MCNC: 19 MG/DL (ref 6–23)
CALCIUM SERPL-MCNC: 9.9 MG/DL (ref 8.6–10.3)
CEA SERPL-MCNC: 1249.2 UG/L
CHLORIDE SERPL-SCNC: 102 MMOL/L (ref 98–107)
CO2 SERPL-SCNC: 29 MMOL/L (ref 21–32)
CREAT SERPL-MCNC: 0.78 MG/DL (ref 0.5–1.3)
EOSINOPHIL # BLD AUTO: 0.19 X10*3/UL (ref 0–0.7)
EOSINOPHIL NFR BLD AUTO: 2.1 %
ERYTHROCYTE [DISTWIDTH] IN BLOOD BY AUTOMATED COUNT: 15.9 % (ref 11.5–14.5)
GFR SERPL CREATININE-BSD FRML MDRD: >90 ML/MIN/1.73M*2
GLUCOSE SERPL-MCNC: 147 MG/DL (ref 74–99)
HCT VFR BLD AUTO: 36.9 % (ref 41–52)
HGB BLD-MCNC: 12.1 G/DL (ref 13.5–17.5)
IMM GRANULOCYTES # BLD AUTO: 0.03 X10*3/UL (ref 0–0.7)
IMM GRANULOCYTES NFR BLD AUTO: 0.3 % (ref 0–0.9)
LYMPHOCYTES # BLD AUTO: 1.83 X10*3/UL (ref 1.2–4.8)
LYMPHOCYTES NFR BLD AUTO: 20.5 %
MCH RBC QN AUTO: 31 PG (ref 26–34)
MCHC RBC AUTO-ENTMCNC: 32.8 G/DL (ref 32–36)
MCV RBC AUTO: 95 FL (ref 80–100)
MONOCYTES # BLD AUTO: 0.74 X10*3/UL (ref 0.1–1)
MONOCYTES NFR BLD AUTO: 8.3 %
NEUTROPHILS # BLD AUTO: 6.12 X10*3/UL (ref 1.2–7.7)
NEUTROPHILS NFR BLD AUTO: 68.5 %
NRBC BLD-RTO: 0 /100 WBCS (ref 0–0)
PLATELET # BLD AUTO: 165 X10*3/UL (ref 150–450)
POTASSIUM SERPL-SCNC: 3.9 MMOL/L (ref 3.5–5.3)
PROT SERPL-MCNC: 7.2 G/DL (ref 6.4–8.2)
RBC # BLD AUTO: 3.9 X10*6/UL (ref 4.5–5.9)
SODIUM SERPL-SCNC: 139 MMOL/L (ref 136–145)
WBC # BLD AUTO: 8.9 X10*3/UL (ref 4.4–11.3)

## 2023-11-15 PROCEDURE — 99215 OFFICE O/P EST HI 40 MIN: CPT | Performed by: STUDENT IN AN ORGANIZED HEALTH CARE EDUCATION/TRAINING PROGRAM

## 2023-11-15 PROCEDURE — 36591 DRAW BLOOD OFF VENOUS DEVICE: CPT

## 2023-11-15 PROCEDURE — 82378 CARCINOEMBRYONIC ANTIGEN: CPT

## 2023-11-15 PROCEDURE — 3008F BODY MASS INDEX DOCD: CPT | Performed by: STUDENT IN AN ORGANIZED HEALTH CARE EDUCATION/TRAINING PROGRAM

## 2023-11-15 PROCEDURE — 85025 COMPLETE CBC W/AUTO DIFF WBC: CPT

## 2023-11-15 PROCEDURE — 2500000004 HC RX 250 GENERAL PHARMACY W/ HCPCS (ALT 636 FOR OP/ED): Performed by: STUDENT IN AN ORGANIZED HEALTH CARE EDUCATION/TRAINING PROGRAM

## 2023-11-15 PROCEDURE — 96415 CHEMO IV INFUSION ADDL HR: CPT

## 2023-11-15 PROCEDURE — 96375 TX/PRO/DX INJ NEW DRUG ADDON: CPT | Mod: INF

## 2023-11-15 PROCEDURE — 96522 REFILL/MAINT PUMP/RESVR SYST: CPT | Mod: 59

## 2023-11-15 PROCEDURE — 96413 CHEMO IV INFUSION 1 HR: CPT

## 2023-11-15 PROCEDURE — 80053 COMPREHEN METABOLIC PANEL: CPT

## 2023-11-15 PROCEDURE — 96523 IRRIG DRUG DELIVERY DEVICE: CPT

## 2023-11-15 PROCEDURE — A4216 STERILE WATER/SALINE, 10 ML: HCPCS | Performed by: STUDENT IN AN ORGANIZED HEALTH CARE EDUCATION/TRAINING PROGRAM

## 2023-11-15 PROCEDURE — 1036F TOBACCO NON-USER: CPT | Performed by: STUDENT IN AN ORGANIZED HEALTH CARE EDUCATION/TRAINING PROGRAM

## 2023-11-15 RX ORDER — PROCHLORPERAZINE MALEATE 10 MG
10 TABLET ORAL EVERY 6 HOURS PRN
Status: CANCELLED | OUTPATIENT
Start: 2023-11-15

## 2023-11-15 RX ORDER — HEPARIN SODIUM,PORCINE/PF 10 UNIT/ML
50 SYRINGE (ML) INTRAVENOUS AS NEEDED
Status: CANCELLED | OUTPATIENT
Start: 2023-11-15

## 2023-11-15 RX ORDER — LORAZEPAM 2 MG/ML
1 INJECTION INTRAMUSCULAR AS NEEDED
Status: DISCONTINUED | OUTPATIENT
Start: 2023-11-15 | End: 2023-11-15 | Stop reason: HOSPADM

## 2023-11-15 RX ORDER — PALONOSETRON 0.05 MG/ML
0.25 INJECTION, SOLUTION INTRAVENOUS ONCE
Status: COMPLETED | OUTPATIENT
Start: 2023-11-15 | End: 2023-11-15

## 2023-11-15 RX ORDER — PROCHLORPERAZINE MALEATE 10 MG
10 TABLET ORAL EVERY 6 HOURS PRN
Status: DISCONTINUED | OUTPATIENT
Start: 2023-11-15 | End: 2023-11-15 | Stop reason: HOSPADM

## 2023-11-15 RX ORDER — ALBUTEROL SULFATE 0.83 MG/ML
3 SOLUTION RESPIRATORY (INHALATION) AS NEEDED
Status: DISCONTINUED | OUTPATIENT
Start: 2023-11-15 | End: 2023-11-15 | Stop reason: HOSPADM

## 2023-11-15 RX ORDER — FAMOTIDINE 10 MG/ML
20 INJECTION INTRAVENOUS ONCE AS NEEDED
Status: DISCONTINUED | OUTPATIENT
Start: 2023-11-15 | End: 2023-11-15 | Stop reason: HOSPADM

## 2023-11-15 RX ORDER — FAMOTIDINE 10 MG/ML
20 INJECTION INTRAVENOUS ONCE AS NEEDED
Status: CANCELLED | OUTPATIENT
Start: 2023-11-15

## 2023-11-15 RX ORDER — DIPHENHYDRAMINE HYDROCHLORIDE 50 MG/ML
50 INJECTION INTRAMUSCULAR; INTRAVENOUS AS NEEDED
Status: CANCELLED | OUTPATIENT
Start: 2023-11-15

## 2023-11-15 RX ORDER — PROCHLORPERAZINE EDISYLATE 5 MG/ML
10 INJECTION INTRAMUSCULAR; INTRAVENOUS EVERY 6 HOURS PRN
Status: CANCELLED | OUTPATIENT
Start: 2023-11-15

## 2023-11-15 RX ORDER — LORAZEPAM 2 MG/ML
1 INJECTION INTRAMUSCULAR AS NEEDED
Status: CANCELLED | OUTPATIENT
Start: 2023-11-15

## 2023-11-15 RX ORDER — ALBUTEROL SULFATE 0.83 MG/ML
3 SOLUTION RESPIRATORY (INHALATION) AS NEEDED
Status: CANCELLED | OUTPATIENT
Start: 2023-11-15

## 2023-11-15 RX ORDER — PALONOSETRON 0.05 MG/ML
0.25 INJECTION, SOLUTION INTRAVENOUS ONCE
Status: CANCELLED | OUTPATIENT
Start: 2023-11-15

## 2023-11-15 RX ORDER — EPINEPHRINE 0.3 MG/.3ML
0.3 INJECTION SUBCUTANEOUS EVERY 5 MIN PRN
Status: DISCONTINUED | OUTPATIENT
Start: 2023-11-15 | End: 2023-11-15 | Stop reason: HOSPADM

## 2023-11-15 RX ORDER — EPINEPHRINE 0.3 MG/.3ML
0.3 INJECTION SUBCUTANEOUS EVERY 5 MIN PRN
Status: CANCELLED | OUTPATIENT
Start: 2023-11-15

## 2023-11-15 RX ORDER — DIPHENHYDRAMINE HYDROCHLORIDE 50 MG/ML
50 INJECTION INTRAMUSCULAR; INTRAVENOUS AS NEEDED
Status: DISCONTINUED | OUTPATIENT
Start: 2023-11-15 | End: 2023-11-15 | Stop reason: HOSPADM

## 2023-11-15 RX ORDER — PROCHLORPERAZINE EDISYLATE 5 MG/ML
10 INJECTION INTRAMUSCULAR; INTRAVENOUS EVERY 6 HOURS PRN
Status: DISCONTINUED | OUTPATIENT
Start: 2023-11-15 | End: 2023-11-15 | Stop reason: HOSPADM

## 2023-11-15 RX ORDER — HEPARIN 100 UNIT/ML
500 SYRINGE INTRAVENOUS AS NEEDED
Status: CANCELLED | OUTPATIENT
Start: 2023-11-15

## 2023-11-15 RX ADMIN — FLUOROURACIL 4000 MG: 50 INJECTION, SOLUTION INTRAVENOUS at 17:10

## 2023-11-15 RX ADMIN — PALONOSETRON HYDROCHLORIDE 250 MCG: 0.25 INJECTION INTRAVENOUS at 14:35

## 2023-11-15 RX ADMIN — DEXAMETHASONE SODIUM PHOSPHATE 12 MG: 10 INJECTION, SOLUTION INTRAMUSCULAR; INTRAVENOUS at 14:35

## 2023-11-15 RX ADMIN — HEPARIN SODIUM: 20000 INJECTION, SOLUTION INTRAVENOUS; SUBCUTANEOUS at 15:30

## 2023-11-15 RX ADMIN — OXALIPLATIN 140 MG: 5 INJECTION, SOLUTION INTRAVENOUS at 14:56

## 2023-11-15 ASSESSMENT — ENCOUNTER SYMPTOMS
LOSS OF SENSATION IN FEET: 1
DEPRESSION: 0
OCCASIONAL FEELINGS OF UNSTEADINESS: 0

## 2023-11-15 ASSESSMENT — PAIN SCALES - GENERAL: PAINLEVEL: 2

## 2023-11-15 NOTE — PROGRESS NOTES
Cancer History:  DIAGNOSIS: Ascending colon cancer     STAGING: IV     CURRENT SITES OF DISEASE:  Ascending colon, liver, peritoneal      MOLECULAR/GENOMICS:  Per report: [facility] showed KRAS mutation     ctDNA Signatera  4/17/23: 0  5/17/23: 1.99  5/31/23: 28.84  6/14/23: 4.20  6/28/23: 9.55  7/12/23: 24.03  7/26/23: 40.53  8/23/23: 9.75  9/20/23: 3.67     CEA:  2/1/23: 4400  3/1/23: 4661  3/15/23: 3061  3/29/23: 2363  4/12/23: 1529  5/17/23: 235.9  5/31/23: 238.3  6/14/23: 225  6/28/23: 181  7/12/23: 171  7/26/23: 266  8/9/23: 392  8/23/23: 718  9/6/23: 800  9/20/23: 905   10/18/23: >100  11/1/23: 613        CURRENT THERAPY:  AMY pump placed 5/3/23. Flow rate 1.2 mL/day. (Serial # [id number]).First FUdR via AMY pump 5/17/23. Systemic chemotherapy being held due to wound vac, Started FOLFOX (RAISA 033) on 8/9/23        PREVIOUS THERAPY:  1/2023-4/12/23: FOLFOX x6 cycles. First 2 cycles with bevacizumab     ONCOLOGIC ISSUES:  Shingles  Midline incision dehisced      PROVIDERS:  Surg onc: Rajinder Arenas  CRS: Regina Raymundo  Local med onc: Dia Kendall     HISTORY:   1/2023: Presented with 20 pound unexpected weight loss and RUQ pain. Imaging showed large liver lesion. Biopsy c/w metastatic colon cancer. C-scope showed infiltrative, ulcerated, fungating mass in ascending colon.   Baseline CEA 2322. Started FOLFOX + bevacizumab  3/16/23: MRI chest and liver showed no evidence of metastatic disease in the chest. Focal circumferential wall thickening   5/3/23: AMY pump placed, right hemicolectomy, open cholecystectomy and peritonectomy     SH: , lives with wife. Former tobacco, quit in 2017, no illicits or EtOH     PMH: physical trauma on R side of body with multiple fractures, HLD     FH: Father and daughter with cancer          Subjective   Memo ran out of the extra dose of Lyrica for his neuropathy from his shingles and he started to feel a flare on the right side of his face, head, scalp, neck and back for the  last 2-3 weeks  Stool is back to solid. No more diarrhea  Abdomen is slightly sore from all of the diarrhea and cramping he had had when he was admitted  Appetite is much better.      No fevers, chills, chest pain, shortness of breath, nausea, vomiting, rashes or masses.     ROS as above. Remainder of 10 point review of systems elicited and otherwise unremarkable.         Objective:    /81 (BP Location: Left arm, Patient Position: Sitting, BP Cuff Size: Large adult)   Pulse 81   Resp 18   Wt 97.7 kg (215 lb 6.2 oz)   SpO2 98%   BMI 29.99 kg/m²      Physical Exam:    Gen: A&O, NAD  Head: Normocephalic, atraumatic  Eyes: no scleral icterus  ENT: mucous membranes moist, no oropharyngeal lesions  Resp: Lungs CTAB  Cardiac: Normal rate, regular rhythm, no murmurs appreciated  Abdomen: Soft, nondistended, nontender, +BS Pump in place in LLQ, no swelling or redness  Neuro: CNII-XII grossly intact  Psych: appropriate mood & affect  Skin: warm, dry, no apparent rashes     ECOG Performance Status: 0    Assessment & Plan:  Mr. Marrero is a 57yoM with metastatic ascending colon cancer to the liver, MMR-intact, KRAS-mutant.      He was diagnosed in Jan 2023 after presenting with abdominal pain and unintentional weight loss. Imaging showed liver mass, biopsy showed adenocarcinoma of colonic origin. C-scope showed mass in ascending colon. He started palliative chemotherapy with FOLFOX + bevacizumab and has received 6 cycles total, ryan d/c after 2 cycles.      I discussed with him at length the risks and benefits to floxuridine chemotherapy delivered via hepatic artery infusion pump in combination with systemic chemotherapy with FOLFOX. I discussed that the goals of AMY therapy are to delay progression of disease and to prolong survival.      Risks of AMY therapy include but are not limited to:   - Gastritis if extrahepatic perfusion (will be mitigated by ensuring no extrahepatic perfusion on Tc99-MAA scan and by  prophylactic H2 antagonist  - Biliary sclerosis  - Seroma, hematoma overlying pump site  - Infection at pump site     I discussed that he must call us immediately if she develops abdominal pain, redness at pump site, fever, or jaundice. Avoid hot packs/heating pads over the site and avoid hot tubs, as temperature changes to the pump can affect pump flow rate. Similarly, avoid changes in atmospheric pressure, and notify AMY team if prolonged air travel or travel to different atmospheric pressures is anticipated.      AMY pump placed on 5/3/23, Intera 3000 serial number [id number].      5/31/23: Midline incision dehisced. Hep Saline in AMY pump      6/14/23L Midline incision dehisced. Hold systemic chemotherapy. Fill AMY pump with FUdR with a 50% dose reduction due to 1.37xRV of alk phos from 5/17/23.     6/28/23 - Now with wound vac to abdominal wound. Continue to hold systemic chemotherapy until wound is healed.      7/12/23: Wound vac is now off. Wound is healing very nicely. Wound bed with pink granulation tissue but the wound is still open. Dr. Russ came in to see the wound. Will continue to hold systemic chemotherapy until the wound is fully healed/closed. Continue with AMY pump.      7/26/23: Wound healing continues. Still has some open area after scab broke off showering. Dr. Russ examined site as well today. In setting of wound and recent shingles, we will continue to HOLD systemic FOLFOX today and give HepNS via AMY. In the meantime, after further discussion with Dr. Russ we will plan on obtaining restaging scans now.      8/9/23: Reviewed CT with Dr. Russ. Liver lesions are decreased in size. There are multiple new lung lesions and numerous peritoneal implants. Since his abdominal wound is now healed will finally be able to start systemic chemotherapy. Due to his ALkPhos being >1.5 x RV, will hold FUdR and give HepNS today.     8/23/23: Proceed with FOLFOX and will give FUdR via AMY pump but  at 50% dose reduction due to alk phos being > 1.4 xRV from 7/12/23.     9/6/23: Tolerating systemic chemotherapy. Continue HepNS     10/4/23: Having nausea and diarrhea. Will hold systemic chemotherapy and give HepNS via HAIP. Will give supportive medications today.     10/18/23: Last week as admitted for several days d/t colitis; infectious etiologies not identified. Concern is for chemotherapy-induced colitis.  I personally reviewed the images from the recent CT, which show decreased size of liver metastasis, stable peritoneal disease. Hold systemic today & will refill pump with heparin saline.     11/1/23: No longer having diarrhea. Stools are back to being formed. Having more neuropathic pain from previous sites of shingles since he ran out of the increased dose of lyrica. Will give FOLFOX + FUdR today     Plan:   Metastatic colon cancer to the liver  - AMY pump placement and R hemicolectomy on 5/3/23  - Monitor ctDNA signatera monthly- due 10/18  - 11/1/23 Proceed with FOLFOX with a dose reduction in Oxaliplatin and infusional 5FU only due to recent chemotherapy colitis and FUdR (today is the 6th dose) via AMY pump today  - RTC in 2 weeks FOLFOX and HepNS. Will transition to Mondays after the end of November     Neuropathic pain due to shingles  - continue the increased Lyrica by 50mg PO TID - refilled rx

## 2023-11-15 NOTE — PROGRESS NOTES
Narciso Marrero, 57 y.o. male is here for chemotherapy infusion of: Fluorouracil and Oxaliplatin  Cycle: 4, Week: 1, Day: 1   S:  none    O:   Lab Results   Component Value Date    WBC 8.9 11/15/2023    HGB 12.1 (L) 11/15/2023    HCT 36.9 (L) 11/15/2023    MCV 95 11/15/2023     11/15/2023     Lab Results   Component Value Date    NEUTROABS 6.12 11/15/2023     Lab Results   Component Value Date    CREATININE 0.78 11/15/2023     BP Readings from Last 1 Encounters:   11/15/23 139/83     Additional diagnostic tests: Signatera; CEA    A:  Encounter Diagnosis   Name Primary?    Malignant neoplasm of ascending colon (CMS/HCC)     Yes  Meets parameters to proceed with treatment.    P:  Time: 1412  IV device: CVAD  IV therapy site and patency:  right chest  IV Fluid given: D5W  Premeds given: Aloxi, dexa  Chemotherapy administered starting at: 1456  Dose given: 140 mg Oxaliplatin in 528 ml given over 2 hours   Followed by: Fluoruoracil 4000 mg in 138 mL given over 46 hours  Tolerated procedure well. IV removed. Pressure dressing applied.  Time chemotherapy to be completed: 11/17/2023 at 1514.   APPOINTMENT SCHEDULED FOR 11/17/2023 AT 1530. PATIENT AWARE.  Time patient discharged: 1714

## 2023-11-15 NOTE — PROGRESS NOTES
"NUTRITION Follow-up NOTE  Reason for Visit:  Narciso Marrero is a 57 y.o. male who presents for taste changes and wt loss. Pt seen in infusion w/ wife, Emperatriz. PMHx: R hemicolectomy, open cholecystectomy, and peritonectomy. Current treatment: FOLFOX via AYM D1C4. Pt reports eating well and has wt gain. Does have a shingles flare at this time which is improving, pain management to good effect. Appears well and intake has increased. Wt is down from 11/1/23, but stable over 1 month.      Lab Results   Component Value Date/Time    GLUCOSE 147 (H) 11/15/2023 1113     11/15/2023 1113    K 3.9 11/15/2023 1113     11/15/2023 1113    CO2 29 11/15/2023 1113    ANIONGAP 12 11/15/2023 1113    BUN 19 11/15/2023 1113    CREATININE 0.78 11/15/2023 1113    EGFR >90 11/15/2023 1113    CALCIUM 9.9 11/15/2023 1113    ALBUMIN 4.1 11/15/2023 1113    ALKPHOS 235 (H) 11/15/2023 1113    PROT 7.2 11/15/2023 1113    AST 53 (H) 11/15/2023 1113    BILITOT 0.5 11/15/2023 1113    ALT 62 (H) 11/15/2023 1113     No results found for: \"VITD25\"    All pertinent labs have been reviewed.       Anthropometrics:  Anthropometrics  Weight: 95.8 kg (211 lb 3.2 oz)  BMI (Calculated): 29.4  IBW/kg (Dietitian Calculated): 78.2 kg  Percent of IBW: 122 %  Weight Change  Weight History / % Weight Change: Non-sig  wt loss over the last 2 weeks. Wt stable over 1 month.  Significant Weight Loss: No  Interpretation of Weight Loss:  (1.9% in 2 weeks.)        Wt Readings from Last 10 Encounters:   11/16/23 95.8 kg (211 lb 3.2 oz)   11/15/23 95.8 kg (211 lb 3.2 oz)   11/15/23 95.8 kg (211 lb 3.2 oz)   11/01/23 97.7 kg (215 lb 6.2 oz)   11/01/23 97.7 kg (215 lb 6.2 oz)   10/24/23 97.5 kg (215 lb)   10/19/23 96.5 kg (212 lb 11.2 oz)   10/18/23 96.5 kg (212 lb 11.2 oz)   10/06/23 95.3 kg (210 lb 1.6 oz)   10/04/23 95.5 kg (210 lb 8.6 oz)        Food And Nutrient Intake:  Food and Nutrient History  Food and Nutrient History: Appetite is improving, it's " "\"Better\"  Energy Intake: Good > 75 %  Fluid Intake: 1 Gatorade Zero 3 16 oz. bottles of naylor.  Food Allergy: None.  Food Intolerance: None.  GI Symptoms: none  GI Symptoms greater than 2 weeks: no  Oral Problems: dysgeusia (Sugar Free Jolly rancher at night.)     Food Intake  Amount of Food: 3 meals/day  Meal 1: Sausage egg and cheese sandwich  Meal 2: Turkey and cheese wraps  Meal 3: Cabbage, beef, and beef brauts  Snacks: Applesauce or cheese    Food Supplement Intake  Oral Nutrition Supplements:  (3 smoothies/day (1 large smoothie with the fruit and protein powder. w/ 2 smaller 8 oz w/ powder, juice, and applesauce.)    Medication and Complementary/Alternative Medicine Use  Nutrition-Related Complementary/Alternative Medicine Use: Fish oil.      Nutrition Focused Physical Exam:  Subcutaneous Fat Loss  Orbital Fat Pads: Well nourshed (slightly bulging fat pads)  Buccal Fat Pads: Well nourished (full, rounded cheeks)  Triceps: Defer  Ribs: Defer  Muscle Wasting  Temporalis: Well nourished (well-defined muscle)  Pectoralis (Clavicular Region): Defer  Deltoid/Trapezius: Defer  Interosseous: Defer  Trapezius/Infraspinatus/Supraspinatus (Scapular Region): Defer  Quadriceps: Defer  Gastrocnemius: Defer  Edema  Edema: none  Physical Findings (Nutrition Deficiency/Toxicity)  Hair: Negative  Eyes: Negative  Skin: Negative        Energy Needs  Calculated Energy Needs Using Equations  Weight Used for Equation Calculations: 95.5 kg (210 lb 8.6 oz)  Estimated Energy Needs  Total Energy Estimated Needs (kCal):  (7573-7682)  Method for Estimating Needs: 25-28 kcals/kg IBW.  Estimated Fluid Needs  Total Fluid Estimated Needs (mL): 2200 mL  Method for Estimating Needs: 1 mL/kcal  Estimated Protein Needs  Total Protein Estimated Needs (g):  ()  Method for Estimating Needs: 1.3 g protein/kg IBW.  Weight and Growth Recommendation  Desired Growth Pattern: Maintain current wt.        Nutrition Diagnosis      Nutrition " Diagnosis  Patient has Nutrition Diagnosis: Yes  Diagnosis Status (1): Ongoing  Nutrition Diagnosis 1: Increased nutrient needs  Related to (1): dx of colon cancer  As Evidenced by (1): increased metabolic demand d/t dx.    Nutrition Interventions/Recommendations   Food and Nutrition Delivery  Meals & Snacks: General Healthful Diet  Goals: Continue current intake levels >75% of days.  Medical Food Supplement: Other, Specify: (Homemade smoothie w/ Kraus protein powder. Also occasionally drinks Premier protein (160 kcals, 25 g protein).)  Goals: Drink 2 protein shakes/day >75% of days.  Coordination of Nutrition Care by a Nutrition Professional  Goals: Will continue to follow. Business card provided.        There are no Patient Instructions on file for this visit.    Nutrition Monitoring and Evaluation   Food/Nutrient Related History Monitoring  Monitoring and Evaluation Plan: Energy intake  Energy Intake: Estimated energy intake  Criteria: Meet EEN >75% of days.  Additional Plan: Homemade shake BID.        Time Spent  Prep time on day of patient encounter: 5 minutes  Time spent directly with patient, family or caregiver: 15 minutes  Additional Time Spent on Patient Care Activities: 0 minutes  Documentation Time: 20 minutes  Other Time Spent: 0 minutes  Total: 40 minutes            Readiness to Change : Excellent  Level of Understanding : Excellent  Anticipated Compliant : Excellent

## 2023-11-16 VITALS — BODY MASS INDEX: 29.4 KG/M2 | WEIGHT: 211.2 LBS

## 2023-11-17 ENCOUNTER — INFUSION (OUTPATIENT)
Dept: HEMATOLOGY/ONCOLOGY | Facility: HOSPITAL | Age: 58
End: 2023-11-17
Payer: COMMERCIAL

## 2023-11-17 VITALS
DIASTOLIC BLOOD PRESSURE: 74 MMHG | HEART RATE: 90 BPM | RESPIRATION RATE: 18 BRPM | SYSTOLIC BLOOD PRESSURE: 131 MMHG | TEMPERATURE: 98.2 F | BODY MASS INDEX: 30.05 KG/M2 | WEIGHT: 215.83 LBS | OXYGEN SATURATION: 98 %

## 2023-11-17 DIAGNOSIS — C18.2 MALIGNANT NEOPLASM OF ASCENDING COLON (MULTI): ICD-10-CM

## 2023-11-17 PROCEDURE — 2500000004 HC RX 250 GENERAL PHARMACY W/ HCPCS (ALT 636 FOR OP/ED): Performed by: STUDENT IN AN ORGANIZED HEALTH CARE EDUCATION/TRAINING PROGRAM

## 2023-11-17 PROCEDURE — 96523 IRRIG DRUG DELIVERY DEVICE: CPT

## 2023-11-17 RX ORDER — HEPARIN SODIUM,PORCINE/PF 10 UNIT/ML
50 SYRINGE (ML) INTRAVENOUS AS NEEDED
Status: CANCELLED | OUTPATIENT
Start: 2023-11-17

## 2023-11-17 RX ORDER — HEPARIN 100 UNIT/ML
500 SYRINGE INTRAVENOUS AS NEEDED
Status: DISCONTINUED | OUTPATIENT
Start: 2023-11-17 | End: 2023-11-17 | Stop reason: HOSPADM

## 2023-11-17 RX ORDER — HEPARIN 100 UNIT/ML
500 SYRINGE INTRAVENOUS AS NEEDED
Status: CANCELLED | OUTPATIENT
Start: 2023-11-17

## 2023-11-17 RX ADMIN — HEPARIN 500 UNITS: 100 SYRINGE at 15:40

## 2023-11-17 ASSESSMENT — PAIN SCALES - GENERAL: PAINLEVEL: 3

## 2023-11-20 ENCOUNTER — APPOINTMENT (OUTPATIENT)
Dept: ENDOCRINOLOGY | Facility: CLINIC | Age: 58
End: 2023-11-20
Payer: COMMERCIAL

## 2023-11-29 ENCOUNTER — OFFICE VISIT (OUTPATIENT)
Dept: HEMATOLOGY/ONCOLOGY | Facility: HOSPITAL | Age: 58
End: 2023-11-29
Payer: COMMERCIAL

## 2023-11-29 ENCOUNTER — LAB (OUTPATIENT)
Dept: HEMATOLOGY/ONCOLOGY | Facility: HOSPITAL | Age: 58
End: 2023-11-29
Payer: COMMERCIAL

## 2023-11-29 ENCOUNTER — NUTRITION (OUTPATIENT)
Dept: HEMATOLOGY/ONCOLOGY | Facility: HOSPITAL | Age: 58
End: 2023-11-29

## 2023-11-29 ENCOUNTER — INFUSION (OUTPATIENT)
Dept: HEMATOLOGY/ONCOLOGY | Facility: HOSPITAL | Age: 58
End: 2023-11-29
Payer: COMMERCIAL

## 2023-11-29 VITALS
TEMPERATURE: 98.2 F | OXYGEN SATURATION: 96 % | WEIGHT: 211.1 LBS | HEART RATE: 91 BPM | DIASTOLIC BLOOD PRESSURE: 83 MMHG | RESPIRATION RATE: 17 BRPM | SYSTOLIC BLOOD PRESSURE: 128 MMHG | BODY MASS INDEX: 29.39 KG/M2

## 2023-11-29 DIAGNOSIS — G89.3 NEOPLASM RELATED PAIN: ICD-10-CM

## 2023-11-29 DIAGNOSIS — C18.2 MALIGNANT NEOPLASM OF ASCENDING COLON (MULTI): ICD-10-CM

## 2023-11-29 DIAGNOSIS — Z51.11 ENCOUNTER FOR ANTINEOPLASTIC CHEMOTHERAPY: Chronic | ICD-10-CM

## 2023-11-29 DIAGNOSIS — C18.2 MALIGNANT NEOPLASM OF ASCENDING COLON (MULTI): Primary | Chronic | ICD-10-CM

## 2023-11-29 LAB
ALBUMIN SERPL BCP-MCNC: 4.1 G/DL (ref 3.4–5)
ALP SERPL-CCNC: 265 U/L (ref 33–120)
ALT SERPL W P-5'-P-CCNC: 50 U/L (ref 10–52)
ANION GAP SERPL CALC-SCNC: 12 MMOL/L (ref 10–20)
AST SERPL W P-5'-P-CCNC: 45 U/L (ref 9–39)
BASOPHILS # BLD AUTO: 0.02 X10*3/UL (ref 0–0.1)
BASOPHILS NFR BLD AUTO: 0.3 %
BILIRUB SERPL-MCNC: 0.6 MG/DL (ref 0–1.2)
BUN SERPL-MCNC: 15 MG/DL (ref 6–23)
CALCIUM SERPL-MCNC: 9.6 MG/DL (ref 8.6–10.3)
CEA SERPL-MCNC: 1189.2 UG/L
CHLORIDE SERPL-SCNC: 101 MMOL/L (ref 98–107)
CO2 SERPL-SCNC: 30 MMOL/L (ref 21–32)
CREAT SERPL-MCNC: 0.56 MG/DL (ref 0.5–1.3)
EOSINOPHIL # BLD AUTO: 0.16 X10*3/UL (ref 0–0.7)
EOSINOPHIL NFR BLD AUTO: 2 %
ERYTHROCYTE [DISTWIDTH] IN BLOOD BY AUTOMATED COUNT: 14.8 % (ref 11.5–14.5)
GFR SERPL CREATININE-BSD FRML MDRD: >90 ML/MIN/1.73M*2
GLUCOSE SERPL-MCNC: 167 MG/DL (ref 74–99)
HCT VFR BLD AUTO: 38.8 % (ref 41–52)
HGB BLD-MCNC: 12.7 G/DL (ref 13.5–17.5)
IMM GRANULOCYTES # BLD AUTO: 0.03 X10*3/UL (ref 0–0.7)
IMM GRANULOCYTES NFR BLD AUTO: 0.4 % (ref 0–0.9)
LYMPHOCYTES # BLD AUTO: 1.21 X10*3/UL (ref 1.2–4.8)
LYMPHOCYTES NFR BLD AUTO: 15.2 %
MCH RBC QN AUTO: 30.5 PG (ref 26–34)
MCHC RBC AUTO-ENTMCNC: 32.7 G/DL (ref 32–36)
MCV RBC AUTO: 93 FL (ref 80–100)
MONOCYTES # BLD AUTO: 0.76 X10*3/UL (ref 0.1–1)
MONOCYTES NFR BLD AUTO: 9.5 %
NEUTROPHILS # BLD AUTO: 5.78 X10*3/UL (ref 1.2–7.7)
NEUTROPHILS NFR BLD AUTO: 72.6 %
NRBC BLD-RTO: 0 /100 WBCS (ref 0–0)
PLATELET # BLD AUTO: 144 X10*3/UL (ref 150–450)
POTASSIUM SERPL-SCNC: 3.8 MMOL/L (ref 3.5–5.3)
PROT SERPL-MCNC: 7.4 G/DL (ref 6.4–8.2)
RBC # BLD AUTO: 4.16 X10*6/UL (ref 4.5–5.9)
SODIUM SERPL-SCNC: 139 MMOL/L (ref 136–145)
WBC # BLD AUTO: 8 X10*3/UL (ref 4.4–11.3)

## 2023-11-29 PROCEDURE — 80053 COMPREHEN METABOLIC PANEL: CPT

## 2023-11-29 PROCEDURE — 96416 CHEMO PROLONG INFUSE W/PUMP: CPT

## 2023-11-29 PROCEDURE — 82378 CARCINOEMBRYONIC ANTIGEN: CPT

## 2023-11-29 PROCEDURE — 36591 DRAW BLOOD OFF VENOUS DEVICE: CPT

## 2023-11-29 PROCEDURE — 3008F BODY MASS INDEX DOCD: CPT | Performed by: STUDENT IN AN ORGANIZED HEALTH CARE EDUCATION/TRAINING PROGRAM

## 2023-11-29 PROCEDURE — 2500000004 HC RX 250 GENERAL PHARMACY W/ HCPCS (ALT 636 FOR OP/ED): Performed by: STUDENT IN AN ORGANIZED HEALTH CARE EDUCATION/TRAINING PROGRAM

## 2023-11-29 PROCEDURE — 85025 COMPLETE CBC W/AUTO DIFF WBC: CPT

## 2023-11-29 PROCEDURE — 1036F TOBACCO NON-USER: CPT | Performed by: STUDENT IN AN ORGANIZED HEALTH CARE EDUCATION/TRAINING PROGRAM

## 2023-11-29 PROCEDURE — 96413 CHEMO IV INFUSION 1 HR: CPT

## 2023-11-29 PROCEDURE — 96415 CHEMO IV INFUSION ADDL HR: CPT

## 2023-11-29 PROCEDURE — 99215 OFFICE O/P EST HI 40 MIN: CPT | Mod: 25 | Performed by: STUDENT IN AN ORGANIZED HEALTH CARE EDUCATION/TRAINING PROGRAM

## 2023-11-29 PROCEDURE — A4216 STERILE WATER/SALINE, 10 ML: HCPCS | Performed by: STUDENT IN AN ORGANIZED HEALTH CARE EDUCATION/TRAINING PROGRAM

## 2023-11-29 PROCEDURE — 96522 REFILL/MAINT PUMP/RESVR SYST: CPT

## 2023-11-29 PROCEDURE — 96375 TX/PRO/DX INJ NEW DRUG ADDON: CPT | Mod: INF

## 2023-11-29 PROCEDURE — 96523 IRRIG DRUG DELIVERY DEVICE: CPT

## 2023-11-29 PROCEDURE — 99215 OFFICE O/P EST HI 40 MIN: CPT | Performed by: STUDENT IN AN ORGANIZED HEALTH CARE EDUCATION/TRAINING PROGRAM

## 2023-11-29 RX ORDER — PROCHLORPERAZINE MALEATE 10 MG
10 TABLET ORAL EVERY 6 HOURS PRN
Status: CANCELLED | OUTPATIENT
Start: 2023-11-29

## 2023-11-29 RX ORDER — PALONOSETRON 0.05 MG/ML
0.25 INJECTION, SOLUTION INTRAVENOUS ONCE
Status: COMPLETED | OUTPATIENT
Start: 2023-11-29 | End: 2023-11-29

## 2023-11-29 RX ORDER — LORAZEPAM 2 MG/ML
1 INJECTION INTRAMUSCULAR AS NEEDED
Status: DISCONTINUED | OUTPATIENT
Start: 2023-11-29 | End: 2023-11-29 | Stop reason: HOSPADM

## 2023-11-29 RX ORDER — HEPARIN SODIUM,PORCINE/PF 10 UNIT/ML
50 SYRINGE (ML) INTRAVENOUS AS NEEDED
Status: CANCELLED | OUTPATIENT
Start: 2023-11-29

## 2023-11-29 RX ORDER — DIPHENHYDRAMINE HYDROCHLORIDE 50 MG/ML
50 INJECTION INTRAMUSCULAR; INTRAVENOUS AS NEEDED
Status: DISCONTINUED | OUTPATIENT
Start: 2023-11-29 | End: 2023-11-29 | Stop reason: HOSPADM

## 2023-11-29 RX ORDER — HEPARIN 100 UNIT/ML
500 SYRINGE INTRAVENOUS AS NEEDED
Status: CANCELLED | OUTPATIENT
Start: 2023-11-29

## 2023-11-29 RX ORDER — DIPHENHYDRAMINE HYDROCHLORIDE 50 MG/ML
50 INJECTION INTRAMUSCULAR; INTRAVENOUS AS NEEDED
Status: CANCELLED | OUTPATIENT
Start: 2023-11-29

## 2023-11-29 RX ORDER — ALBUTEROL SULFATE 0.83 MG/ML
3 SOLUTION RESPIRATORY (INHALATION) AS NEEDED
Status: CANCELLED | OUTPATIENT
Start: 2023-11-29

## 2023-11-29 RX ORDER — HEPARIN 100 UNIT/ML
500 SYRINGE INTRAVENOUS AS NEEDED
Status: DISCONTINUED | OUTPATIENT
Start: 2023-11-29 | End: 2023-11-29 | Stop reason: HOSPADM

## 2023-11-29 RX ORDER — HEPARIN SODIUM,PORCINE/PF 10 UNIT/ML
50 SYRINGE (ML) INTRAVENOUS AS NEEDED
Status: DISCONTINUED | OUTPATIENT
Start: 2023-11-29 | End: 2023-11-29 | Stop reason: HOSPADM

## 2023-11-29 RX ORDER — PALONOSETRON 0.05 MG/ML
0.25 INJECTION, SOLUTION INTRAVENOUS ONCE
Status: CANCELLED | OUTPATIENT
Start: 2023-11-29

## 2023-11-29 RX ORDER — PROCHLORPERAZINE EDISYLATE 5 MG/ML
10 INJECTION INTRAMUSCULAR; INTRAVENOUS EVERY 6 HOURS PRN
Status: DISCONTINUED | OUTPATIENT
Start: 2023-11-29 | End: 2023-11-29 | Stop reason: HOSPADM

## 2023-11-29 RX ORDER — ALBUTEROL SULFATE 0.83 MG/ML
3 SOLUTION RESPIRATORY (INHALATION) AS NEEDED
Status: DISCONTINUED | OUTPATIENT
Start: 2023-11-29 | End: 2023-11-29 | Stop reason: HOSPADM

## 2023-11-29 RX ORDER — PROCHLORPERAZINE MALEATE 10 MG
10 TABLET ORAL EVERY 6 HOURS PRN
Status: DISCONTINUED | OUTPATIENT
Start: 2023-11-29 | End: 2023-11-29 | Stop reason: HOSPADM

## 2023-11-29 RX ORDER — LORAZEPAM 2 MG/ML
1 INJECTION INTRAMUSCULAR AS NEEDED
Status: CANCELLED | OUTPATIENT
Start: 2023-11-29

## 2023-11-29 RX ORDER — OXYCODONE HYDROCHLORIDE 5 MG/1
5 TABLET ORAL EVERY 6 HOURS PRN
Qty: 30 TABLET | Refills: 0 | Status: SHIPPED | OUTPATIENT
Start: 2023-11-29 | End: 2023-12-09

## 2023-11-29 RX ORDER — FAMOTIDINE 10 MG/ML
20 INJECTION INTRAVENOUS ONCE AS NEEDED
Status: DISCONTINUED | OUTPATIENT
Start: 2023-11-29 | End: 2023-11-29 | Stop reason: HOSPADM

## 2023-11-29 RX ORDER — PROCHLORPERAZINE EDISYLATE 5 MG/ML
10 INJECTION INTRAMUSCULAR; INTRAVENOUS EVERY 6 HOURS PRN
Status: CANCELLED | OUTPATIENT
Start: 2023-11-29

## 2023-11-29 RX ORDER — EPINEPHRINE 0.3 MG/.3ML
0.3 INJECTION SUBCUTANEOUS EVERY 5 MIN PRN
Status: CANCELLED | OUTPATIENT
Start: 2023-11-29

## 2023-11-29 RX ORDER — EPINEPHRINE 0.3 MG/.3ML
0.3 INJECTION SUBCUTANEOUS EVERY 5 MIN PRN
Status: DISCONTINUED | OUTPATIENT
Start: 2023-11-29 | End: 2023-11-29 | Stop reason: HOSPADM

## 2023-11-29 RX ORDER — FAMOTIDINE 10 MG/ML
20 INJECTION INTRAVENOUS ONCE AS NEEDED
Status: CANCELLED | OUTPATIENT
Start: 2023-11-29

## 2023-11-29 RX ADMIN — OXALIPLATIN 140 MG: 5 INJECTION, SOLUTION INTRAVENOUS at 14:09

## 2023-11-29 RX ADMIN — PALONOSETRON HYDROCHLORIDE 250 MCG: 0.25 INJECTION INTRAVENOUS at 13:39

## 2023-11-29 RX ADMIN — DEXAMETHASONE SODIUM PHOSPHATE 12 MG: 10 INJECTION, SOLUTION INTRAMUSCULAR; INTRAVENOUS at 13:39

## 2023-11-29 RX ADMIN — FLOXURIDINE: 500 INJECTION, POWDER, LYOPHILIZED, FOR SOLUTION INTRA-ARTERIAL at 14:47

## 2023-11-29 RX ADMIN — FLUOROURACIL 4000 MG: 50 INJECTION, SOLUTION INTRAVENOUS at 16:23

## 2023-11-29 ASSESSMENT — ENCOUNTER SYMPTOMS
DEPRESSION: 0
OCCASIONAL FEELINGS OF UNSTEADINESS: 0
LOSS OF SENSATION IN FEET: 0

## 2023-11-29 ASSESSMENT — PAIN SCALES - GENERAL: PAINLEVEL: 4

## 2023-11-29 NOTE — PROGRESS NOTES
Narciso Marrero is a 57 y.o. male who presents for Chemotherapy.    He is on the following chemotherapy regimen:     Treatment Plans       Name Type Plan Dates Plan Provider         Active    Floxuridine Hepatic Arterial Infusion + mFOLFOX6, 14 Day Cycles Oncology Treatment  10/3/2023 - Present Laney Russ MD PhD                  .    Since his last visit, he has been doing well.  Overall, he states his energy level is stable.  His appetite has been unchanged.  He reports neuropathies - numbness and tingling BUE .  He has no other concerns.    Lines of note:  implanted port right chest intact.  Site Maintenance: Flushed and Positive blood return.  Line Removal: No, attached to a continuous infusion ambulatory pump.    AMY was accessed with no complications, documented output in flowsheet. Pt aware of follow up visit in [place] for pump disconnect on 12/1/23 at 3pm.     Patient tolerated infusions without any complications.  Discharged home with family in stable condition.

## 2023-11-29 NOTE — PROGRESS NOTES
"NUTRITION Follow-up NOTE    Nutrition Assessment     Reason for Visit:  Narciso Marrero is a 57 y.o. male who presents for taste changes and wt loss. Pt seen in infusion w/ wife, Emperatriz. PMHx: R hemicolectomy, open cholecystectomy, and peritonectomy. Current treatment: FOLFOX via MAY D1C5. Pt reports eating well and has wt gain. Appears well and intake has increased. Wt is down from 11/1/23, but stable over 1 month.    Lab Results   Component Value Date/Time    GLUCOSE 167 (H) 11/29/2023 1025     11/29/2023 1025    K 3.8 11/29/2023 1025     11/29/2023 1025    CO2 30 11/29/2023 1025    ANIONGAP 12 11/29/2023 1025    BUN 15 11/29/2023 1025    CREATININE 0.56 11/29/2023 1025    EGFR >90 11/29/2023 1025    CALCIUM 9.6 11/29/2023 1025    ALBUMIN 4.1 11/29/2023 1025    ALKPHOS 265 (H) 11/29/2023 1025    PROT 7.4 11/29/2023 1025    AST 45 (H) 11/29/2023 1025    BILITOT 0.6 11/29/2023 1025    ALT 50 11/29/2023 1025     No results found for: \"VITD25\"    Anthropometrics:  Anthropometrics  Height: 180.5 cm (5' 11.06\")  Weight: 95.8 kg (211 lb 1.6 oz)  BMI (Calculated): 29.39  Weight Change  Weight History / % Weight Change: States goal wt of 90.9kg  Significant Weight Loss: Yes  Interpretation of Weight Loss: >2% in 1 week (2.1% in ~2 weeks, but stable x1 month.)        Wt Readings from Last 10 Encounters:   12/01/23 95.8 kg (211 lb 3.2 oz)   11/29/23 95.8 kg (211 lb 1.6 oz)   11/29/23 95.8 kg (211 lb 1.6 oz)   11/17/23 97.9 kg (215 lb 13.3 oz)   11/16/23 95.8 kg (211 lb 3.2 oz)   11/15/23 95.8 kg (211 lb 3.2 oz)   11/15/23 95.8 kg (211 lb 3.2 oz)   11/01/23 97.7 kg (215 lb 6.2 oz)   11/01/23 97.7 kg (215 lb 6.2 oz)   10/24/23 97.5 kg (215 lb)        Food And Nutrient Intake:  Food and Nutrient History  Food and Nutrient History: Appetite is \"not bad\" and depends on the day - sometimes had days w/ only 1 meal every other week.  Energy Intake: Good > 75 %  Fluid Intake: 3-4 16 oz water  Food Allergy: None.  Food " "Intolerance: None.  GI Symptoms: constipation (Metamucil 1x/day - does endorse not being completely voided.)  GI Symptoms greater than 2 weeks: intermittent  Oral Problems: dysgeusia (Sugar Free Jolly rancher at night.)     Food Intake  Amount of Food: 3 meals/day  Meal 1: Egg and cheese sandwich  Meal 2: Chicken salad sandwich  Meal 3: Sausage gravy and waffle  Snacks: Pork rinds w/ dip                                       Micronutrient Intake  Vitamin Intake: Multivitamin, D (Endorses some issues swallowing pills.)    Food Supplement Intake  Oral Nutrition Supplements:  (1 large smoothie (1/2 cup strawberries, 1 cup blueberries, healthy balance juice, 1 scoop protein powder.) and 1 smaller smoothie w/ the same ingredients.)    Medication and Complementary/Alternative Medicine Use  Nutrition-Related Complementary/Alternative Medicine Use: Fish oil.      Nutrition Focused Physical Exam:  Subcutaneous Fat Loss  Orbital Fat Pads: Well nourshed (slightly bulging fat pads)  Buccal Fat Pads: Well nourished (full, rounded cheeks)  Triceps: Defer  Ribs: Defer  Muscle Wasting  Temporalis: Well nourished (well-defined muscle)  Pectoralis (Clavicular Region): Defer  Deltoid/Trapezius: Defer  Interosseous: Defer  Trapezius/Infraspinatus/Supraspinatus (Scapular Region): Defer  Quadriceps: Defer  Gastrocnemius: Defer  Edema  Edema: none  Physical Findings (Nutrition Deficiency/Toxicity)  Hair: Negative  Eyes: Negative  Skin: Negative        Energy Needs  Calculated Energy Needs Using Equations  Height: 180.5 cm (5' 11.06\")  Weight Used for Equation Calculations: 95.5 kg (210 lb 8.6 oz)  [place] Equation (Overweight or Obese Patients): 1798  Equation Chosen to Use by RD: Other (Comment) (kcals/kg IBW.)  Estimated Energy Needs  Total Energy Estimated Needs (kCal):  (6899-5968)  Method for Estimating Needs: 25-28 kcals/kg IBW.  Estimated Fluid Needs  Total Fluid Estimated Needs (mL): 2200 mL  Method for Estimating Needs: " 1 mL/kcal  Estimated Protein Needs  Total Protein Estimated Needs (g):  ()  Method for Estimating Needs: 1.3 g protein/kg IBW.  Weight and Growth Recommendation  Desired Growth Pattern: Maintain current wt.        Nutrition Diagnosis   Malnutrition Diagnosis  Patient has Malnutrition Diagnosis: No  Nutrition Diagnosis  Patient has Nutrition Diagnosis: Yes  Diagnosis Status (1): Ongoing  Nutrition Diagnosis 1: Increased nutrient needs  Related to (1): dx of colon cancer  As Evidenced by (1): increased metabolic demand d/t dx.    Nutrition Interventions/Recommendations   Food and Nutrition Delivery  Meals & Snacks: General Healthful Diet  Goals: Continue current intake levels >75% of days.  Medical Food Supplement: Other, Specify: (Homemade smoothie w/ Kraus protein powder. Also occasionally drinks Premier protein (160 kcals, 25 g protein).)  Goals: Drink 2 protein shakes/day >75% of days.  Coordination of Nutrition Care by a Nutrition Professional  Goals: Will continue to follow. Business card provided.        There are no Patient Instructions on file for this visit.    Nutrition Monitoring and Evaluation   Food/Nutrient Related History Monitoring  Monitoring and Evaluation Plan: Energy intake  Energy Intake: Estimated energy intake  Criteria: Meet EEN >75% of days.  Additional Plan: Homemade shake BID.        Time Spent  Prep time on day of patient encounter: 10 minutes  Time spent directly with patient, family or caregiver: 10 minutes  Additional Time Spent on Patient Care Activities: 0 minutes  Documentation Time: 25 minutes  Other Time Spent: 0 minutes  Total: 45 minutes        Readiness to Change : Good  Level of Understanding : Excellent  Anticipated Compliant : Excellent
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
## 2023-12-01 ENCOUNTER — INFUSION (OUTPATIENT)
Dept: HEMATOLOGY/ONCOLOGY | Facility: CLINIC | Age: 58
End: 2023-12-01
Payer: COMMERCIAL

## 2023-12-01 VITALS — BODY MASS INDEX: 29.55 KG/M2 | WEIGHT: 211.1 LBS | HEIGHT: 71 IN

## 2023-12-01 VITALS
OXYGEN SATURATION: 95 % | SYSTOLIC BLOOD PRESSURE: 122 MMHG | BODY MASS INDEX: 29.57 KG/M2 | DIASTOLIC BLOOD PRESSURE: 75 MMHG | HEART RATE: 106 BPM | HEIGHT: 71 IN | WEIGHT: 211.2 LBS | RESPIRATION RATE: 16 BRPM | TEMPERATURE: 97.7 F

## 2023-12-01 DIAGNOSIS — C18.2 MALIGNANT NEOPLASM OF ASCENDING COLON (MULTI): ICD-10-CM

## 2023-12-01 PROCEDURE — 96523 IRRIG DRUG DELIVERY DEVICE: CPT

## 2023-12-01 RX ORDER — HEPARIN SODIUM,PORCINE/PF 10 UNIT/ML
50 SYRINGE (ML) INTRAVENOUS AS NEEDED
Status: CANCELLED | OUTPATIENT
Start: 2023-12-01

## 2023-12-01 RX ORDER — HEPARIN 100 UNIT/ML
500 SYRINGE INTRAVENOUS AS NEEDED
Status: CANCELLED | OUTPATIENT
Start: 2023-12-01

## 2023-12-01 RX ORDER — HEPARIN 100 UNIT/ML
500 SYRINGE INTRAVENOUS AS NEEDED
Status: DISCONTINUED | OUTPATIENT
Start: 2023-12-01 | End: 2023-12-01 | Stop reason: HOSPADM

## 2023-12-01 ASSESSMENT — PAIN SCALES - GENERAL: PAINLEVEL: 3

## 2023-12-01 NOTE — PROGRESS NOTES
CADD pump disconnected, verified total volume infused.  + blood return noted, flushed with 10cc normal saline and 5cc 10 units heparin.  Lee needle removed and site covered with bandaid. Patient tolerated procedure well.     Patient tolerated treatment well, no reaction noted. Patient feels well and has no complaints, vital signs stable. Patient discharged in stable condition with no further needs.

## 2023-12-04 ENCOUNTER — TELEMEDICINE CLINICAL SUPPORT (OUTPATIENT)
Dept: ENDOCRINOLOGY | Facility: CLINIC | Age: 58
End: 2023-12-04
Payer: COMMERCIAL

## 2023-12-04 DIAGNOSIS — Z79.4 TYPE 2 DIABETES MELLITUS WITH HYPERGLYCEMIA, WITH LONG-TERM CURRENT USE OF INSULIN (MULTI): Primary | ICD-10-CM

## 2023-12-04 DIAGNOSIS — E11.65 TYPE 2 DIABETES MELLITUS WITH HYPERGLYCEMIA, WITH LONG-TERM CURRENT USE OF INSULIN (MULTI): Primary | ICD-10-CM

## 2023-12-04 PROCEDURE — 95251 CONT GLUC MNTR ANALYSIS I&R: CPT | Performed by: PHARMACIST

## 2023-12-04 PROCEDURE — 99211 OFF/OP EST MAY X REQ PHY/QHP: CPT | Performed by: PHARMACIST

## 2023-12-04 NOTE — PROGRESS NOTES
"Clinical Pharmacy Team met regarding a consultation for diabetes management thanks to a referral from Mariela Meyers CNP. Below is a summary of our conversation and recommendations:    Recommendations:  Continue current DM regimen. Can use sliding scale insulin every 2 hours to correct for highs during chemo days  2. Patient should continue to use GAGANDEEP 2 CGM to monitor glucose  ________________________________________________________________________    Diabetes Pharmacotherapy:      Lantus 22 units subcutaneous once daily at bedtime  Humalog 3 units subcutaneous with meals plus sliding scale of 3units:50mg/dL >150 mg/dL    Lab Review  Lab Results   Component Value Date    BILITOT 0.6 11/29/2023    CALCIUM 9.6 11/29/2023    CO2 30 11/29/2023     11/29/2023    CREATININE 0.56 11/29/2023    GLUCOSE 167 (H) 11/29/2023    ALKPHOS 265 (H) 11/29/2023    K 3.8 11/29/2023    PROT 7.4 11/29/2023     11/29/2023    AST 45 (H) 11/29/2023    ALT 50 11/29/2023    BUN 15 11/29/2023    ANIONGAP 12 11/29/2023    MG 1.54 (L) 10/12/2023    PHOS 3.0 10/10/2023     10/06/2023    ALBUMIN 4.1 11/29/2023    GFRMALE >90 09/20/2023     No results found for: \"TRIG\", \"CHOL\", \"LDLCALC\", \"HDL\"  Lab Results   Component Value Date    HGBA1C 6.6 (A) 10/24/2023    HGBA1C 9.4 (A) 05/05/2023     Monitoring         Assessment/Plan     The patient reports today for a diabetes consultation. He has a history of stage 4 colon cancer with metastasis to the liver. Current chemo regimen per patient consists of systemic chemo with steroids as well as pump/port directed chemo every other Wednesday-Friday. Reviewed CGM report and discussed it with the patient. TIR is doing well and very close to goal of 70%. He has glucose elevations during chemo days. Discussed this with the patient. We discussed giving more correction doses via his sliding scale in general but particularly during his chemo days. Educated the patient that he can give " correction doses per his sliding scale every 2 hours as needed which he was agreeable to. Recommend no changes to his set humalog or lantus dose at this time. He is working on eating more often. Reports drinking more premier protein shakes. Informed the patient that if he has just the shake not to cover with insulin due to very low carb content. If he has it with a carb based food item then he is to cover for his meal    Answered all patient questions. The patient understands and was agreeable to these recommendations.    PATIENT EDUCATION/GOALS    Goals  Fasting B - 130 mg/dL  Postprandial BG: less than 180 mg/dL  A1c: less than 7%    Type of encounter: [ virtual ]    Provided counseling on lifestyle modifications, medications, and self-monitoring. Patient has no additional questions at this time. Pharmacy to follow up in 6 months. Please reach out with any questions. Thank you.       Marisa Parry, PharmD    Provider on site: Dr. Juani Burgess  Continue all meds under the continuation of care with the referring provider and clinical pharmacy team.

## 2023-12-11 ENCOUNTER — LAB (OUTPATIENT)
Dept: HEMATOLOGY/ONCOLOGY | Facility: HOSPITAL | Age: 58
End: 2023-12-11
Payer: COMMERCIAL

## 2023-12-11 ENCOUNTER — NUTRITION (OUTPATIENT)
Dept: HEMATOLOGY/ONCOLOGY | Facility: HOSPITAL | Age: 58
End: 2023-12-11

## 2023-12-11 ENCOUNTER — OFFICE VISIT (OUTPATIENT)
Dept: HEMATOLOGY/ONCOLOGY | Facility: HOSPITAL | Age: 58
End: 2023-12-11
Payer: COMMERCIAL

## 2023-12-11 ENCOUNTER — APPOINTMENT (OUTPATIENT)
Dept: HEMATOLOGY/ONCOLOGY | Facility: HOSPITAL | Age: 58
End: 2023-12-11
Payer: COMMERCIAL

## 2023-12-11 ENCOUNTER — INFUSION (OUTPATIENT)
Dept: HEMATOLOGY/ONCOLOGY | Facility: HOSPITAL | Age: 58
End: 2023-12-11
Payer: COMMERCIAL

## 2023-12-11 VITALS — BODY MASS INDEX: 29.75 KG/M2 | WEIGHT: 212.5 LBS | HEIGHT: 71 IN

## 2023-12-11 VITALS
SYSTOLIC BLOOD PRESSURE: 147 MMHG | TEMPERATURE: 98.1 F | BODY MASS INDEX: 29.59 KG/M2 | WEIGHT: 212.5 LBS | DIASTOLIC BLOOD PRESSURE: 88 MMHG | RESPIRATION RATE: 16 BRPM | HEART RATE: 85 BPM | OXYGEN SATURATION: 97 %

## 2023-12-11 DIAGNOSIS — C18.2 MALIGNANT NEOPLASM OF ASCENDING COLON (MULTI): Primary | Chronic | ICD-10-CM

## 2023-12-11 DIAGNOSIS — Z51.11 ENCOUNTER FOR ANTINEOPLASTIC CHEMOTHERAPY: ICD-10-CM

## 2023-12-11 DIAGNOSIS — G89.3 NEOPLASM RELATED PAIN: ICD-10-CM

## 2023-12-11 DIAGNOSIS — C18.2 MALIGNANT NEOPLASM OF ASCENDING COLON (MULTI): Chronic | ICD-10-CM

## 2023-12-11 DIAGNOSIS — R10.9 ABDOMINAL CRAMPING: ICD-10-CM

## 2023-12-11 DIAGNOSIS — C18.2 MALIGNANT NEOPLASM OF ASCENDING COLON (MULTI): ICD-10-CM

## 2023-12-11 LAB
ALBUMIN SERPL BCP-MCNC: 3.9 G/DL (ref 3.4–5)
ALP SERPL-CCNC: 279 U/L (ref 33–120)
ALT SERPL W P-5'-P-CCNC: 52 U/L (ref 10–52)
ANION GAP SERPL CALC-SCNC: 15 MMOL/L (ref 10–20)
AST SERPL W P-5'-P-CCNC: 42 U/L (ref 9–39)
BASOPHILS # BLD AUTO: 0.03 X10*3/UL (ref 0–0.1)
BASOPHILS NFR BLD AUTO: 0.3 %
BILIRUB SERPL-MCNC: 0.4 MG/DL (ref 0–1.2)
BUN SERPL-MCNC: 23 MG/DL (ref 6–23)
CALCIUM SERPL-MCNC: 9.6 MG/DL (ref 8.6–10.3)
CEA SERPL-MCNC: 1571.6 UG/L
CHLORIDE SERPL-SCNC: 100 MMOL/L (ref 98–107)
CO2 SERPL-SCNC: 28 MMOL/L (ref 21–32)
CREAT SERPL-MCNC: 0.57 MG/DL (ref 0.5–1.3)
EOSINOPHIL # BLD AUTO: 0.08 X10*3/UL (ref 0–0.7)
EOSINOPHIL NFR BLD AUTO: 0.9 %
ERYTHROCYTE [DISTWIDTH] IN BLOOD BY AUTOMATED COUNT: 14.6 % (ref 11.5–14.5)
GFR SERPL CREATININE-BSD FRML MDRD: >90 ML/MIN/1.73M*2
GLUCOSE SERPL-MCNC: 186 MG/DL (ref 74–99)
HCT VFR BLD AUTO: 37.3 % (ref 41–52)
HGB BLD-MCNC: 12.6 G/DL (ref 13.5–17.5)
IMM GRANULOCYTES # BLD AUTO: 0.05 X10*3/UL (ref 0–0.7)
IMM GRANULOCYTES NFR BLD AUTO: 0.5 % (ref 0–0.9)
LYMPHOCYTES # BLD AUTO: 1.57 X10*3/UL (ref 1.2–4.8)
LYMPHOCYTES NFR BLD AUTO: 16.9 %
MCH RBC QN AUTO: 31.3 PG (ref 26–34)
MCHC RBC AUTO-ENTMCNC: 33.8 G/DL (ref 32–36)
MCV RBC AUTO: 93 FL (ref 80–100)
MONOCYTES # BLD AUTO: 0.86 X10*3/UL (ref 0.1–1)
MONOCYTES NFR BLD AUTO: 9.3 %
NEUTROPHILS # BLD AUTO: 6.69 X10*3/UL (ref 1.2–7.7)
NEUTROPHILS NFR BLD AUTO: 72.1 %
NRBC BLD-RTO: 0 /100 WBCS (ref 0–0)
PLATELET # BLD AUTO: 167 X10*3/UL (ref 150–450)
POTASSIUM SERPL-SCNC: 3.7 MMOL/L (ref 3.5–5.3)
PROT SERPL-MCNC: 7.1 G/DL (ref 6.4–8.2)
RBC # BLD AUTO: 4.03 X10*6/UL (ref 4.5–5.9)
SODIUM SERPL-SCNC: 139 MMOL/L (ref 136–145)
WBC # BLD AUTO: 9.3 X10*3/UL (ref 4.4–11.3)

## 2023-12-11 PROCEDURE — 3008F BODY MASS INDEX DOCD: CPT | Performed by: STUDENT IN AN ORGANIZED HEALTH CARE EDUCATION/TRAINING PROGRAM

## 2023-12-11 PROCEDURE — 96375 TX/PRO/DX INJ NEW DRUG ADDON: CPT | Mod: INF

## 2023-12-11 PROCEDURE — 96523 IRRIG DRUG DELIVERY DEVICE: CPT

## 2023-12-11 PROCEDURE — 85025 COMPLETE CBC W/AUTO DIFF WBC: CPT

## 2023-12-11 PROCEDURE — 99215 OFFICE O/P EST HI 40 MIN: CPT | Mod: 25 | Performed by: STUDENT IN AN ORGANIZED HEALTH CARE EDUCATION/TRAINING PROGRAM

## 2023-12-11 PROCEDURE — 36591 DRAW BLOOD OFF VENOUS DEVICE: CPT

## 2023-12-11 PROCEDURE — 82378 CARCINOEMBRYONIC ANTIGEN: CPT

## 2023-12-11 PROCEDURE — 96413 CHEMO IV INFUSION 1 HR: CPT

## 2023-12-11 PROCEDURE — 96415 CHEMO IV INFUSION ADDL HR: CPT

## 2023-12-11 PROCEDURE — 2500000004 HC RX 250 GENERAL PHARMACY W/ HCPCS (ALT 636 FOR OP/ED): Performed by: STUDENT IN AN ORGANIZED HEALTH CARE EDUCATION/TRAINING PROGRAM

## 2023-12-11 PROCEDURE — 84075 ASSAY ALKALINE PHOSPHATASE: CPT

## 2023-12-11 PROCEDURE — A4216 STERILE WATER/SALINE, 10 ML: HCPCS | Performed by: STUDENT IN AN ORGANIZED HEALTH CARE EDUCATION/TRAINING PROGRAM

## 2023-12-11 PROCEDURE — 1036F TOBACCO NON-USER: CPT | Performed by: STUDENT IN AN ORGANIZED HEALTH CARE EDUCATION/TRAINING PROGRAM

## 2023-12-11 PROCEDURE — 96416 CHEMO PROLONG INFUSE W/PUMP: CPT

## 2023-12-11 PROCEDURE — 99215 OFFICE O/P EST HI 40 MIN: CPT | Performed by: STUDENT IN AN ORGANIZED HEALTH CARE EDUCATION/TRAINING PROGRAM

## 2023-12-11 RX ORDER — PROCHLORPERAZINE EDISYLATE 5 MG/ML
10 INJECTION INTRAMUSCULAR; INTRAVENOUS EVERY 6 HOURS PRN
Status: DISCONTINUED | OUTPATIENT
Start: 2023-12-11 | End: 2023-12-11 | Stop reason: HOSPADM

## 2023-12-11 RX ORDER — DIPHENHYDRAMINE HYDROCHLORIDE 50 MG/ML
50 INJECTION INTRAMUSCULAR; INTRAVENOUS AS NEEDED
Status: DISCONTINUED | OUTPATIENT
Start: 2023-12-11 | End: 2023-12-11 | Stop reason: HOSPADM

## 2023-12-11 RX ORDER — HEPARIN SODIUM,PORCINE/PF 10 UNIT/ML
50 SYRINGE (ML) INTRAVENOUS AS NEEDED
Status: CANCELLED | OUTPATIENT
Start: 2023-12-11

## 2023-12-11 RX ORDER — HEPARIN 100 UNIT/ML
500 SYRINGE INTRAVENOUS AS NEEDED
Status: CANCELLED | OUTPATIENT
Start: 2023-12-11

## 2023-12-11 RX ORDER — PALONOSETRON 0.05 MG/ML
0.25 INJECTION, SOLUTION INTRAVENOUS ONCE
Status: CANCELLED | OUTPATIENT
Start: 2023-12-11

## 2023-12-11 RX ORDER — EPINEPHRINE 0.3 MG/.3ML
0.3 INJECTION SUBCUTANEOUS EVERY 5 MIN PRN
Status: CANCELLED | OUTPATIENT
Start: 2023-12-11

## 2023-12-11 RX ORDER — DICYCLOMINE HYDROCHLORIDE 10 MG/1
10 CAPSULE ORAL 4 TIMES DAILY
Qty: 120 CAPSULE | Refills: 11 | Status: SHIPPED | OUTPATIENT
Start: 2023-12-11 | End: 2024-12-10

## 2023-12-11 RX ORDER — FAMOTIDINE 10 MG/ML
20 INJECTION INTRAVENOUS ONCE AS NEEDED
Status: DISCONTINUED | OUTPATIENT
Start: 2023-12-11 | End: 2023-12-11 | Stop reason: HOSPADM

## 2023-12-11 RX ORDER — PROCHLORPERAZINE MALEATE 10 MG
10 TABLET ORAL EVERY 6 HOURS PRN
Status: CANCELLED | OUTPATIENT
Start: 2023-12-11

## 2023-12-11 RX ORDER — PROCHLORPERAZINE MALEATE 10 MG
10 TABLET ORAL EVERY 6 HOURS PRN
Status: DISCONTINUED | OUTPATIENT
Start: 2023-12-11 | End: 2023-12-11 | Stop reason: HOSPADM

## 2023-12-11 RX ORDER — PROCHLORPERAZINE EDISYLATE 5 MG/ML
10 INJECTION INTRAMUSCULAR; INTRAVENOUS EVERY 6 HOURS PRN
Status: CANCELLED | OUTPATIENT
Start: 2023-12-11

## 2023-12-11 RX ORDER — ALBUTEROL SULFATE 0.83 MG/ML
3 SOLUTION RESPIRATORY (INHALATION) AS NEEDED
Status: CANCELLED | OUTPATIENT
Start: 2023-12-11

## 2023-12-11 RX ORDER — DIPHENHYDRAMINE HYDROCHLORIDE 50 MG/ML
50 INJECTION INTRAMUSCULAR; INTRAVENOUS AS NEEDED
Status: CANCELLED | OUTPATIENT
Start: 2023-12-11

## 2023-12-11 RX ORDER — PALONOSETRON 0.05 MG/ML
0.25 INJECTION, SOLUTION INTRAVENOUS ONCE
Status: COMPLETED | OUTPATIENT
Start: 2023-12-11 | End: 2023-12-11

## 2023-12-11 RX ORDER — EPINEPHRINE 0.3 MG/.3ML
0.3 INJECTION SUBCUTANEOUS EVERY 5 MIN PRN
Status: DISCONTINUED | OUTPATIENT
Start: 2023-12-11 | End: 2023-12-11 | Stop reason: HOSPADM

## 2023-12-11 RX ORDER — LORAZEPAM 2 MG/ML
1 INJECTION INTRAMUSCULAR AS NEEDED
Status: CANCELLED | OUTPATIENT
Start: 2023-12-11

## 2023-12-11 RX ORDER — HEPARIN SODIUM,PORCINE/PF 10 UNIT/ML
50 SYRINGE (ML) INTRAVENOUS AS NEEDED
Status: DISCONTINUED | OUTPATIENT
Start: 2023-12-11 | End: 2023-12-11 | Stop reason: HOSPADM

## 2023-12-11 RX ORDER — HEPARIN 100 UNIT/ML
500 SYRINGE INTRAVENOUS AS NEEDED
Status: DISCONTINUED | OUTPATIENT
Start: 2023-12-11 | End: 2023-12-11 | Stop reason: HOSPADM

## 2023-12-11 RX ORDER — ALBUTEROL SULFATE 0.83 MG/ML
3 SOLUTION RESPIRATORY (INHALATION) AS NEEDED
Status: DISCONTINUED | OUTPATIENT
Start: 2023-12-11 | End: 2023-12-11 | Stop reason: HOSPADM

## 2023-12-11 RX ORDER — FAMOTIDINE 10 MG/ML
20 INJECTION INTRAVENOUS ONCE AS NEEDED
Status: CANCELLED | OUTPATIENT
Start: 2023-12-11

## 2023-12-11 RX ORDER — LORAZEPAM 2 MG/ML
1 INJECTION INTRAMUSCULAR AS NEEDED
Status: DISCONTINUED | OUTPATIENT
Start: 2023-12-11 | End: 2023-12-11 | Stop reason: HOSPADM

## 2023-12-11 RX ADMIN — PALONOSETRON HYDROCHLORIDE 250 MCG: 0.25 INJECTION INTRAVENOUS at 15:17

## 2023-12-11 RX ADMIN — OXALIPLATIN 140 MG: 5 INJECTION, SOLUTION INTRAVENOUS at 15:44

## 2023-12-11 RX ADMIN — DEXAMETHASONE SODIUM PHOSPHATE 12 MG: 10 INJECTION, SOLUTION INTRAMUSCULAR; INTRAVENOUS at 15:17

## 2023-12-11 RX ADMIN — FLUOROURACIL 4000 MG: 50 INJECTION, SOLUTION INTRAVENOUS at 17:49

## 2023-12-11 RX ADMIN — HEPARIN SODIUM: 20000 INJECTION, SOLUTION INTRAVENOUS; SUBCUTANEOUS at 16:14

## 2023-12-11 ASSESSMENT — PATIENT HEALTH QUESTIONNAIRE - PHQ9
2. FEELING DOWN, DEPRESSED OR HOPELESS: NOT AT ALL
SUM OF ALL RESPONSES TO PHQ9 QUESTIONS 1 AND 2: 0
1. LITTLE INTEREST OR PLEASURE IN DOING THINGS: NOT AT ALL

## 2023-12-11 ASSESSMENT — PAIN SCALES - GENERAL: PAINLEVEL: 0-NO PAIN

## 2023-12-11 ASSESSMENT — COLUMBIA-SUICIDE SEVERITY RATING SCALE - C-SSRS
6. HAVE YOU EVER DONE ANYTHING, STARTED TO DO ANYTHING, OR PREPARED TO DO ANYTHING TO END YOUR LIFE?: NO
1. IN THE PAST MONTH, HAVE YOU WISHED YOU WERE DEAD OR WISHED YOU COULD GO TO SLEEP AND NOT WAKE UP?: NO
2. HAVE YOU ACTUALLY HAD ANY THOUGHTS OF KILLING YOURSELF?: NO

## 2023-12-11 ASSESSMENT — ENCOUNTER SYMPTOMS
DEPRESSION: 0
LOSS OF SENSATION IN FEET: 0
OCCASIONAL FEELINGS OF UNSTEADINESS: 0

## 2023-12-11 NOTE — PROGRESS NOTES
"NUTRITION Follow-up NOTE    Nutrition Assessment     Reason for Visit:  Narciso Marrero is a 58 y.o. male who presents for taste changes and wt loss. Pt seen in infusion w/ wife, Emperatriz. PMHx: R hemicolectomy, open cholecystectomy, and peritonectomy. Current treatment: FOLFOX via AMY D1C6. Appears well and intake has increased. Wt is up over the last 2 weeks.    Lab Results   Component Value Date/Time    GLUCOSE 186 (H) 12/11/2023 1325     12/11/2023 1325    K 3.7 12/11/2023 1325     12/11/2023 1325    CO2 28 12/11/2023 1325    ANIONGAP 15 12/11/2023 1325    BUN 23 12/11/2023 1325    CREATININE 0.57 12/11/2023 1325    EGFR >90 12/11/2023 1325    CALCIUM 9.6 12/11/2023 1325    ALBUMIN 3.9 12/11/2023 1325    ALKPHOS 279 (H) 12/11/2023 1325    PROT 7.1 12/11/2023 1325    AST 42 (H) 12/11/2023 1325    BILITOT 0.4 12/11/2023 1325    ALT 52 12/11/2023 1325     No results found for: \"VITD25\"    All pertinent labs have been reviewed.     Anthropometrics:  Anthropometrics  Height: 180.5 cm (5' 11.06\")  Weight: 96.4 kg (212 lb 8 oz)  BMI (Calculated): 29.59  Weight Change  Weight History / % Weight Change: States goal wt of 90.9kg (Wt is increased over the last ~2 weeks.)  Significant Weight Loss: No        Wt Readings from Last 10 Encounters:   12/11/23 96.4 kg (212 lb 8 oz)   12/11/23 96.4 kg (212 lb 8 oz)   12/01/23 95.8 kg (211 lb 3.2 oz)   11/29/23 95.8 kg (211 lb 1.6 oz)   11/29/23 95.8 kg (211 lb 1.6 oz)   11/17/23 97.9 kg (215 lb 13.3 oz)   11/16/23 95.8 kg (211 lb 3.2 oz)   11/15/23 95.8 kg (211 lb 3.2 oz)   11/15/23 95.8 kg (211 lb 3.2 oz)   11/01/23 97.7 kg (215 lb 6.2 oz)        Food And Nutrient Intake:  Food and Nutrient History  Food and Nutrient History: Appetite is good  Energy Intake: Good > 75 %  Fluid Intake: 3-4 16 oz water  GI Symptoms: none (States cramping after BM - medications on the way as of 12/11/23.)  Oral Problems: dysgeusia     Food Intake  Amount of Food: 3 meals/day  Meal 1: " "Pizza, peppers, sauage, pepperoni x4 squares  Meal 2: Pizza, pepperoni  Meal 3: Italian sausages, hominey                                       Micronutrient Intake  Vitamin Intake: Multivitamin, D    Food Supplement Intake  Oral Nutrition Supplements:  (2-3 homemade shakes. 3 Premiers per day (PB&Chocolate))    Medication and Complementary/Alternative Medicine Use  Nutrition-Related Complementary/Alternative Medicine Use: Fish oil.      Nutrition Focused Physical Exam Findings:  defer: Pt seen in infusion    Subcutaneous Fat Loss  Orbital Fat Pads: Well nourshed (slightly bulging fat pads)  Buccal Fat Pads: Well nourished (full, rounded cheeks)    Muscle Wasting  Temporalis: Well nourished (well-defined muscle)              Energy Needs  Calculated Energy Needs Using Equations  Height: 180.5 cm (5' 11.06\")  Estimated Energy Needs  Total Energy Estimated Needs (kCal):  (8407-5721)  Total Estimated Energy Need per Day (kCal/kg):  (25-28)  Method for Estimating Needs: IBW  Estimated Fluid Needs  Total Fluid Estimated Needs (mL): 2200 mL  Method for Estimating Needs: 1 mL/kcal  Estimated Protein Needs  Total Protein Estimated Needs (g):  ()  Total Protein Estimated Needs (g/kg):  (1.3)  Method for Estimating Needs: IBW  Weight and Growth Recommendation  Desired Growth Pattern: Maintain current wt.        Nutrition Diagnosis   Malnutrition Diagnosis  Patient has Malnutrition Diagnosis: No    Nutrition Diagnosis  Patient has Nutrition Diagnosis: Yes  Diagnosis Status (1): Ongoing  Nutrition Diagnosis 1: Increased nutrient needs  Related to (1): dx of colon cancer  As Evidenced by (1): increased metabolic demand d/t dx.    Nutrition Interventions/Recommendations   Food and Nutrition Delivery  Food and Nutrition Delivery  Meals & Snacks: General Healthful Diet  Goals: Continue current intake levels >75% of days.  Medical Food Supplement: Other, Specify: (Homemade smoothie w/ Kraus protein powder. Also occasionally " drinks Premier protein (160 kcals, 25 g protein).)  Goals: Drink 3 protein shakes/day >75% of days.    Nutrition Education       Coordination of Care  Coordination of Nutrition Care by a Nutrition Professional  Goals: Will continue to follow. Business card provided.    There are no Patient Instructions on file for this visit.    Nutrition Monitoring and Evaluation   Food/Nutrient Related History Monitoring  Monitoring and Evaluation Plan: Energy intake  Energy Intake: Estimated energy intake  Criteria: Meet EEN >75% of days.  Additional Plan: Protein Shake TID        Time Spent  Prep time on day of patient encounter: 5 minutes  Time spent directly with patient, family or caregiver: 10 minutes  Additional Time Spent on Patient Care Activities: 0 minutes  Documentation Time: 25 minutes  Other Time Spent: 0 minutes  Total: 40 minutes        Readiness to Change : Excellent  Level of Understanding : Excellent  Anticipated Compliant : Excellent

## 2023-12-13 ENCOUNTER — INFUSION (OUTPATIENT)
Dept: HEMATOLOGY/ONCOLOGY | Facility: HOSPITAL | Age: 58
End: 2023-12-13
Payer: COMMERCIAL

## 2023-12-13 VITALS
HEART RATE: 99 BPM | WEIGHT: 213.19 LBS | SYSTOLIC BLOOD PRESSURE: 145 MMHG | DIASTOLIC BLOOD PRESSURE: 85 MMHG | BODY MASS INDEX: 29.68 KG/M2 | OXYGEN SATURATION: 98 % | RESPIRATION RATE: 18 BRPM | TEMPERATURE: 97.5 F

## 2023-12-13 DIAGNOSIS — C18.2 MALIGNANT NEOPLASM OF ASCENDING COLON (MULTI): ICD-10-CM

## 2023-12-13 PROCEDURE — 2500000004 HC RX 250 GENERAL PHARMACY W/ HCPCS (ALT 636 FOR OP/ED): Performed by: STUDENT IN AN ORGANIZED HEALTH CARE EDUCATION/TRAINING PROGRAM

## 2023-12-13 PROCEDURE — 96523 IRRIG DRUG DELIVERY DEVICE: CPT

## 2023-12-13 RX ORDER — HEPARIN SODIUM,PORCINE/PF 10 UNIT/ML
50 SYRINGE (ML) INTRAVENOUS AS NEEDED
Status: CANCELLED | OUTPATIENT
Start: 2023-12-13

## 2023-12-13 RX ORDER — HEPARIN 100 UNIT/ML
500 SYRINGE INTRAVENOUS AS NEEDED
Status: CANCELLED | OUTPATIENT
Start: 2023-12-13

## 2023-12-13 RX ORDER — HEPARIN 100 UNIT/ML
500 SYRINGE INTRAVENOUS AS NEEDED
Status: DISCONTINUED | OUTPATIENT
Start: 2023-12-13 | End: 2023-12-13 | Stop reason: HOSPADM

## 2023-12-13 RX ADMIN — HEPARIN 500 UNITS: 100 SYRINGE at 16:16

## 2023-12-13 ASSESSMENT — PAIN SCALES - GENERAL: PAINLEVEL: 3

## 2023-12-19 ENCOUNTER — HOSPITAL ENCOUNTER (OUTPATIENT)
Dept: RADIOLOGY | Facility: HOSPITAL | Age: 58
Discharge: HOME | End: 2023-12-19
Payer: COMMERCIAL

## 2023-12-19 DIAGNOSIS — C18.2 MALIGNANT NEOPLASM OF ASCENDING COLON (MULTI): Chronic | ICD-10-CM

## 2023-12-19 PROCEDURE — 74177 CT ABD & PELVIS W/CONTRAST: CPT | Performed by: STUDENT IN AN ORGANIZED HEALTH CARE EDUCATION/TRAINING PROGRAM

## 2023-12-19 PROCEDURE — 2550000001 HC RX 255 CONTRASTS: Performed by: STUDENT IN AN ORGANIZED HEALTH CARE EDUCATION/TRAINING PROGRAM

## 2023-12-19 PROCEDURE — 71260 CT THORAX DX C+: CPT

## 2023-12-19 PROCEDURE — 71260 CT THORAX DX C+: CPT | Performed by: STUDENT IN AN ORGANIZED HEALTH CARE EDUCATION/TRAINING PROGRAM

## 2023-12-19 RX ADMIN — IOHEXOL 75 ML: 350 INJECTION, SOLUTION INTRAVENOUS at 11:14

## 2023-12-21 ENCOUNTER — TELEMEDICINE (OUTPATIENT)
Dept: HEMATOLOGY/ONCOLOGY | Facility: CLINIC | Age: 58
End: 2023-12-21
Payer: COMMERCIAL

## 2023-12-21 DIAGNOSIS — C18.2 MALIGNANT NEOPLASM OF ASCENDING COLON (MULTI): Primary | Chronic | ICD-10-CM

## 2023-12-21 PROBLEM — Z51.11 ENCOUNTER FOR ANTINEOPLASTIC CHEMOTHERAPY: Chronic | Status: ACTIVE | Noted: 2023-12-21

## 2023-12-21 PROBLEM — G89.3 NEOPLASM RELATED PAIN: Status: ACTIVE | Noted: 2023-12-21

## 2023-12-21 PROCEDURE — 99215 OFFICE O/P EST HI 40 MIN: CPT | Performed by: STUDENT IN AN ORGANIZED HEALTH CARE EDUCATION/TRAINING PROGRAM

## 2023-12-21 RX ORDER — ONDANSETRON HYDROCHLORIDE 8 MG/1
8 TABLET, FILM COATED ORAL EVERY 8 HOURS PRN
Qty: 30 TABLET | Refills: 5 | Status: SHIPPED
Start: 2023-12-21 | End: 2024-03-21 | Stop reason: HOSPADM

## 2023-12-21 RX ORDER — DIPHENHYDRAMINE HYDROCHLORIDE 50 MG/ML
50 INJECTION INTRAMUSCULAR; INTRAVENOUS AS NEEDED
Status: CANCELLED | OUTPATIENT
Start: 2023-12-26

## 2023-12-21 RX ORDER — PALONOSETRON 0.05 MG/ML
0.25 INJECTION, SOLUTION INTRAVENOUS ONCE
Status: CANCELLED | OUTPATIENT
Start: 2023-12-26

## 2023-12-21 RX ORDER — LOPERAMIDE HYDROCHLORIDE 2 MG/1
CAPSULE ORAL
Qty: 100 CAPSULE | Refills: 2 | Status: SHIPPED
Start: 2023-12-21 | End: 2024-02-26 | Stop reason: WASHOUT

## 2023-12-21 RX ORDER — PROCHLORPERAZINE MALEATE 10 MG
10 TABLET ORAL EVERY 6 HOURS PRN
Qty: 30 TABLET | Refills: 5 | Status: SHIPPED
Start: 2023-12-21 | End: 2024-01-09 | Stop reason: ALTCHOICE

## 2023-12-21 RX ORDER — FAMOTIDINE 10 MG/ML
20 INJECTION INTRAVENOUS ONCE AS NEEDED
Status: CANCELLED | OUTPATIENT
Start: 2023-12-26

## 2023-12-21 RX ORDER — EPINEPHRINE 0.3 MG/.3ML
0.3 INJECTION SUBCUTANEOUS EVERY 5 MIN PRN
Status: CANCELLED | OUTPATIENT
Start: 2023-12-26

## 2023-12-21 RX ORDER — ALBUTEROL SULFATE 0.83 MG/ML
3 SOLUTION RESPIRATORY (INHALATION) AS NEEDED
Status: CANCELLED | OUTPATIENT
Start: 2023-12-26

## 2023-12-21 RX ORDER — ATROPINE SULFATE 0.4 MG/ML
0.4 INJECTION, SOLUTION ENDOTRACHEAL; INTRAMEDULLARY; INTRAMUSCULAR; INTRAVENOUS; SUBCUTANEOUS
Status: CANCELLED | OUTPATIENT
Start: 2023-12-26

## 2023-12-21 RX ORDER — PROCHLORPERAZINE EDISYLATE 5 MG/ML
10 INJECTION INTRAMUSCULAR; INTRAVENOUS EVERY 6 HOURS PRN
Status: CANCELLED | OUTPATIENT
Start: 2023-12-26

## 2023-12-21 RX ORDER — PROCHLORPERAZINE MALEATE 10 MG
10 TABLET ORAL EVERY 6 HOURS PRN
Status: CANCELLED | OUTPATIENT
Start: 2023-12-26

## 2023-12-21 ASSESSMENT — PAIN SCALES - GENERAL: PAINLEVEL: 3

## 2023-12-21 NOTE — PROGRESS NOTES
Cancer History:  DIAGNOSIS: Ascending colon cancer     STAGING: IV     CURRENT SITES OF DISEASE:  Ascending colon, liver, peritoneal      MOLECULAR/GENOMICS:  Per report: [facility] showed KRAS mutation     ctDNA Signatera  4/17/23: 0  5/17/23: 1.99  5/31/23: 28.84  6/14/23: 4.20  6/28/23: 9.55  7/12/23: 24.03  7/26/23: 40.53  8/23/23: 9.75  9/20/23: 3.67  10/18/23: 19.35     CEA:  2/1/23: 4400  3/1/23: 4661  3/15/23: 3061  3/29/23: 2363  4/12/23: 1529  5/17/23: 235.9  5/31/23: 238.3  6/14/23: 225  6/28/23: 181  7/12/23: 171  7/26/23: 266  8/9/23: 392  8/23/23: 718  9/6/23: 800  9/20/23: 905   10/18/23: >100  11/1/23: 613  11/15/23: 1249        CURRENT THERAPY:  AMY pump placed 5/3/23. Flow rate 1.2 mL/day. (Serial # [id number]).First FUdR via AMY pump 5/17/23. Systemic chemotherapy being held due to wound vac, Started FOLFOX on 8/9/23        PREVIOUS THERAPY:  1/2023-4/12/23: FOLFOX x6 cycles. First 2 cycles with bevacizumab     ONCOLOGIC ISSUES:  Shingles  Midline incision dehisced      PROVIDERS:  Surg onc: Rajinder Arenas  CRS: Regina Raymundo  Local med onc: Dia Kendall     HISTORY:   1/2023: Presented with 20 pound unexpected weight loss and RUQ pain. Imaging showed large liver lesion. Biopsy c/w metastatic colon cancer. C-scope showed infiltrative, ulcerated, fungating mass in ascending colon.   Baseline CEA 2322. Started FOLFOX + bevacizumab  3/16/23: MRI chest and liver showed no evidence of metastatic disease in the chest. Focal circumferential wall thickening   5/3/23: AMY pump placed, right hemicolectomy, open cholecystectomy and peritonectomy     SH: , lives with wife. Former tobacco, quit in 2017, no illicits or EtOH     PMH: physical trauma on R side of body with multiple fractures, HLD     FH: Father and daughter with cancer       Subjective:   Feeling well, tolerating chemotherapy, he is having some numbness in his fingers and feet.      No fevers, chills, chest pain, shortness of breath, nausea,  vomiting, rashes or masses.     ROS as above. Remainder of 10 point review of systems elicited and otherwise unremarkable.         Objective:    There were no vitals taken for this visit.     Physical Exam:   Gen: A&O, NAD  Head: Normocephalic, atraumatic  Eyes: no scleral icterus  ENT: mucous membranes moist, no oropharyngeal lesions  Resp: No respiratory distress  Neuro: CNII-XII grossly intact  Psych: appropriate mood & affect  Skin: no apparent rashes     ECOG Performance Status: 1    CT C/A/P 12/19/23:  CHEST:  1.  Compared with the CT from 10/03/2023, there has been interval  increase in size and number of several pulmonary nodules as described  above, concerning for progressive metastasis.  2. No suspicious thoracic adenopathy.      ABDOMEN-PELVIS:  1.  Unchanged predominantly right hepatic lobe  hypoenhancing/hypoattenuating lesions and masses, with the largest  7.5 x 6.3 cm. No new lesions identified.  2. Multiple unchanged to mildly enlarged peritoneal deposits as  described above, compatible with peritoneal carcinomatosis.  3. Punctate left upper pole nonobstructing renal calculus. No  hydronephrosis.  4. Other stable chronic findings as described above.    Assessment & Plan:  Mr. Marrero is a 57yoM with metastatic ascending colon cancer to the liver, MMR-intact, KRAS-mutant.      He was diagnosed in Jan 2023 after presenting with abdominal pain and unintentional weight loss. Imaging showed liver mass, biopsy showed adenocarcinoma of colonic origin. C-scope showed mass in ascending colon. He started palliative chemotherapy with FOLFOX + bevacizumab and has received 6 cycles total, ryan d/c after 2 cycles.      I discussed with him at length the risks and benefits to floxuridine chemotherapy delivered via hepatic artery infusion pump in combination with systemic chemotherapy with FOLFOX. I discussed that the goals of AMY therapy are to delay progression of disease and to prolong survival.      Risks of AMY  therapy include but are not limited to:   - Gastritis if extrahepatic perfusion (will be mitigated by ensuring no extrahepatic perfusion on Tc99-MAA scan and by prophylactic H2 antagonist  - Biliary sclerosis  - Seroma, hematoma overlying pump site  - Infection at pump site     I discussed that he must call us immediately if she develops abdominal pain, redness at pump site, fever, or jaundice. Avoid hot packs/heating pads over the site and avoid hot tubs, as temperature changes to the pump can affect pump flow rate. Similarly, avoid changes in atmospheric pressure, and notify AMY team if prolonged air travel or travel to different atmospheric pressures is anticipated.      AMY pump placed on 5/3/23, Intera 3000 serial number [id number].      5/31/23: Midline incision dehisced. Hep Saline in AMY pump      6/14/23L Midline incision dehisced. Hold systemic chemotherapy. Fill AMY pump with FUdR with a 50% dose reduction due to 1.37xRV of alk phos from 5/17/23.     6/28/23 - Now with wound vac to abdominal wound. Continue to hold systemic chemotherapy until wound is healed.      7/12/23: Wound vac is now off. Wound is healing very nicely. Wound bed with pink granulation tissue but the wound is still open. Dr. Russ came in to see the wound. Will continue to hold systemic chemotherapy until the wound is fully healed/closed. Continue with AMY pump.      7/26/23: Wound healing continues. Still has some open area after scab broke off showering. Dr. Russ examined site as well today. In setting of wound and recent shingles, we will continue to HOLD systemic FOLFOX today and give HepNS via AMY. In the meantime, after further discussion with Dr. Russ we will plan on obtaining restaging scans now.      8/9/23: Reviewed CT with Dr. Russ. Liver lesions are decreased in size. There are multiple new lung lesions and numerous peritoneal implants. Since his abdominal wound is now healed will finally be able to start  systemic chemotherapy. Due to his ALkPhos being >1.5 x RV, will hold FUdR and give HepNS today.     8/23/23: Proceed with FOLFOX and will give FUdR via AMY pump but at 50% dose reduction due to alk phos being > 1.4 xRV from 7/12/23.     9/6/23: Tolerating systemic chemotherapy. Continue HepNS     10/4/23: Having nausea and diarrhea. Will hold systemic chemotherapy and give HepNS via HAIP. Will give supportive medications today.     10/18/23: Last week as admitted for several days d/t colitis; infectious etiologies not identified. Concern is for chemotherapy-induced colitis.  I personally reviewed the images from the recent CT, which show decreased size of liver metastasis, stable peritoneal disease. Hold systemic today & will refill pump with heparin saline.     11/1/23: No longer having diarrhea. Stools are back to being formed. Having more neuropathic pain from previous sites of shingles since he ran out of the increased dose of lyrica. Will give FOLFOX + FUdR today    11/15/23: Less fatigued. Stools formed. FOLFOX + HepNS today    12/21/23: I personally reviewed the images from the recent CT, which show interval increase in b/l pulmonary nodules and peritoneal nodules. I discussed with him that we should discontinue FOLFOX and AMY and change to FOLFIRI. Will add bevacizumab in near future, as with KRAS mutation, he will not be a candidate for EGFRi. It is too soon after receiving FUDR in AMY to start ryan, will plan with C3 FOLFIRI.  Will start with dose reduced 5FU and irinotecan d/t prior severe diarrhea      Plan:   Metastatic colon cancer to the liver  - AMY pump placement and R hemicolectomy on 5/3/23  - Monitor ctDNA signatera monthly- due 10/18  - RTC 12/26 for C1 FOLFIRI, plan for glycerin in AMY pump as long as Tbili normal.    Total cycles of FUdR: 6    Neuropathic pain due to shingles  - continue the increased Lyrica by 50mg PO TID - refilled rx

## 2023-12-21 NOTE — PROGRESS NOTES
Cancer History:  DIAGNOSIS: Ascending colon cancer     STAGING: IV     CURRENT SITES OF DISEASE:  Ascending colon, liver, peritoneal      MOLECULAR/GENOMICS:  Per report: [facility] showed KRAS mutation     ctDNA Signatera  4/17/23: 0  5/17/23: 1.99  5/31/23: 28.84  6/14/23: 4.20  6/28/23: 9.55  7/12/23: 24.03  7/26/23: 40.53  8/23/23: 9.75  9/20/23: 3.67  10/18/23: 19.35     CEA:  2/1/23: 4400  3/1/23: 4661  3/15/23: 3061  3/29/23: 2363  4/12/23: 1529  5/17/23: 235.9  5/31/23: 238.3  6/14/23: 225  6/28/23: 181  7/12/23: 171  7/26/23: 266  8/9/23: 392  8/23/23: 718  9/6/23: 800  9/20/23: 905   10/18/23: >100  11/1/23: 613  11/15/23: 1249  11/29/23: 1189        CURRENT THERAPY:  AMY pump placed 5/3/23. Flow rate 1.2 mL/day. (Serial # [id number]).First FUdR via AMY pump 5/17/23. Systemic chemotherapy being held due to wound vac, Started FOLFOX on 8/9/23. On 11/1/23 dose reduction in Oxaliplatin and infusional 5FU only due to chemotherapy colitis        PREVIOUS THERAPY:  1/2023-4/12/23: FOLFOX x6 cycles. First 2 cycles with bevacizumab     ONCOLOGIC ISSUES:  Shingles  Midline incision dehisced      PROVIDERS:  Surg onc: Rajinder Arenas  CRS: Regina Raymundo  Local med onc: Dia Kendall     HISTORY:   1/2023: Presented with 20 pound unexpected weight loss and RUQ pain. Imaging showed large liver lesion. Biopsy c/w metastatic colon cancer. C-scope showed infiltrative, ulcerated, fungating mass in ascending colon.   Baseline CEA 2322. Started FOLFOX + bevacizumab  3/16/23: MRI chest and liver showed no evidence of metastatic disease in the chest. Focal circumferential wall thickening   5/3/23: AMY pump placed, right hemicolectomy, open cholecystectomy and peritonectomy     SH: , lives with wife. Former tobacco, quit in 2017, no illicits or EtOH     PMH: physical trauma on R side of body with multiple fractures, HLD     FH: Father and daughter with cancer          Subjective   Memo continues to have neuropathic pain at the  areas where he had his shingles  Reporting pain in lower abdomen below AMY pump, happens mostly after bowel movements  Last week noticed a mobile lump at the top of his abdominal incision, not painful  Stools are firm, occasional soft stool  Percocet doesn't help this lower abdominal pain. Helps his right foot and ankle pain     No fevers, chills, chest pain, shortness of breath, nausea, vomiting, rashes or masses.     ROS as above. Remainder of 10 point review of systems elicited and otherwise unremarkable.         Objective:    /83 (BP Location: Right arm, Patient Position: Sitting, BP Cuff Size: Adult)   Pulse 91   Temp 36.8 °C (98.2 °F) (Temporal)   Resp 17   Wt 95.8 kg (211 lb 1.6 oz)   SpO2 96%   BMI 29.39 kg/m²      Physical Exam:   Gen: A&O, NAD  Head: Normocephalic, atraumatic  Eyes: no scleral icterus  ENT: mucous membranes moist, no oropharyngeal lesions  Resp: Lungs CTAB  Cardiac: Normal rate, regular rhythm, no murmurs appreciated  Abdomen: Soft, nondistended, nontender, +BS AMY Pump in place in LLQ, no swelling or redness, firm lump at Xiphoid process  Neuro: CNII-XII grossly intact  Psych: appropriate mood & affect  Skin: warm, dry, no apparent rashes     ECOG Performance Status: 1    Assessment & Plan:  Mr. Marrero is a 57yoM with metastatic ascending colon cancer to the liver, MMR-intact, KRAS-mutant.      He was diagnosed in Jan 2023 after presenting with abdominal pain and unintentional weight loss. Imaging showed liver mass, biopsy showed adenocarcinoma of colonic origin. C-scope showed mass in ascending colon. He started palliative chemotherapy with FOLFOX + bevacizumab and has received 6 cycles total, ryan d/c after 2 cycles.      I discussed with him at length the risks and benefits to floxuridine chemotherapy delivered via hepatic artery infusion pump in combination with systemic chemotherapy with FOLFOX. I discussed that the goals of AMY therapy are to delay progression of disease  and to prolong survival.      Risks of AMY therapy include but are not limited to:   - Gastritis if extrahepatic perfusion (will be mitigated by ensuring no extrahepatic perfusion on Tc99-MAA scan and by prophylactic H2 antagonist  - Biliary sclerosis  - Seroma, hematoma overlying pump site  - Infection at pump site     I discussed that he must call us immediately if she develops abdominal pain, redness at pump site, fever, or jaundice. Avoid hot packs/heating pads over the site and avoid hot tubs, as temperature changes to the pump can affect pump flow rate. Similarly, avoid changes in atmospheric pressure, and notify AMY team if prolonged air travel or travel to different atmospheric pressures is anticipated.      AMY pump placed on 5/3/23, Intera 3000 serial number [id number].      5/31/23: Midline incision dehisced. Hep Saline in AMY pump      6/14/23L Midline incision dehisced. Hold systemic chemotherapy. Fill AMY pump with FUdR with a 50% dose reduction due to 1.37xRV of alk phos from 5/17/23.     6/28/23 - Now with wound vac to abdominal wound. Continue to hold systemic chemotherapy until wound is healed.      7/12/23: Wound vac is now off. Wound is healing very nicely. Wound bed with pink granulation tissue but the wound is still open. Dr. Russ came in to see the wound. Will continue to hold systemic chemotherapy until the wound is fully healed/closed. Continue with AMY pump.      7/26/23: Wound healing continues. Still has some open area after scab broke off showering. Dr. Russ examined site as well today. In setting of wound and recent shingles, we will continue to HOLD systemic FOLFOX today and give HepNS via AMY. In the meantime, after further discussion with Dr. Russ we will plan on obtaining restaging scans now.      8/9/23: Reviewed CT with Dr. Russ. Liver lesions are decreased in size. There are multiple new lung lesions and numerous peritoneal implants. Since his abdominal wound is  now healed will finally be able to start systemic chemotherapy. Due to his ALkPhos being >1.5 x RV, will hold FUdR and give HepNS today.     8/23/23: Proceed with FOLFOX and will give FUdR via AMY pump but at 50% dose reduction due to alk phos being > 1.4 xRV from 7/12/23.     9/6/23: Tolerating systemic chemotherapy. Continue HepNS     10/4/23: Having nausea and diarrhea. Will hold systemic chemotherapy and give HepNS via HAIP. Will give supportive medications today.     10/18/23: Last week as admitted for several days d/t colitis; infectious etiologies not identified. Concern is for chemotherapy-induced colitis.  I personally reviewed the images from the recent CT, which show decreased size of liver metastasis, stable peritoneal disease. Hold systemic today & will refill pump with heparin saline.     11/1/23: No longer having diarrhea. Stools are back to being formed. Having more neuropathic pain from previous sites of shingles since he ran out of the increased dose of lyrica. Will give FOLFOX + FUdR today    11/15/23: Less fatigued. Stools formed. FOLFOX + HepNS today    11/29/23: Starting to have lower abdominal pain not responsive to Percocet. Happens mostly after a bowel movement. FOLFOX + FUdR with a 50% dose reduction due to elevated Alk Phos from 1.35xRV from 11/1/23     Plan:   Metastatic colon cancer to the liver  - AMY pump placement and R hemicolectomy on 5/3/23  - Monitor ctDNA signatera monthly- due 12/11/23  - 11/29/23 Proceed with FOLFOX  + FUdR with a 50% dose reduction due to elevated Alk Phos from 1.35xRV from 11/1/23 via AMY pump today  - will provide a rx for oxycodone for his new abdominal pain. He understands that since he has signed a contract with pain management, this may not be acceptable to their practice and he should check with them before picking up the oxycodone rx.   - RTC in 2 weeks FOLFOX and HepNS. Will transition to Mondays after the end of November    Total cycles of FUdR  including today (if applicable): 7    Neuropathic pain due to shingles  - continue the increased Lyrica by 50mg PO TID - refilled rx

## 2023-12-21 NOTE — PROGRESS NOTES
Cancer History:  DIAGNOSIS: Ascending colon cancer     STAGING: IV     CURRENT SITES OF DISEASE:  Ascending colon, liver, peritoneal      MOLECULAR/GENOMICS:  Per report: [facility] showed KRAS mutation     ctDNA Signatera  4/17/23: 0  5/17/23: 1.99  5/31/23: 28.84  6/14/23: 4.20  6/28/23: 9.55  7/12/23: 24.03  7/26/23: 40.53  8/23/23: 9.75  9/20/23: 3.67  10/18/23: 19.35  11/29/23: 125.52     CEA:  2/1/23: 4400  3/1/23: 4661  3/15/23: 3061  3/29/23: 2363  4/12/23: 1529  5/17/23: 235.9  5/31/23: 238.3  6/14/23: 225  6/28/23: 181  7/12/23: 171  7/26/23: 266  8/9/23: 392  8/23/23: 718  9/6/23: 800  9/20/23: 905   10/18/23: >100  11/1/23: 613  11/15/23: 1249  11/29/23: 1189  12/11/23: 1572        CURRENT THERAPY:  AMY pump placed 5/3/23. Flow rate 1.2 mL/day. (Serial # [id number]).First FUdR via AMY pump 5/17/23. Systemic chemotherapy being held due to wound vac, Started FOLFOX on 8/9/23. On 11/1/23 dose reduction in Oxaliplatin and infusional 5FU only due to chemotherapy colitis        PREVIOUS THERAPY:  1/2023-4/12/23: FOLFOX x6 cycles. First 2 cycles with bevacizumab     ONCOLOGIC ISSUES:  Shingles  Midline incision dehisced      PROVIDERS:  Surg onc: Rajinder Aernas  CRS: Regina Raymundo  Local med onc: Dia Kendall     HISTORY:   1/2023: Presented with 20 pound unexpected weight loss and RUQ pain. Imaging showed large liver lesion. Biopsy c/w metastatic colon cancer. C-scope showed infiltrative, ulcerated, fungating mass in ascending colon.   Baseline CEA 2322. Started FOLFOX + bevacizumab  3/16/23: MRI chest and liver showed no evidence of metastatic disease in the chest. Focal circumferential wall thickening   5/3/23: AMY pump placed, right hemicolectomy, open cholecystectomy and peritonectomy     SH: , lives with wife. Former tobacco, quit in 2017, no illicits or EtOH     PMH: physical trauma on R side of body with multiple fractures, HLD     FH: Father and daughter with cancer          Subjective   Memo  continues to report pain in lower abdomen, happens mostly after bowel movements  Mostly taking Oxycodone at night  Firm stool mostly every other day  More tired     No fevers, chills, chest pain, shortness of breath, nausea, vomiting, rashes or masses.     ROS as above. Remainder of 10 point review of systems elicited and otherwise unremarkable.         Objective:    /88 (BP Location: Left arm, Patient Position: Sitting, BP Cuff Size: Large adult)   Pulse 85   Temp 36.7 °C (98.1 °F)   Resp 16   Wt 96.4 kg (212 lb 8 oz)   SpO2 97%   BMI 29.59 kg/m²      Physical Exam:   Gen: A&O, NAD  Head: Normocephalic, atraumatic  Eyes: no scleral icterus  ENT: mucous membranes moist, no oropharyngeal lesions  Resp: Lungs CTAB  Cardiac: Normal rate, regular rhythm, no murmurs appreciated  Abdomen: Soft, nondistended, nontender, +BS AMY Pump in place in LLQ, no swelling or redness, firm lump at Xiphoid process  Neuro: CNII-XII grossly intact  Psych: appropriate mood & affect  Skin: warm, dry, no apparent rashes     ECOG Performance Status: 1    Assessment & Plan:  Mr. 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      He was diagnosed in Jan 2023 after presenting with abdominal pain and unintentional weight loss. Imaging showed liver mass, biopsy showed adenocarcinoma of colonic origin. C-scope showed mass in ascending colon. He started palliative chemotherapy with FOLFOX + bevacizumab and has received 6 cycles total, ryan d/c after 2 cycles.      I discussed with him at length the risks and benefits to floxuridine chemotherapy delivered via hepatic artery infusion pump in combination with systemic chemotherapy with FOLFOX. I discussed that the goals of AMY therapy are to delay progression of disease and to prolong survival.      Risks of AMY therapy include but are not limited to:   - Gastritis if extrahepatic perfusion (will be mitigated by ensuring no extrahepatic  perfusion on Tc99-MAA scan and by prophylactic H2 antagonist  - Biliary sclerosis  - Seroma, hematoma overlying pump site  - Infection at pump site     I discussed that he must call us immediately if she develops abdominal pain, redness at pump site, fever, or jaundice. Avoid hot packs/heating pads over the site and avoid hot tubs, as temperature changes to the pump can affect pump flow rate. Similarly, avoid changes in atmospheric pressure, and notify AMY team if prolonged air travel or travel to different atmospheric pressures is anticipated.      AMY pump placed on 5/3/23, Intera 3000 serial number [id number].      5/31/23: Midline incision dehisced. Hep Saline in AMY pump      6/14/23L Midline incision dehisced. Hold systemic chemotherapy. Fill AMY pump with FUdR with a 50% dose reduction due to 1.37xRV of alk phos from 5/17/23.     6/28/23 - Now with wound vac to abdominal wound. Continue to hold systemic chemotherapy until wound is healed.      7/12/23: Wound vac is now off. Wound is healing very nicely. Wound bed with pink granulation tissue but the wound is still open. Dr. Russ came in to see the wound. Will continue to hold systemic chemotherapy until the wound is fully healed/closed. Continue with AMY pump.      7/26/23: Wound healing continues. Still has some open area after scab broke off showering. Dr. uRss examined site as well today. In setting of wound and recent shingles, we will continue to HOLD systemic FOLFOX today and give HepNS via AMY. In the meantime, after further discussion with Dr. Russ we will plan on obtaining restaging scans now.      8/9/23: Reviewed CT with Dr. Russ. Liver lesions are decreased in size. There are multiple new lung lesions and numerous peritoneal implants. Since his abdominal wound is now healed will finally be able to start systemic chemotherapy. Due to his ALkPhos being >1.5 x RV, will hold FUdR and give HepNS today.     8/23/23: Proceed with FOLFOX  and will give FUdR via AMY pump but at 50% dose reduction due to alk phos being > 1.4 xRV from 7/12/23.     9/6/23: Tolerating systemic chemotherapy. Continue HepNS     10/4/23: Having nausea and diarrhea. Will hold systemic chemotherapy and give HepNS via HAIP. Will give supportive medications today.     10/18/23: Last week as admitted for several days d/t colitis; infectious etiologies not identified. Concern is for chemotherapy-induced colitis.  I personally reviewed the images from the recent CT, which show decreased size of liver metastasis, stable peritoneal disease. Hold systemic today & will refill pump with heparin saline.     11/1/23: No longer having diarrhea. Stools are back to being formed. Having more neuropathic pain from previous sites of shingles since he ran out of the increased dose of lyrica. Will give FOLFOX + FUdR today    11/15/23: Less fatigued. Stools formed. FOLFOX + HepNS today    11/29/23: Starting to have lower abdominal pain not responsive to Percocet. Happens mostly after a bowel movement. FOLFOX + FUdR with a 50% dose reduction due to elevated Alk Phos from 1.35xRV from 11/1/23 12/11/23: Continues with abdominal pain. CEA continues to rise as well as Signatera. Due for imaging.  Will receive FOLFOX + HepNS.      Plan:   Metastatic colon cancer to the liver  - AMY pump placement and R hemicolectomy on 5/3/23  - Monitor ctDNA signatera monthly- due 12/27/23  - rising CEA and signatera. Continues with abdominal pain.   - rx for bentyl for pain felt after bowel movement  - 12/11/23: Proceed with FOLFOX  + HepNS via AMY pump today  - CT c/a/p prior to next follow up  - RTC on 12/21 as virtual with Dr. Russ to review CT scan, treatment on 12/26/27 for treatment    Total cycles of FUdR including today (if applicable): 7    Neuropathic pain due to shingles  - continue the increased Lyrica by 50mg PO TID - refilled rx

## 2023-12-26 ENCOUNTER — APPOINTMENT (OUTPATIENT)
Dept: HEMATOLOGY/ONCOLOGY | Facility: HOSPITAL | Age: 58
End: 2023-12-26
Payer: COMMERCIAL

## 2023-12-26 ENCOUNTER — INFUSION (OUTPATIENT)
Dept: HEMATOLOGY/ONCOLOGY | Facility: HOSPITAL | Age: 58
End: 2023-12-26
Payer: COMMERCIAL

## 2023-12-26 ENCOUNTER — OFFICE VISIT (OUTPATIENT)
Dept: HEMATOLOGY/ONCOLOGY | Facility: HOSPITAL | Age: 58
End: 2023-12-26
Payer: COMMERCIAL

## 2023-12-26 ENCOUNTER — LAB (OUTPATIENT)
Dept: HEMATOLOGY/ONCOLOGY | Facility: HOSPITAL | Age: 58
End: 2023-12-26
Payer: COMMERCIAL

## 2023-12-26 VITALS
SYSTOLIC BLOOD PRESSURE: 141 MMHG | RESPIRATION RATE: 18 BRPM | OXYGEN SATURATION: 97 % | DIASTOLIC BLOOD PRESSURE: 81 MMHG | HEART RATE: 99 BPM | BODY MASS INDEX: 29.31 KG/M2 | WEIGHT: 210.5 LBS | TEMPERATURE: 98.2 F

## 2023-12-26 DIAGNOSIS — B02.23 POST-HERPETIC POLYNEUROPATHY: ICD-10-CM

## 2023-12-26 DIAGNOSIS — Z51.11 ENCOUNTER FOR ANTINEOPLASTIC CHEMOTHERAPY: ICD-10-CM

## 2023-12-26 DIAGNOSIS — C18.2 MALIGNANT NEOPLASM OF ASCENDING COLON (MULTI): ICD-10-CM

## 2023-12-26 DIAGNOSIS — C18.2 MALIGNANT NEOPLASM OF ASCENDING COLON (MULTI): Primary | ICD-10-CM

## 2023-12-26 DIAGNOSIS — G89.3 NEOPLASM RELATED PAIN: ICD-10-CM

## 2023-12-26 LAB
ALBUMIN SERPL BCP-MCNC: 3.9 G/DL (ref 3.4–5)
ALP SERPL-CCNC: 277 U/L (ref 33–120)
ALT SERPL W P-5'-P-CCNC: 41 U/L (ref 10–52)
ANION GAP SERPL CALC-SCNC: 14 MMOL/L (ref 10–20)
AST SERPL W P-5'-P-CCNC: 32 U/L (ref 9–39)
BASOPHILS # BLD AUTO: 0.03 X10*3/UL (ref 0–0.1)
BASOPHILS NFR BLD AUTO: 0.4 %
BILIRUB SERPL-MCNC: 0.7 MG/DL (ref 0–1.2)
BUN SERPL-MCNC: 12 MG/DL (ref 6–23)
CALCIUM SERPL-MCNC: 9.6 MG/DL (ref 8.6–10.3)
CEA SERPL-MCNC: 2021.5 UG/L
CHLORIDE SERPL-SCNC: 98 MMOL/L (ref 98–107)
CO2 SERPL-SCNC: 30 MMOL/L (ref 21–32)
CREAT SERPL-MCNC: 0.57 MG/DL (ref 0.5–1.3)
EOSINOPHIL # BLD AUTO: 0.15 X10*3/UL (ref 0–0.7)
EOSINOPHIL NFR BLD AUTO: 1.9 %
ERYTHROCYTE [DISTWIDTH] IN BLOOD BY AUTOMATED COUNT: 14.3 % (ref 11.5–14.5)
GFR SERPL CREATININE-BSD FRML MDRD: >90 ML/MIN/1.73M*2
GLUCOSE SERPL-MCNC: 163 MG/DL (ref 74–99)
HCT VFR BLD AUTO: 38.9 % (ref 41–52)
HGB BLD-MCNC: 12.9 G/DL (ref 13.5–17.5)
IMM GRANULOCYTES # BLD AUTO: 0.04 X10*3/UL (ref 0–0.7)
IMM GRANULOCYTES NFR BLD AUTO: 0.5 % (ref 0–0.9)
LYMPHOCYTES # BLD AUTO: 1.05 X10*3/UL (ref 1.2–4.8)
LYMPHOCYTES NFR BLD AUTO: 13 %
MCH RBC QN AUTO: 30.3 PG (ref 26–34)
MCHC RBC AUTO-ENTMCNC: 33.2 G/DL (ref 32–36)
MCV RBC AUTO: 91 FL (ref 80–100)
MONOCYTES # BLD AUTO: 0.88 X10*3/UL (ref 0.1–1)
MONOCYTES NFR BLD AUTO: 10.9 %
NEUTROPHILS # BLD AUTO: 5.95 X10*3/UL (ref 1.2–7.7)
NEUTROPHILS NFR BLD AUTO: 73.3 %
NRBC BLD-RTO: 0 /100 WBCS (ref 0–0)
PLATELET # BLD AUTO: 143 X10*3/UL (ref 150–450)
POTASSIUM SERPL-SCNC: 3.8 MMOL/L (ref 3.5–5.3)
PROT SERPL-MCNC: 7.2 G/DL (ref 6.4–8.2)
RBC # BLD AUTO: 4.26 X10*6/UL (ref 4.5–5.9)
SODIUM SERPL-SCNC: 138 MMOL/L (ref 136–145)
WBC # BLD AUTO: 8.1 X10*3/UL (ref 4.4–11.3)

## 2023-12-26 PROCEDURE — 1036F TOBACCO NON-USER: CPT

## 2023-12-26 PROCEDURE — 96411 CHEMO IV PUSH ADDL DRUG: CPT

## 2023-12-26 PROCEDURE — 2500000004 HC RX 250 GENERAL PHARMACY W/ HCPCS (ALT 636 FOR OP/ED): Performed by: STUDENT IN AN ORGANIZED HEALTH CARE EDUCATION/TRAINING PROGRAM

## 2023-12-26 PROCEDURE — 2500000005 HC RX 250 GENERAL PHARMACY W/O HCPCS: Performed by: STUDENT IN AN ORGANIZED HEALTH CARE EDUCATION/TRAINING PROGRAM

## 2023-12-26 PROCEDURE — 96375 TX/PRO/DX INJ NEW DRUG ADDON: CPT | Mod: INF

## 2023-12-26 PROCEDURE — 99215 OFFICE O/P EST HI 40 MIN: CPT

## 2023-12-26 PROCEDURE — 82378 CARCINOEMBRYONIC ANTIGEN: CPT

## 2023-12-26 PROCEDURE — 96523 IRRIG DRUG DELIVERY DEVICE: CPT

## 2023-12-26 PROCEDURE — 80053 COMPREHEN METABOLIC PANEL: CPT

## 2023-12-26 PROCEDURE — 36591 DRAW BLOOD OFF VENOUS DEVICE: CPT

## 2023-12-26 PROCEDURE — 3008F BODY MASS INDEX DOCD: CPT

## 2023-12-26 PROCEDURE — 85025 COMPLETE CBC W/AUTO DIFF WBC: CPT

## 2023-12-26 PROCEDURE — 96413 CHEMO IV INFUSION 1 HR: CPT

## 2023-12-26 RX ORDER — ALBUTEROL SULFATE 0.83 MG/ML
3 SOLUTION RESPIRATORY (INHALATION) AS NEEDED
Status: DISCONTINUED | OUTPATIENT
Start: 2023-12-26 | End: 2023-12-26 | Stop reason: HOSPADM

## 2023-12-26 RX ORDER — DIPHENHYDRAMINE HYDROCHLORIDE 50 MG/ML
50 INJECTION INTRAMUSCULAR; INTRAVENOUS AS NEEDED
Status: CANCELLED | OUTPATIENT
Start: 2024-02-06

## 2023-12-26 RX ORDER — PROCHLORPERAZINE EDISYLATE 5 MG/ML
10 INJECTION INTRAMUSCULAR; INTRAVENOUS EVERY 6 HOURS PRN
Status: DISCONTINUED | OUTPATIENT
Start: 2023-12-26 | End: 2023-12-26 | Stop reason: HOSPADM

## 2023-12-26 RX ORDER — ATROPINE SULFATE 0.4 MG/ML
0.4 INJECTION, SOLUTION ENDOTRACHEAL; INTRAMEDULLARY; INTRAMUSCULAR; INTRAVENOUS; SUBCUTANEOUS
Status: DISCONTINUED | OUTPATIENT
Start: 2023-12-26 | End: 2023-12-26 | Stop reason: HOSPADM

## 2023-12-26 RX ORDER — PROCHLORPERAZINE MALEATE 10 MG
10 TABLET ORAL EVERY 6 HOURS PRN
Status: DISCONTINUED | OUTPATIENT
Start: 2023-12-26 | End: 2023-12-26 | Stop reason: HOSPADM

## 2023-12-26 RX ORDER — DIPHENHYDRAMINE HYDROCHLORIDE 50 MG/ML
50 INJECTION INTRAMUSCULAR; INTRAVENOUS AS NEEDED
Status: DISCONTINUED | OUTPATIENT
Start: 2023-12-26 | End: 2023-12-26 | Stop reason: HOSPADM

## 2023-12-26 RX ORDER — ALBUTEROL SULFATE 0.83 MG/ML
3 SOLUTION RESPIRATORY (INHALATION) AS NEEDED
Status: CANCELLED | OUTPATIENT
Start: 2024-02-06

## 2023-12-26 RX ORDER — HEPARIN SODIUM,PORCINE/PF 10 UNIT/ML
50 SYRINGE (ML) INTRAVENOUS AS NEEDED
Status: CANCELLED | OUTPATIENT
Start: 2023-12-26

## 2023-12-26 RX ORDER — FAMOTIDINE 10 MG/ML
20 INJECTION INTRAVENOUS ONCE AS NEEDED
Status: DISCONTINUED | OUTPATIENT
Start: 2023-12-26 | End: 2023-12-26 | Stop reason: HOSPADM

## 2023-12-26 RX ORDER — FAMOTIDINE 10 MG/ML
20 INJECTION INTRAVENOUS ONCE AS NEEDED
Status: CANCELLED | OUTPATIENT
Start: 2024-02-06

## 2023-12-26 RX ORDER — HEPARIN 100 UNIT/ML
500 SYRINGE INTRAVENOUS AS NEEDED
Status: CANCELLED | OUTPATIENT
Start: 2023-12-26

## 2023-12-26 RX ORDER — HEPARIN 100 UNIT/ML
500 SYRINGE INTRAVENOUS AS NEEDED
Status: DISCONTINUED | OUTPATIENT
Start: 2023-12-26 | End: 2023-12-26 | Stop reason: HOSPADM

## 2023-12-26 RX ORDER — HEPARIN SODIUM,PORCINE/PF 10 UNIT/ML
50 SYRINGE (ML) INTRAVENOUS AS NEEDED
Status: DISCONTINUED | OUTPATIENT
Start: 2023-12-26 | End: 2023-12-26 | Stop reason: HOSPADM

## 2023-12-26 RX ORDER — EPINEPHRINE 0.3 MG/.3ML
0.3 INJECTION SUBCUTANEOUS EVERY 5 MIN PRN
Status: DISCONTINUED | OUTPATIENT
Start: 2023-12-26 | End: 2023-12-26 | Stop reason: HOSPADM

## 2023-12-26 RX ORDER — EPINEPHRINE 0.3 MG/.3ML
0.3 INJECTION SUBCUTANEOUS EVERY 5 MIN PRN
Status: CANCELLED | OUTPATIENT
Start: 2024-02-06

## 2023-12-26 RX ORDER — PALONOSETRON 0.05 MG/ML
0.25 INJECTION, SOLUTION INTRAVENOUS ONCE
Status: COMPLETED | OUTPATIENT
Start: 2023-12-26 | End: 2023-12-26

## 2023-12-26 RX ADMIN — Medication 15 G: at 16:22

## 2023-12-26 RX ADMIN — FLUOROURACIL 3950 MG: 50 INJECTION, SOLUTION INTRAVENOUS at 18:09

## 2023-12-26 RX ADMIN — ATROPINE SULFATE 0.4 MG: 0.4 INJECTION, SOLUTION INTRAVENOUS at 16:16

## 2023-12-26 RX ADMIN — DEXAMETHASONE SODIUM PHOSPHATE 12 MG: 10 INJECTION, SOLUTION INTRAMUSCULAR; INTRAVENOUS at 15:50

## 2023-12-26 RX ADMIN — PALONOSETRON HYDROCHLORIDE 250 MCG: 0.25 INJECTION INTRAVENOUS at 15:50

## 2023-12-26 RX ADMIN — IRINOTECAN HYDROCHLORIDE 330 MG: 20 INJECTION, SOLUTION INTRAVENOUS at 16:22

## 2023-12-26 ASSESSMENT — PAIN SCALES - GENERAL: PAINLEVEL: 4
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
## 2023-12-26 NOTE — PROGRESS NOTES
Patient referred to supportive oncology by Kris Garner CNP for help managing patients cancer related pain, not currently controlled by his 5mg percocet that his pain management group prescribes him Dr. Salinas [facility] in [place]. Patient also experiencing diarrhea/ constipation, neuropathy from both his work related injury and shingles. Met with patient in infusion and set up an appointment for this Friday. Provider updated.

## 2023-12-28 ENCOUNTER — INFUSION (OUTPATIENT)
Dept: HEMATOLOGY/ONCOLOGY | Facility: CLINIC | Age: 58
End: 2023-12-28
Payer: COMMERCIAL

## 2023-12-28 VITALS
TEMPERATURE: 98.2 F | HEIGHT: 71 IN | DIASTOLIC BLOOD PRESSURE: 90 MMHG | RESPIRATION RATE: 16 BRPM | OXYGEN SATURATION: 95 % | BODY MASS INDEX: 29.31 KG/M2 | SYSTOLIC BLOOD PRESSURE: 137 MMHG | HEART RATE: 107 BPM

## 2023-12-28 DIAGNOSIS — C18.2 MALIGNANT NEOPLASM OF ASCENDING COLON (MULTI): Chronic | ICD-10-CM

## 2023-12-28 PROCEDURE — 96523 IRRIG DRUG DELIVERY DEVICE: CPT

## 2023-12-28 PROCEDURE — 2500000004 HC RX 250 GENERAL PHARMACY W/ HCPCS (ALT 636 FOR OP/ED): Performed by: STUDENT IN AN ORGANIZED HEALTH CARE EDUCATION/TRAINING PROGRAM

## 2023-12-28 RX ORDER — HEPARIN 100 UNIT/ML
500 SYRINGE INTRAVENOUS AS NEEDED
Status: CANCELLED | OUTPATIENT
Start: 2023-12-28

## 2023-12-28 RX ORDER — HEPARIN SODIUM,PORCINE/PF 10 UNIT/ML
50 SYRINGE (ML) INTRAVENOUS AS NEEDED
Status: CANCELLED | OUTPATIENT
Start: 2023-12-28

## 2023-12-28 RX ORDER — HEPARIN 100 UNIT/ML
500 SYRINGE INTRAVENOUS AS NEEDED
Status: DISCONTINUED | OUTPATIENT
Start: 2023-12-28 | End: 2023-12-28 | Stop reason: HOSPADM

## 2023-12-28 RX ADMIN — SODIUM CHLORIDE, PRESERVATIVE FREE 500 UNITS: 5 INJECTION INTRAVENOUS at 15:35

## 2023-12-28 ASSESSMENT — PAIN SCALES - GENERAL: PAINLEVEL: 0-NO PAIN

## 2023-12-28 NOTE — PROGRESS NOTES
CADD pump disconnected, verified total volume infused.  + blood return noted, flushed with 10cc normal saline and 5cc 10 units heparin.  Lee needle removed and site covered with bandaid. Patient tolerated procedure well.     Mediport flushed with 10 ml NS followed by 5 ml of 100mg/ml Heparin Flush, Lee Needle removed per practice policy and procedure, site care given with bandaid applied. Pt tolerated procedure well.    Patient educated to take nausea meds as prescribed, monitor for fevers, take anti nausea meds as prescribed. Patient verbalized understanding of all.

## 2023-12-29 ENCOUNTER — SPECIALTY PHARMACY (OUTPATIENT)
Dept: PHARMACY | Facility: CLINIC | Age: 58
End: 2023-12-29

## 2023-12-29 ENCOUNTER — OFFICE VISIT (OUTPATIENT)
Dept: PALLIATIVE MEDICINE | Facility: HOSPITAL | Age: 58
End: 2023-12-29
Payer: COMMERCIAL

## 2023-12-29 ENCOUNTER — TELEPHONE (OUTPATIENT)
Dept: PALLIATIVE MEDICINE | Facility: HOSPITAL | Age: 58
End: 2023-12-29
Payer: COMMERCIAL

## 2023-12-29 VITALS
WEIGHT: 206.2 LBS | HEART RATE: 107 BPM | DIASTOLIC BLOOD PRESSURE: 71 MMHG | RESPIRATION RATE: 18 BRPM | SYSTOLIC BLOOD PRESSURE: 111 MMHG | OXYGEN SATURATION: 96 % | TEMPERATURE: 97.5 F | BODY MASS INDEX: 28.71 KG/M2

## 2023-12-29 DIAGNOSIS — G89.3 NEOPLASM RELATED PAIN: ICD-10-CM

## 2023-12-29 DIAGNOSIS — B02.23 POST-HERPETIC POLYNEUROPATHY: ICD-10-CM

## 2023-12-29 DIAGNOSIS — C78.7 METASTATIC CARCINOMA TO LIVER (MULTI): ICD-10-CM

## 2023-12-29 DIAGNOSIS — G89.21 CHRONIC PAIN DUE TO TRAUMA: ICD-10-CM

## 2023-12-29 DIAGNOSIS — Z79.891 ENCOUNTER FOR MONITORING OPIOID MAINTENANCE THERAPY: Primary | ICD-10-CM

## 2023-12-29 DIAGNOSIS — Z51.81 ENCOUNTER FOR MONITORING OPIOID MAINTENANCE THERAPY: Primary | ICD-10-CM

## 2023-12-29 DIAGNOSIS — Z51.5 PALLIATIVE CARE ENCOUNTER: ICD-10-CM

## 2023-12-29 DIAGNOSIS — R19.7 DIARRHEA, UNSPECIFIED TYPE: ICD-10-CM

## 2023-12-29 DIAGNOSIS — C18.2 MALIGNANT NEOPLASM OF ASCENDING COLON (MULTI): ICD-10-CM

## 2023-12-29 DIAGNOSIS — Z71.89 GOALS OF CARE, COUNSELING/DISCUSSION: ICD-10-CM

## 2023-12-29 DIAGNOSIS — R53.1 WEAKNESS: ICD-10-CM

## 2023-12-29 DIAGNOSIS — R11.2 NAUSEA AND VOMITING, UNSPECIFIED VOMITING TYPE: ICD-10-CM

## 2023-12-29 DIAGNOSIS — R53.0 NEOPLASTIC MALIGNANT RELATED FATIGUE: ICD-10-CM

## 2023-12-29 DIAGNOSIS — R63.0 LOSS OF APPETITE: ICD-10-CM

## 2023-12-29 LAB
AMPHETAMINES UR QL SCN: ABNORMAL
BARBITURATES UR QL SCN: ABNORMAL
BENZODIAZ UR QL SCN: ABNORMAL
BZE UR QL SCN: ABNORMAL
CANNABINOIDS UR QL SCN: ABNORMAL
FENTANYL+NORFENTANYL UR QL SCN: ABNORMAL
OPIATES UR QL SCN: ABNORMAL
OXYCODONE+OXYMORPHONE UR QL SCN: ABNORMAL
PCP UR QL SCN: ABNORMAL

## 2023-12-29 PROCEDURE — 99215 OFFICE O/P EST HI 40 MIN: CPT | Performed by: NURSE PRACTITIONER

## 2023-12-29 PROCEDURE — 80365 DRUG SCREENING OXYCODONE: CPT | Performed by: NURSE PRACTITIONER

## 2023-12-29 PROCEDURE — 99417 PROLNG OP E/M EACH 15 MIN: CPT | Performed by: NURSE PRACTITIONER

## 2023-12-29 PROCEDURE — 1036F TOBACCO NON-USER: CPT | Performed by: NURSE PRACTITIONER

## 2023-12-29 PROCEDURE — 80307 DRUG TEST PRSMV CHEM ANLYZR: CPT | Performed by: NURSE PRACTITIONER

## 2023-12-29 PROCEDURE — 3008F BODY MASS INDEX DOCD: CPT | Performed by: NURSE PRACTITIONER

## 2023-12-29 RX ORDER — OXYCODONE AND ACETAMINOPHEN 10; 325 MG/1; MG/1
1 TABLET ORAL EVERY 6 HOURS PRN
Qty: 120 TABLET | Refills: 0 | Status: SHIPPED | OUTPATIENT
Start: 2023-12-29 | End: 2024-01-22 | Stop reason: SDUPTHER

## 2023-12-29 RX ORDER — DRONABINOL 2.5 MG/1
2.5 CAPSULE ORAL
Qty: 45 CAPSULE | Refills: 0 | Status: SHIPPED
Start: 2023-12-29 | End: 2024-01-08 | Stop reason: SDUPTHER

## 2023-12-29 RX ORDER — PREGABALIN 150 MG/1
150 CAPSULE ORAL 2 TIMES DAILY
Qty: 60 CAPSULE | Refills: 2 | Status: SHIPPED | OUTPATIENT
Start: 2023-12-29 | End: 2024-01-22 | Stop reason: SDUPTHER

## 2023-12-29 ASSESSMENT — PAIN SCALES - GENERAL: PAINLEVEL: 3

## 2023-12-29 NOTE — PROGRESS NOTES
SUPPORTIVE AND PALLIATIVE ONCOLOGY CONSULT - OUTPATIENT      SERVICE DATE: 12/29/2023    Medical Oncologist: Laney Russ MD PhD  Brooklynn Enciso MD   Primary Physician: Jonny Murdock  [phone]    REASON FOR CONSULT/CHIEF CONSULT COMPLAINT: pain management and other symptom control     Subjective   HISTORY OF PRESENT ILLNESS: Narciso Marrero is a 58 y.o. male who presents with metastatic ascending colon cancer with liver and peritoneal involvement. Patient has been referred to Supportive Oncology/Palliative Care for further symptom management.     Pain Assessment:  Pain Score:  3  Location:  center abd    Symptom Assessment:  Pain:somewhat - center across center of abdomen, general aching, but with certain movement or coughing pain becomes heightend. Nonradiating. Has been taking Oxycodone 5mg (from oncology) with Oxycodone/Acetmainophen (5/325) which was receiving from Chronic Pain together because 1 tablet was no longer helpful. He was running into issues with his Chronic Pain provider due to needing assistance with cancer pain.   Also endorses right facial, neck and shoulder nerve pain related to shingles infection where oncology prescribed him addition Pregabalin to what his Chronic Pain provider was prescribing  Has history of chronic pain for trauma through work accident where was broke multiple bones in his body from a 1500lb construction device falling on him. He was see's pain management, receiving Pregabalin 100mg TID and Percocet.  Numbness or Tingling in hands/feet/other: a little - neuropathy with numbness and tingling in fingers and lips since receiving oxaliplatin therapy  Sore Muscles/Spasms: none  Headache: none  Dizziness:none  Constipation: none -   Diarrhea: somewhat - 1 day of liquid diarrhea following treatment. Notes it had been worse but chemotherapy dosing was decreased. Otherwise soft stools, only had to take imodium once this past treatment. With some abd cramping, bentyl  helps.  Nausea: somewhat  Vomiting: somewhat - nausea and vomiting with chemotherapy, taking zofran every 8 hours after his third day of chemotherapy. Alternating with Prochlorperazine 10mg every 6 hours. Notes all medications help but if he does not catch it in time he will have emesis. Also taking Dexamethasone with treatment  Lack of Appetite: a little - appetite is low, is with weight loss. Following with dietician, but has difficulty with finding an appetite as well as xerostomia  Weight Loss: a little  Taste changes: a little - metallic taste in mouth   Dry Mouth: none  Pain in Mouth/Swallowing: none  Lack of Energy: a little - general weakness. Was more active when weather was nice but finding it hard to get himself to walk with colder and gloomier weather. Declined PT referral  Difficulty Sleeping: none  Worrying: none  Anxiety: none  Depression: none  Shortness of breath: none  Other: none      Information obtained from: chart review and interview of patient  ______________________________________________________________________     Oncology History   Malignant neoplasm of ascending colon (CMS/HCC)   9/29/2023 Initial Diagnosis    Malignant neoplasm of ascending colon (CMS/HCC)     10/4/2023 - 12/13/2023 Chemotherapy    Floxuridine Hepatic Arterial Infusion + mFOLFOX6, 14 Day Cycles     12/26/2023 -  Chemotherapy    AMY Glycerin + FOLFIRI (Fluorouracil Continuous Infusion / Leucovorin / Irinotecan), 14 Day Cycles         Past Medical History:   Diagnosis Date    Cancer (CMS/HCC)     Diabetes mellitus (CMS/HCC)     History of transfusion      Past Surgical History:   Procedure Laterality Date    COLON SURGERY      CT ABDOMEN ANGIOGRAM W AND/OR WO IV CONTRAST  04/13/2023    CT ABDOMEN ANGIOGRAM W AND/OR WO IV CONTRAST POR [id number] CT    FRACTURE SURGERY       No family history on file.     SOCIAL HISTORY  Children: 3 of his own, 1 with his current wife   Social History:  reports that he has quit smoking. His  "smoking use included cigarettes. He has never used smokeless tobacco. He reports that he does not drink alcohol and does not use drugs.    Orthodox and Importance of Orthodox:  Islam     REVIEW OF SYSTEMS  Review of systems negative unless noted in HPI.       Objective     Palliative Performance Scale % (PPS)       Current Outpatient Medications   Medication Instructions    acetaminophen (TYLENOL) 650 mg, oral, Every 6 hours PRN    cholecalciferol (VITAMIN D-3) 1,000 Units, oral, Daily    dicyclomine (BENTYL) 10 mg, oral, 4 times daily, As need for abdominal cramping    insulin glargine (LANTUS) 10 Units, subcutaneous, Nightly, Take as directed per insulin instructions.    loperamide (Imodium) 2 mg capsule Take 2 capsules (4 mg) by mouth with the first episode of diarrhea and 1 capsule (2 mg) by mouth with any additional episodes. Maximum 8 capsules (16 mg) per day.    loperamide (IMODIUM) 4 mg, oral, Every 6 hours    Lyrica 100 mg, oral, 3 times daily, Take with 50 mg for a total daily dose of 150 mg three times daily    multivitamin capsule 1 capsule, oral, Daily    omega 3-dha-epa-fish oil (Fish OiL) 1,000 mg (120 mg-180 mg) capsule 1 capsule, oral, Daily    ondansetron (ZOFRAN) 8 mg, oral, Every 8 hours PRN    ondansetron (ZOFRAN) 8 mg, oral, Every 8 hours PRN    pantoprazole (PROTONIX) 40 mg, oral, Daily before breakfast    pen needle, diabetic 31 gauge x 5/16\" needle USE FOUR TIMES A DAY    Percocet 5-325 mg tablet 1 tablet, oral, 2 times daily PRN    pregabalin (LYRICA) 50 mg, oral, 3 times daily, Take with 100 mg for a total daily dose of 150 mg three times daily    prochlorperazine (COMPAZINE) 10 mg, oral, Every 6 hours PRN    prochlorperazine (COMPAZINE) 10 mg, oral, Every 6 hours PRN    psyllium (Metamucil) 3.4 gram packet 1 packet, oral, Daily PRN    simvastatin (ZOCOR) 40 mg, oral, Nightly       Allergies: No Known Allergies  Office Visit on 12/29/2023   Component Date Value Ref Range Status    " Amphetamine Screen, Urine 12/29/2023 Presumptive Negative  Presumptive Negative Final    Barbiturate Screen, Urine 12/29/2023 Presumptive Negative  Presumptive Negative Final    Benzodiazepines Screen, Urine 12/29/2023 Presumptive Negative  Presumptive Negative Final    Cannabinoid Screen, Urine 12/29/2023 Presumptive Negative  Presumptive Negative Final    Cocaine Metabolite Screen, Urine 12/29/2023 Presumptive Negative  Presumptive Negative Final    Fentanyl Screen, Urine 12/29/2023 Presumptive Negative  Presumptive Negative Final    Opiate Screen, Urine 12/29/2023 Presumptive Negative  Presumptive Negative Final    Oxycodone Screen, Urine 12/29/2023 Presumptive Positive (A)  Presumptive Negative Final    PCP Screen, Urine 12/29/2023 Presumptive Negative  Presumptive Negative Final        PHYSICAL EXAMINATION  Vital Signs:   Vital signs reviewed  Visit Vitals  /71 (BP Location: Right arm, Patient Position: Sitting)   Pulse 107   Temp 36.4 °C (97.5 °F) (Core)   Resp 18   Wt 93.5 kg (206 lb 3.2 oz)   SpO2 96%   BMI 28.71 kg/m²   Smoking Status Former   BSA 2.17 m²        Physical Exam  Vitals reviewed.   Constitutional:       Appearance: Normal appearance.   HENT:      Head: Normocephalic.   Cardiovascular:      Rate and Rhythm: Normal rate and regular rhythm.   Pulmonary:      Effort: Pulmonary effort is normal.   Abdominal:      General: Abdomen is flat.      Palpations: Abdomen is soft.   Musculoskeletal:         General: Normal range of motion.   Skin:     General: Skin is warm and dry.   Neurological:      General: No focal deficit present.      Mental Status: He is alert and oriented to person, place, and time. Mental status is at baseline.   Psychiatric:         Mood and Affect: Mood normal.         Behavior: Behavior normal.         Thought Content: Thought content normal.         Judgment: Judgment normal.       ASSESSMENT/PLAN    Pain  Pain is: cancer related pain and chronic  With mix of chronic  pain from work accident/trauma. Was following Chronic Pain where he received Pregabalin 100mg TID and Percocet (5/325) BID PRN.  Also with shingles infection of right face/neck/shoulder, now with chronic neuropathic pain.  Also with cancer related pain of center across abdomen, sharp intermittent with general aching.   - Increase to Oxycodone/Acetaminophen 10mg/325mg [id number] PRN  - Discussed initiating long acting therapy in future if needed  - Increase to Pregabalin 150mg TID    Opioid Use  Medication Management:   - OARRS report reviewed with no aberrant behavior; consistent with  prescriptions/records and patient history  - MED 20.  Overdose Risk Score 480.   This has been discussed with patient.   - We will continue to closely monitor the patient for signs of prescription misuse including UDS, OARRS review and subjective reports at each visit.  - No concurrent benzodiazepine use   - I am a provider who either is or has consulted and collaborated with a provider certified in Hospice and Palliative Medicine and have conducted a face-face visit and examination for this patient.  - Routine Urine Drug Screen completed 12/29/23 appropriately positive for opioids and negative for illicit substances  - Controlled Substance Agreement completed 12/29/23  - Specifically discussed that controlled substance prescriptions will only be provided by our group as outlined in the completed agreement  - Prescribed naloxone: patient declined  - Red Flags: NA    Diarrhea  Related to chemotherapy  - Continue Loperamide 2mg PRN - MAX 8 caps/day  - Dose reduced chemo    Nausea/GERD/Loss of Appetite  - Continue Pantoprazole 40mg once daily  - Continue Ondansetron 8mg [id number] PRN  - Continue Prochlorperazine 10mg [id number] PRN  - Start Dronabinol 2.5mg TID  - Also discussed Olanzapine - patient wanted to trial Dronabinol first  - Established with dietician  - Dexamethasone with treatment  - Encouraged protein supplements  - Encouraged  small more frequent meals  - Encouraged good hydration    Weakness/Fatigue  - Declined PT referral  - Steroids with treatment not effective - also with hyperglycemia induced by steroids ( limit therapy)  - Consider stimulant in future    Sleeping Difficulty:  - Monitor with improvement in pain    Introduction to Supportive and Palliative Oncology:  Spoke with patient and his wife   Introduced the role and philosophy of Supportive and Palliative oncology in the evaluation and management of symptoms during cancer treatment  Palliative care was introduced as a service for patients with serious illness to help with symptoms, assist with goals of care conversations, navigate complex decision making, improve quality of life for patients, and provide support both patients and families.  Patient seemed to appreciate the extra layer of support.    Advance Directives  Existence of Advance Directives:Yes, but NOT documented in medical record  Decision maker: HCPOA is Emperatriz Marrero (wife) [phone]  Code Status: Full code    Next Follow-Up Visit:  Return to clinic in 3 weeks with Rosalinda Barksdale CNP while I am out on [facility]    Signature and billing  Thank you for allowing us to participate in the care of this patient. Recommendations will be communicated back to the consulting service by way of shared electronic medical record or face-to-face.    Medical complexity was moderate level due to due to complexity of problems, extensive data review, and high risk of management/treatment.  Time was spent on the following: Prep Time, Time Directly with Patient/Family/Caregiver, Documentation Time. Total time spent: 80    Plan of Care discussed with: Patient and Family/Significant Other: wife      SIGNATURE: TORRI White    Contact information:  Supportive and Palliative Oncology  Monday-Friday 8 AM-5 PM  Phone:  [phone], press option #5, then option #1.   Or Epic Secure Chat

## 2023-12-29 NOTE — TELEPHONE ENCOUNTER
Called [facility]. Prior Authorization needed for Percocet and Dronabinol, request sent to  Specialty.

## 2024-01-04 ENCOUNTER — TELEPHONE (OUTPATIENT)
Dept: PALLIATIVE MEDICINE | Facility: HOSPITAL | Age: 59
End: 2024-01-04
Payer: MEDICARE

## 2024-01-04 LAB
6MAM UR CFM-MCNC: <25 NG/ML
CODEINE UR CFM-MCNC: <50 NG/ML
HYDROCODONE CTO UR CFM-MCNC: <25 NG/ML
HYDROMORPHONE UR CFM-MCNC: <25 NG/ML
MORPHINE UR CFM-MCNC: <50 NG/ML
NORHYDROCODONE UR CFM-MCNC: <25 NG/ML
NOROXYCODONE UR CFM-MCNC: >1000 NG/ML
OXYCODONE UR CFM-MCNC: 261 NG/ML
OXYMORPHONE UR CFM-MCNC: 861 NG/ML

## 2024-01-04 NOTE — TELEPHONE ENCOUNTER
Spoke to Martin at [facility]. Patient picked up Percocet with a discount card. Dronabinol is now in stock, still waiting for prior authorization. Waiting for PA on Percocet as well. Pharmacy will be notified once received

## 2024-01-08 ENCOUNTER — PATIENT MESSAGE (OUTPATIENT)
Dept: ENDOCRINOLOGY | Facility: CLINIC | Age: 59
End: 2024-01-08

## 2024-01-08 ENCOUNTER — OFFICE VISIT (OUTPATIENT)
Dept: HEMATOLOGY/ONCOLOGY | Facility: HOSPITAL | Age: 59
End: 2024-01-08
Payer: MEDICARE

## 2024-01-08 ENCOUNTER — INFUSION (OUTPATIENT)
Dept: HEMATOLOGY/ONCOLOGY | Facility: HOSPITAL | Age: 59
End: 2024-01-08
Payer: MEDICARE

## 2024-01-08 ENCOUNTER — LAB (OUTPATIENT)
Dept: HEMATOLOGY/ONCOLOGY | Facility: HOSPITAL | Age: 59
End: 2024-01-08
Payer: MEDICARE

## 2024-01-08 ENCOUNTER — NUTRITION (OUTPATIENT)
Dept: HEMATOLOGY/ONCOLOGY | Facility: HOSPITAL | Age: 59
End: 2024-01-08

## 2024-01-08 VITALS
DIASTOLIC BLOOD PRESSURE: 88 MMHG | TEMPERATURE: 98.1 F | SYSTOLIC BLOOD PRESSURE: 143 MMHG | HEART RATE: 101 BPM | RESPIRATION RATE: 18 BRPM | WEIGHT: 208.5 LBS | BODY MASS INDEX: 29.03 KG/M2 | OXYGEN SATURATION: 97 %

## 2024-01-08 DIAGNOSIS — E11.9 TYPE 2 DIABETES MELLITUS WITHOUT COMPLICATION, UNSPECIFIED WHETHER LONG TERM INSULIN USE (MULTI): ICD-10-CM

## 2024-01-08 DIAGNOSIS — G89.3 NEOPLASM RELATED PAIN: ICD-10-CM

## 2024-01-08 DIAGNOSIS — Z51.11 ENCOUNTER FOR ANTINEOPLASTIC CHEMOTHERAPY: ICD-10-CM

## 2024-01-08 DIAGNOSIS — Z79.4 TYPE 2 DIABETES MELLITUS WITH HYPERGLYCEMIA, WITH LONG-TERM CURRENT USE OF INSULIN (MULTI): ICD-10-CM

## 2024-01-08 DIAGNOSIS — E11.65 TYPE 2 DIABETES MELLITUS WITH HYPERGLYCEMIA, WITH LONG-TERM CURRENT USE OF INSULIN (MULTI): ICD-10-CM

## 2024-01-08 DIAGNOSIS — C18.2 MALIGNANT NEOPLASM OF ASCENDING COLON (MULTI): ICD-10-CM

## 2024-01-08 DIAGNOSIS — Z51.81 ENCOUNTER FOR MONITORING OPIOID MAINTENANCE THERAPY: ICD-10-CM

## 2024-01-08 DIAGNOSIS — Z79.891 ENCOUNTER FOR MONITORING OPIOID MAINTENANCE THERAPY: ICD-10-CM

## 2024-01-08 DIAGNOSIS — C18.2 MALIGNANT NEOPLASM OF ASCENDING COLON (MULTI): Primary | ICD-10-CM

## 2024-01-08 DIAGNOSIS — C18.2 MALIGNANT NEOPLASM OF ASCENDING COLON (MULTI): Chronic | ICD-10-CM

## 2024-01-08 DIAGNOSIS — Z79.4 TYPE 2 DIABETES MELLITUS WITH HYPERGLYCEMIA, WITH LONG-TERM CURRENT USE OF INSULIN (MULTI): Primary | ICD-10-CM

## 2024-01-08 DIAGNOSIS — E11.65 TYPE 2 DIABETES MELLITUS WITH HYPERGLYCEMIA, WITH LONG-TERM CURRENT USE OF INSULIN (MULTI): Primary | ICD-10-CM

## 2024-01-08 DIAGNOSIS — C78.7 METASTATIC CARCINOMA TO LIVER (MULTI): ICD-10-CM

## 2024-01-08 LAB
ALBUMIN SERPL BCP-MCNC: 3.9 G/DL (ref 3.4–5)
ALP SERPL-CCNC: 250 U/L (ref 33–120)
ALT SERPL W P-5'-P-CCNC: 21 U/L (ref 10–52)
ANION GAP SERPL CALC-SCNC: 13 MMOL/L (ref 10–20)
AST SERPL W P-5'-P-CCNC: 25 U/L (ref 9–39)
BASOPHILS # BLD AUTO: 0.02 X10*3/UL (ref 0–0.1)
BASOPHILS NFR BLD AUTO: 0.2 %
BILIRUB SERPL-MCNC: 0.5 MG/DL (ref 0–1.2)
BUN SERPL-MCNC: 10 MG/DL (ref 6–23)
CALCIUM SERPL-MCNC: 9.9 MG/DL (ref 8.6–10.3)
CEA SERPL-MCNC: 1662.5 UG/L
CHLORIDE SERPL-SCNC: 98 MMOL/L (ref 98–107)
CO2 SERPL-SCNC: 31 MMOL/L (ref 21–32)
CREAT SERPL-MCNC: 0.71 MG/DL (ref 0.5–1.3)
EGFRCR SERPLBLD CKD-EPI 2021: >90 ML/MIN/1.73M*2
EOSINOPHIL # BLD AUTO: 0.05 X10*3/UL (ref 0–0.7)
EOSINOPHIL NFR BLD AUTO: 0.6 %
ERYTHROCYTE [DISTWIDTH] IN BLOOD BY AUTOMATED COUNT: 13.9 % (ref 11.5–14.5)
GLUCOSE SERPL-MCNC: 193 MG/DL (ref 74–99)
HCT VFR BLD AUTO: 39.3 % (ref 41–52)
HGB BLD-MCNC: 13.1 G/DL (ref 13.5–17.5)
IMM GRANULOCYTES # BLD AUTO: 0.04 X10*3/UL (ref 0–0.7)
IMM GRANULOCYTES NFR BLD AUTO: 0.4 % (ref 0–0.9)
LYMPHOCYTES # BLD AUTO: 0.88 X10*3/UL (ref 1.2–4.8)
LYMPHOCYTES NFR BLD AUTO: 9.9 %
MCH RBC QN AUTO: 30.2 PG (ref 26–34)
MCHC RBC AUTO-ENTMCNC: 33.3 G/DL (ref 32–36)
MCV RBC AUTO: 91 FL (ref 80–100)
MONOCYTES # BLD AUTO: 0.73 X10*3/UL (ref 0.1–1)
MONOCYTES NFR BLD AUTO: 8.2 %
NEUTROPHILS # BLD AUTO: 7.21 X10*3/UL (ref 1.2–7.7)
NEUTROPHILS NFR BLD AUTO: 80.7 %
NRBC BLD-RTO: 0 /100 WBCS (ref 0–0)
PLATELET # BLD AUTO: 177 X10*3/UL (ref 150–450)
POTASSIUM SERPL-SCNC: 4 MMOL/L (ref 3.5–5.3)
PROT SERPL-MCNC: 7.2 G/DL (ref 6.4–8.2)
RBC # BLD AUTO: 4.34 X10*6/UL (ref 4.5–5.9)
SODIUM SERPL-SCNC: 138 MMOL/L (ref 136–145)
WBC # BLD AUTO: 8.9 X10*3/UL (ref 4.4–11.3)

## 2024-01-08 PROCEDURE — 1036F TOBACCO NON-USER: CPT | Performed by: STUDENT IN AN ORGANIZED HEALTH CARE EDUCATION/TRAINING PROGRAM

## 2024-01-08 PROCEDURE — 36591 DRAW BLOOD OFF VENOUS DEVICE: CPT

## 2024-01-08 PROCEDURE — 99215 OFFICE O/P EST HI 40 MIN: CPT | Performed by: STUDENT IN AN ORGANIZED HEALTH CARE EDUCATION/TRAINING PROGRAM

## 2024-01-08 PROCEDURE — 82378 CARCINOEMBRYONIC ANTIGEN: CPT

## 2024-01-08 PROCEDURE — 96367 TX/PROPH/DG ADDL SEQ IV INF: CPT

## 2024-01-08 PROCEDURE — 3008F BODY MASS INDEX DOCD: CPT | Performed by: STUDENT IN AN ORGANIZED HEALTH CARE EDUCATION/TRAINING PROGRAM

## 2024-01-08 PROCEDURE — 85025 COMPLETE CBC W/AUTO DIFF WBC: CPT

## 2024-01-08 PROCEDURE — 2500000004 HC RX 250 GENERAL PHARMACY W/ HCPCS (ALT 636 FOR OP/ED): Performed by: STUDENT IN AN ORGANIZED HEALTH CARE EDUCATION/TRAINING PROGRAM

## 2024-01-08 PROCEDURE — 3079F DIAST BP 80-89 MM HG: CPT | Performed by: STUDENT IN AN ORGANIZED HEALTH CARE EDUCATION/TRAINING PROGRAM

## 2024-01-08 PROCEDURE — 2500000001 HC RX 250 WO HCPCS SELF ADMINISTERED DRUGS (ALT 637 FOR MEDICARE OP): Performed by: STUDENT IN AN ORGANIZED HEALTH CARE EDUCATION/TRAINING PROGRAM

## 2024-01-08 PROCEDURE — 96413 CHEMO IV INFUSION 1 HR: CPT

## 2024-01-08 PROCEDURE — 96416 CHEMO PROLONG INFUSE W/PUMP: CPT

## 2024-01-08 PROCEDURE — 80053 COMPREHEN METABOLIC PANEL: CPT

## 2024-01-08 PROCEDURE — 96375 TX/PRO/DX INJ NEW DRUG ADDON: CPT | Mod: INF

## 2024-01-08 PROCEDURE — 3077F SYST BP >= 140 MM HG: CPT | Performed by: STUDENT IN AN ORGANIZED HEALTH CARE EDUCATION/TRAINING PROGRAM

## 2024-01-08 PROCEDURE — 96523 IRRIG DRUG DELIVERY DEVICE: CPT

## 2024-01-08 RX ORDER — ATROPINE SULFATE 0.4 MG/ML
0.4 INJECTION, SOLUTION ENDOTRACHEAL; INTRAMEDULLARY; INTRAMUSCULAR; INTRAVENOUS; SUBCUTANEOUS
Status: DISCONTINUED | OUTPATIENT
Start: 2024-01-08 | End: 2024-01-08 | Stop reason: HOSPADM

## 2024-01-08 RX ORDER — FAMOTIDINE 10 MG/ML
20 INJECTION INTRAVENOUS ONCE AS NEEDED
Status: CANCELLED | OUTPATIENT
Start: 2024-02-06

## 2024-01-08 RX ORDER — PROCHLORPERAZINE MALEATE 10 MG
10 TABLET ORAL EVERY 6 HOURS PRN
Status: CANCELLED | OUTPATIENT
Start: 2024-01-08

## 2024-01-08 RX ORDER — ALBUTEROL SULFATE 0.83 MG/ML
3 SOLUTION RESPIRATORY (INHALATION) AS NEEDED
Status: CANCELLED | OUTPATIENT
Start: 2024-02-06

## 2024-01-08 RX ORDER — PALONOSETRON 0.05 MG/ML
0.25 INJECTION, SOLUTION INTRAVENOUS ONCE
Status: COMPLETED | OUTPATIENT
Start: 2024-01-08 | End: 2024-01-08

## 2024-01-08 RX ORDER — EPINEPHRINE 0.3 MG/.3ML
0.3 INJECTION SUBCUTANEOUS EVERY 5 MIN PRN
Status: CANCELLED | OUTPATIENT
Start: 2024-02-06

## 2024-01-08 RX ORDER — EPINEPHRINE 0.3 MG/.3ML
0.3 INJECTION SUBCUTANEOUS EVERY 5 MIN PRN
Status: DISCONTINUED | OUTPATIENT
Start: 2024-01-08 | End: 2024-01-08 | Stop reason: HOSPADM

## 2024-01-08 RX ORDER — DIPHENHYDRAMINE HYDROCHLORIDE 50 MG/ML
50 INJECTION INTRAMUSCULAR; INTRAVENOUS AS NEEDED
Status: CANCELLED | OUTPATIENT
Start: 2024-02-06

## 2024-01-08 RX ORDER — PROCHLORPERAZINE EDISYLATE 5 MG/ML
10 INJECTION INTRAMUSCULAR; INTRAVENOUS EVERY 6 HOURS PRN
Status: CANCELLED | OUTPATIENT
Start: 2024-01-08

## 2024-01-08 RX ORDER — PROCHLORPERAZINE EDISYLATE 5 MG/ML
10 INJECTION INTRAMUSCULAR; INTRAVENOUS EVERY 6 HOURS PRN
Status: DISCONTINUED | OUTPATIENT
Start: 2024-01-08 | End: 2024-01-08 | Stop reason: HOSPADM

## 2024-01-08 RX ORDER — FAMOTIDINE 10 MG/ML
20 INJECTION INTRAVENOUS ONCE AS NEEDED
Status: CANCELLED | OUTPATIENT
Start: 2024-01-08

## 2024-01-08 RX ORDER — HEPARIN 100 UNIT/ML
500 SYRINGE INTRAVENOUS AS NEEDED
Status: CANCELLED | OUTPATIENT
Start: 2024-01-08

## 2024-01-08 RX ORDER — PALONOSETRON 0.05 MG/ML
0.25 INJECTION, SOLUTION INTRAVENOUS ONCE
Status: CANCELLED | OUTPATIENT
Start: 2024-01-08

## 2024-01-08 RX ORDER — PROCHLORPERAZINE MALEATE 10 MG
10 TABLET ORAL EVERY 6 HOURS PRN
Status: DISCONTINUED | OUTPATIENT
Start: 2024-01-08 | End: 2024-01-08 | Stop reason: HOSPADM

## 2024-01-08 RX ORDER — ATROPINE SULFATE 0.4 MG/ML
0.4 INJECTION, SOLUTION ENDOTRACHEAL; INTRAMEDULLARY; INTRAMUSCULAR; INTRAVENOUS; SUBCUTANEOUS
Status: CANCELLED | OUTPATIENT
Start: 2024-01-08

## 2024-01-08 RX ORDER — HEPARIN SODIUM,PORCINE/PF 10 UNIT/ML
50 SYRINGE (ML) INTRAVENOUS AS NEEDED
Status: CANCELLED | OUTPATIENT
Start: 2024-01-08

## 2024-01-08 RX ORDER — DRONABINOL 2.5 MG/1
2.5 CAPSULE ORAL
Qty: 90 CAPSULE | Refills: 1 | Status: SHIPPED | OUTPATIENT
Start: 2024-01-08 | End: 2024-02-26 | Stop reason: WASHOUT

## 2024-01-08 RX ORDER — ALBUTEROL SULFATE 0.83 MG/ML
3 SOLUTION RESPIRATORY (INHALATION) AS NEEDED
Status: CANCELLED | OUTPATIENT
Start: 2024-01-08

## 2024-01-08 RX ORDER — FAMOTIDINE 10 MG/ML
20 INJECTION INTRAVENOUS ONCE AS NEEDED
Status: DISCONTINUED | OUTPATIENT
Start: 2024-01-08 | End: 2024-01-08 | Stop reason: HOSPADM

## 2024-01-08 RX ORDER — ALBUTEROL SULFATE 0.83 MG/ML
3 SOLUTION RESPIRATORY (INHALATION) AS NEEDED
Status: DISCONTINUED | OUTPATIENT
Start: 2024-01-08 | End: 2024-01-08 | Stop reason: HOSPADM

## 2024-01-08 RX ORDER — DIPHENHYDRAMINE HYDROCHLORIDE 50 MG/ML
50 INJECTION INTRAMUSCULAR; INTRAVENOUS AS NEEDED
Status: CANCELLED | OUTPATIENT
Start: 2024-01-08

## 2024-01-08 RX ORDER — DIPHENHYDRAMINE HYDROCHLORIDE 50 MG/ML
50 INJECTION INTRAMUSCULAR; INTRAVENOUS AS NEEDED
Status: DISCONTINUED | OUTPATIENT
Start: 2024-01-08 | End: 2024-01-08 | Stop reason: HOSPADM

## 2024-01-08 RX ORDER — EPINEPHRINE 0.3 MG/.3ML
0.3 INJECTION SUBCUTANEOUS EVERY 5 MIN PRN
Status: CANCELLED | OUTPATIENT
Start: 2024-01-08

## 2024-01-08 RX ADMIN — PALONOSETRON HYDROCHLORIDE 250 MCG: 0.25 INJECTION INTRAVENOUS at 12:15

## 2024-01-08 RX ADMIN — PROCHLORPERAZINE MALEATE 10 MG: 10 TABLET ORAL at 15:10

## 2024-01-08 RX ADMIN — IRINOTECAN HYDROCHLORIDE 330 MG: 20 INJECTION, SOLUTION INTRAVENOUS at 13:23

## 2024-01-08 RX ADMIN — FLUOROURACIL 3950 MG: 50 INJECTION, SOLUTION INTRAVENOUS at 15:10

## 2024-01-08 RX ADMIN — FOSAPREPITANT 150 MG: 150 INJECTION, POWDER, LYOPHILIZED, FOR SOLUTION INTRAVENOUS at 12:45

## 2024-01-08 RX ADMIN — DEXAMETHASONE SODIUM PHOSPHATE 12 MG: 10 INJECTION, SOLUTION INTRAMUSCULAR; INTRAVENOUS at 12:27

## 2024-01-08 ASSESSMENT — PAIN SCALES - GENERAL: PAINLEVEL: 4

## 2024-01-08 NOTE — PROGRESS NOTES
Cancer History:  DIAGNOSIS: Ascending colon cancer     STAGING: IV     CURRENT SITES OF DISEASE:  Ascending colon, liver, peritoneal      MOLECULAR/GENOMICS:  Per report: Tempus showed KRAS mutation     ctDNA Signatera  4/17/23: 0  5/17/23: 1.99  5/31/23: 28.84  6/14/23: 4.20  6/28/23: 9.55  7/12/23: 24.03  7/26/23: 40.53  8/23/23: 9.75  9/20/23: 3.67  10/18/23: 19.35  11/29/23: 125.52  12/26/23: 177.74     CEA:  2/1/23: 4400  3/1/23: 4661  3/15/23: 3061  3/29/23: 2363  4/12/23: 1529  5/17/23: 235.9  5/31/23: 238.3  6/14/23: 225  6/28/23: 181  7/12/23: 171  7/26/23: 266  8/9/23: 392  8/23/23: 718  9/6/23: 800  9/20/23: 905   10/18/23: >100  11/1/23: 613  11/15/23: 1249  11/29/23: 1189  12/11/23: 1572  12/26/23: 2021 Switched treatment to FOLFIRI.   1/8/24: 1662        CURRENT THERAPY:  12.26.2023 : Started on FOLFIRI  Last Glycerin in AMY pump on 12.26.2023     PREVIOUS THERAPY:  1/2023-4/12/23: FOLFOX x6 cycles. First 2 cycles with bevacizumab  AMY pump placed 5/3/23. Flow rate 1.2 mL/day. (Serial # [id number]).First FUdR via AMY pump 5/17/23. Systemic chemotherapy being held due to wound vac, Started FOLFOX on 8/9/23. On 11/1/23 dose reduction in Oxaliplatin and infusional 5FU only due to chemotherapy colitis. Treatment DC due to PD. Last treatment on 12.11.2023. Total cycles of FUdR (if applicable): 7     ONCOLOGIC ISSUES:  Shingles  Midline incision dehisced      PROVIDERS:  Surg onc: Rajinder Arenas  CRS: Regina Raymundo  [facility] med onc: Dia Spearsulapalli     HISTORY:   1/2023: Presented with 20 pound unexpected weight loss and RUQ pain. Imaging showed large liver lesion. Biopsy c/w metastatic colon cancer. C-scope showed infiltrative, ulcerated, fungating mass in ascending colon.   Baseline CEA 2322. Started FOLFOX + bevacizumab  3/16/23: MRI chest and liver showed no evidence of metastatic disease in the chest. Focal circumferential wall thickening   5/3/23: AMY pump placed, right hemicolectomy, open cholecystectomy  and peritonectomy     SH: , lives with wife. Former tobacco, quit in 2017, no illicits or EtOH     PMH: physical trauma on R side of body with multiple fractures, HLD     FH: Father and daughter with cancer      Subjective:   Memo had nausea the evening of chemotherapy and threw up on day 2. He had nausea until day 4. He took compazine and Zofran starting day 3 with no relief. He also starting having vomiting this Saturday and took Dronabinol yesterday evening but didn't notice a difference with one dose.   Had liquid diarrhea x 1 episode and took one imodium with good relief  + increase in fatigue  mild constipation. Typically goes BID but now it's daily. Was using metamucil but will use Miralax. Constipation may be contributing to the nausea.   Having more abdominal pain. It's a dull ache all the time. The new increase in percocet dose takes the edge off of the pain but the pain is present all of the time. Taking percocet every 6 hours. Abdomen feels tight. + gas.  Appetite waxes and wanes. He eats what he has a taste for.   Reports his home pharmacy was only able to fill 6 days worth of Dronabinol and he doesn't know when they can fill the rest of the prescripion.      Objective:    /88 (BP Location: Left arm, Patient Position: Sitting, BP Cuff Size: Large adult)   Pulse 101   Temp 36.7 °C (98.1 °F)   Resp 18   Wt 94.6 kg (208 lb 8 oz)   SpO2 97%   BMI 29.03 kg/m²        Daily Weight  01/08/24 : 94.6 kg (208 lb 8 oz)  12/29/23 : 93.5 kg (206 lb 3.2 oz)  12/26/23 : 95.5 kg (210 lb 8 oz)  12/13/23 : 96.7 kg (213 lb 3 oz)  12/11/23 : 96.4 kg (212 lb 8 oz)     Physical Exam:   Gen: A&O, NAD  Head: Normocephalic, atraumatic  Eyes: no scleral icterus  ENT: mucous membranes moist, no oropharyngeal lesions  Resp: Lungs CTAB  Cardiac: Normal rate, regular rhythm, no murmurs appreciated  Abdomen: Soft, nondistended, nontender, +BS AMY Pump in place in LLQ, no swelling or redness, firm lump at Xiphoid  process  Neuro: CNII-XII grossly intact  Psych: appropriate mood & affect  Skin: warm, dry, no apparent rashes     ECOG Performance Status: ECOG 1: Restricted in physically strenuous activity but ambulatory and able to carry out work of a light or sedentary nature, e.g., light house work, office work.     Assessment & Plan:  Mr. 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      He was diagnosed in Jan 2023 after presenting with abdominal pain and unintentional weight loss. Imaging showed liver mass, biopsy showed adenocarcinoma of colonic origin. C-scope showed mass in ascending colon. He started palliative chemotherapy with FOLFOX + bevacizumab and has received 6 cycles total, ryan d/c after 2 cycles.      I discussed with him at length the risks and benefits to floxuridine chemotherapy delivered via hepatic artery infusion pump in combination with systemic chemotherapy with FOLFOX. I discussed that the goals of AMY therapy are to delay progression of disease and to prolong survival.      Risks of AMY therapy include but are not limited to:   - Gastritis if extrahepatic perfusion (will be mitigated by ensuring no extrahepatic perfusion on Tc99-MAA scan and by prophylactic H2 antagonist  - Biliary sclerosis  - Seroma, hematoma overlying pump site  - Infection at pump site     I discussed that he must call us immediately if she develops abdominal pain, redness at pump site, fever, or jaundice. Avoid hot packs/heating pads over the site and avoid hot tubs, as temperature changes to the pump can affect pump flow rate. Similarly, avoid changes in atmospheric pressure, and notify AMY team if prolonged air travel or travel to different atmospheric pressures is anticipated.      AMY pump placed on 5/3/23, Intera 3000 serial number [id number].      5/31/23: Midline incision dehisced. Hep Saline in AMY pump      6/14/23L Midline incision dehisced. Hold systemic chemotherapy. Fill  AMY pump with FUdR with a 50% dose reduction due to 1.37xRV of alk phos from 5/17/23.     6/28/23 - Now with wound vac to abdominal wound. Continue to hold systemic chemotherapy until wound is healed.      7/12/23: Wound vac is now off. Wound is healing very nicely. Wound bed with pink granulation tissue but the wound is still open. Dr. Russ came in to see the wound. Will continue to hold systemic chemotherapy until the wound is fully healed/closed. Continue with AMY pump.      7/26/23: Wound healing continues. Still has some open area after scab broke off showering. Dr. Russ examined site as well today. In setting of wound and recent shingles, we will continue to HOLD systemic FOLFOX today and give HepNS via AMY. In the meantime, after further discussion with Dr. Russ we will plan on obtaining restaging scans now.      8/9/23: Reviewed CT with Dr. Russ. Liver lesions are decreased in size. There are multiple new lung lesions and numerous peritoneal implants. Since his abdominal wound is now healed will finally be able to start systemic chemotherapy. Due to his ALkPhos being >1.5 x RV, will hold FUdR and give HepNS today.     8/23/23: Proceed with FOLFOX and will give FUdR via AMY pump but at 50% dose reduction due to alk phos being > 1.4 xRV from 7/12/23.     9/6/23: Tolerating systemic chemotherapy. Continue HepNS     10/4/23: Having nausea and diarrhea. Will hold systemic chemotherapy and give HepNS via HAIP. Will give supportive medications today.     10/18/23: Last week as admitted for several days d/t colitis; infectious etiologies not identified. Concern is for chemotherapy-induced colitis.  I personally reviewed the images from the recent CT, which show decreased size of liver metastasis, stable peritoneal disease. Hold systemic today & will refill pump with heparin saline.     11/1/23: No longer having diarrhea. Stools are back to being formed. Having more neuropathic pain from previous  sites of shingles since he ran out of the increased dose of lyrica. Will give FOLFOX + FUdR today    11/15/23: Less fatigued. Stools formed. FOLFOX + HepNS today    11/29/23: Starting to have lower abdominal pain not responsive to Percocet. Happens mostly after a bowel movement. FOLFOX + FUdR with a 50% dose reduction due to elevated Alk Phos from 1.35xRV from 11/1/23 12/11/23: Continues with abdominal pain. CEA continues to rise as well as Signatera. Due for imaging.  Will receive FOLFOX + HepNS.     12/21/23: I personally reviewed the images from the recent CT, which show interval increase in b/l pulmonary nodules and peritoneal nodules. I discussed with him that we should discontinue FOLFOX and AMY and change to FOLFIRI. Will add bevacizumab in near future, as with KRAS mutation, he will not be a candidate for EGFRi. It is too soon after receiving FUDR in AMY to start ryan, will plan with C3 FOLFIRI.  Will start with dose reduced 5FU and irinotecan d/t prior severe diarrhea     1/8/24: Proceed with C2 FOLFIRI. Will add Fosaprepitant to premeds and have asked him to start taking compazine PO the evening of chemotherapy and then around the clock until day 4. He can start Zofran on day 3 at pump disconnect. He will also be using Dronabinol 3x day for nausea and appetite. Also encouraged him to use Miralax for constipation which also should help his pain and nausea. I will also reach out to Palliative care.       Plan:   Metastatic colon cancer to the liver  - AMY pump placement and R hemicolectomy on 5/3/23  - Monitor ctDNA signatera monthly- due 1/22/24  - Proceed with C2 FOLFIRI, added fosaprepitant with C2. Will ask Dr. Russ when to add Bevacizumab to FOLFIRI  - Flush AMY pump with Glycerin on 2/6/24  - CT after 4 cycles  - RTC in 2 weeks.     Nausea and vomiting due to chemotherapy, neoplasm pain, anorexia  - managed by palliative care    Neuropathic pain due to shingles  - continue the increased Lyrica  by 50mg PO TID - refilled rx  - Referred to Supportive Oncology for pain management.

## 2024-01-08 NOTE — PROGRESS NOTES
Narciso Marrero, 58 y.o. male is here for chemotherapy infusion of: Irinotecan, 5-FU  Cycle: 2, Week: 1, Day: 1   S:  nausea    O:   Lab Results   Component Value Date    WBC 8.9 01/08/2024    HGB 13.1 (L) 01/08/2024    HCT 39.3 (L) 01/08/2024    MCV 91 01/08/2024     01/08/2024     Lab Results   Component Value Date    NEUTROABS 7.21 01/08/2024     Lab Results   Component Value Date    CREATININE 0.71 01/08/2024     BP Readings from Last 1 Encounters:   01/08/24 143/88     Additional diagnostic tests: CEA    A:  Encounter Diagnosis   Name Primary?    Malignant neoplasm of ascending colon (CMS/HCC)     yes  Meets parameters to proceed with treatment.    P:  Time: 1420  IV device: CVAD  IV therapy site and patency:  right chest  IV Fluid given: D5W  Premeds given: emend, aloxi, dexamethasone  Chemotherapy administered starting at: 1323  Dose given: 330 mg of Irinotecan in 516.5 ml given over 90 min   Followed by: 3950 mg of 5_fu in 138 mL given over 46 hours  Tolerated procedure well. IV removed. Pressure dressing applied.  Time chemotherapy completed: 1453  Time patient discharged: 1500

## 2024-01-08 NOTE — PROGRESS NOTES
"NUTRITION Follow-up NOTE    Nutrition Assessment     Reason for Visit:  Narciso Marrero is a 58 y.o. male who presents for taste changes and wt loss. Pt seen in infusion w/ wife, Emperatriz. PMHx: R hemicolectomy, open cholecystectomy, and peritonectomy. Current treatment: FOLFIRI via AMY D1C2. 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 Pt's intake in the last 2 weeks has varied at times d/t treatment side effects, and he recently has had more nausea/vomiting. He will be starting to schedule anti-nausea medications.    Lab Results   Component Value Date/Time    GLUCOSE 193 (H) 01/08/2024 0948     01/08/2024 0948    K 4.0 01/08/2024 0948    CL 98 01/08/2024 0948    CO2 31 01/08/2024 0948    ANIONGAP 13 01/08/2024 0948    BUN 10 01/08/2024 0948    CREATININE 0.71 01/08/2024 0948    EGFR >90 01/08/2024 0948    CALCIUM 9.9 01/08/2024 0948    ALBUMIN 3.9 01/08/2024 0948    ALKPHOS 250 (H) 01/08/2024 0948    PROT 7.2 01/08/2024 0948    AST 25 01/08/2024 0948    BILITOT 0.5 01/08/2024 0948    ALT 21 01/08/2024 0948     No results found for: \"VITD25\"    All pertinent labs have been reviewed.     Anthropometrics:  Weight Change  Weight History / % Weight Change: Wt has been fluctuating over the last month. (States goal wt of 90.9 kg)  Significant Weight Loss: No        Wt Readings from Last 10 Encounters:   01/08/24 94.6 kg (208 lb 8 oz)   12/29/23 93.5 kg (206 lb 3.2 oz)   12/26/23 95.5 kg (210 lb 8 oz)   12/13/23 96.7 kg (213 lb 3 oz)   12/11/23 96.4 kg (212 lb 8 oz)   12/11/23 96.4 kg (212 lb 8 oz)   12/01/23 95.8 kg (211 lb 3.2 oz)   11/29/23 95.8 kg (211 lb 1.6 oz)   11/29/23 95.8 kg (211 lb 1.6 oz)   11/17/23 97.9 kg (215 lb 13.3 oz)        Food And Nutrient Intake:  Food and Nutrient History  Food and Nutrient History: Appetite is ok - finds it changes w/ chemo  Energy Intake: Good > 75 %  Fluid Intake: 4 16 oz water bottles, 1-2 glasses OJ  Food Allergy: None.  Food " Patient Assistance Enrollment    Patient's expected copay for prevymis is over $1800 due to an unmet out of pocket max. Patient has commercial insurance thus is eligible for a copay savings card. Pharmacy liaison added the copay card to patient's [place] pharmacy profile. Expected prevymis copay with the savings card applied will be $15/mo. Once patient meets their out of pocket max, copay will be $0.        Natty Fisher  [facility] Liaison  Ph: [phone] Page: [phone]          Intolerance: None.  GI Symptoms: nausea, vomiting, constipation, increased gas, abdominal pain, diarrhea (Taking Compazine and Nausea, but having breakthrough nausea - started scheduling it today. States stool is more firm - twice per day. States diarrhea x1-2 days. Abd pain w/ gas and some at baseline. 1x vomiting 1/2/24, 2x 1/6/24, Dry heaving 1/7/24.)  GI Symptoms greater than 2 weeks: yes  Oral Problems: dysgeusia (Sugar Free [place] rancher at night.)     Food Intake  Amount of Food: 3 meals/day  Meal 1: Egg, prather, and cheese sandwich  Meal 2: Hamburger rice, peppers, onions (1 bowl)  Meal 3: 2 bowls of PB crunch cereal - no milk.  Snacks: Summer sausage.                                       Micronutrient Intake  Vitamin Intake: Multivitamin, D  Mineral/Element Intake:  (N/A)    Food Supplement Intake  Oral Nutrition Supplements:  (Not currently taking d/t good meal intake.)    Medication and Complementary/Alternative Medicine Use  Prescription Medication Use: Drabinol - started yesterday, no changes. Taking Metamucil 1x/day, but will be replacing w/ Miralax. Imodium - to good effect.  Nutrition-Related Complementary/Alternative Medicine Use: Fish oil.      Nutrition Focused Physical Exam Findings:  defer: Pt seen in infusion                        Energy Needs  Estimated Energy Needs  Total Energy Estimated Needs (kCal):  (1600-0634)  Total Estimated Energy Need per Day (kCal/kg):  (25-28)  Method for Estimating Needs: IBW  Estimated Fluid Needs  Total Fluid Estimated Needs (mL): 2200 mL  Method for Estimating Needs: 1 mL/kcal  Estimated Protein Needs  Total Protein Estimated Needs (g):  ()  Total Protein Estimated Needs (g/kg):  (1.3)  Method for Estimating Needs: IBW  Weight and Growth Recommendation  Desired Growth Pattern: Maintain current wt.        Nutrition Diagnosis   Malnutrition Diagnosis  Patient has Malnutrition Diagnosis: No    Nutrition Diagnosis  Patient has Nutrition Diagnosis:  Yes  Diagnosis Status (1): Ongoing  Nutrition Diagnosis 1: Increased nutrient needs  Related to (1): dx of colon cancer  As Evidenced by (1): increased metabolic demand d/t dx.    Nutrition Interventions/Recommendations   Food and Nutrition Delivery  Food and Nutrition Delivery  Meals & Snacks: Modify schedule of foods/fluids, Modify Composition of Meals/Snacks  Goals: Consume 4-5 meals >75% of days; Choose high calorie and high protein foods >75% of meals.    Nutrition Education  Nutrition Education  Nutrition Education Content: Content related nutrition education  Goals: Provided reinforcement for consuming adequate meals, protein, and calories.    Coordination of Care       There are no Patient Instructions on file for this visit.    Nutrition Monitoring and Evaluation   Food/Nutrient Related History Monitoring  Monitoring and Evaluation Plan: Meal/snack pattern, Amount of food  Amount of Food: Estimated amout of food  Criteria: Choose high calorie and high protein foods >75% of meals.  Meal/Snack Pattern: Estimated meal and snack pattern  Criteria: Consume 4-5 meals >75% of days        Time Spent  Prep time on day of patient encounter: 5 minutes  Time spent directly with patient, family or caregiver: 10 minutes  Additional Time Spent on Patient Care Activities: 0 minutes  Documentation Time: 35 minutes  Other Time Spent: 0 minutes  Total: 50 minutes          Readiness to Change : Excellent  Level of Understanding : Excellent  Anticipated Compliant : Excellent

## 2024-01-09 ENCOUNTER — PATIENT MESSAGE (OUTPATIENT)
Dept: PALLIATIVE MEDICINE | Facility: HOSPITAL | Age: 59
End: 2024-01-09
Payer: MEDICARE

## 2024-01-09 DIAGNOSIS — R11.0 CHEMOTHERAPY-INDUCED NAUSEA: Primary | ICD-10-CM

## 2024-01-09 DIAGNOSIS — Z51.5 PALLIATIVE CARE ENCOUNTER: ICD-10-CM

## 2024-01-09 DIAGNOSIS — T45.1X5A CHEMOTHERAPY-INDUCED NAUSEA: Primary | ICD-10-CM

## 2024-01-09 RX ORDER — INSULIN GLARGINE 100 [IU]/ML
22 INJECTION, SOLUTION SUBCUTANEOUS NIGHTLY
Qty: 30 ML | Refills: 1 | Status: SHIPPED
Start: 2024-01-09 | End: 2024-01-22 | Stop reason: WASHOUT

## 2024-01-09 RX ORDER — OLANZAPINE 5 MG/1
5 TABLET ORAL NIGHTLY
COMMUNITY
End: 2024-01-09 | Stop reason: SDUPTHER

## 2024-01-09 RX ORDER — OLANZAPINE 5 MG/1
5 TABLET ORAL NIGHTLY
Qty: 30 TABLET | Refills: 0 | Status: SHIPPED | OUTPATIENT
Start: 2024-01-09 | End: 2024-02-12 | Stop reason: SDUPTHER

## 2024-01-09 RX ORDER — LORAZEPAM 0.5 MG/1
0.5 TABLET ORAL EVERY 8 HOURS PRN
Qty: 90 TABLET | Refills: 0 | Status: SHIPPED | OUTPATIENT
Start: 2024-01-09 | End: 2024-02-26 | Stop reason: SDUPTHER

## 2024-01-09 RX ORDER — LORAZEPAM 0.5 MG/1
0.5 TABLET ORAL EVERY 8 HOURS PRN
COMMUNITY
End: 2024-01-09 | Stop reason: SDUPTHER

## 2024-01-09 RX ORDER — INSULIN LISPRO 100 [IU]/ML
INJECTION, SOLUTION INTRAVENOUS; SUBCUTANEOUS
Qty: 75 ML | Refills: 1 | Status: SHIPPED
Start: 2024-01-09 | End: 2024-01-22 | Stop reason: WASHOUT

## 2024-01-09 NOTE — TELEPHONE ENCOUNTER
Patient called in stating he was having a lot of nausea from chemotherapy.  Patient had tried compazine and dronabinol.  Keily Crawford NP with supportive oncology contacted.  Patient to be ordered olanzapine 5mg at bedtime daily and lorazepam 0.5mg every 8 hours prn.  Patient called and updated.  Voicemail message left for patient.  Patient also instructed to stop taking compazine.  Scripts are being sent into his Will[facility] pharmacy.

## 2024-01-10 ENCOUNTER — INFUSION (OUTPATIENT)
Dept: HEMATOLOGY/ONCOLOGY | Facility: CLINIC | Age: 59
End: 2024-01-10
Payer: MEDICARE

## 2024-01-10 VITALS
HEIGHT: 71 IN | HEART RATE: 101 BPM | RESPIRATION RATE: 16 BRPM | DIASTOLIC BLOOD PRESSURE: 95 MMHG | BODY MASS INDEX: 29.03 KG/M2 | OXYGEN SATURATION: 95 % | TEMPERATURE: 97.5 F | SYSTOLIC BLOOD PRESSURE: 150 MMHG

## 2024-01-10 DIAGNOSIS — C18.2 MALIGNANT NEOPLASM OF ASCENDING COLON (MULTI): ICD-10-CM

## 2024-01-10 PROCEDURE — 96523 IRRIG DRUG DELIVERY DEVICE: CPT

## 2024-01-10 PROCEDURE — 2500000004 HC RX 250 GENERAL PHARMACY W/ HCPCS (ALT 636 FOR OP/ED): Performed by: STUDENT IN AN ORGANIZED HEALTH CARE EDUCATION/TRAINING PROGRAM

## 2024-01-10 RX ORDER — HEPARIN 100 UNIT/ML
500 SYRINGE INTRAVENOUS AS NEEDED
Status: DISCONTINUED | OUTPATIENT
Start: 2024-01-10 | End: 2024-01-10 | Stop reason: HOSPADM

## 2024-01-10 RX ORDER — HEPARIN SODIUM,PORCINE/PF 10 UNIT/ML
50 SYRINGE (ML) INTRAVENOUS AS NEEDED
Status: CANCELLED | OUTPATIENT
Start: 2024-01-10

## 2024-01-10 RX ORDER — HEPARIN 100 UNIT/ML
500 SYRINGE INTRAVENOUS AS NEEDED
Status: CANCELLED | OUTPATIENT
Start: 2024-01-10

## 2024-01-10 RX ADMIN — SODIUM CHLORIDE, PRESERVATIVE FREE 500 UNITS: 5 INJECTION INTRAVENOUS at 13:20

## 2024-01-10 ASSESSMENT — PAIN SCALES - GENERAL: PAINLEVEL: 0-NO PAIN

## 2024-01-10 NOTE — PROGRESS NOTES
Patient at [facility] for pump d/c and BP elevated 150/95 . He has increase in nausea over the past couple days and working with DEBRA, Keily Crawford, with pal med and changing his anti-emetic and appetite stimulant medications . He denies any other symptoms concerning his BP. He said he used to be on BP meds but was taken off because his BP was trending down awhile back. In looking at his recent readings over the past month, he seems to be trending up now. He is due to schedule a follow-up with PCP. We discussed that as well as monitoring at home and when to seek additional medical attention. Dr. Russ and Sandy Cruz, THOMAS made aware.

## 2024-01-17 LAB — SCAN RESULT: NORMAL

## 2024-01-22 ENCOUNTER — INFUSION (OUTPATIENT)
Dept: HEMATOLOGY/ONCOLOGY | Facility: HOSPITAL | Age: 59
End: 2024-01-22
Payer: MEDICARE

## 2024-01-22 ENCOUNTER — LAB (OUTPATIENT)
Dept: HEMATOLOGY/ONCOLOGY | Facility: HOSPITAL | Age: 59
End: 2024-01-22
Payer: MEDICARE

## 2024-01-22 ENCOUNTER — NUTRITION (OUTPATIENT)
Dept: HEMATOLOGY/ONCOLOGY | Facility: HOSPITAL | Age: 59
End: 2024-01-22

## 2024-01-22 ENCOUNTER — OFFICE VISIT (OUTPATIENT)
Dept: HEMATOLOGY/ONCOLOGY | Facility: HOSPITAL | Age: 59
End: 2024-01-22
Payer: MEDICARE

## 2024-01-22 ENCOUNTER — TELEPHONE (OUTPATIENT)
Dept: PALLIATIVE MEDICINE | Facility: HOSPITAL | Age: 59
End: 2024-01-22

## 2024-01-22 VITALS
SYSTOLIC BLOOD PRESSURE: 136 MMHG | WEIGHT: 210.1 LBS | RESPIRATION RATE: 18 BRPM | DIASTOLIC BLOOD PRESSURE: 86 MMHG | BODY MASS INDEX: 29.25 KG/M2 | HEART RATE: 110 BPM | OXYGEN SATURATION: 97 % | TEMPERATURE: 98.4 F

## 2024-01-22 VITALS — BODY MASS INDEX: 29.41 KG/M2 | WEIGHT: 210.1 LBS | HEIGHT: 71 IN

## 2024-01-22 DIAGNOSIS — G89.3 NEOPLASM RELATED PAIN: ICD-10-CM

## 2024-01-22 DIAGNOSIS — C18.2 MALIGNANT NEOPLASM OF ASCENDING COLON (MULTI): ICD-10-CM

## 2024-01-22 DIAGNOSIS — C78.7 METASTATIC CARCINOMA TO LIVER (MULTI): ICD-10-CM

## 2024-01-22 DIAGNOSIS — Z51.11 ENCOUNTER FOR ANTINEOPLASTIC CHEMOTHERAPY: ICD-10-CM

## 2024-01-22 DIAGNOSIS — C18.2 MALIGNANT NEOPLASM OF ASCENDING COLON (MULTI): Primary | ICD-10-CM

## 2024-01-22 LAB
ALBUMIN SERPL BCP-MCNC: 3.9 G/DL (ref 3.4–5)
ALP SERPL-CCNC: 250 U/L (ref 33–120)
ALT SERPL W P-5'-P-CCNC: 40 U/L (ref 10–52)
ANION GAP SERPL CALC-SCNC: 14 MMOL/L (ref 10–20)
AST SERPL W P-5'-P-CCNC: 34 U/L (ref 9–39)
BASOPHILS # BLD AUTO: 0.02 X10*3/UL (ref 0–0.1)
BASOPHILS NFR BLD AUTO: 0.2 %
BILIRUB SERPL-MCNC: 0.6 MG/DL (ref 0–1.2)
BUN SERPL-MCNC: 12 MG/DL (ref 6–23)
CALCIUM SERPL-MCNC: 9.1 MG/DL (ref 8.6–10.3)
CEA SERPL-MCNC: 2013.2 UG/L
CHLORIDE SERPL-SCNC: 96 MMOL/L (ref 98–107)
CO2 SERPL-SCNC: 28 MMOL/L (ref 21–32)
CREAT SERPL-MCNC: 0.5 MG/DL (ref 0.5–1.3)
EGFRCR SERPLBLD CKD-EPI 2021: >90 ML/MIN/1.73M*2
EOSINOPHIL # BLD AUTO: 0.06 X10*3/UL (ref 0–0.7)
EOSINOPHIL NFR BLD AUTO: 0.7 %
ERYTHROCYTE [DISTWIDTH] IN BLOOD BY AUTOMATED COUNT: 14.1 % (ref 11.5–14.5)
GLUCOSE SERPL-MCNC: 238 MG/DL (ref 74–99)
HCT VFR BLD AUTO: 37.3 % (ref 41–52)
HGB BLD-MCNC: 12.3 G/DL (ref 13.5–17.5)
IMM GRANULOCYTES # BLD AUTO: 0.02 X10*3/UL (ref 0–0.7)
IMM GRANULOCYTES NFR BLD AUTO: 0.2 % (ref 0–0.9)
LYMPHOCYTES # BLD AUTO: 0.82 X10*3/UL (ref 1.2–4.8)
LYMPHOCYTES NFR BLD AUTO: 9.3 %
MCH RBC QN AUTO: 29.4 PG (ref 26–34)
MCHC RBC AUTO-ENTMCNC: 33 G/DL (ref 32–36)
MCV RBC AUTO: 89 FL (ref 80–100)
MONOCYTES # BLD AUTO: 0.71 X10*3/UL (ref 0.1–1)
MONOCYTES NFR BLD AUTO: 8.1 %
NEUTROPHILS # BLD AUTO: 7.15 X10*3/UL (ref 1.2–7.7)
NEUTROPHILS NFR BLD AUTO: 81.5 %
NRBC BLD-RTO: 0 /100 WBCS (ref 0–0)
PLATELET # BLD AUTO: 184 X10*3/UL (ref 150–450)
POTASSIUM SERPL-SCNC: 3.7 MMOL/L (ref 3.5–5.3)
PROT SERPL-MCNC: 7 G/DL (ref 6.4–8.2)
RBC # BLD AUTO: 4.18 X10*6/UL (ref 4.5–5.9)
SODIUM SERPL-SCNC: 134 MMOL/L (ref 136–145)
WBC # BLD AUTO: 8.8 X10*3/UL (ref 4.4–11.3)

## 2024-01-22 PROCEDURE — 96367 TX/PROPH/DG ADDL SEQ IV INF: CPT

## 2024-01-22 PROCEDURE — 2500000004 HC RX 250 GENERAL PHARMACY W/ HCPCS (ALT 636 FOR OP/ED): Performed by: STUDENT IN AN ORGANIZED HEALTH CARE EDUCATION/TRAINING PROGRAM

## 2024-01-22 PROCEDURE — 3075F SYST BP GE 130 - 139MM HG: CPT | Performed by: STUDENT IN AN ORGANIZED HEALTH CARE EDUCATION/TRAINING PROGRAM

## 2024-01-22 PROCEDURE — 96416 CHEMO PROLONG INFUSE W/PUMP: CPT

## 2024-01-22 PROCEDURE — 3079F DIAST BP 80-89 MM HG: CPT | Performed by: STUDENT IN AN ORGANIZED HEALTH CARE EDUCATION/TRAINING PROGRAM

## 2024-01-22 PROCEDURE — 3008F BODY MASS INDEX DOCD: CPT | Performed by: STUDENT IN AN ORGANIZED HEALTH CARE EDUCATION/TRAINING PROGRAM

## 2024-01-22 PROCEDURE — 36591 DRAW BLOOD OFF VENOUS DEVICE: CPT

## 2024-01-22 PROCEDURE — 96413 CHEMO IV INFUSION 1 HR: CPT

## 2024-01-22 PROCEDURE — 96375 TX/PRO/DX INJ NEW DRUG ADDON: CPT | Mod: INF

## 2024-01-22 PROCEDURE — 96415 CHEMO IV INFUSION ADDL HR: CPT

## 2024-01-22 PROCEDURE — 85025 COMPLETE CBC W/AUTO DIFF WBC: CPT

## 2024-01-22 PROCEDURE — 99214 OFFICE O/P EST MOD 30 MIN: CPT | Performed by: STUDENT IN AN ORGANIZED HEALTH CARE EDUCATION/TRAINING PROGRAM

## 2024-01-22 PROCEDURE — 82378 CARCINOEMBRYONIC ANTIGEN: CPT

## 2024-01-22 PROCEDURE — 80053 COMPREHEN METABOLIC PANEL: CPT

## 2024-01-22 PROCEDURE — 1036F TOBACCO NON-USER: CPT | Performed by: STUDENT IN AN ORGANIZED HEALTH CARE EDUCATION/TRAINING PROGRAM

## 2024-01-22 PROCEDURE — 4010F ACE/ARB THERAPY RXD/TAKEN: CPT | Performed by: STUDENT IN AN ORGANIZED HEALTH CARE EDUCATION/TRAINING PROGRAM

## 2024-01-22 RX ORDER — BISACODYL 5 MG
5 TABLET, DELAYED RELEASE (ENTERIC COATED) ORAL DAILY PRN
COMMUNITY
Start: 2023-04-18

## 2024-01-22 RX ORDER — LOSARTAN POTASSIUM 25 MG/1
TABLET ORAL
COMMUNITY
Start: 2022-06-20 | End: 2024-02-26 | Stop reason: WASHOUT

## 2024-01-22 RX ORDER — FAMOTIDINE 10 MG/ML
20 INJECTION INTRAVENOUS ONCE AS NEEDED
Status: DISCONTINUED | OUTPATIENT
Start: 2024-01-22 | End: 2024-01-22 | Stop reason: HOSPADM

## 2024-01-22 RX ORDER — PROCHLORPERAZINE MALEATE 10 MG
10 TABLET ORAL EVERY 6 HOURS PRN
Status: CANCELLED | OUTPATIENT
Start: 2024-01-22

## 2024-01-22 RX ORDER — HYDROCODONE BITARTRATE AND ACETAMINOPHEN 5; 325 MG/1; MG/1
TABLET ORAL
Status: ON HOLD | COMMUNITY
Start: 2017-10-11 | End: 2024-03-16 | Stop reason: ALTCHOICE

## 2024-01-22 RX ORDER — NYSTATIN 100000 [USP'U]/ML
SUSPENSION ORAL
Status: ON HOLD | COMMUNITY
Start: 2023-06-25 | End: 2024-03-16 | Stop reason: ALTCHOICE

## 2024-01-22 RX ORDER — HEPARIN SODIUM,PORCINE/PF 10 UNIT/ML
50 SYRINGE (ML) INTRAVENOUS AS NEEDED
Status: CANCELLED | OUTPATIENT
Start: 2024-01-22

## 2024-01-22 RX ORDER — GLYBURIDE 5 MG/1
TABLET ORAL
COMMUNITY
Start: 2023-04-12 | End: 2024-02-26 | Stop reason: WASHOUT

## 2024-01-22 RX ORDER — FAMOTIDINE 10 MG/ML
20 INJECTION INTRAVENOUS ONCE AS NEEDED
Status: CANCELLED | OUTPATIENT
Start: 2024-01-22

## 2024-01-22 RX ORDER — METFORMIN HYDROCHLORIDE 500 MG/1
1000 TABLET ORAL
COMMUNITY
Start: 2017-05-03 | End: 2024-02-26 | Stop reason: WASHOUT

## 2024-01-22 RX ORDER — INSULIN GLARGINE 100 [IU]/ML
15 INJECTION, SOLUTION SUBCUTANEOUS NIGHTLY
COMMUNITY
Start: 2023-12-07 | End: 2024-04-17 | Stop reason: HOSPADM

## 2024-01-22 RX ORDER — INSULIN LISPRO 100 [IU]/ML
INJECTION, SOLUTION INTRAVENOUS; SUBCUTANEOUS
COMMUNITY
End: 2024-04-17 | Stop reason: HOSPADM

## 2024-01-22 RX ORDER — PROCHLORPERAZINE EDISYLATE 5 MG/ML
10 INJECTION INTRAMUSCULAR; INTRAVENOUS EVERY 6 HOURS PRN
Status: DISCONTINUED | OUTPATIENT
Start: 2024-01-22 | End: 2024-01-22 | Stop reason: HOSPADM

## 2024-01-22 RX ORDER — GLUCOSAM/CHONDRO/HERB 149/HYAL 750-100 MG
1000 TABLET ORAL
Status: ON HOLD | COMMUNITY
Start: 2023-01-08 | End: 2024-03-16 | Stop reason: ALTCHOICE

## 2024-01-22 RX ORDER — TRAMADOL HYDROCHLORIDE 50 MG/1
TABLET ORAL
Status: ON HOLD | COMMUNITY
Start: 2023-05-04 | End: 2024-03-16 | Stop reason: ALTCHOICE

## 2024-01-22 RX ORDER — PALONOSETRON 0.05 MG/ML
0.25 INJECTION, SOLUTION INTRAVENOUS ONCE
Status: COMPLETED | OUTPATIENT
Start: 2024-01-22 | End: 2024-01-22

## 2024-01-22 RX ORDER — ACYCLOVIR 800 MG/1
TABLET ORAL EVERY 12 HOURS
Status: ON HOLD | COMMUNITY
Start: 2023-07-20 | End: 2024-03-16 | Stop reason: ALTCHOICE

## 2024-01-22 RX ORDER — ALBUTEROL SULFATE 0.83 MG/ML
3 SOLUTION RESPIRATORY (INHALATION) AS NEEDED
Status: DISCONTINUED | OUTPATIENT
Start: 2024-01-22 | End: 2024-01-22 | Stop reason: HOSPADM

## 2024-01-22 RX ORDER — ENOXAPARIN SODIUM 100 MG/ML
40 INJECTION SUBCUTANEOUS
Status: ON HOLD | COMMUNITY
Start: 2023-05-04 | End: 2024-03-16 | Stop reason: ALTCHOICE

## 2024-01-22 RX ORDER — AMOXICILLIN 250 MG
CAPSULE ORAL EVERY 12 HOURS
Status: ON HOLD | COMMUNITY
Start: 2023-05-11 | End: 2024-03-16 | Stop reason: ALTCHOICE

## 2024-01-22 RX ORDER — POLYETHYLENE GLYCOL 3350 17 G/17G
17 POWDER, FOR SOLUTION ORAL
COMMUNITY
Start: 2023-04-18 | End: 2024-02-05 | Stop reason: SDUPTHER

## 2024-01-22 RX ORDER — HEPARIN 100 UNIT/ML
500 SYRINGE INTRAVENOUS AS NEEDED
Status: CANCELLED | OUTPATIENT
Start: 2024-01-22

## 2024-01-22 RX ORDER — ERGOCALCIFEROL (VITAMIN D2) 50 MCG
CAPSULE ORAL
Status: ON HOLD | COMMUNITY
Start: 2023-01-08 | End: 2024-03-16 | Stop reason: ALTCHOICE

## 2024-01-22 RX ORDER — PRAVASTATIN SODIUM 20 MG/1
TABLET ORAL EVERY 24 HOURS
Status: ON HOLD | COMMUNITY
End: 2024-03-16 | Stop reason: ALTCHOICE

## 2024-01-22 RX ORDER — HEPARIN 100 UNIT/ML
500 SYRINGE INTRAVENOUS AS NEEDED
Status: DISCONTINUED | OUTPATIENT
Start: 2024-01-22 | End: 2024-01-22 | Stop reason: HOSPADM

## 2024-01-22 RX ORDER — ATROPINE SULFATE 0.4 MG/ML
0.4 INJECTION, SOLUTION ENDOTRACHEAL; INTRAMEDULLARY; INTRAMUSCULAR; INTRAVENOUS; SUBCUTANEOUS
Status: DISCONTINUED | OUTPATIENT
Start: 2024-01-22 | End: 2024-01-22 | Stop reason: HOSPADM

## 2024-01-22 RX ORDER — CHLORHEXIDINE GLUCONATE 40 MG/ML
15 SOLUTION TOPICAL EVERY 12 HOURS
Status: ON HOLD | COMMUNITY
Start: 2023-04-17 | End: 2024-03-16 | Stop reason: ALTCHOICE

## 2024-01-22 RX ORDER — DIPHENHYDRAMINE HYDROCHLORIDE 50 MG/ML
50 INJECTION INTRAMUSCULAR; INTRAVENOUS AS NEEDED
Status: CANCELLED | OUTPATIENT
Start: 2024-01-22

## 2024-01-22 RX ORDER — HEPARIN SODIUM,PORCINE/PF 10 UNIT/ML
50 SYRINGE (ML) INTRAVENOUS AS NEEDED
Status: DISCONTINUED | OUTPATIENT
Start: 2024-01-22 | End: 2024-01-22 | Stop reason: HOSPADM

## 2024-01-22 RX ORDER — PROCHLORPERAZINE EDISYLATE 5 MG/ML
10 INJECTION INTRAMUSCULAR; INTRAVENOUS EVERY 6 HOURS PRN
Status: CANCELLED | OUTPATIENT
Start: 2024-01-22

## 2024-01-22 RX ORDER — PALONOSETRON 0.05 MG/ML
0.25 INJECTION, SOLUTION INTRAVENOUS ONCE
Status: CANCELLED | OUTPATIENT
Start: 2024-01-22

## 2024-01-22 RX ORDER — ATROPINE SULFATE 0.4 MG/ML
0.4 INJECTION, SOLUTION ENDOTRACHEAL; INTRAMEDULLARY; INTRAMUSCULAR; INTRAVENOUS; SUBCUTANEOUS
Status: CANCELLED | OUTPATIENT
Start: 2024-01-22

## 2024-01-22 RX ORDER — EPINEPHRINE 0.3 MG/.3ML
0.3 INJECTION SUBCUTANEOUS EVERY 5 MIN PRN
Status: DISCONTINUED | OUTPATIENT
Start: 2024-01-22 | End: 2024-01-22 | Stop reason: HOSPADM

## 2024-01-22 RX ORDER — LISINOPRIL 10 MG/1
10 TABLET ORAL
COMMUNITY
Start: 2023-01-08 | End: 2024-02-26 | Stop reason: WASHOUT

## 2024-01-22 RX ORDER — PROCHLORPERAZINE MALEATE 10 MG
10 TABLET ORAL EVERY 6 HOURS PRN
Status: DISCONTINUED | OUTPATIENT
Start: 2024-01-22 | End: 2024-01-22 | Stop reason: HOSPADM

## 2024-01-22 RX ORDER — EPINEPHRINE 0.3 MG/.3ML
0.3 INJECTION SUBCUTANEOUS EVERY 5 MIN PRN
Status: CANCELLED | OUTPATIENT
Start: 2024-01-22

## 2024-01-22 RX ORDER — METRONIDAZOLE 250 MG/1
TABLET ORAL
Status: ON HOLD | COMMUNITY
Start: 2023-04-18 | End: 2024-03-16 | Stop reason: ALTCHOICE

## 2024-01-22 RX ORDER — ALBUTEROL SULFATE 0.83 MG/ML
3 SOLUTION RESPIRATORY (INHALATION) AS NEEDED
Status: CANCELLED | OUTPATIENT
Start: 2024-01-22

## 2024-01-22 RX ORDER — DIPHENHYDRAMINE HYDROCHLORIDE 50 MG/ML
50 INJECTION INTRAMUSCULAR; INTRAVENOUS AS NEEDED
Status: DISCONTINUED | OUTPATIENT
Start: 2024-01-22 | End: 2024-01-22 | Stop reason: HOSPADM

## 2024-01-22 RX ADMIN — PALONOSETRON HYDROCHLORIDE 250 MCG: 0.25 INJECTION INTRAVENOUS at 11:30

## 2024-01-22 RX ADMIN — FLUOROURACIL 3950 MG: 50 INJECTION, SOLUTION INTRAVENOUS at 14:24

## 2024-01-22 RX ADMIN — DEXAMETHASONE SODIUM PHOSPHATE 12 MG: 10 INJECTION, SOLUTION INTRAMUSCULAR; INTRAVENOUS at 11:29

## 2024-01-22 RX ADMIN — IRINOTECAN HYDROCHLORIDE 330 MG: 20 INJECTION, SOLUTION INTRAVENOUS at 12:23

## 2024-01-22 RX ADMIN — FOSAPREPITANT DIMEGLUMINE 150 MG: 150 INJECTION, POWDER, LYOPHILIZED, FOR SOLUTION INTRAVENOUS at 11:48

## 2024-01-22 ASSESSMENT — PAIN SCALES - GENERAL: PAINLEVEL: 2

## 2024-01-22 NOTE — PROGRESS NOTES
"NUTRITION Follow-up NOTE    Nutrition Assessment     Reason for Visit:  Narciso Marrero is a 58 y.o. male who presents for taste changes and wt loss. Pt seen in infusion w/ wife, Emperatriz. PMHx: R hemicolectomy, open cholecystectomy, and peritonectomy. Current treatment: FOLFIRI via AMY D1C3. 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 Scheduling of anti-nausea medications has been very effective. Recently started taking Zyprexa, which pt states has been very effective to increase appetite. States he will occasionally have a craving for something, but then this food will not taste as good after starting his meal. On days where he feels good, he has been eating really well.  Eating bagels and cream cheese along with breakfast to help control his BM. Has felt better than he has in while over the past week. States some abd pain may be d/t doing too much. It occurs in the evenings around 8-9. Unrelated to meals or BM. New pain regimen started today.    Will plan to see in infusion on 2/5/24 @ 2:15 pm.      Lab Results   Component Value Date/Time    GLUCOSE 238 (H) 01/22/2024 0918     (L) 01/22/2024 0918    K 3.7 01/22/2024 0918    CL 96 (L) 01/22/2024 0918    CO2 28 01/22/2024 0918    ANIONGAP 14 01/22/2024 0918    BUN 12 01/22/2024 0918    CREATININE 0.50 01/22/2024 0918    EGFR >90 01/22/2024 0918    CALCIUM 9.1 01/22/2024 0918    ALBUMIN 3.9 01/22/2024 0918    ALKPHOS 250 (H) 01/22/2024 0918    PROT 7.0 01/22/2024 0918    AST 34 01/22/2024 0918    BILITOT 0.6 01/22/2024 0918    ALT 40 01/22/2024 0918     No results found for: \"VITD25\"    All pertinent labs have been reviewed.     Anthropometrics:  Anthropometrics  Height: 180.5 cm (5' 11.06\")  Weight: 95.3 kg (210 lb 1.6 oz)  BMI (Calculated): 29.25  IBW/kg (Dietitian Calculated): 78.2 kg  Percent of IBW: 122 %  Weight Change  Weight History / % Weight Change: Wt has been stable. (States wt loss to a goal wt of 90.9 " kg)  Significant Weight Loss: No        Wt Readings from Last 30 Encounters:   01/22/24 95.3 kg (210 lb 1.6 oz)   01/22/24 95.3 kg (210 lb 1.6 oz)   01/08/24 94.6 kg (208 lb 8 oz)   12/29/23 93.5 kg (206 lb 3.2 oz)   12/26/23 95.5 kg (210 lb 8 oz)   12/13/23 96.7 kg (213 lb 3 oz)   12/11/23 96.4 kg (212 lb 8 oz)   12/11/23 96.4 kg (212 lb 8 oz)   12/01/23 95.8 kg (211 lb 3.2 oz)   11/29/23 95.8 kg (211 lb 1.6 oz)   11/29/23 95.8 kg (211 lb 1.6 oz)   11/17/23 97.9 kg (215 lb 13.3 oz)   11/16/23 95.8 kg (211 lb 3.2 oz)   11/15/23 95.8 kg (211 lb 3.2 oz)   11/15/23 95.8 kg (211 lb 3.2 oz)   11/01/23 97.7 kg (215 lb 6.2 oz)   11/01/23 97.7 kg (215 lb 6.2 oz)   10/24/23 97.5 kg (215 lb)   10/19/23 96.5 kg (212 lb 11.2 oz)   10/18/23 96.5 kg (212 lb 11.2 oz)   10/06/23 95.3 kg (210 lb 1.6 oz)   10/04/23 95.5 kg (210 lb 8.6 oz)   10/04/23 95.5 kg (210 lb 8.6 oz)   10/03/23 98 kg (216 lb 0.8 oz)   09/29/23 98.9 kg (218 lb 0.6 oz)   06/29/23 101 kg (223 lb)   06/28/23 101 kg (223 lb 1.7 oz)   06/09/23 101 kg (222 lb 7 oz)   04/17/23 104 kg (229 lb 5.9 oz)   03/24/23 105 kg (231 lb 7.7 oz)        Food And Nutrient Intake:  Food and Nutrient History  Food and Nutrient History: Appetite is improved.  Energy Intake: Good > 75 %  Fluid Intake: 4 16 oz bottles water, gingerale or juice.  Food Allergy: None.  Food Intolerance: None.  GI Symptoms: vomiting, constipation, abdominal pain (States 2x vomiting unrelated to nausea. Some constipation, but his has resolved. Miralax to good effect. Endoreses looser stools for a few days after chemo. Abd pain, after dinner might be switching to a long-lasting pain med.)  GI Symptoms greater than 2 weeks: yes  Oral Problems: dysgeusia (Metallic flavor. Sugar Free [place] rancher at night to good effect.)     Food Intake  Amount of Food: 3 meals/day  Meal 1: 2 eggs, hashbrowns, 1 piece of toast  Meal 2: 2 Beef tacos  Meal 3: Spaghetti w/ meat sauce, garlic bread    Micronutrient Intake  Vitamin  "Intake: Multivitamin, D (Not taking d/t size.)    Food Supplement Intake  Oral Nutrition Supplements:  (Has not been d/t good intake.)    Medication and Complementary/Alternative Medicine Use  Prescription Medication Use: Zyprexa (For appetite stimulation), Miralax (To good effect.)  Nutrition-Related Complementary/Alternative Medicine Use: Fish oil. (Has not been taking d/t the size.)      Nutrition Focused Physical Exam Findings:  defer: Pt seen in infusion    Subcutaneous Fat Loss  Orbital Fat Pads: Well nourshed (slightly bulging fat pads)  Buccal Fat Pads: Well nourished (full, rounded cheeks)    Muscle Wasting  Temporalis: Well nourished (well-defined muscle)    Edema  Edema: none         Energy Needs  Calculated Energy Needs Using Equations  Height: 180.5 cm (5' 11.06\")  Estimated Energy Needs  Total Energy Estimated Needs (kCal):  (7081-6529)  Total Estimated Energy Need per Day (kCal/kg):  (25-28)  Method for Estimating Needs: IBW  Estimated Fluid Needs  Total Fluid Estimated Needs (mL): 2200 mL  Method for Estimating Needs: 1 mL/kcal  Estimated Protein Needs  Total Protein Estimated Needs (g):  ()  Total Protein Estimated Needs (g/kg):  (1.3)  Method for Estimating Needs: IBW  Weight and Growth Recommendation  Desired Growth Pattern: Maintain current wt.        Nutrition Diagnosis   Malnutrition Diagnosis  Patient has Malnutrition Diagnosis: No    Nutrition Diagnosis  Patient has Nutrition Diagnosis: Yes  Diagnosis Status (1): Ongoing  Nutrition Diagnosis 1: Increased nutrient needs  Related to (1): dx of colon cancer  As Evidenced by (1): increased metabolic demand d/t dx.    Nutrition Interventions/Recommendations   Food and Nutrition Delivery  Food and Nutrition Delivery  Meals & Snacks: Modify schedule of foods/fluids, Modify Composition of Meals/Snacks  Goals: Consume 4-5 meals >75% of days; Choose high calorie and high protein foods >75% of meals.    Nutrition Education  Nutrition " Education  Nutrition Education Content: Content related nutrition education  Goals: Provided reinforcement for consuming adequate meals, protein, and calories.    Coordination of Care  Coordination of Nutrition Care by a Nutrition Professional  Collaboration and referral of nutrition care: Collaboration by nutrition professional with other providers  Goals: Collaborated with Rosalinda Barksdale.    There are no Patient Instructions on file for this visit.    Nutrition Monitoring and Evaluation   Food/Nutrient Related History Monitoring  Monitoring and Evaluation Plan: Meal/snack pattern, Amount of food  Amount of Food: Estimated amout of food  Criteria: Choose high calorie and high protein foods >75% of meals.  Meal/Snack Pattern: Estimated meal and snack pattern  Criteria: Consume 4-5 meals >75% of days        Time Spent  Prep time on day of patient encounter: 5 minutes  Time spent directly with patient, family or caregiver: 15 minutes  Additional Time Spent on Patient Care Activities: 10 minutes  Documentation Time: 25 minutes  Other Time Spent: 0 minutes  Total: 55 minutes            Readiness to Change : Excellent  Level of Understanding : Excellent  Anticipated Compliant : Excellent

## 2024-01-22 NOTE — PROGRESS NOTES
Pt tolerated chemo without incident.  Discharged home in stable condition with CADD pump infusing 5FU over the next 46 hours.
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
## 2024-01-24 ENCOUNTER — INFUSION (OUTPATIENT)
Dept: HEMATOLOGY/ONCOLOGY | Facility: CLINIC | Age: 59
End: 2024-01-24
Payer: MEDICARE

## 2024-01-24 VITALS
TEMPERATURE: 97.2 F | SYSTOLIC BLOOD PRESSURE: 137 MMHG | BODY MASS INDEX: 29.25 KG/M2 | DIASTOLIC BLOOD PRESSURE: 91 MMHG | RESPIRATION RATE: 16 BRPM | HEART RATE: 112 BPM | HEIGHT: 71 IN | OXYGEN SATURATION: 96 %

## 2024-01-24 DIAGNOSIS — C18.2 MALIGNANT NEOPLASM OF ASCENDING COLON (MULTI): ICD-10-CM

## 2024-01-24 PROCEDURE — 2500000004 HC RX 250 GENERAL PHARMACY W/ HCPCS (ALT 636 FOR OP/ED): Performed by: STUDENT IN AN ORGANIZED HEALTH CARE EDUCATION/TRAINING PROGRAM

## 2024-01-24 PROCEDURE — 96523 IRRIG DRUG DELIVERY DEVICE: CPT

## 2024-01-24 RX ORDER — HEPARIN 100 UNIT/ML
500 SYRINGE INTRAVENOUS AS NEEDED
Status: CANCELLED | OUTPATIENT
Start: 2024-01-24

## 2024-01-24 RX ORDER — HEPARIN SODIUM,PORCINE/PF 10 UNIT/ML
50 SYRINGE (ML) INTRAVENOUS AS NEEDED
Status: CANCELLED | OUTPATIENT
Start: 2024-01-24

## 2024-01-24 RX ORDER — HEPARIN 100 UNIT/ML
500 SYRINGE INTRAVENOUS AS NEEDED
Status: DISCONTINUED | OUTPATIENT
Start: 2024-01-24 | End: 2024-01-24 | Stop reason: HOSPADM

## 2024-01-24 RX ADMIN — Medication 500 UNITS: at 12:41

## 2024-01-24 ASSESSMENT — PAIN SCALES - GENERAL: PAINLEVEL: 0-NO PAIN

## 2024-01-24 NOTE — PROGRESS NOTES
Patient is here for cycle 3 FU pump disconnect. Patient states he is tolerating treatment, eating and drinking well. States mild numbness and tingling in bilateral hands. Pump infused without trouble. CVADD de-accessed per protocol with Saline and Heparin. Patient discharged in stable condition. Has updated schedule, aware that next infusion and follow up visit will be 2/5/24.

## 2024-01-26 NOTE — TELEPHONE ENCOUNTER
PA approved for MS Contin 15 mg tab by [facility] from 12/24/23-4/23/24.  [facility] pharmacy called and updated.  Patient called and updated.

## 2024-02-05 ENCOUNTER — OFFICE VISIT (OUTPATIENT)
Dept: HEMATOLOGY/ONCOLOGY | Facility: HOSPITAL | Age: 59
End: 2024-02-05
Payer: MEDICARE

## 2024-02-05 ENCOUNTER — INFUSION (OUTPATIENT)
Dept: HEMATOLOGY/ONCOLOGY | Facility: HOSPITAL | Age: 59
End: 2024-02-05
Payer: MEDICARE

## 2024-02-05 ENCOUNTER — LAB (OUTPATIENT)
Dept: HEMATOLOGY/ONCOLOGY | Facility: HOSPITAL | Age: 59
End: 2024-02-05
Payer: MEDICARE

## 2024-02-05 VITALS
TEMPERATURE: 98.1 F | HEART RATE: 106 BPM | WEIGHT: 209.2 LBS | BODY MASS INDEX: 29.13 KG/M2 | SYSTOLIC BLOOD PRESSURE: 155 MMHG | RESPIRATION RATE: 17 BRPM | DIASTOLIC BLOOD PRESSURE: 90 MMHG | OXYGEN SATURATION: 96 %

## 2024-02-05 DIAGNOSIS — K59.03 THERAPEUTIC OPIOID INDUCED CONSTIPATION: ICD-10-CM

## 2024-02-05 DIAGNOSIS — Z51.11 ENCOUNTER FOR ANTINEOPLASTIC CHEMOTHERAPY: ICD-10-CM

## 2024-02-05 DIAGNOSIS — G89.3 NEOPLASM RELATED PAIN: ICD-10-CM

## 2024-02-05 DIAGNOSIS — C18.2 MALIGNANT NEOPLASM OF ASCENDING COLON (MULTI): Primary | ICD-10-CM

## 2024-02-05 DIAGNOSIS — C18.2 MALIGNANT NEOPLASM OF ASCENDING COLON (MULTI): ICD-10-CM

## 2024-02-05 DIAGNOSIS — Z96.89 PRESENCE OF HEPATIC ARTERIAL INFUSION PUMP: ICD-10-CM

## 2024-02-05 DIAGNOSIS — T40.2X5A THERAPEUTIC OPIOID INDUCED CONSTIPATION: ICD-10-CM

## 2024-02-05 DIAGNOSIS — C78.7 METASTATIC CARCINOMA TO LIVER (MULTI): ICD-10-CM

## 2024-02-05 LAB
ALBUMIN SERPL BCP-MCNC: 3.8 G/DL (ref 3.4–5)
ALP SERPL-CCNC: 222 U/L (ref 33–120)
ALT SERPL W P-5'-P-CCNC: 16 U/L (ref 10–52)
ANION GAP SERPL CALC-SCNC: 14 MMOL/L (ref 10–20)
AST SERPL W P-5'-P-CCNC: 20 U/L (ref 9–39)
BASOPHILS # BLD AUTO: 0.01 X10*3/UL (ref 0–0.1)
BASOPHILS NFR BLD AUTO: 0.1 %
BILIRUB SERPL-MCNC: 0.5 MG/DL (ref 0–1.2)
BUN SERPL-MCNC: 13 MG/DL (ref 6–23)
CALCIUM SERPL-MCNC: 9.3 MG/DL (ref 8.6–10.3)
CEA SERPL-MCNC: 2439 UG/L
CHLORIDE SERPL-SCNC: 99 MMOL/L (ref 98–107)
CO2 SERPL-SCNC: 29 MMOL/L (ref 21–32)
CREAT SERPL-MCNC: 0.66 MG/DL (ref 0.5–1.3)
EGFRCR SERPLBLD CKD-EPI 2021: >90 ML/MIN/1.73M*2
EOSINOPHIL # BLD AUTO: 0.05 X10*3/UL (ref 0–0.7)
EOSINOPHIL NFR BLD AUTO: 0.5 %
ERYTHROCYTE [DISTWIDTH] IN BLOOD BY AUTOMATED COUNT: 14.3 % (ref 11.5–14.5)
GLUCOSE SERPL-MCNC: 155 MG/DL (ref 74–99)
HCT VFR BLD AUTO: 37.2 % (ref 41–52)
HGB BLD-MCNC: 12.2 G/DL (ref 13.5–17.5)
IMM GRANULOCYTES # BLD AUTO: 0.06 X10*3/UL (ref 0–0.7)
IMM GRANULOCYTES NFR BLD AUTO: 0.7 % (ref 0–0.9)
LYMPHOCYTES # BLD AUTO: 1.13 X10*3/UL (ref 1.2–4.8)
LYMPHOCYTES NFR BLD AUTO: 12.4 %
MCH RBC QN AUTO: 29.4 PG (ref 26–34)
MCHC RBC AUTO-ENTMCNC: 32.8 G/DL (ref 32–36)
MCV RBC AUTO: 90 FL (ref 80–100)
MONOCYTES # BLD AUTO: 0.75 X10*3/UL (ref 0.1–1)
MONOCYTES NFR BLD AUTO: 8.2 %
NEUTROPHILS # BLD AUTO: 7.13 X10*3/UL (ref 1.2–7.7)
NEUTROPHILS NFR BLD AUTO: 78.1 %
NRBC BLD-RTO: 0 /100 WBCS (ref 0–0)
PLATELET # BLD AUTO: 243 X10*3/UL (ref 150–450)
POTASSIUM SERPL-SCNC: 3.9 MMOL/L (ref 3.5–5.3)
PROT SERPL-MCNC: 6.9 G/DL (ref 6.4–8.2)
RBC # BLD AUTO: 4.15 X10*6/UL (ref 4.5–5.9)
SODIUM SERPL-SCNC: 138 MMOL/L (ref 136–145)
WBC # BLD AUTO: 9.1 X10*3/UL (ref 4.4–11.3)

## 2024-02-05 PROCEDURE — 96413 CHEMO IV INFUSION 1 HR: CPT

## 2024-02-05 PROCEDURE — 2500000004 HC RX 250 GENERAL PHARMACY W/ HCPCS (ALT 636 FOR OP/ED): Performed by: STUDENT IN AN ORGANIZED HEALTH CARE EDUCATION/TRAINING PROGRAM

## 2024-02-05 PROCEDURE — 96522 REFILL/MAINT PUMP/RESVR SYST: CPT

## 2024-02-05 PROCEDURE — 96365 THER/PROPH/DIAG IV INF INIT: CPT | Mod: INF

## 2024-02-05 PROCEDURE — 1036F TOBACCO NON-USER: CPT | Performed by: STUDENT IN AN ORGANIZED HEALTH CARE EDUCATION/TRAINING PROGRAM

## 2024-02-05 PROCEDURE — 99215 OFFICE O/P EST HI 40 MIN: CPT | Mod: 25 | Performed by: STUDENT IN AN ORGANIZED HEALTH CARE EDUCATION/TRAINING PROGRAM

## 2024-02-05 PROCEDURE — 3008F BODY MASS INDEX DOCD: CPT | Performed by: STUDENT IN AN ORGANIZED HEALTH CARE EDUCATION/TRAINING PROGRAM

## 2024-02-05 PROCEDURE — 96415 CHEMO IV INFUSION ADDL HR: CPT

## 2024-02-05 PROCEDURE — 4010F ACE/ARB THERAPY RXD/TAKEN: CPT | Performed by: STUDENT IN AN ORGANIZED HEALTH CARE EDUCATION/TRAINING PROGRAM

## 2024-02-05 PROCEDURE — 96416 CHEMO PROLONG INFUSE W/PUMP: CPT

## 2024-02-05 PROCEDURE — 96375 TX/PRO/DX INJ NEW DRUG ADDON: CPT | Mod: INF

## 2024-02-05 PROCEDURE — 99215 OFFICE O/P EST HI 40 MIN: CPT | Performed by: STUDENT IN AN ORGANIZED HEALTH CARE EDUCATION/TRAINING PROGRAM

## 2024-02-05 PROCEDURE — 84075 ASSAY ALKALINE PHOSPHATASE: CPT

## 2024-02-05 PROCEDURE — 82378 CARCINOEMBRYONIC ANTIGEN: CPT

## 2024-02-05 PROCEDURE — 3077F SYST BP >= 140 MM HG: CPT | Performed by: STUDENT IN AN ORGANIZED HEALTH CARE EDUCATION/TRAINING PROGRAM

## 2024-02-05 PROCEDURE — 2500000004 HC RX 250 GENERAL PHARMACY W/ HCPCS (ALT 636 FOR OP/ED): Mod: JZ | Performed by: STUDENT IN AN ORGANIZED HEALTH CARE EDUCATION/TRAINING PROGRAM

## 2024-02-05 PROCEDURE — 36591 DRAW BLOOD OFF VENOUS DEVICE: CPT

## 2024-02-05 PROCEDURE — 2500000005 HC RX 250 GENERAL PHARMACY W/O HCPCS: Performed by: STUDENT IN AN ORGANIZED HEALTH CARE EDUCATION/TRAINING PROGRAM

## 2024-02-05 PROCEDURE — 85025 COMPLETE CBC W/AUTO DIFF WBC: CPT

## 2024-02-05 PROCEDURE — 2500000001 HC RX 250 WO HCPCS SELF ADMINISTERED DRUGS (ALT 637 FOR MEDICARE OP): Performed by: STUDENT IN AN ORGANIZED HEALTH CARE EDUCATION/TRAINING PROGRAM

## 2024-02-05 PROCEDURE — 3080F DIAST BP >= 90 MM HG: CPT | Performed by: STUDENT IN AN ORGANIZED HEALTH CARE EDUCATION/TRAINING PROGRAM

## 2024-02-05 RX ORDER — POLYETHYLENE GLYCOL 3350 17 G/17G
17 POWDER, FOR SOLUTION ORAL 2 TIMES DAILY
Qty: 850 G | Refills: 1 | Status: SHIPPED | OUTPATIENT
Start: 2024-02-05 | End: 2024-02-26 | Stop reason: SDUPTHER

## 2024-02-05 RX ORDER — ACETAMINOPHEN 325 MG/1
650 TABLET ORAL ONCE
Status: CANCELLED
Start: 2024-02-05 | End: 2024-02-05

## 2024-02-05 RX ORDER — OXYCODONE HYDROCHLORIDE 5 MG/1
10 TABLET ORAL ONCE
Status: COMPLETED | OUTPATIENT
Start: 2024-02-05 | End: 2024-02-05

## 2024-02-05 RX ORDER — EPINEPHRINE 0.3 MG/.3ML
0.3 INJECTION SUBCUTANEOUS EVERY 5 MIN PRN
Status: CANCELLED | OUTPATIENT
Start: 2024-02-05

## 2024-02-05 RX ORDER — ALBUTEROL SULFATE 0.83 MG/ML
3 SOLUTION RESPIRATORY (INHALATION) AS NEEDED
Status: CANCELLED | OUTPATIENT
Start: 2024-02-05

## 2024-02-05 RX ORDER — HEPARIN 100 UNIT/ML
500 SYRINGE INTRAVENOUS AS NEEDED
Status: CANCELLED | OUTPATIENT
Start: 2024-02-05

## 2024-02-05 RX ORDER — EPINEPHRINE 0.3 MG/.3ML
0.3 INJECTION SUBCUTANEOUS EVERY 5 MIN PRN
Status: DISCONTINUED | OUTPATIENT
Start: 2024-02-05 | End: 2024-02-05 | Stop reason: HOSPADM

## 2024-02-05 RX ORDER — PROCHLORPERAZINE EDISYLATE 5 MG/ML
10 INJECTION INTRAMUSCULAR; INTRAVENOUS EVERY 6 HOURS PRN
Status: CANCELLED | OUTPATIENT
Start: 2024-02-05

## 2024-02-05 RX ORDER — PALONOSETRON 0.05 MG/ML
0.25 INJECTION, SOLUTION INTRAVENOUS ONCE
Status: COMPLETED | OUTPATIENT
Start: 2024-02-05 | End: 2024-02-05

## 2024-02-05 RX ORDER — DIPHENHYDRAMINE HYDROCHLORIDE 50 MG/ML
50 INJECTION INTRAMUSCULAR; INTRAVENOUS AS NEEDED
Status: DISCONTINUED | OUTPATIENT
Start: 2024-02-05 | End: 2024-02-05 | Stop reason: HOSPADM

## 2024-02-05 RX ORDER — HEPARIN SODIUM,PORCINE/PF 10 UNIT/ML
50 SYRINGE (ML) INTRAVENOUS AS NEEDED
Status: CANCELLED | OUTPATIENT
Start: 2024-02-05

## 2024-02-05 RX ORDER — EPINEPHRINE 0.3 MG/.3ML
0.3 INJECTION SUBCUTANEOUS EVERY 5 MIN PRN
Status: CANCELLED | OUTPATIENT
Start: 2024-02-06

## 2024-02-05 RX ORDER — FAMOTIDINE 10 MG/ML
20 INJECTION INTRAVENOUS ONCE AS NEEDED
Status: CANCELLED | OUTPATIENT
Start: 2024-02-06

## 2024-02-05 RX ORDER — DIPHENHYDRAMINE HYDROCHLORIDE 50 MG/ML
50 INJECTION INTRAMUSCULAR; INTRAVENOUS AS NEEDED
Status: CANCELLED | OUTPATIENT
Start: 2024-02-06

## 2024-02-05 RX ORDER — ATROPINE SULFATE 0.4 MG/ML
0.4 INJECTION, SOLUTION ENDOTRACHEAL; INTRAMEDULLARY; INTRAMUSCULAR; INTRAVENOUS; SUBCUTANEOUS
Status: CANCELLED | OUTPATIENT
Start: 2024-02-05

## 2024-02-05 RX ORDER — FAMOTIDINE 10 MG/ML
20 INJECTION INTRAVENOUS ONCE AS NEEDED
Status: DISCONTINUED | OUTPATIENT
Start: 2024-02-05 | End: 2024-02-05 | Stop reason: HOSPADM

## 2024-02-05 RX ORDER — PROCHLORPERAZINE MALEATE 10 MG
10 TABLET ORAL EVERY 6 HOURS PRN
Status: DISCONTINUED | OUTPATIENT
Start: 2024-02-05 | End: 2024-02-05 | Stop reason: HOSPADM

## 2024-02-05 RX ORDER — ATROPINE SULFATE 0.4 MG/ML
0.4 INJECTION, SOLUTION ENDOTRACHEAL; INTRAMEDULLARY; INTRAMUSCULAR; INTRAVENOUS; SUBCUTANEOUS
Status: DISCONTINUED | OUTPATIENT
Start: 2024-02-05 | End: 2024-02-05 | Stop reason: HOSPADM

## 2024-02-05 RX ORDER — OXYCODONE HYDROCHLORIDE 5 MG/1
10 TABLET ORAL ONCE
Status: CANCELLED
Start: 2024-02-05 | End: 2024-02-05

## 2024-02-05 RX ORDER — PROCHLORPERAZINE EDISYLATE 5 MG/ML
10 INJECTION INTRAMUSCULAR; INTRAVENOUS EVERY 6 HOURS PRN
Status: DISCONTINUED | OUTPATIENT
Start: 2024-02-05 | End: 2024-02-05 | Stop reason: HOSPADM

## 2024-02-05 RX ORDER — ALBUTEROL SULFATE 0.83 MG/ML
3 SOLUTION RESPIRATORY (INHALATION) AS NEEDED
Status: CANCELLED | OUTPATIENT
Start: 2024-02-06

## 2024-02-05 RX ORDER — ALBUTEROL SULFATE 0.83 MG/ML
3 SOLUTION RESPIRATORY (INHALATION) AS NEEDED
Status: DISCONTINUED | OUTPATIENT
Start: 2024-02-05 | End: 2024-02-05 | Stop reason: HOSPADM

## 2024-02-05 RX ORDER — ACETAMINOPHEN 325 MG/1
650 TABLET ORAL ONCE
Status: COMPLETED | OUTPATIENT
Start: 2024-02-05 | End: 2024-02-05

## 2024-02-05 RX ORDER — DIPHENHYDRAMINE HYDROCHLORIDE 50 MG/ML
50 INJECTION INTRAMUSCULAR; INTRAVENOUS AS NEEDED
Status: CANCELLED | OUTPATIENT
Start: 2024-02-05

## 2024-02-05 RX ORDER — PALONOSETRON 0.05 MG/ML
0.25 INJECTION, SOLUTION INTRAVENOUS ONCE
Status: CANCELLED | OUTPATIENT
Start: 2024-02-05

## 2024-02-05 RX ORDER — FAMOTIDINE 10 MG/ML
20 INJECTION INTRAVENOUS ONCE AS NEEDED
Status: CANCELLED | OUTPATIENT
Start: 2024-02-05

## 2024-02-05 RX ORDER — PROCHLORPERAZINE MALEATE 10 MG
10 TABLET ORAL EVERY 6 HOURS PRN
Status: CANCELLED | OUTPATIENT
Start: 2024-02-05

## 2024-02-05 RX ADMIN — ACETAMINOPHEN 650 MG: 325 TABLET ORAL at 15:28

## 2024-02-05 RX ADMIN — DEXAMETHASONE SODIUM PHOSPHATE 12 MG: 10 INJECTION, SOLUTION INTRAMUSCULAR; INTRAVENOUS at 15:28

## 2024-02-05 RX ADMIN — FLUOROURACIL 3950 MG: 50 INJECTION, SOLUTION INTRAVENOUS at 18:27

## 2024-02-05 RX ADMIN — FOSAPREPITANT 150 MG: 150 INJECTION, POWDER, LYOPHILIZED, FOR SOLUTION INTRAVENOUS at 15:57

## 2024-02-05 RX ADMIN — IRINOTECAN HYDROCHLORIDE 330 MG: 20 INJECTION, SOLUTION INTRAVENOUS at 16:33

## 2024-02-05 RX ADMIN — OXYCODONE HYDROCHLORIDE 10 MG: 5 TABLET ORAL at 15:28

## 2024-02-05 RX ADMIN — Medication 15 G: at 17:05

## 2024-02-05 RX ADMIN — PALONOSETRON HYDROCHLORIDE 250 MCG: 0.25 INJECTION INTRAVENOUS at 15:28

## 2024-02-05 RX ADMIN — ATROPINE SULFATE 0.4 MG: 0.4 INJECTION, SOLUTION INTRAVENOUS at 18:27

## 2024-02-05 ASSESSMENT — ENCOUNTER SYMPTOMS
DEPRESSION: 0
LOSS OF SENSATION IN FEET: 0
OCCASIONAL FEELINGS OF UNSTEADINESS: 0

## 2024-02-05 ASSESSMENT — PAIN SCALES - GENERAL: PAINLEVEL: 3

## 2024-02-05 NOTE — PROGRESS NOTES
AT end of Irinotecan, pt c/o feeling very sweaty, and experienced a bout of diarrhea. Atropine given with complete relief of sx within 10min.

## 2024-02-05 NOTE — PROGRESS NOTES
Pt seen in clinic by Sandy Cruz CNP prior to infusion clinic. Pt arrived ambulatory to infusion for treatment of Folfiri + MAY glycerin.  Pt complains of N/V and constipation.     Hand off given at 1730.

## 2024-02-07 ENCOUNTER — INFUSION (OUTPATIENT)
Dept: HEMATOLOGY/ONCOLOGY | Facility: CLINIC | Age: 59
End: 2024-02-07
Payer: MEDICARE

## 2024-02-07 VITALS
OXYGEN SATURATION: 96 % | SYSTOLIC BLOOD PRESSURE: 157 MMHG | TEMPERATURE: 97.9 F | DIASTOLIC BLOOD PRESSURE: 98 MMHG | HEIGHT: 71 IN | RESPIRATION RATE: 16 BRPM | BODY MASS INDEX: 29.13 KG/M2

## 2024-02-07 DIAGNOSIS — C18.2 MALIGNANT NEOPLASM OF ASCENDING COLON (MULTI): ICD-10-CM

## 2024-02-07 PROCEDURE — 96523 IRRIG DRUG DELIVERY DEVICE: CPT

## 2024-02-07 PROCEDURE — 2500000004 HC RX 250 GENERAL PHARMACY W/ HCPCS (ALT 636 FOR OP/ED): Performed by: STUDENT IN AN ORGANIZED HEALTH CARE EDUCATION/TRAINING PROGRAM

## 2024-02-07 RX ORDER — HEPARIN 100 UNIT/ML
500 SYRINGE INTRAVENOUS AS NEEDED
Status: CANCELLED | OUTPATIENT
Start: 2024-02-07

## 2024-02-07 RX ORDER — HEPARIN SODIUM,PORCINE/PF 10 UNIT/ML
50 SYRINGE (ML) INTRAVENOUS AS NEEDED
Status: CANCELLED | OUTPATIENT
Start: 2024-02-07

## 2024-02-07 RX ORDER — HEPARIN 100 UNIT/ML
500 SYRINGE INTRAVENOUS AS NEEDED
Status: DISCONTINUED | OUTPATIENT
Start: 2024-02-07 | End: 2024-02-07 | Stop reason: HOSPADM

## 2024-02-07 RX ADMIN — Medication 500 UNITS: at 16:09

## 2024-02-07 ASSESSMENT — PAIN SCALES - GENERAL: PAINLEVEL: 0-NO PAIN

## 2024-02-07 NOTE — PROGRESS NOTES
Patient presents for pump disconnect,  has no complaints alert and oriented x 4. Patient wants pump removed 30 mins prior to pump scheduled to be complete. Per Sandy Cruz, CNP - OK to remove pump early.   CADD pump disconnected, verified total volume infused.  + blood return noted, flushed with 10cc normal saline and 5cc 10 units heparin.  Lee needle removed and site covered with bandaid. Patient tolerated procedure well.   Patient feels well and has no complaints, vital signs stable. Patient discharged in stable condition with no further needs.

## 2024-02-12 ENCOUNTER — PATIENT MESSAGE (OUTPATIENT)
Dept: PALLIATIVE MEDICINE | Facility: HOSPITAL | Age: 59
End: 2024-02-12
Payer: MEDICARE

## 2024-02-12 DIAGNOSIS — T45.1X5A CHEMOTHERAPY-INDUCED NAUSEA: ICD-10-CM

## 2024-02-12 DIAGNOSIS — R11.0 CHEMOTHERAPY-INDUCED NAUSEA: ICD-10-CM

## 2024-02-12 RX ORDER — OLANZAPINE 5 MG/1
5 TABLET ORAL NIGHTLY
Qty: 30 TABLET | Refills: 0 | Status: SHIPPED
Start: 2024-02-12 | End: 2024-03-04 | Stop reason: DRUGHIGH

## 2024-02-12 NOTE — TELEPHONE ENCOUNTER
From: Narciso Marrero  To: LA Bonds-THOMAS  Sent: 2/12/2024 11:19 AM EST  Subject: Medication     Hi Rosalinda I need a refill of Zyprexa. I'm also having trouble with nausea and vomiting. Started yesterday around noon then was ok most of the day. Eat supper was ok. Around 9pm started vomiting again. I'm taking Ativan for nausea and vomiting every 8hr. Then was ok till 5 this morning and started vomiting again. Not sure what to do. My number is . Memo Marrero 1965. Tried calling but was on hold for a while. Thanks

## 2024-02-12 NOTE — PATIENT COMMUNICATION
I called patient to further discuss his n/v. Patient last seen by LA Barksdale on 1/22 with plan to continue Zyprexa 5mg daily. His treatment ended Wednesday and the n/v started yesterday. He has taken ativan q8h, but is not using any other nausea medications. He has zofran and compazine at home.,but was not aware if he was able to take them. We reviewed trying to alternate these medications to stay ahead of the nausea. He is staying well hydrated. He is agreeable to this plan and will call if these medications are not effective. I will pend the zyprexa prescription to covering provider.

## 2024-02-15 ENCOUNTER — HOSPITAL ENCOUNTER (OUTPATIENT)
Dept: RADIOLOGY | Facility: HOSPITAL | Age: 59
Discharge: HOME | End: 2024-02-15
Payer: MEDICARE

## 2024-02-15 DIAGNOSIS — C18.2 MALIGNANT NEOPLASM OF ASCENDING COLON (MULTI): ICD-10-CM

## 2024-02-15 PROBLEM — Z96.89 PRESENCE OF HEPATIC ARTERIAL INFUSION PUMP: Status: ACTIVE | Noted: 2024-02-15

## 2024-02-15 PROCEDURE — 74177 CT ABD & PELVIS W/CONTRAST: CPT

## 2024-02-15 PROCEDURE — 74177 CT ABD & PELVIS W/CONTRAST: CPT | Performed by: STUDENT IN AN ORGANIZED HEALTH CARE EDUCATION/TRAINING PROGRAM

## 2024-02-15 PROCEDURE — 2550000001 HC RX 255 CONTRASTS: Performed by: STUDENT IN AN ORGANIZED HEALTH CARE EDUCATION/TRAINING PROGRAM

## 2024-02-15 PROCEDURE — 71260 CT THORAX DX C+: CPT | Performed by: STUDENT IN AN ORGANIZED HEALTH CARE EDUCATION/TRAINING PROGRAM

## 2024-02-15 RX ADMIN — IOHEXOL 75 ML: 350 INJECTION, SOLUTION INTRAVENOUS at 10:20
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
## 2024-02-19 ENCOUNTER — INFUSION (OUTPATIENT)
Dept: HEMATOLOGY/ONCOLOGY | Facility: HOSPITAL | Age: 59
End: 2024-02-19
Payer: MEDICARE

## 2024-02-19 ENCOUNTER — NUTRITION (OUTPATIENT)
Dept: HEMATOLOGY/ONCOLOGY | Facility: HOSPITAL | Age: 59
End: 2024-02-19

## 2024-02-19 ENCOUNTER — OFFICE VISIT (OUTPATIENT)
Dept: HEMATOLOGY/ONCOLOGY | Facility: HOSPITAL | Age: 59
End: 2024-02-19
Payer: MEDICARE

## 2024-02-19 ENCOUNTER — LAB (OUTPATIENT)
Dept: HEMATOLOGY/ONCOLOGY | Facility: HOSPITAL | Age: 59
End: 2024-02-19
Payer: MEDICARE

## 2024-02-19 ENCOUNTER — ONCOLOGY MEDICATION OUTREACH (OUTPATIENT)
Dept: HEMATOLOGY/ONCOLOGY | Facility: HOSPITAL | Age: 59
End: 2024-02-19

## 2024-02-19 ENCOUNTER — TELEPHONE (OUTPATIENT)
Dept: PALLIATIVE MEDICINE | Facility: HOSPITAL | Age: 59
End: 2024-02-19

## 2024-02-19 VITALS
HEART RATE: 107 BPM | BODY MASS INDEX: 29.36 KG/M2 | DIASTOLIC BLOOD PRESSURE: 91 MMHG | RESPIRATION RATE: 16 BRPM | TEMPERATURE: 98.4 F | SYSTOLIC BLOOD PRESSURE: 134 MMHG | WEIGHT: 210.9 LBS | OXYGEN SATURATION: 94 %

## 2024-02-19 VITALS — WEIGHT: 210.9 LBS | HEIGHT: 71 IN | BODY MASS INDEX: 29.52 KG/M2

## 2024-02-19 DIAGNOSIS — C18.2 MALIGNANT NEOPLASM OF ASCENDING COLON (MULTI): ICD-10-CM

## 2024-02-19 DIAGNOSIS — C18.2 MALIGNANT NEOPLASM OF ASCENDING COLON (MULTI): Primary | ICD-10-CM

## 2024-02-19 LAB
ALBUMIN SERPL BCP-MCNC: 3.6 G/DL (ref 3.4–5)
ALP SERPL-CCNC: 231 U/L (ref 33–120)
ALT SERPL W P-5'-P-CCNC: 23 U/L (ref 10–52)
ANION GAP SERPL CALC-SCNC: 15 MMOL/L (ref 10–20)
AST SERPL W P-5'-P-CCNC: 24 U/L (ref 9–39)
BASOPHILS # BLD AUTO: 0.03 X10*3/UL (ref 0–0.1)
BASOPHILS NFR BLD AUTO: 0.3 %
BILIRUB SERPL-MCNC: 0.8 MG/DL (ref 0–1.2)
BUN SERPL-MCNC: 11 MG/DL (ref 6–23)
CALCIUM SERPL-MCNC: 9.2 MG/DL (ref 8.6–10.3)
CEA SERPL-MCNC: 2775.8 UG/L
CHLORIDE SERPL-SCNC: 98 MMOL/L (ref 98–107)
CO2 SERPL-SCNC: 30 MMOL/L (ref 21–32)
CREAT SERPL-MCNC: 0.6 MG/DL (ref 0.5–1.3)
EGFRCR SERPLBLD CKD-EPI 2021: >90 ML/MIN/1.73M*2
EOSINOPHIL # BLD AUTO: 0.05 X10*3/UL (ref 0–0.7)
EOSINOPHIL NFR BLD AUTO: 0.5 %
ERYTHROCYTE [DISTWIDTH] IN BLOOD BY AUTOMATED COUNT: 14.3 % (ref 11.5–14.5)
GLUCOSE SERPL-MCNC: 137 MG/DL (ref 74–99)
HCT VFR BLD AUTO: 36.9 % (ref 41–52)
HGB BLD-MCNC: 11.9 G/DL (ref 13.5–17.5)
IMM GRANULOCYTES # BLD AUTO: 0.04 X10*3/UL (ref 0–0.7)
IMM GRANULOCYTES NFR BLD AUTO: 0.4 % (ref 0–0.9)
LYMPHOCYTES # BLD AUTO: 0.87 X10*3/UL (ref 1.2–4.8)
LYMPHOCYTES NFR BLD AUTO: 9.5 %
MCH RBC QN AUTO: 29 PG (ref 26–34)
MCHC RBC AUTO-ENTMCNC: 32.2 G/DL (ref 32–36)
MCV RBC AUTO: 90 FL (ref 80–100)
MONOCYTES # BLD AUTO: 0.84 X10*3/UL (ref 0.1–1)
MONOCYTES NFR BLD AUTO: 9.2 %
NEUTROPHILS # BLD AUTO: 7.28 X10*3/UL (ref 1.2–7.7)
NEUTROPHILS NFR BLD AUTO: 80.1 %
NRBC BLD-RTO: 0 /100 WBCS (ref 0–0)
PLATELET # BLD AUTO: 214 X10*3/UL (ref 150–450)
POTASSIUM SERPL-SCNC: 3.9 MMOL/L (ref 3.5–5.3)
PROT SERPL-MCNC: 6.8 G/DL (ref 6.4–8.2)
RBC # BLD AUTO: 4.11 X10*6/UL (ref 4.5–5.9)
SODIUM SERPL-SCNC: 139 MMOL/L (ref 136–145)
WBC # BLD AUTO: 9.1 X10*3/UL (ref 4.4–11.3)

## 2024-02-19 PROCEDURE — 2500000004 HC RX 250 GENERAL PHARMACY W/ HCPCS (ALT 636 FOR OP/ED): Performed by: STUDENT IN AN ORGANIZED HEALTH CARE EDUCATION/TRAINING PROGRAM

## 2024-02-19 PROCEDURE — 82378 CARCINOEMBRYONIC ANTIGEN: CPT

## 2024-02-19 PROCEDURE — 3008F BODY MASS INDEX DOCD: CPT | Performed by: STUDENT IN AN ORGANIZED HEALTH CARE EDUCATION/TRAINING PROGRAM

## 2024-02-19 PROCEDURE — 85025 COMPLETE CBC W/AUTO DIFF WBC: CPT

## 2024-02-19 PROCEDURE — 80053 COMPREHEN METABOLIC PANEL: CPT

## 2024-02-19 PROCEDURE — 36591 DRAW BLOOD OFF VENOUS DEVICE: CPT

## 2024-02-19 PROCEDURE — 96360 HYDRATION IV INFUSION INIT: CPT | Mod: INF

## 2024-02-19 PROCEDURE — 3075F SYST BP GE 130 - 139MM HG: CPT | Performed by: STUDENT IN AN ORGANIZED HEALTH CARE EDUCATION/TRAINING PROGRAM

## 2024-02-19 PROCEDURE — 1036F TOBACCO NON-USER: CPT | Performed by: STUDENT IN AN ORGANIZED HEALTH CARE EDUCATION/TRAINING PROGRAM

## 2024-02-19 PROCEDURE — 3080F DIAST BP >= 90 MM HG: CPT | Performed by: STUDENT IN AN ORGANIZED HEALTH CARE EDUCATION/TRAINING PROGRAM

## 2024-02-19 PROCEDURE — 4010F ACE/ARB THERAPY RXD/TAKEN: CPT | Performed by: STUDENT IN AN ORGANIZED HEALTH CARE EDUCATION/TRAINING PROGRAM

## 2024-02-19 PROCEDURE — 99215 OFFICE O/P EST HI 40 MIN: CPT | Performed by: STUDENT IN AN ORGANIZED HEALTH CARE EDUCATION/TRAINING PROGRAM

## 2024-02-19 RX ORDER — HEPARIN 100 UNIT/ML
500 SYRINGE INTRAVENOUS AS NEEDED
Status: CANCELLED | OUTPATIENT
Start: 2024-02-19

## 2024-02-19 RX ORDER — PROCHLORPERAZINE MALEATE 10 MG
10 TABLET ORAL EVERY 6 HOURS PRN
Qty: 30 TABLET | Refills: 5 | Status: SHIPPED | OUTPATIENT
Start: 2024-02-19

## 2024-02-19 RX ORDER — LOPERAMIDE HYDROCHLORIDE 2 MG/1
CAPSULE ORAL
Qty: 100 CAPSULE | Refills: 2 | Status: SHIPPED
Start: 2024-02-19 | End: 2024-02-26 | Stop reason: WASHOUT

## 2024-02-19 RX ORDER — ONDANSETRON HYDROCHLORIDE 8 MG/1
8 TABLET, FILM COATED ORAL EVERY 8 HOURS PRN
Qty: 30 TABLET | Refills: 5 | Status: SHIPPED | OUTPATIENT
Start: 2024-02-19

## 2024-02-19 RX ORDER — HEPARIN SODIUM,PORCINE/PF 10 UNIT/ML
50 SYRINGE (ML) INTRAVENOUS AS NEEDED
Status: CANCELLED | OUTPATIENT
Start: 2024-02-19

## 2024-02-19 RX ORDER — HEPARIN 100 UNIT/ML
500 SYRINGE INTRAVENOUS AS NEEDED
Status: DISCONTINUED | OUTPATIENT
Start: 2024-02-19 | End: 2024-02-19 | Stop reason: HOSPADM

## 2024-02-19 RX ADMIN — SODIUM CHLORIDE 1000 ML: 9 INJECTION, SOLUTION INTRAVENOUS at 15:27

## 2024-02-19 RX ADMIN — HEPARIN 500 UNITS: 100 SYRINGE at 16:46

## 2024-02-19 ASSESSMENT — PAIN SCALES - GENERAL: PAINLEVEL: 4

## 2024-02-19 NOTE — TELEPHONE ENCOUNTER
Per [facility] pharmacy [place] required for MSER 30mg BID. Sent to [facility] for processing. Patient aware

## 2024-02-19 NOTE — PROGRESS NOTES
Pt arrived ambulatory to infusion for treatment of IVF.  Pt c/o N/V, no active vomiting today. Tolerated infusion without issue. Discharged in stable condition.

## 2024-02-19 NOTE — PROGRESS NOTES
"NUTRITION Follow-up NOTE    Nutrition Assessment     Reason for Visit:  Narciso Marrero is a 58 y.o. male who presents for taste changes and wt loss. Pt seen in infusion w/ wife, Emperatriz. PMHx: R hemicolectomy, open cholecystectomy, and peritonectomy. Current treatment plan: Bevacizumab + Trifluridine D1C1 planned for 3/4/24. 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 ~2 weeks ago, pt had nausea and vomiting. Made changes in anti-nausea medications and has been very effective, but he had vomiting yesterday. Has an increased salty taste at this time. Suggested utilizing sweet and sour flavors to balance flavors, purchasing low-sodium/no added sodium products, and reaching out to [employer] [facility] for their modules on the Cancer Fighting Kitchen.    Lab Results   Component Value Date/Time    GLUCOSE 137 (H) 02/19/2024 1135     02/19/2024 1135    K 3.9 02/19/2024 1135    CL 98 02/19/2024 1135    CO2 30 02/19/2024 1135    ANIONGAP 15 02/19/2024 1135    BUN 11 02/19/2024 1135    CREATININE 0.60 02/19/2024 1135    EGFR >90 02/19/2024 1135    CALCIUM 9.2 02/19/2024 1135    ALBUMIN 3.6 02/19/2024 1135    ALKPHOS 231 (H) 02/19/2024 1135    PROT 6.8 02/19/2024 1135    AST 24 02/19/2024 1135    BILITOT 0.8 02/19/2024 1135    ALT 23 02/19/2024 1135     No results found for: \"VITD25\"    All pertinent labs have been reviewed.     Anthropometrics:  Anthropometrics  Height: 180.5 cm (5' 11.06\")  Weight: 95.7 kg (210 lb 14.4 oz)  BMI (Calculated): 29.36  IBW/kg (Dietitian Calculated): 78.2 kg  Percent of IBW: 122 %  Weight Change  Weight History / % Weight Change: Wt has been stable. (States wt loss to a goal wt of 90.9 kg)  Significant Weight Loss: No        Wt Readings from Last 30 Encounters:   02/19/24 95.7 kg (210 lb 14.4 oz)   02/19/24 95.7 kg (210 lb 14.4 oz)   02/05/24 94.9 kg (209 lb 3.2 oz)   01/22/24 95.3 kg (210 lb 1.6 oz)   01/22/24 95.3 kg (210 lb 1.6 oz)   01/08/24 94.6 kg " (208 lb 8 oz)   12/29/23 93.5 kg (206 lb 3.2 oz)   12/26/23 95.5 kg (210 lb 8 oz)   12/13/23 96.7 kg (213 lb 3 oz)   12/11/23 96.4 kg (212 lb 8 oz)   12/11/23 96.4 kg (212 lb 8 oz)   12/01/23 95.8 kg (211 lb 3.2 oz)   11/29/23 95.8 kg (211 lb 1.6 oz)   11/29/23 95.8 kg (211 lb 1.6 oz)   11/17/23 97.9 kg (215 lb 13.3 oz)   11/16/23 95.8 kg (211 lb 3.2 oz)   11/15/23 95.8 kg (211 lb 3.2 oz)   11/15/23 95.8 kg (211 lb 3.2 oz)   11/01/23 97.7 kg (215 lb 6.2 oz)   11/01/23 97.7 kg (215 lb 6.2 oz)   10/24/23 97.5 kg (215 lb)   10/19/23 96.5 kg (212 lb 11.2 oz)   10/18/23 96.5 kg (212 lb 11.2 oz)   10/06/23 95.3 kg (210 lb 1.6 oz)   10/04/23 95.5 kg (210 lb 8.6 oz)   10/04/23 95.5 kg (210 lb 8.6 oz)   10/03/23 98 kg (216 lb 0.8 oz)   09/29/23 98.9 kg (218 lb 0.6 oz)   06/29/23 101 kg (223 lb)   06/28/23 101 kg (223 lb 1.7 oz)        Food And Nutrient Intake:  Food and Nutrient History  Food and Nutrient History: Appetite is improved.  Energy Intake: Good > 75 %  Fluid Intake: 3-4 16 oz bottles of water,  Food Allergy: None.  Food Intolerance: None.  GI Symptoms: nausea, vomiting (~2 weeks ago, pt had nausea and vomiting. Made changes in anti-nausea medications and has been very effective, but he had vomiting yesterday.)  GI Symptoms greater than 2 weeks: yes  Oral Problems: dysgeusia (Has an increased salty taste.)     Food Intake  Amount of Food: 3 meals/day  Meal 1: Sausage gravy, cheese, egg,  Meal 2: Prepackaged meals  Meal 3: Roasted red pepper soup, grilled cheese    Micronutrient Intake  Vitamin Intake: Multivitamin, D  Mineral/Element Intake:  (N/A)    Food Supplement Intake  Oral Nutrition Supplements:  (None.)    Medication and Complementary/Alternative Medicine Use  Prescription Medication Use: Zyprexa (For appetite stimulation), Miralax      Nutrition Focused Physical Exam Findings:  defer: Pt seen in infusion    Subcutaneous Fat Loss  Orbital Fat Pads: Well nourshed (slightly bulging fat pads)  Buccal Fat  "Pads: Well nourished (full, rounded cheeks)    Muscle Wasting  Temporalis: Well nourished (well-defined muscle)  Pectoralis (Clavicular Region): Well nourished (clavicle not visible)    Edema  Edema: none         Energy Needs  Calculated Energy Needs Using Equations  Height: 180.5 cm (5' 11.06\")  Equation Chosen to Use by RD: Other (Comment) (kcals/kg)  Estimated Energy Needs  Total Energy Estimated Needs (kCal):  (2724-9670)  Total Estimated Energy Need per Day (kCal/kg):  (25-28)  Method for Estimating Needs: IBW  Estimated Fluid Needs  Total Fluid Estimated Needs (mL): 2200 mL  Method for Estimating Needs: 1 mL/kcal  Estimated Protein Needs  Total Protein Estimated Needs (g):  ()  Total Protein Estimated Needs (g/kg):  (1.3)  Method for Estimating Needs: IBW  Weight and Growth Recommendation  Desired Growth Pattern: Maintain current wt.        Nutrition Diagnosis   Malnutrition Diagnosis  Patient has Malnutrition Diagnosis: No    Nutrition Diagnosis  Patient has Nutrition Diagnosis: Yes  Diagnosis Status (1): Ongoing  Nutrition Diagnosis 1: Increased nutrient needs  Related to (1): dx of colon cancer  As Evidenced by (1): increased metabolic demand d/t dx.    Nutrition Interventions/Recommendations   Food and Nutrition Delivery  Food and Nutrition Delivery  Meals & Snacks: Modify schedule of foods/fluids, Modify Composition of Meals/Snacks  Goals: Consume 4-5 meals >75% of days; Choose high calorie and high protein foods >75% of meals; Add sweet foods/ingredients >75% of meals; Use low-sodium ingredients >75% of meals.    Nutrition Education  Nutrition Education  Nutrition Education Content: Content related nutrition education  Goals: Provided handouts and education for Taste Changes    Coordination of Care  Coordination of Nutrition Care by a Nutrition Professional  Collaboration and referral of nutrition care: Collaboration by nutrition professional with other providers  Goals: Collaborated with Rosalinda " Shamir.    There are no Patient Instructions on file for this visit.    Nutrition Monitoring and Evaluation   Food/Nutrient Related History Monitoring  Monitoring and Evaluation Plan: Meal/snack pattern, Amount of food  Amount of Food: Types of food  Criteria: Choose high calorie and high protein foods >75% of meals; Add sweet foods/ingredients >75% of meals; Use low-sodium ingredients >75% of meals.  Meal/Snack Pattern: Estimated meal and snack pattern  Criteria: Consume 4-5 meals >75% of days        Time Spent  Prep time on day of patient encounter: 5 minutes  Time spent directly with patient, family or caregiver: 20 minutes  Additional Time Spent on Patient Care Activities: 5 minutes  Documentation Time: 30 minutes  Other Time Spent: 0 minutes  Total: 60 minutes              Readiness to Change : Excellent  Level of Understanding : Excellent  Anticipated Compliant : Excellent

## 2024-02-19 NOTE — PROGRESS NOTES
Pharmacist consulted to provide education on Lonsurf/bevacizumab chemotherapy via in-clinic visit.    1.  Chemotherapy Education: The chemotherapy regimen was discussed with the patient/family including treatment schedule, potential side effects, and management of side effects.  Potential side effects include: myelosuppression, fatigue, nausea, abdominal pain/discomfort, diarrhea.  Management techniques of these side effects were discussed.  Written materials were provided including drug-specific side effect handouts.  Prescriptions for supportive care/prophylaxis (if applicable) medications were also discussed in terms of use and potential side effects.    2.  Home Medications: Reviewed patients documented home medications. Drug interactions identified between chemotherapy and patient's home medications: None  All questions were answered.  Patient/family verbalized understanding of the plan of care and management of side effects.  Spent approximately 10 minutes coordinating care and patient interaction.  Will follow-up as necessary.    Thank you for consulting [facility] Pharm[facility] Team.   Please don't hesitate to reach out for further questions regarding this patient.     Patient to take 1 (15 mg) tablet + 3 (20mg) tablets by mouth twice daily (after breakfast and dinner) for days 1-5 and 8-12 of a 28-day cycle.  Bevacizumab will continue to be administered once every 2 weeks of cycle.      Planned start date of 3/4 - dosing calendar for cycle 1 provided to patient in office.  Confirmed additional contact with  Specialty Pharmacy within next week or so to review copay and delivery information.  Advised patient to call clinical [facility] or specialty pharmacy if no update week prior to tentative start date.      Reviewed safe handling and storage of Lonsurf medication.  Patient denies questions/concerns at this time.     Katie Schrader, PharmD    .
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
## 2024-02-20 ENCOUNTER — SPECIALTY PHARMACY (OUTPATIENT)
Dept: PHARMACY | Facility: CLINIC | Age: 59
End: 2024-02-20

## 2024-02-21 ENCOUNTER — APPOINTMENT (OUTPATIENT)
Dept: HEMATOLOGY/ONCOLOGY | Facility: CLINIC | Age: 59
End: 2024-02-21
Payer: MEDICARE

## 2024-02-23 NOTE — TELEPHONE ENCOUNTER
Per [facility] for MSER has been approved from 1/22/24 to 8/22/24. Pharmacy updated, they were able to run script with copay of $2. They will get this ready, patient updated.

## 2024-02-25 ENCOUNTER — PATIENT MESSAGE (OUTPATIENT)
Dept: PALLIATIVE MEDICINE | Facility: HOSPITAL | Age: 59
End: 2024-02-25
Payer: MEDICARE

## 2024-02-25 DIAGNOSIS — T45.1X5A CHEMOTHERAPY-INDUCED NAUSEA: ICD-10-CM

## 2024-02-25 DIAGNOSIS — R11.0 CHEMOTHERAPY-INDUCED NAUSEA: ICD-10-CM

## 2024-02-26 ENCOUNTER — TELEPHONE (OUTPATIENT)
Dept: ENDOCRINOLOGY | Facility: CLINIC | Age: 59
End: 2024-02-26

## 2024-02-26 ENCOUNTER — OFFICE VISIT (OUTPATIENT)
Dept: ENDOCRINOLOGY | Facility: CLINIC | Age: 59
End: 2024-02-26
Payer: MEDICARE

## 2024-02-26 VITALS
WEIGHT: 217 LBS | SYSTOLIC BLOOD PRESSURE: 120 MMHG | HEART RATE: 113 BPM | HEIGHT: 72 IN | DIASTOLIC BLOOD PRESSURE: 76 MMHG | BODY MASS INDEX: 29.39 KG/M2

## 2024-02-26 DIAGNOSIS — Z79.4 TYPE 2 DIABETES MELLITUS WITH HYPERGLYCEMIA, WITH LONG-TERM CURRENT USE OF INSULIN (MULTI): Primary | ICD-10-CM

## 2024-02-26 DIAGNOSIS — T40.2X5A THERAPEUTIC OPIOID INDUCED CONSTIPATION: ICD-10-CM

## 2024-02-26 DIAGNOSIS — K59.03 THERAPEUTIC OPIOID INDUCED CONSTIPATION: ICD-10-CM

## 2024-02-26 DIAGNOSIS — E11.65 TYPE 2 DIABETES MELLITUS WITH HYPERGLYCEMIA, WITH LONG-TERM CURRENT USE OF INSULIN (MULTI): Primary | ICD-10-CM

## 2024-02-26 LAB — POC HEMOGLOBIN A1C: 6.7 % (ref 4.2–6.5)

## 2024-02-26 PROCEDURE — 3078F DIAST BP <80 MM HG: CPT | Performed by: NURSE PRACTITIONER

## 2024-02-26 PROCEDURE — 83036 HEMOGLOBIN GLYCOSYLATED A1C: CPT | Performed by: NURSE PRACTITIONER

## 2024-02-26 PROCEDURE — 95251 CONT GLUC MNTR ANALYSIS I&R: CPT | Performed by: NURSE PRACTITIONER

## 2024-02-26 PROCEDURE — 3008F BODY MASS INDEX DOCD: CPT | Performed by: NURSE PRACTITIONER

## 2024-02-26 PROCEDURE — 3074F SYST BP LT 130 MM HG: CPT | Performed by: NURSE PRACTITIONER

## 2024-02-26 PROCEDURE — 99214 OFFICE O/P EST MOD 30 MIN: CPT | Performed by: NURSE PRACTITIONER

## 2024-02-26 PROCEDURE — 1036F TOBACCO NON-USER: CPT | Performed by: NURSE PRACTITIONER

## 2024-02-26 RX ORDER — POLYETHYLENE GLYCOL 3350 17 G/17G
17 POWDER, FOR SOLUTION ORAL DAILY
Qty: 850 G | Refills: 1 | Status: ON HOLD | COMMUNITY
Start: 2024-02-26 | End: 2024-03-16 | Stop reason: ALTCHOICE

## 2024-02-26 RX ORDER — POLYETHYLENE GLYCOL 3350 17 G/17G
17 POWDER, FOR SOLUTION ORAL 2 TIMES DAILY
Qty: 850 G | Refills: 1 | Status: SHIPPED
Start: 2024-02-26 | End: 2024-02-26 | Stop reason: SDUPTHER

## 2024-02-26 RX ORDER — BLOOD-GLUCOSE SENSOR
EACH MISCELLANEOUS
Qty: 2 EACH | Refills: 11 | Status: SHIPPED | OUTPATIENT
Start: 2024-02-26

## 2024-02-26 RX ORDER — LORAZEPAM 0.5 MG/1
0.5 TABLET ORAL EVERY 8 HOURS PRN
Qty: 90 TABLET | Refills: 0 | Status: SHIPPED
Start: 2024-02-26 | End: 2024-03-11 | Stop reason: SDUPTHER

## 2024-02-26 NOTE — PATIENT INSTRUCTIONS
Continue Lantus 22 units once daily      Continue Humalog 3 units with meals plus sliding scale   If blood sugar is < 150 add 0 units   If blood sugar is 150-200 add 3 units   If blood sugar is 201-250 add 6 units  If blood sugar is 251-300 add 9 units   If blood sugar is 301-350 add 12 units   If blood sugar is 351-400 add 15 units   If blood sugar is 401-450 add 18 units  If blood sugar is over 450, call the office      Use up your "[employer] 2 sensors and then switch to Alessia 3  Download the Alessia 3 an   If will prompt you to make a new account but will recognize you have an account   Once you start using the Alessia 3, you no longer need the "[employer] 2 an      Follow up 6 months

## 2024-02-26 NOTE — TELEPHONE ENCOUNTER
From: Narciso Marrero  To: Rosalinda Barksdale, APRN-THOMAS  Sent: 2/25/2024 7:37 AM EST  Subject: Varghese Tellez   Not that I'm taking Ativan 3 times a day I will need a refill soon. I have 3days left.    Thanks  Memo Marrero

## 2024-02-26 NOTE — PATIENT COMMUNICATION
Patient last seen by LA Barksdale on 2/19 with plan to continue Ativan 0.5mg q8h PRN. Follow up visit is scheduled for 3/11. OARRS reviewed and no aberrancy noted. Patient last filled ativan 0.5mg #90/30 days on 1/9. Prescription pended to provider to approve.

## 2024-02-26 NOTE — PROGRESS NOTES
Subjective   Narciso Marrero is a 58 y.o. male presents today for a follow up of DM Type 2.  Initial diagnosis with diabetes was 20+ years ago..   The patient has a family history in mother.   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   Personal history of colon cancer with liver mets    Previously seeing PCP for diabetes care.   A1c today 6.7% .  Previous A1c 6.6% in 10/2023.   Since last visit, he reports chemo is changing again   Chemo is going to be every other week starting March 4th plus a new oral chemo pill daily x 5 days then takes a week off.    Reports most sugars are in range  He is eating what sounds good for him.        Historical meds;   metformin   glyburide     Current diabetes regimen is as follows:   Lantus 22 units once daily   Humalog 3 units with meals plus sliding scale:   If blood sugar is < 150 add 0 units   If blood sugar is 150-200 add 3 units   If blood sugar is 201-250 add 6 units  If blood sugar is 251-300 add 9 units   If blood sugar is 301-350 add 12 units   If blood sugar is 351-400 add 15 units   If blood sugar is 401-450 add 18 units  If blood sugar is over 450, call the office    **has not needed to add scale often     Patient is using continuous glucose monitor Alessia 2 and scanning three times per day on average  The patient is currently checking the blood glucose as needed        Hypoglycemia frequency: 0%   Hypoglycemia awareness: Yes     Regarding symptoms of hyperglycemia, the patient is not experiencing any symptoms such as polyuria, polydipsia, nocturia or rapid weight loss or blurry vision. The patient comes into the office today without concern.        ROS  General: no fever, chills or acute changes in weight in the last 6 months  Skin: no rashes, pruritis or dry skin  Cardiac: denies chest pain, heart palpitations or orthopnea  Pulmonary: denies wheezing, productive cough or exertional dyspnea        Objective    Physical Exam  Blood  pressure 120/76, pulse (!) 113, height 1.829 m (6'), weight 98.4 kg (217 lb).  General: not in acute distress, cooperative   Respiratory: normal respiratory effort  Musculoskeletal: normal gait       Current Outpatient Medications:     acetaminophen (Tylenol) 325 mg tablet, Take 2 tablets (650 mg) by mouth every 6 hours if needed for mild pain (1 - 3)., Disp: , Rfl:     alteplase (Cathflo Activase) 2 mg injection, 2 mL (2 mg) by intra-catheter route., Disp: , Rfl:     dicyclomine (Bentyl) 10 mg capsule, Take 1 capsule (10 mg) by mouth 4 times a day. As need for abdominal cramping, Disp: 120 capsule, Rfl: 11    insulin lispro (HumaLOG KwikPen Insulin) 100 unit/mL injection, Subcutaneous for 90, Disp: , Rfl:     Lantus Solostar U-100 Insulin 100 unit/mL (3 mL) pen, , Disp: , Rfl:     LORazepam (Ativan) 0.5 mg tablet, Take 1 tablet (0.5 mg) by mouth every 8 hours if needed for anxiety., Disp: 90 tablet, Rfl: 0    morphine CR (MS Contin) 30 mg 12 hr tablet, Take 1 tablet (30 mg) by mouth 2 times a day. Do not crush, chew, or split., Disp: 60 tablet, Rfl: 0    OLANZapine (ZyPREXA) 5 mg tablet, Take 1 tablet (5 mg) by mouth once daily at bedtime., Disp: 30 tablet, Rfl: 0    ondansetron (Zofran) 8 mg tablet, Take 1 tablet (8 mg) by mouth every 8 hours if needed for nausea or vomiting., Disp: 30 tablet, Rfl: 5    ondansetron (Zofran) 8 mg tablet, Take 1 tablet (8 mg) by mouth every 8 hours if needed for nausea or vomiting., Disp: 30 tablet, Rfl: 5    pantoprazole (ProtoNix) 40 mg EC tablet, Take 1 tablet (40 mg) by mouth once daily in the morning. Take before meals., Disp: , Rfl:     pregabalin (Lyrica) 150 mg capsule, Take 1 capsule (150 mg) by mouth 2 times a day., Disp: 60 capsule, Rfl: 2    prochlorperazine (Compazine) 10 mg tablet, Take 1 tablet (10 mg) by mouth every 6 hours if needed for nausea or vomiting., Disp: 30 tablet, Rfl: 5    simvastatin (Zocor) 40 mg tablet, Take 1 tablet (40 mg) by mouth once daily at  "bedtime., Disp: , Rfl:     acyclovir (Zovirax) 800 mg tablet, every 12 hours., Disp: , Rfl:     bisacodyl (Dulcolax) 5 mg EC tablet, Take by mouth., Disp: , Rfl:     blood-glucose sensor (FreeStyle Alessia 3 Sensor) device, Use to check blood sugars 4 times per day.  Change every 14 days., Disp: 2 each, Rfl: 11    chlorhexidine (Hibiclens) 4 % external liquid, 3 Applications every 12 hours., Disp: , Rfl:     cholecalciferol (Vitamin D-3) 25 MCG (1000 UT) tablet, Take 1 tablet (1,000 Units) by mouth once daily., Disp: , Rfl:     DOCOSAHEXAENOIC ACID ORAL, Take 1,000 mg by mouth once daily., Disp: , Rfl:     enoxaparin (Lovenox) 40 mg/0.4 mL syringe, Inject 0.4 mL (40 mg) under the skin., Disp: , Rfl:     ergocalciferol (Vitamin D-2) 50 MCG (2000 UT) capsule capsule, Take by mouth., Disp: , Rfl:     glucagon 1 mg injection, Inject 1 mg into the muscle., Disp: , Rfl:     HYDROcodone-acetaminophen (Norco) 5-325 mg tablet, , Disp: , Rfl:     metroNIDAZOLE (Flagyl) 250 mg tablet, Take by mouth., Disp: , Rfl:     Miralax 17 gram/dose powder, Take 17 g by mouth once daily., Disp: 850 g, Rfl: 1    multivit 46-iron-folate 6-dha 29 mg iron-1 mg -300 mg capsule, Take by mouth once daily., Disp: , Rfl:     multivitamin capsule, Take 1 capsule by mouth once daily., Disp: , Rfl:     nystatin (Mycostatin) 100,000 unit/mL suspension, SWISH & SWALLOW 5 ML BY MOUTH 4 TIMES DAILY, Disp: , Rfl:     omega 3-dha-epa-fish oil (Fish OiL) 1,000 mg (120 mg-180 mg) capsule, 1 capsule (1,000 mg)., Disp: , Rfl:     ondansetron (Zofran) 8 mg tablet, Take 1 tablet (8 mg) by mouth every 8 hours if needed for nausea or vomiting., Disp: , Rfl:     oxyCODONE-acetaminophen (Percocet)  mg tablet, Take 1 tablet by mouth every 6 hours if needed for severe pain (7 - 10)., Disp: 120 tablet, Rfl: 0    pen needle, diabetic 31 gauge x 5/16\" needle, USE FOUR TIMES A DAY (Patient not taking: Reported on 2/26/2024), Disp: 100 each, Rfl: 2    pravastatin " (Pravachol) 20 mg tablet, once every 24 hours., Disp: , Rfl:     psyllium (Metamucil) 3.4 gram packet, Take 1 packet by mouth once daily as needed (constipation)., Disp: , Rfl:     sennosides-docusate sodium (Leila-Colace) 8.6-50 mg tablet, Take by mouth every 12 hours., Disp: , Rfl:     traMADol (Ultram) 50 mg tablet, TAKE 1 TABLET BY MOUTH EVERY 4 TO 6 HOURS AS NEEDED FOR PAIN FOR 7 DAYS, Disp: , Rfl:     [START ON 3/4/2024] trifluridine-tipiraciL (Lonsurf) 15-6.14 mg tablet, Take 1 tablet (15 mg total) by mouth twice daily.  Take on Days 1-5 and Days 8-12 of each 28 day treatment cycle. Take within one hour of completion of morning and evening meals. (Patient not taking: Reported on 2/26/2024), Disp: 20 tablet, Rfl: 3    [START ON 3/4/2024] trifluridine-tipiraciL (Lonsurf) 20-8.19 mg tablet, Take 3 tablets (60 mg total) by mouth twice daily.  Take on Days 1-5 and Days 8-12 of each 28 day treatment cycle. Take within one hour of completion of morning and evening meals. (Patient not taking: Reported on 2/26/2024), Disp: 60 tablet, Rfl: 3    Assessment/Plan    Type 2 diabetes hyperglycemia with long term use insulin:    Constipation:  - A1c at goal.  His TIR is at goal.  He is at 90%.  This is excellent.  Will continue current insulin doses.  He is starting a new chemo and will keep me posted about hyperglycemia and if we need to change anything between visits.  He could see the PharmD if needed.  Overall again doing well.  Will upgrade to Alessia 3.  Sample provided today.  Appears he may need PA.  Doing well with miralax.        Plan:   Continue Lantus 22 units once daily    Continue Humalog 3 units with meals plus sliding scale   If blood sugar is < 150 add 0 units   If blood sugar is 150-200 add 3 units   If blood sugar is 201-250 add 6 units  If blood sugar is 251-300 add 9 units   If blood sugar is 301-350 add 12 units   If blood sugar is 351-400 add 15 units   If blood sugar is 401-450 add 18 units  If blood  sugar is over 450, call the office    Use up your Ophthotech 2 sensors and then switch to Ophthotech 3  Download the Ophthotech 3 an   If will prompt you to make a new account but will recognize you have an account   Once you start using the Ophthotech 3, you no longer need the Ophthotech 2 an    Follow up 6 months

## 2024-02-29 ENCOUNTER — SPECIALTY PHARMACY (OUTPATIENT)
Dept: HEMATOLOGY/ONCOLOGY | Facility: HOSPITAL | Age: 59
End: 2024-02-29
Payer: MEDICARE

## 2024-03-02 ENCOUNTER — APPOINTMENT (OUTPATIENT)
Dept: RADIOLOGY | Facility: HOSPITAL | Age: 59
End: 2024-03-02
Payer: MEDICARE

## 2024-03-02 ENCOUNTER — HOSPITAL ENCOUNTER (EMERGENCY)
Facility: HOSPITAL | Age: 59
Discharge: HOME | End: 2024-03-02
Attending: EMERGENCY MEDICINE
Payer: MEDICARE

## 2024-03-02 ENCOUNTER — NURSE TRIAGE (OUTPATIENT)
Dept: HEMATOLOGY/ONCOLOGY | Facility: HOSPITAL | Age: 59
End: 2024-03-02
Payer: MEDICARE

## 2024-03-02 VITALS
WEIGHT: 210 LBS | HEIGHT: 72 IN | BODY MASS INDEX: 28.44 KG/M2 | RESPIRATION RATE: 18 BRPM | HEART RATE: 108 BPM | OXYGEN SATURATION: 94 % | DIASTOLIC BLOOD PRESSURE: 95 MMHG | TEMPERATURE: 96.8 F | SYSTOLIC BLOOD PRESSURE: 145 MMHG
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
## 2024-03-02 LAB
ABO GROUP (TYPE) IN BLOOD: NORMAL
ALBUMIN SERPL BCP-MCNC: 3.5 G/DL (ref 3.4–5)
ALP SERPL-CCNC: 275 U/L (ref 33–120)
ALT SERPL W P-5'-P-CCNC: 7 U/L (ref 10–52)
ANION GAP SERPL CALC-SCNC: 14 MMOL/L (ref 10–20)
ANTIBODY SCREEN: NORMAL
APTT PPP: 29 SECONDS (ref 27–38)
AST SERPL W P-5'-P-CCNC: 20 U/L (ref 9–39)
BASOPHILS # BLD AUTO: 0.03 X10*3/UL (ref 0–0.1)
BASOPHILS NFR BLD AUTO: 0.2 %
BILIRUB SERPL-MCNC: 0.7 MG/DL (ref 0–1.2)
BUN SERPL-MCNC: 12 MG/DL (ref 6–23)
CALCIUM SERPL-MCNC: 9.5 MG/DL (ref 8.6–10.6)
CHLORIDE SERPL-SCNC: 101 MMOL/L (ref 98–107)
CO2 SERPL-SCNC: 30 MMOL/L (ref 21–32)
CREAT SERPL-MCNC: 0.63 MG/DL (ref 0.5–1.3)
EGFRCR SERPLBLD CKD-EPI 2021: >90 ML/MIN/1.73M*2
EOSINOPHIL # BLD AUTO: 0.03 X10*3/UL (ref 0–0.7)
EOSINOPHIL NFR BLD AUTO: 0.2 %
ERYTHROCYTE [DISTWIDTH] IN BLOOD BY AUTOMATED COUNT: 14.3 % (ref 11.5–14.5)
GLUCOSE SERPL-MCNC: 94 MG/DL (ref 74–99)
HCT VFR BLD AUTO: 37.4 % (ref 41–52)
HGB BLD-MCNC: 12.5 G/DL (ref 13.5–17.5)
IMM GRANULOCYTES # BLD AUTO: 0.12 X10*3/UL (ref 0–0.7)
IMM GRANULOCYTES NFR BLD AUTO: 0.9 % (ref 0–0.9)
INR PPP: 1.1 (ref 0.9–1.1)
LACTATE SERPL-SCNC: 1.6 MMOL/L (ref 0.4–2)
LYMPHOCYTES # BLD AUTO: 0.81 X10*3/UL (ref 1.2–4.8)
LYMPHOCYTES NFR BLD AUTO: 6.1 %
MCH RBC QN AUTO: 29.3 PG (ref 26–34)
MCHC RBC AUTO-ENTMCNC: 33.4 G/DL (ref 32–36)
MCV RBC AUTO: 88 FL (ref 80–100)
MONOCYTES # BLD AUTO: 1 X10*3/UL (ref 0.1–1)
MONOCYTES NFR BLD AUTO: 7.6 %
NEUTROPHILS # BLD AUTO: 11.21 X10*3/UL (ref 1.2–7.7)
NEUTROPHILS NFR BLD AUTO: 85 %
NRBC BLD-RTO: 0 /100 WBCS (ref 0–0)
PLATELET # BLD AUTO: 236 X10*3/UL (ref 150–450)
POTASSIUM SERPL-SCNC: 3.9 MMOL/L (ref 3.5–5.3)
PROT SERPL-MCNC: 6.9 G/DL (ref 6.4–8.2)
PROTHROMBIN TIME: 12.4 SECONDS (ref 9.8–12.8)
RBC # BLD AUTO: 4.27 X10*6/UL (ref 4.5–5.9)
RH FACTOR (ANTIGEN D): NORMAL
SODIUM SERPL-SCNC: 141 MMOL/L (ref 136–145)
WBC # BLD AUTO: 13.2 X10*3/UL (ref 4.4–11.3)

## 2024-03-02 PROCEDURE — 83605 ASSAY OF LACTIC ACID: CPT

## 2024-03-02 PROCEDURE — 99284 EMERGENCY DEPT VISIT MOD MDM: CPT | Mod: 25

## 2024-03-02 PROCEDURE — 74177 CT ABD & PELVIS W/CONTRAST: CPT | Performed by: STUDENT IN AN ORGANIZED HEALTH CARE EDUCATION/TRAINING PROGRAM

## 2024-03-02 PROCEDURE — 85610 PROTHROMBIN TIME: CPT

## 2024-03-02 PROCEDURE — 80053 COMPREHEN METABOLIC PANEL: CPT

## 2024-03-02 PROCEDURE — 86901 BLOOD TYPING SEROLOGIC RH(D): CPT

## 2024-03-02 PROCEDURE — 74177 CT ABD & PELVIS W/CONTRAST: CPT

## 2024-03-02 PROCEDURE — 2550000001 HC RX 255 CONTRASTS: Performed by: EMERGENCY MEDICINE

## 2024-03-02 PROCEDURE — 99285 EMERGENCY DEPT VISIT HI MDM: CPT

## 2024-03-02 PROCEDURE — 85025 COMPLETE CBC W/AUTO DIFF WBC: CPT

## 2024-03-02 RX ADMIN — IOHEXOL 85 ML: 350 INJECTION, SOLUTION INTRAVENOUS at 11:26

## 2024-03-02 ASSESSMENT — PAIN DESCRIPTION - LOCATION: LOCATION: ABDOMEN

## 2024-03-02 ASSESSMENT — PAIN SCALES - GENERAL: PAINLEVEL_OUTOF10: 3

## 2024-03-02 ASSESSMENT — PAIN - FUNCTIONAL ASSESSMENT: PAIN_FUNCTIONAL_ASSESSMENT: 0-10

## 2024-03-02 NOTE — ED PROVIDER NOTES
HPI   Chief Complaint   Patient presents with    Abdominal Pain     Reports falling and landing on stomach, has an hepatic artery infusion pump. Sent in by oncology for further eval       Limitations to History: None    HPI: This is a 58-year-old male with a past medical history of stage IV KRAS + colon cancer of the ascending colon with liver and peritoneal mets, getting chemotherapy through an AMY pump who presents to the emergency room after a fall.  Patient states that he was walking around his house when he tripped and landed flat on his stomach.  He endorses some pain around where his AMY pump is.  He denies any constitutional symptoms such as fevers, chills, cough, or nasal congestion.  He denies any nausea, vomiting, or change in bowel habits.    Additional History Obtained from: None    12 point review of systems was performed and is negative unless otherwise specified    ------------------------------------------------------------------------------------------------------------------------------------------  Physical Exam:    VS: As documented in the triage note and EMR flowsheet from this visit were reviewed.    CONSTITUTIONAL: Well appearing, well nourished, awake, alert, oriented to person, place, time/situation and in no apparent distress.   EYES: Clear bilaterally, pupils equal, round and reactive to light.   CARDIOVASCULAR: Normal rate, regular rhythm.  Heart sounds S1, S2.  No murmurs, rubs or gallops.  RESPIRATORY: Breath sounds clear and equal bilaterally. No wheezes or rhonchi.   GASTROINTESTINAL: Abdomen soft, non-distended, no rebound, no guarding.  Patient has bruising and swelling around his AMY pump.  There is minimal focal tenderness to palpation in this region.  Negative McBurney's point, Rovsing, psoas, Victor sign.  MUSCULOSKELETAL: Spine appears normal, range of motion is not limited, no muscle or joint tenderness.  No tenderness to palpation of the C, T, or L-spine.  No bony tenderness  along the upper and lower extremities.  NEUROLOGICAL: Alert and oriented, no focal deficits, no motor or sensory deficits. Cranial nerves 2 through 12 intact bilaterally.  No cerebellar ataxia.  Patient is able to ambulate effectively.  SKIN: Skin normal color for race, warm, dry and intact. No evidence of trauma.   PSYCHIATRIC: Alert and oriented to person, place, time/situation. normal mood and affect. No apparent risk to self or others.     ------------------------------------------------------------------------------------------------------------------------------------------    Medical Decision Making:    This is a 58-year-old male with a history of cancer and an AMY pump who presents to the emergency room after a mechanical fall.  Patient remained stable throughout course of care.  This patient was staffed with my supervising attending.  Patient's vitals were concerning for tachycardia.  Based on the bruising and swelling along with the tachycardia, I do have some clinical concern for bleed.  I did reach out to the patient's oncologist Dr. Veronica Russ.  CBC with differential, CMP, lactate, type and screen, coagulations cream ordered.  CT of the abdomen pelvis with IV contrast ordered. CBC was significant for white blood cell count of 13.2 and hemoglobin of 12.5.  CMP was significant for an alkaline phosphatase of 275 which is around patient's baseline.  Lactate was unremarkable.  CT of the abdomen pelvis was significant for the following findings:    1. Stable position of a hepatic artery infusion pump. No evidence of  adjacent hematoma or fluid collection.  2. Mild overall interval worsening of metastatic disease burden  compared to 02/15/2024 CT, with increased size of multiple pulmonary  nodules, hepatic lesions, splenic lesions, and peritoneal  carcinomatosis.  3. Interval increase in size of 3.8 cm hypodense splenic/parasplenic  metastatic deposit which now involves the splenic vein concerning  for  tumor thrombus.  4. Moderate-to-large volume abdominopelvic ascites, slightly  increased compared to prior.  5. Redemonstration of partially loculated mild bilateral pleural  effusions.  6. Additional findings as described above.      Acute care surgery evaluated patient and determined that patient can be safely discharged home without any acute intervention.  I did speak with Dr. Laney Russ and Dr. Rajinder Arenas who are the oncology providers that care for this patient.  They are also comfortable with discharge home.  He does have an outpatient follow-up appointment on Monday.  I strongly encouraged the patient follows up with this appointment.  Patient will be discharged home with strict return precautions.  Patient was agreeable to this plan and had no further questions.  I did provide patient with the number for the [facility] for outpatient follow-up.  Patient will be discharged home.  Patient understands that if symptoms worsen or change in any way, they should return for immediate medical attention.  Patient understands that they should follow-up with their primary care physician within the next week to follow-up for mechanical fall.  Patient was stable upon discharge.      External Records Reviewed: I reviewed recent and relevant outside records including: Previous provider note    Independent Interpretation of Studies:  I independently interpreted: CT of the abdomen pelvis    Escalation of Care:  Appropriate for outpatient follow-up with primary care provider, oncology    Social Determinants Affecting Care:  None    Discussion of Management with Other Providers:   I discussed the patient/results with: ACS, medical oncology, supervising attending      MIGUELINA Hilliard, PA-C  [facility]                            [place] Coma Scale Score: 15                     Patient History   Past Medical History:   Diagnosis Date     Cancer (CMS/HCC)     Diabetes mellitus (CMS/HCC)     History of transfusion      Past Surgical History:   Procedure Laterality Date    COLON SURGERY      CT ABDOMEN ANGIOGRAM W AND/OR WO IV CONTRAST  04/13/2023    CT ABDOMEN ANGIOGRAM W AND/OR WO IV CONTRAST POR [id number] CT    FRACTURE SURGERY       No family history on file.  Social History     Tobacco Use    Smoking status: Former     Types: Cigarettes    Smokeless tobacco: Never   Substance Use Topics    Alcohol use: Never    Drug use: Never       Physical Exam   ED Triage Vitals [03/02/24 1008]   Temperature Heart Rate Respirations BP   36 °C (96.8 °F) (!) 127 20 119/79      Pulse Ox Temp Source Heart Rate Source Patient Position   94 % Tympanic Monitor Sitting      BP Location FiO2 (%)     Right arm --       Physical Exam    ED Course & MDM        Medical Decision Making      Procedure  Procedures     Stephen Lucio PA-C  03/02/24 [id number]

## 2024-03-02 NOTE — CONSULTS
Reason For Consult  Mechanical fell, hit AMY pump     History Of Present Illness  Narciso Marrero is a 58 y.o. male w/ pmhx of ascending colon cancer with metastases to liver, peritoneum, spleen, lungs, and abdominal wall s/p AMY, regional lymphadenectomy, cholecystectomy, peritonectomy, right hemicolectomy  w/ ileocolic anastomosis 5/3/2023 who presents after mechanical fall this morning with direct impact to AMY pump site. Denies lightheadedness/dizziness relating to fall. Says he has a hx of drop foot on left and tripped over his slipper while walking to the kitchen. Reports when he fell, he felt and heard a popping noise. He was subsequently directed to ED for evaluation. He had progression of disease on FOLFOX/FUdR and subsequently FOLFIRI. He is now planned to start bevacizumab/ lonsurf this coming Monday. Undergoing q6 week AMY refills with glycerin, last pump fill was 2/5.    CT obtained is reassuring without evidence of catheter dislodgement or hematoma. Hgb 12.5 from 11 on prior outpatient labs. He denies recent fever/chills. He has been overall doing well at home but does endorse worsening distention and reduced appetite. States he is drinking plenty of fluids throughout the day.    Past Medical History  He has a past medical history of Cancer (CMS/HCC), Diabetes mellitus (CMS/HCC), and History of transfusion.    Surgical History  He has a past surgical history that includes CT angio abdomen w and or wo IV IV contrast (04/13/2023); Fracture surgery; and Colon surgery.     Social History  He reports that he has quit smoking. His smoking use included cigarettes. He has never used smokeless tobacco. He reports that he does not drink alcohol and does not use drugs.    Family History  No family history on file.     Allergies  Patient has no known allergies.    Review of Systems  Reviewed and negative unless otherwise specified in hpi    Physical Exam  Gen: nad   CV: HR low 100s  P: unlabored on ra  GI: soft,  focal mild tenderness just medial and superior to pump site, midline scar well healed, palpable abdominal wall metastatic implant superior aspect of incision, no appreciable hematoma or ecchymosis around pump site, moderately distended  MSK: [id number]  Psych: appropriate  Neuro: motor/sensory grossly intact      Last Recorded Vitals  Blood pressure (!) 145/95, pulse (!) 108, temperature 36 °C (96.8 °F), temperature source Tympanic, resp. rate 18, height 1.829 m (6'), weight 95.3 kg (210 lb), SpO2 94 %.    Relevant Results  See CT report from same date     Assessment/Plan   57 yo M w/ metastatic colon cancer s/p AMY pump placement in May of 2023. Presents for evaluation after falling onto pump site. Fall mechanical in nature. CT demonstrates an appropriately positioned pump/catheter. Physical exam is reassuring. Hgb stable.     Recommend discharge with precaution to return if he notices any significant swelling or has any severe pain at the site. Otherwise he will be seen this week in medical oncology clinic and pump site can be revaluated at that time if needed.    Ubaldo Kyle MD  General Surgery, pgy3  [place] [id number]    Discussed with Dr. Padron

## 2024-03-02 NOTE — TELEPHONE ENCOUNTER
Patient states fell about 5 minutes ago and landed on hepatic artery infusion pump. States area appears to be swollen now. Advised to seek evaluation in ED. [place] [facility] [place]

## 2024-03-03 ENCOUNTER — PATIENT MESSAGE (OUTPATIENT)
Dept: PALLIATIVE MEDICINE | Facility: HOSPITAL | Age: 59
End: 2024-03-03
Payer: MEDICARE

## 2024-03-03 DIAGNOSIS — R11.0 NAUSEA: ICD-10-CM

## 2024-03-04 ENCOUNTER — LAB (OUTPATIENT)
Dept: HEMATOLOGY/ONCOLOGY | Facility: HOSPITAL | Age: 59
End: 2024-03-04
Payer: MEDICARE

## 2024-03-04 ENCOUNTER — INFUSION (OUTPATIENT)
Dept: HEMATOLOGY/ONCOLOGY | Facility: HOSPITAL | Age: 59
End: 2024-03-04
Payer: MEDICARE

## 2024-03-04 ENCOUNTER — OFFICE VISIT (OUTPATIENT)
Dept: HEMATOLOGY/ONCOLOGY | Facility: HOSPITAL | Age: 59
End: 2024-03-04
Payer: MEDICARE

## 2024-03-04 VITALS
DIASTOLIC BLOOD PRESSURE: 80 MMHG | WEIGHT: 216.7 LBS | HEART RATE: 113 BPM | SYSTOLIC BLOOD PRESSURE: 125 MMHG | RESPIRATION RATE: 18 BRPM | TEMPERATURE: 97.9 F | BODY MASS INDEX: 29.39 KG/M2 | OXYGEN SATURATION: 94 %

## 2024-03-04 DIAGNOSIS — Z51.11 ENCOUNTER FOR ANTINEOPLASTIC CHEMOTHERAPY: ICD-10-CM

## 2024-03-04 DIAGNOSIS — C18.2 MALIGNANT NEOPLASM OF ASCENDING COLON (MULTI): ICD-10-CM

## 2024-03-04 DIAGNOSIS — Z96.89 PRESENCE OF HEPATIC ARTERIAL INFUSION PUMP: ICD-10-CM

## 2024-03-04 DIAGNOSIS — G89.3 NEOPLASM RELATED PAIN: ICD-10-CM

## 2024-03-04 DIAGNOSIS — C18.2 MALIGNANT NEOPLASM OF ASCENDING COLON (MULTI): Primary | ICD-10-CM

## 2024-03-04 DIAGNOSIS — C78.7 METASTATIC CARCINOMA TO LIVER (MULTI): ICD-10-CM

## 2024-03-04 LAB
ALBUMIN SERPL BCP-MCNC: 3.4 G/DL (ref 3.4–5)
ALP SERPL-CCNC: 267 U/L (ref 33–120)
ALT SERPL W P-5'-P-CCNC: 7 U/L (ref 10–52)
ANION GAP SERPL CALC-SCNC: 16 MMOL/L (ref 10–20)
APPEARANCE UR: CLEAR
AST SERPL W P-5'-P-CCNC: 19 U/L (ref 9–39)
BASOPHILS # BLD AUTO: 0.03 X10*3/UL (ref 0–0.1)
BASOPHILS NFR BLD AUTO: 0.2 %
BILIRUB SERPL-MCNC: 0.5 MG/DL (ref 0–1.2)
BILIRUB UR STRIP.AUTO-MCNC: ABNORMAL MG/DL
BUN SERPL-MCNC: 11 MG/DL (ref 6–23)
CALCIUM SERPL-MCNC: 8.8 MG/DL (ref 8.6–10.3)
CHLORIDE SERPL-SCNC: 99 MMOL/L (ref 98–107)
CO2 SERPL-SCNC: 30 MMOL/L (ref 21–32)
COLOR UR: YELLOW
CREAT SERPL-MCNC: 0.61 MG/DL (ref 0.5–1.3)
EGFRCR SERPLBLD CKD-EPI 2021: >90 ML/MIN/1.73M*2
EOSINOPHIL # BLD AUTO: 0.07 X10*3/UL (ref 0–0.7)
EOSINOPHIL NFR BLD AUTO: 0.5 %
ERYTHROCYTE [DISTWIDTH] IN BLOOD BY AUTOMATED COUNT: 14.6 % (ref 11.5–14.5)
GLUCOSE SERPL-MCNC: 100 MG/DL (ref 74–99)
GLUCOSE UR STRIP.AUTO-MCNC: NORMAL MG/DL
HBV CORE AB SER QL: NONREACTIVE
HBV SURFACE AB SER-ACNC: <3.1 MIU/ML
HBV SURFACE AG SERPL QL IA: NONREACTIVE
HCT VFR BLD AUTO: 38 % (ref 41–52)
HGB BLD-MCNC: 11.8 G/DL (ref 13.5–17.5)
IMM GRANULOCYTES # BLD AUTO: 0.12 X10*3/UL (ref 0–0.7)
IMM GRANULOCYTES NFR BLD AUTO: 0.9 % (ref 0–0.9)
KETONES UR STRIP.AUTO-MCNC: NEGATIVE MG/DL
LEUKOCYTE ESTERASE UR QL STRIP.AUTO: NEGATIVE
LYMPHOCYTES # BLD AUTO: 1.08 X10*3/UL (ref 1.2–4.8)
LYMPHOCYTES NFR BLD AUTO: 8.1 %
MCH RBC QN AUTO: 28.2 PG (ref 26–34)
MCHC RBC AUTO-ENTMCNC: 31.1 G/DL (ref 32–36)
MCV RBC AUTO: 91 FL (ref 80–100)
MONOCYTES # BLD AUTO: 0.99 X10*3/UL (ref 0.1–1)
MONOCYTES NFR BLD AUTO: 7.4 %
NEUTROPHILS # BLD AUTO: 11.07 X10*3/UL (ref 1.2–7.7)
NEUTROPHILS NFR BLD AUTO: 82.9 %
NITRITE UR QL STRIP.AUTO: NEGATIVE
NRBC BLD-RTO: 0 /100 WBCS (ref 0–0)
PH UR STRIP.AUTO: 6 [PH]
PLATELET # BLD AUTO: 267 X10*3/UL (ref 150–450)
POTASSIUM SERPL-SCNC: 3.6 MMOL/L (ref 3.5–5.3)
PROT SERPL-MCNC: 6.7 G/DL (ref 6.4–8.2)
PROT UR STRIP.AUTO-MCNC: ABNORMAL MG/DL
RBC # BLD AUTO: 4.19 X10*6/UL (ref 4.5–5.9)
RBC # UR STRIP.AUTO: NEGATIVE /UL
RBC #/AREA URNS AUTO: NORMAL /HPF
SODIUM SERPL-SCNC: 141 MMOL/L (ref 136–145)
SP GR UR STRIP.AUTO: 1.04
UROBILINOGEN UR STRIP.AUTO-MCNC: ABNORMAL MG/DL
WBC # BLD AUTO: 13.4 X10*3/UL (ref 4.4–11.3)
WBC #/AREA URNS AUTO: NORMAL /HPF

## 2024-03-04 PROCEDURE — 3074F SYST BP LT 130 MM HG: CPT | Performed by: STUDENT IN AN ORGANIZED HEALTH CARE EDUCATION/TRAINING PROGRAM

## 2024-03-04 PROCEDURE — 80053 COMPREHEN METABOLIC PANEL: CPT

## 2024-03-04 PROCEDURE — 2500000004 HC RX 250 GENERAL PHARMACY W/ HCPCS (ALT 636 FOR OP/ED): Mod: JZ | Performed by: STUDENT IN AN ORGANIZED HEALTH CARE EDUCATION/TRAINING PROGRAM

## 2024-03-04 PROCEDURE — 1036F TOBACCO NON-USER: CPT | Performed by: STUDENT IN AN ORGANIZED HEALTH CARE EDUCATION/TRAINING PROGRAM

## 2024-03-04 PROCEDURE — 87340 HEPATITIS B SURFACE AG IA: CPT

## 2024-03-04 PROCEDURE — 96409 CHEMO IV PUSH SNGL DRUG: CPT

## 2024-03-04 PROCEDURE — 86704 HEP B CORE ANTIBODY TOTAL: CPT

## 2024-03-04 PROCEDURE — 86706 HEP B SURFACE ANTIBODY: CPT

## 2024-03-04 PROCEDURE — 99215 OFFICE O/P EST HI 40 MIN: CPT | Mod: 25 | Performed by: STUDENT IN AN ORGANIZED HEALTH CARE EDUCATION/TRAINING PROGRAM

## 2024-03-04 PROCEDURE — 99215 OFFICE O/P EST HI 40 MIN: CPT | Performed by: STUDENT IN AN ORGANIZED HEALTH CARE EDUCATION/TRAINING PROGRAM

## 2024-03-04 PROCEDURE — 3079F DIAST BP 80-89 MM HG: CPT | Performed by: STUDENT IN AN ORGANIZED HEALTH CARE EDUCATION/TRAINING PROGRAM

## 2024-03-04 PROCEDURE — 85025 COMPLETE CBC W/AUTO DIFF WBC: CPT

## 2024-03-04 PROCEDURE — 3008F BODY MASS INDEX DOCD: CPT | Performed by: STUDENT IN AN ORGANIZED HEALTH CARE EDUCATION/TRAINING PROGRAM

## 2024-03-04 PROCEDURE — 36591 DRAW BLOOD OFF VENOUS DEVICE: CPT

## 2024-03-04 PROCEDURE — 81001 URINALYSIS AUTO W/SCOPE: CPT

## 2024-03-04 RX ORDER — FAMOTIDINE 10 MG/ML
20 INJECTION INTRAVENOUS ONCE AS NEEDED
Status: DISCONTINUED | OUTPATIENT
Start: 2024-03-04 | End: 2024-03-04 | Stop reason: HOSPADM

## 2024-03-04 RX ORDER — EPINEPHRINE 0.3 MG/.3ML
0.3 INJECTION SUBCUTANEOUS EVERY 5 MIN PRN
Status: CANCELLED | OUTPATIENT
Start: 2024-03-04

## 2024-03-04 RX ORDER — DIPHENHYDRAMINE HYDROCHLORIDE 50 MG/ML
50 INJECTION INTRAMUSCULAR; INTRAVENOUS AS NEEDED
Status: CANCELLED | OUTPATIENT
Start: 2024-03-04

## 2024-03-04 RX ORDER — HEPARIN SODIUM,PORCINE/PF 10 UNIT/ML
50 SYRINGE (ML) INTRAVENOUS AS NEEDED
Status: CANCELLED | OUTPATIENT
Start: 2024-03-04

## 2024-03-04 RX ORDER — PROCHLORPERAZINE MALEATE 10 MG
10 TABLET ORAL EVERY 6 HOURS PRN
Status: CANCELLED | OUTPATIENT
Start: 2024-03-18

## 2024-03-04 RX ORDER — PROCHLORPERAZINE EDISYLATE 5 MG/ML
10 INJECTION INTRAMUSCULAR; INTRAVENOUS EVERY 6 HOURS PRN
Status: CANCELLED | OUTPATIENT
Start: 2024-03-18

## 2024-03-04 RX ORDER — FAMOTIDINE 10 MG/ML
20 INJECTION INTRAVENOUS ONCE AS NEEDED
Status: CANCELLED | OUTPATIENT
Start: 2024-03-04

## 2024-03-04 RX ORDER — ALBUTEROL SULFATE 0.83 MG/ML
3 SOLUTION RESPIRATORY (INHALATION) AS NEEDED
Status: DISCONTINUED | OUTPATIENT
Start: 2024-03-04 | End: 2024-03-04 | Stop reason: HOSPADM

## 2024-03-04 RX ORDER — DIPHENHYDRAMINE HYDROCHLORIDE 50 MG/ML
50 INJECTION INTRAMUSCULAR; INTRAVENOUS AS NEEDED
Status: CANCELLED | OUTPATIENT
Start: 2024-03-18

## 2024-03-04 RX ORDER — EPINEPHRINE 0.3 MG/.3ML
0.3 INJECTION SUBCUTANEOUS EVERY 5 MIN PRN
Status: CANCELLED | OUTPATIENT
Start: 2024-03-18

## 2024-03-04 RX ORDER — HEPARIN 100 UNIT/ML
500 SYRINGE INTRAVENOUS AS NEEDED
Status: DISCONTINUED | OUTPATIENT
Start: 2024-03-04 | End: 2024-03-04 | Stop reason: HOSPADM

## 2024-03-04 RX ORDER — DIPHENHYDRAMINE HYDROCHLORIDE 50 MG/ML
50 INJECTION INTRAMUSCULAR; INTRAVENOUS AS NEEDED
Status: DISCONTINUED | OUTPATIENT
Start: 2024-03-04 | End: 2024-03-04 | Stop reason: HOSPADM

## 2024-03-04 RX ORDER — PROCHLORPERAZINE EDISYLATE 5 MG/ML
10 INJECTION INTRAMUSCULAR; INTRAVENOUS EVERY 6 HOURS PRN
Status: DISCONTINUED | OUTPATIENT
Start: 2024-03-04 | End: 2024-03-04 | Stop reason: HOSPADM

## 2024-03-04 RX ORDER — OLANZAPINE 10 MG/1
10 TABLET ORAL NIGHTLY
COMMUNITY
End: 2024-03-04 | Stop reason: SDUPTHER

## 2024-03-04 RX ORDER — OLANZAPINE 10 MG/1
10 TABLET ORAL NIGHTLY
Qty: 30 TABLET | Refills: 0 | Status: SHIPPED | OUTPATIENT
Start: 2024-03-04 | End: 2024-04-03

## 2024-03-04 RX ORDER — PROCHLORPERAZINE EDISYLATE 5 MG/ML
10 INJECTION INTRAMUSCULAR; INTRAVENOUS EVERY 6 HOURS PRN
Status: CANCELLED | OUTPATIENT
Start: 2024-03-04

## 2024-03-04 RX ORDER — HEPARIN 100 UNIT/ML
500 SYRINGE INTRAVENOUS AS NEEDED
Status: CANCELLED | OUTPATIENT
Start: 2024-03-04

## 2024-03-04 RX ORDER — FAMOTIDINE 10 MG/ML
20 INJECTION INTRAVENOUS ONCE AS NEEDED
Status: CANCELLED | OUTPATIENT
Start: 2024-03-18

## 2024-03-04 RX ORDER — EPINEPHRINE 0.3 MG/.3ML
0.3 INJECTION SUBCUTANEOUS EVERY 5 MIN PRN
Status: DISCONTINUED | OUTPATIENT
Start: 2024-03-04 | End: 2024-03-04 | Stop reason: HOSPADM

## 2024-03-04 RX ORDER — ALBUTEROL SULFATE 0.83 MG/ML
3 SOLUTION RESPIRATORY (INHALATION) AS NEEDED
Status: CANCELLED | OUTPATIENT
Start: 2024-03-04

## 2024-03-04 RX ORDER — ALBUTEROL SULFATE 0.83 MG/ML
3 SOLUTION RESPIRATORY (INHALATION) AS NEEDED
Status: CANCELLED | OUTPATIENT
Start: 2024-03-18

## 2024-03-04 RX ORDER — PROCHLORPERAZINE MALEATE 10 MG
10 TABLET ORAL EVERY 6 HOURS PRN
Status: DISCONTINUED | OUTPATIENT
Start: 2024-03-04 | End: 2024-03-04 | Stop reason: HOSPADM

## 2024-03-04 RX ORDER — PROCHLORPERAZINE MALEATE 10 MG
10 TABLET ORAL EVERY 6 HOURS PRN
Status: CANCELLED | OUTPATIENT
Start: 2024-03-04

## 2024-03-04 RX ADMIN — BEVACIZUMAB-AWWB 500 MG: 400 INJECTION, SOLUTION INTRAVENOUS at 14:43

## 2024-03-04 ASSESSMENT — PAIN SCALES - GENERAL: PAINLEVEL: 5

## 2024-03-04 NOTE — PROGRESS NOTES
Cancer History:  DIAGNOSIS: Ascending colon cancer     STAGING: IV     CURRENT SITES OF DISEASE:  Ascending colon, liver, peritoneal      MOLECULAR/GENOMICS:  [facility] xT 1/23/23 (from [place])  KRAS G12D 32.8% VAF  PTEN V290fs  BCOR Q822*  LZTR1 R198T  SMAD4 R361H  APC S146fs  APC R876*  FUBP1 Q279*  PTEN D268fs  Microsatellite stable  TMB 4.2 (low)     ctDNA Signatera  4/17/23: 0  5/17/23: 1.99  5/31/23: 28.84  6/14/23: 4.20  6/28/23: 9.55  7/12/23: 24.03  7/26/23: 40.53  8/23/23: 9.75  9/20/23: 3.67  10/18/23: 19.35  11/29/23: 125.52  12/26/23: 177.74 Switched treatment to FOLFIRI.  1/22/24: 195.37  2/19/24: 462.54  3/4/24: 1977.70 Switched to Lonsurf + Bevacizumab      CEA:  2/1/23: 4400  3/1/23: 4661  3/15/23: 3061  3/29/23: 2363  4/12/23: 1529  5/17/23: 235.9  5/31/23: 238.3  6/14/23: 225  6/28/23: 181  7/12/23: 171  7/26/23: 266  8/9/23: 392  8/23/23: 718  9/6/23: 800  9/20/23: 905   10/18/23: >100  11/1/23: 613  11/15/23: 1249  11/29/23: 1189  12/11/23: 1572  12/26/23: 2021 Switched treatment to FOLFIRI.   1/8/24: 1662  1/22/24: 2013  2/5/24: 2439  2/19/24: 2775        CURRENT THERAPY:  Lonsurf + Bevacizumab start 3/4/24  Last Glycerin in AMY pump on 2/5/24     PREVIOUS THERAPY:  1/2023-4/12/23: FOLFOX x6 cycles. First 2 cycles with bevacizumab  AMY pump placed 5/3/23. Flow rate 1.2 mL/day. (Serial # [id number]).First FUdR via AMY pump 5/17/23. Systemic chemotherapy being held due to wound vac, Started FOLFOX on 8/9/23. On 11/1/23 dose reduction in Oxaliplatin and infusional 5FU only due to chemotherapy colitis. Treatment DC due to PD. Last treatment on 12.11.2023. Total cycles of FUdR (if applicable): 7  12.26.2023 : Started on FOLFIRI, Last and 4th cycle on 2/5/24. Discontinued due to disease progression     ONCOLOGIC ISSUES:  Shingles  Midline incision dehisced      PROVIDERS:  Surg onc: Rajinder Arenas  CRS: Regina Raymundo  Local med onc: Dia Kendall     HISTORY:   1/2023: Presented with 20 pound unexpected  weight loss and RUQ pain. Imaging showed large liver lesion. Biopsy c/w metastatic colon cancer. C-scope showed infiltrative, ulcerated, fungating mass in ascending colon.   Baseline CEA 2322. Started FOLFOX + bevacizumab  3/16/23: MRI chest and liver showed no evidence of metastatic disease in the chest. Focal circumferential wall thickening   5/3/23: AMY pump placed, right hemicolectomy, open cholecystectomy and peritonectomy     SH: , lives with wife. Former tobacco, quit in 2017, no illicits or EtOH     PMH: physical trauma on R side of body with multiple fractures, HLD     FH: Father and daughter with cancer      Subjective:   Here to start Lonsurf +Bevacizumab  Feeling tired  Bloated   Having bowel movements with Miralax  Abdomen hurts even before fall  Pain pill every 4-6 hours    Fell on Saturday 3/2. Had a mechanical fall onto his AMY pump and went to the ED to get his AMY pump evaluated. After being evaluated, it was felt his AMY pump was ok.    No fevers, chills, chest pain, shortness of breath, rashes or masses.     ROS as above. Remainder of 10 point review of systems elicited and otherwise unremarkable.         Objective:    Vitals:    03/04/24 1225   BP: 125/80   Pulse: (!) 113   Resp: 18   Temp: 36.6 °C (97.9 °F)   SpO2: 94%          Daily Weight  03/02/24 : 95.3 kg (210 lb)  02/26/24 : 98.4 kg (217 lb)  02/19/24 : 95.7 kg (210 lb 14.4 oz)  02/19/24 : 95.7 kg (210 lb 14.4 oz)  02/05/24 : 94.9 kg (209 lb 3.2 oz)     Physical Exam:   Gen: A&O, NAD  Head: Normocephalic, atraumatic  Eyes: no scleral icterus  ENT: mucous membranes moist, no oropharyngeal lesions  Resp: Lungs CTAB  Cardiac: Normal rate, regular rhythm, no murmurs appreciated  Abdomen: Firm, nondistended, nontender, +BS AMY Pump in place in LLQ, no swelling or redness, ecchymotic area over scar of AMY pump, firm lump at Xiphoid process  Neuro: CNII-XII grossly intact  Psych: appropriate mood & affect  Skin: warm, dry, no apparent  nicole    ECOG Performance Status: 1      CT C/A/P 2/15/24:  1. Increase in the size and number of the metastatic pulmonary  nodules.  2. New/progressed right paratracheal, subcarinal and suprahilar  metastatic lymphadenopathy.  3. Stable appearance of the left thyroid lobe.      ABDOMEN-PELVIS:  1. Left anterior abdominal wall AMY pump terminating in the karlo  hepatis. The dominant hepatic lesion has slightly decreased in size  measuring 7.4 x 5.7 cm in comparison to 7.5 x 6.3 cm as well as the smaller hepatic dome lesion measuring 0.6 cm, previously 0.8 cm. However on the other hand, there is suggestion of possible new segment 5/6.7 cm lesion that needs attention on follow-up.  2. New splenic parenchymal/hilum metastatic implants.  3. Increase in the size and number of the metastatic  mesenteric/retroperitoneal lymph nodes and peritoneal/abdominal wall  implants.  4. Moderate abdominopelvic ascites, worsened from prior.      Assessment & Plan:  Mr. 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      He was diagnosed in Jan 2023 after presenting with abdominal pain and unintentional weight loss. Imaging showed liver mass, biopsy showed adenocarcinoma of colonic origin. C-scope showed mass in ascending colon. He started palliative chemotherapy with FOLFOX + bevacizumab and has received 6 cycles total, ryan d/c after 2 cycles.      I discussed with him at length the risks and benefits to floxuridine chemotherapy delivered via hepatic artery infusion pump in combination with systemic chemotherapy with FOLFOX. I discussed that the goals of AMY therapy are to delay progression of disease and to prolong survival.      Risks of AMY therapy include but are not limited to:   - Gastritis if extrahepatic perfusion (will be mitigated by ensuring no extrahepatic perfusion on Tc99-MAA scan and by prophylactic H2 antagonist  - Biliary sclerosis  - Seroma, hematoma overlying pump  site  - Infection at pump site     I discussed that he must call us immediately if she develops abdominal pain, redness at pump site, fever, or jaundice. Avoid hot packs/heating pads over the site and avoid hot tubs, as temperature changes to the pump can affect pump flow rate. Similarly, avoid changes in atmospheric pressure, and notify AMY team if prolonged air travel or travel to different atmospheric pressures is anticipated.      AMY pump placed on 5/3/23, Intera Code Blue serial number [id number].      5/31/23: Midline incision dehisced. Hep Saline in AMY pump      6/14/23L Midline incision dehisced. Hold systemic chemotherapy. Fill AMY pump with FUdR with a 50% dose reduction due to 1.37xRV of alk phos from 5/17/23.     6/28/23 - Now with wound vac to abdominal wound. Continue to hold systemic chemotherapy until wound is healed.      7/12/23: Wound vac is now off. Wound is healing very nicely. Wound bed with pink granulation tissue but the wound is still open. Dr. Russ came in to see the wound. Will continue to hold systemic chemotherapy until the wound is fully healed/closed. Continue with AMY pump.      7/26/23: Wound healing continues. Still has some open area after scab broke off showering. Dr. Russ examined site as well today. In setting of wound and recent shingles, we will continue to HOLD systemic FOLFOX today and give HepNS via AMY. In the meantime, after further discussion with Dr. Russ we will plan on obtaining restaging scans now.      8/9/23: Reviewed CT with Dr. Russ. Liver lesions are decreased in size. There are multiple new lung lesions and numerous peritoneal implants. Since his abdominal wound is now healed will finally be able to start systemic chemotherapy. Due to his ALkPhos being >1.5 x RV, will hold FUdR and give HepNS today.     8/23/23: Proceed with FOLFOX and will give FUdR via AMY pump but at 50% dose reduction due to alk phos being > 1.4 xRV from 7/12/23.     9/6/23:  Tolerating systemic chemotherapy. Continue HepNS     10/4/23: Having nausea and diarrhea. Will hold systemic chemotherapy and give HepNS via HAIP. Will give supportive medications today.     10/18/23: Last week as admitted for several days d/t colitis; infectious etiologies not identified. Concern is for chemotherapy-induced colitis.  I personally reviewed the images from the recent CT, which show decreased size of liver metastasis, stable peritoneal disease. Hold systemic today & will refill pump with heparin saline.     11/1/23: No longer having diarrhea. Stools are back to being formed. Having more neuropathic pain from previous sites of shingles since he ran out of the increased dose of lyrica. Will give FOLFOX + FUdR today    11/15/23: Less fatigued. Stools formed. FOLFOX + HepNS today    11/29/23: Starting to have lower abdominal pain not responsive to Percocet. Happens mostly after a bowel movement. FOLFOX + FUdR with a 50% dose reduction due to elevated Alk Phos from 1.35xRV from 11/1/23 12/11/23: Continues with abdominal pain. CEA continues to rise as well as Signatera. Due for imaging.  Will receive FOLFOX + HepNS.     12/21/23: I personally reviewed the images from the recent CT, which show interval increase in b/l pulmonary nodules and peritoneal nodules. I discussed with him that we should discontinue FOLFOX and AMY and change to FOLFIRI. Will add bevacizumab in near future, as with KRAS mutation, he will not be a candidate for EGFRi. It is too soon after receiving FUDR in AMY to start ryan, will plan with C3 FOLFIRI.  Will start with dose reduced 5FU and irinotecan d/t prior severe diarrhea     1/8/24: Proceed with C2 FOLFIRI. Will add Fosaprepitant to premeds and have asked him to start taking compazine PO the evening of chemotherapy and then around the clock until day 4. He can start Zofran on day 3 at pump disconnect. He will also be using Dronabinol 3x day for nausea and appetite. Also encouraged  him to use Miralax for constipation which also should help his pain and nausea. I will also reach out to Palliative care.       1/22/24: Starting to feel better symptomatically from his chemotherapy. Continue with C3 FOLFIRI. Still needing pain medicine every 6 hours.     2/5/24: Due for Glycerin today. Having vomiting. Not moving bowels regularly. BP continues to stay elevated. Continue with FOLFIRI.     2/19/24: I personally reviewed the images from the recent CT, which show interval progression of multiple peritoneal nodules and pulmonary nodules. I discussed with him and his wife that unfortunately the FOLFIRI is not working anymore. I recommended changing his chemotherapy to Lonsurf + bevacizumab and adding him to the Phase 1 clinical trials list. I discussed the risks and benefits of the chemotherapy regimen at length, and he agrees to proceed.     3/4/24: Will start C1D1 Lonsurf and Bevacizumab today. Had a  fall onto his AMY pump a few days ago. CT scan showed stable position of AMY pump. Abdomen hurts at this time and it hurt even before his fall.     Plan:   Metastatic colon cancer to the liver  - AMY pump placement and R hemicolectomy on 5/3/23  - Fill AMY pump with Glycerin 2/5/24 and every 6 weeks -> due 3/18/24  - Discontinue FOLFIRI  - To start Lonsurf + bevacizumab   - Phase 1 referral  - proceed with C1D1 Lonsurf + Bevacizumab today  - CT after C2  - RTC in 2 weeks for C1 D15 treatment only and glycerin then 4 weeks for C2D1.     Nausea and vomiting due to chemotherapy, neoplasm pain, anorexia, Neuropathic pain due to shingles  - managed by palliative care

## 2024-03-04 NOTE — TELEPHONE ENCOUNTER
From: Narciso Marrero  To: LA Bonds-THOMAS  Sent: 3/3/2024 4:49 PM EST  Subject: Zyprexa question     Froy mullins asked for a refill of Zyprexa on around the 12 of Feb but I was also very sick at that time I have 1 tablet left can I get these refilled?   Thanks  Memo Marrero

## 2024-03-04 NOTE — PROGRESS NOTES
Patient tolerated first dose beva well with no complaints. Given education on medication and ambulated out of infusion center.

## 2024-03-06 ENCOUNTER — APPOINTMENT (OUTPATIENT)
Dept: HEMATOLOGY/ONCOLOGY | Facility: CLINIC | Age: 59
End: 2024-03-06
Payer: MEDICARE

## 2024-03-06 NOTE — CONSULTS
Service:   Service: Endocrinology     Consult:  Consult requested by (Attending Name): Rajinder Arenas   Reason: hyperglycemia     History of Present Illness:   Admission Reason: hepatic artery infusion pump placement   HPI:    JOSE JUAN MARIE is a 57 year old Male who presented for hepatic artery infusion pump with Dr Arenas.     Endocrinology consulted for DM2 with hyperglycemia   A1C due for measurement  home regimen: glyburide 5mg BID  managed by: PCP     Review Family/Social History and ROS:   Family History:  Family History: unable to obtain      Social History:    Smoking Status: unable to assess  (1)   Drug Use: denies  (1)   Drug 2 Use: denies  (1)     Constitutional: POSITIVE: Malaise; NEGATIVE: Fever,  Chills, Anorexia, Weight Loss     Eyes: NEGATIVE: Blurry Vision, Drainage, Diploplia,  Redness, Vision Loss/ Change     ENMT: NEGATIVE: Nasal Discharge, Nasal Congestion,  Ear Pain, Mouth Pain, Throat Pain     Respiratory: NEGATIVE: Dry Cough, Productive Cough,  Hemoptysis, Wheezing, Shortness of Breath     Cardiac: NEGATIVE: Chest Pain, Dyspnea on Exertion,  Orthopnea, Palpitations, Syncope     Gastrointestinal: POSITIVE: Abdominal Pain; NEGATIVE:  Nausea, Vomiting, Diarrhea, Constipation     Genitourinary: NEGATIVE: Discharge, Dysuria, Flank  Pain, Frequency, Hematuria     Musculoskeletal: NEGATIVE: Decreased ROM, Pain, Swelling,  Stiffness, Weakness     Neurological: NEGATIVE: Dizziness, Confusion, Headache,  Seizures, Syncope     Psychiatric: NEGATIVE: Mood Changes, Anxiety, Hallucinations,  Sleep Changes, Suicidal Ideas     Endocrine: NEGATIVE: Heat Intolerance, Cold Intolerance,  Sweat, Polyuria, Thirst              Allergies:  ·  No Known Allergies :     Objective:     Objective Information:        T   P  R  BP   MAP  SpO2   Value  37.4  106  16  98/65      93%  Date/Time 5/4 18:15 5/4 18:15 5/4 18:15 5/4 18:15    5/4 18:15  Range  (36.5C - 37.4C )  (92 - 126 )  (16 - 18 )  (89 - 118 )/ (60 - 76  )    (93% - 98% )   As of 04-May-2023 16:39:00, patient is on 2 L/min of oxygen via nasal cannula.  Highest temp of 37.4 C was recorded at 5/4 18:15         Weights   5/3 18:38: Weight in kg (Weight (kg))  101  5/3 18:38: Weight in lbs ((lbs))  222.6  5/3 18:38: BMI (kg/m2) (BMI (kg/m2))  30.225      Physical Exam Narrative:  ·  Physical Exam:    Constitutional: ill appearing, awake/alert/oriented x3, lying in bed   Eyes: EOMI, clear sclera  ENMT: mucous membranes moist, no apparent injury, no lesions seen  Head/Neck: Neck supple, no apparent injury  Respiratory/Thorax: Patent airways, good chest expansion, thorax symmetric, CTAB  Cardiovascular: regular rate and rhythm, s1 and s2 distinct, 2+ equal pulses of the extremities  Gastrointestinal: Nondistended, soft, non-tender, no rebound tenderness or guarding  Musculoskeletal: ROM intact, normal strength  Extremities: normal extremities, no edema  Neurological: alert and oriented x3, CN's grossly intact, normal strength  Psychological: appropriate mood and behavior  Skin: Warm and dry, no lesions, no rashes        Medications:    Medications:      CENTRAL NERVOUS SYSTEM AGENTS:    1. Acetaminophen:  650  mg  Oral  Every 6 Hours    2. HYDROmorphone PCA 15 mg/ NaCL 0.9% 30 mL:  2.6  IV PCA  <Continuous>    3. Pregabalin:  100  mg  Oral  2 Times a Day    4. Ondansetron Injectable:  4  mg  IntraVenous Push  Every 6 Hours   PRN       5. Scopolamine TransDermal:  1  patch  TransDermal  Every 72 Hours    6. Methocarbamol Injectable:  1000  mg  IntraVenous Push  Every 8 Hours      COAGULATION MODIFIERS:    1. Enoxaparin SubCutaneous:  40  mg  SubCutaneous  Every 24 Hours      GASTROINTESTINAL AGENTS:    1. Docusate:  100  mg  Oral  2 Times a Day      METABOLIC AGENTS:    1. Insulin Glargine (Lantus) Injectable:  10  unit(s)  SubCutaneous  At Bedtime    2. Insulin Lispro Moderate Corrective Scale:  unit(s)  SubCutaneous  Every 4 Hours    3. Dextrose 50% in Water Injectable:   25  gram(s)  IntraVenous Push  Every 15 Minutes   PRN       4. Glucagon Injectable:  1  mg  IntraMuscular  Every 15 Minutes   PRN         MISCELLANEOUS AGENTS:    1. Naloxone Injectable:  0.2  mg  IntraVenous Push  Once   PRN       2. Ropivacaine 0.2%/ Ambit Pump 500 mL:  1000  mg  Peripheral Nerve  <Continuous>      NUTRITIONAL PRODUCTS:    1. Lactated Ringers Infusion:  1000  mL  IntraVenous  <Continuous>        Recent Lab Results:    Results:        I have reviewed these laboratory results:    Glucose_POCT  Trending View      Result 04-May-2023 16:27:00  04-May-2023 12:15:00  04-May-2023 06:02:00  03-May-2023 21:40:00  03-May-2023 15:14:00  03-May-2023 06:16:00    Glucose-POCT 282   H   328   H   360   H   277   H   286   H   354   H        Comprehensive Metabolic Panel  Trending View      Result 04-May-2023 16:25:00  04-May-2023 07:43:00  03-May-2023 15:06:00    Glucose, Serum 299   H   328   H   265   H       L   138   137    K 4.7   4.7   3.5    CL 98   99   106    Bicarbonate, Serum 24   25   23    Anion Gap, Serum 17   19   12    BUN 21   15   17    CREAT 1.31   H   1.02   0.74    GFR Male 63   85   >90    Calcium, Serum 9.0   9.4   7.9   L    ALB 3.6   3.9   3.2   L    ALKP 117   136   H   114    T Pro 5.9   L   6.3   L   4.8   L    T Bili 0.9   1.1   0.7    Alanine Aminotransferase, Serum 133   H   150   H   145   H    Aspartate Transaminase, Serum 84   H   106   H   140   H        Arterial Full Panel  Trending View      Result 03-May-2023 15:13:00  03-May-2023 12:36:00  03-May-2023 11:18:00  03-May-2023 10:14:00  03-May-2023 09:08:00    pH, Arterial 7.30   L   7.28   L   7.30   L   7.28   L   7.32   L    pCO2, Arterial 41   44   H   43   H   44   H   41    pO2, Arterial 79   L   154   H   150   H   144   H   141   H    PATIENT TEMPERATURE, Arterial 37.0   37.0   37.0   37.0   37.0    SO2, Arterial 95   98   97   97   97    Oxy Hgb, Arterial 93.9   L   97.0   96.1   95.9   96.5    HCT CALCULATED,  "Arterial 36.0   L   38.0   L   40.0   L   39.0   L   43.0    SODIUM, Arterial 133   L   133   L   132   L   132   L   130   L    Potassium- Arterial 4.1   3.8   4.1   3.8   5.0       103   103   103   100    CALCIUM, IONIZED, Arterial 1.20   1.21   1.24   1.24   1.26    GLUCOSE, Arterial 303   H   312   H   354   H   371   H   438   H    LACTATE, Arterial 2.4   H   3.4   H   2.2   H   2.1   H   1.7    BASE EXCESS-BLOOD, Arterial -5.9   L   -5.9   L   -5.1   L   -5.9   L   -4.8   L    BiCarb-Calculated, Arterial 20.2   L   20.7   L   21.2   L   20.7   L   21.1   L    HGB, Arterial 11.9   L   12.7   L   13.4   L   13.1   L   14.3    ANION GAP, Arterial 13   13   12   12   14              Assessment:    JOSE JUAN MARIE is a 57 year old Male who presented for hepatic artery infusion pump with Dr Arenas.     Endocrinology consulted for DM2 with hyperglycemia   A1C due for measurement  home regimen: glyburide 5mg BID  managed by: PCP     ASSESSMENT: DM2 with hyperglycemia in setting of post-operative stress  - target glucose <180mg/dL     PLAN:   - start glargine 10u qHS  - continue lispro moderate ssi q4hr   - POCT glucose ACHS  - hypoglycemia protocol     Patient seen and examined.   Plan communicated to primary team via doc halo to Dr. May Osborne PA-C  Pager [phone] or Doc Halo  Please contact endocrinology fellow on-call after 5p - page [phone]      Consult Status:  Consult Order ID: [id number]     Attestation:   Note Completion:  Provider/Team Pager # [phone]         Electronic Signatures:  Sharon Osborne (PAC)  (Signed 04-May-2023 18:30)   Authored: Service, History of Present Illness, Review  Family/Social History and ROS, Allergies, Objective, Assessment/Recommendations, Note Completion      Last Updated: 04-May-2023 18:30 by Sharon Osborne (PAC)    References:  1.  Data Referenced From \"Patient Profile - Adult v2\" 03-May-2023 18:38   "

## 2024-03-06 NOTE — CONSULTS
Service:   Service: Anesthesia - Pain     Consult:  Consult requested by (Attending Name): Dagoberto   Reason: Postoperative Analgesia     History of Present Illness:   HPI:    JOSE JUAN MARIE is a 57 year old Male who presents for hepatic artery infusion pump with Dr Arenas. Acute Pain consulted for block for postoperative pain control.     Anticipated Postop Pain Issues -   Palliative: typically relieved with IV analgesics and regional local anesthetics  Provocative: typically with movement  Quality: typically burning and aching  Radiation: typically none  Severity: typically severe 8-10/10  Timing: typically constant    PMH:  Hypertension, Hyperlipidemia  Nephrolithiasis  Liver Mets from colon CA  GERD, Colorectal cancer (with Liver mets  Diabetes - Non-Insulin dependent    PSH:  Hernia repair - Umbilical hernia repair (no mesh), Colonoscopy, EGD  Ankle repair  pelvis repair wrist surgery  Shoulder surgery  Internal amputation of left leg    FHx:  unremarkable    SHx:  Former smoker  Occ etoh  no drug use    Allergies:  NKDA    ROS: all 10 points of review of system are negative including:  GENERAL, CONSTITUTIONAL: no Recent weight loss, Fever, Chills  EYES, VISION: no Visual Changes  EARS, NOSE, THROAT: no Hearing loss  HEART, CARDIOVASCULAR : no Chest pain, Arrythmia, palpitations, Shortness of breath, Peripheral edema  RESPIRATORY: no Cough, Shortness of breath, Wheezing   GASTROINTESTINAL: no Abdominal pain, Bloody stool   GENITOURINARY: no Frequent urination, Urgency   MUSCULOSKELETAL: no Joint pain, swelling, Musculoskeletal pain   SKIN & INTEGUMENTARY: no Rashes or Sores   NEUROLOGICAL: no Numbness or tingling sensations  PSYCHIATRIC: no Anxiety or Depression             Allergies:  ·  No Known Allergies :     Objective:   Physical Exam Narrative:  ·  Physical Exam:    Physical Exam:  Constitutional:  no distress, alert and cooperative  Eyes: clear sclera  Head/Neck: No apparent injury, trachea  midline  Respiratory/Thorax: Patent airways, thorax symmetric, breathing comfortably  Cardiovascular: no pitting edema  Gastrointestinal: Nondistended  Musculoskeletal: ROM intact  Extremities: no clubbing  Neurological: alert, cabral x4  Psychological: Appropriate affect      Medications:    Medications:          Continuous Medications       --------------------------------    1. Ropivacaine 0.2%/ Ambit Pump 500 mL:  1000  mg  Peripheral Nerve  <Continuous>         Scheduled Medications       --------------------------------  No scheduled medications are active         PRN Medications       --------------------------------  No PRN medications are active          Assessment:    JOSE JUAN MARIE is a 57 year old Male who presents for hepatic artery infusion pump with Dr Arenas. Acute Pain consulted for block for postoperative pain control.     Plan:  - Bilateral quadratus lumborum blocks preoperatively with catheters placed on 5/3/23 and Ambit pump with Ropivacaine 0.2%/NaCl 0.9% 500mL, Rate 14 cc/hr  - Ambit pump medication will not interfere with pain medication prescribed by primary team  - No Lidoderm patches while catheters in place  - Acute pain service will follow while catheters in place  - Consider Tylenol, Toradol and Robaxin     Acute Pain Service Resident  pg [phone] ph [phone]      Attestation:   Note Completion:  I am a:  Resident/Fellow   Attending Attestation I saw and evaluated the patient.  I personally obtained the key and critical portions of the history and physical exam or was physically present for key and  critical portions performed by the resident/fellow. I reviewed the resident/fellow?s documentation and discussed the patient with the resident/fellow.  I agree with the resident/fellow?s medical decision making as documented in the note.     I personally evaluated the patient on 03-May-2023         Electronic Signatures:  Gwen oCbb)  (Signed 03-May-2023 12:08)   Authored:  Assessment/Recommendations, Note Completion   Co-Signer: Service, History of Present Illness, Allergies, Objective, Assessment/Recommendations, Note Completion  Domenico Rubin (Resident))  (Signed 03-May-2023 07:07)   Authored: Service, History of Present Illness, Allergies,  Objective, Assessment/Recommendations, Note Completion      Last Updated: 03-May-2023 12:08 by Gwen Cobb)

## 2024-03-11 ENCOUNTER — TELEMEDICINE (OUTPATIENT)
Dept: PALLIATIVE MEDICINE | Facility: HOSPITAL | Age: 59
End: 2024-03-11
Payer: MEDICARE

## 2024-03-11 DIAGNOSIS — T40.2X5A CONSTIPATION DUE TO OPIOID THERAPY: Primary | ICD-10-CM

## 2024-03-11 DIAGNOSIS — G89.3 CANCER RELATED PAIN: ICD-10-CM

## 2024-03-11 DIAGNOSIS — Z51.81 ENCOUNTER FOR MONITORING OPIOID MAINTENANCE THERAPY: ICD-10-CM

## 2024-03-11 DIAGNOSIS — Z71.89 GOALS OF CARE, COUNSELING/DISCUSSION: ICD-10-CM

## 2024-03-11 DIAGNOSIS — Z51.5 PALLIATIVE CARE ENCOUNTER: ICD-10-CM

## 2024-03-11 DIAGNOSIS — Z79.891 ENCOUNTER FOR MONITORING OPIOID MAINTENANCE THERAPY: ICD-10-CM

## 2024-03-11 DIAGNOSIS — K59.03 CONSTIPATION DUE TO OPIOID THERAPY: Primary | ICD-10-CM

## 2024-03-11 DIAGNOSIS — T45.1X5A CHEMOTHERAPY-INDUCED NAUSEA: ICD-10-CM

## 2024-03-11 DIAGNOSIS — R11.0 CHEMOTHERAPY-INDUCED NAUSEA: ICD-10-CM

## 2024-03-11 PROCEDURE — 3008F BODY MASS INDEX DOCD: CPT

## 2024-03-11 PROCEDURE — 1036F TOBACCO NON-USER: CPT

## 2024-03-11 PROCEDURE — 99215 OFFICE O/P EST HI 40 MIN: CPT

## 2024-03-11 RX ORDER — MORPHINE SULFATE 30 MG/1
30 TABLET, FILM COATED, EXTENDED RELEASE ORAL 3 TIMES DAILY
Qty: 90 TABLET | Refills: 0 | Status: SHIPPED
Start: 2024-03-11 | End: 2024-03-25 | Stop reason: SDUPTHER

## 2024-03-11 RX ORDER — SENNOSIDES 8.6 MG/1
2 TABLET ORAL NIGHTLY
Qty: 60 TABLET | Refills: 11 | Status: SHIPPED | OUTPATIENT
Start: 2024-03-11 | End: 2025-03-11

## 2024-03-11 RX ORDER — OXYCODONE AND ACETAMINOPHEN 10; 325 MG/1; MG/1
1 TABLET ORAL EVERY 6 HOURS PRN
Qty: 120 TABLET | Refills: 0 | Status: SHIPPED | OUTPATIENT
Start: 2024-03-11 | End: 2024-04-10

## 2024-03-11 RX ORDER — LORAZEPAM 1 MG/1
1 TABLET ORAL EVERY 8 HOURS PRN
Qty: 90 TABLET | Refills: 0 | Status: SHIPPED
Start: 2024-03-11 | End: 2024-03-21 | Stop reason: HOSPADM

## 2024-03-11 NOTE — PROGRESS NOTES
SUPPORTIVE AND PALLIATIVE ONCOLOGY CONSULT - OUTPATIENT      SERVICE DATE: 3/11/2024    Medical Oncologist: Laney Russ MD PhD  Brooklynn Enciso MD   Primary Physician: Son oTm Murdock  [phone]    REASON FOR CONSULT/CHIEF CONSULT COMPLAINT: pain management and other symptom control     Subjective   HISTORY OF PRESENT ILLNESS: Narciso Marrero is a 58 y.o. male who presents with metastatic ascending colon cancer with liver and peritoneal involvement. He has an AMY pump and is s/p multiple lines of systemic treatment. He was started on FOLFIRI 12/26/23, though this is now on hold d/t disease progression (noted 2/19/24). Future treatment plans are currently pending. He follows with Dr. Russ. He previously followed with Keily Crawford CNP in Supportive Oncology.    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    Symptom Assessment:  Pain:somewhat - worsening abd pain  Numbness or Tingling in hands/feet/other: a little   Sore Muscles/Spasms: none  Headache: none  Dizziness:none  Constipation: none -   Diarrhea: none   Nausea: somewhat  Vomiting: somewhat -  Lack of Appetite: a little   Weight Loss: none  Taste changes: a little - metallic taste in mouth   Dry Mouth: none  Pain in Mouth/Swallowing: none  Lack of Energy: somewhat -  Difficulty Sleeping: somewhat  Worrying: none  Anxiety: none  Depression: none  Shortness of breath: none  Other: none      Information obtained from: chart review and interview of patient  ______________________________________________________________________     Oncology History   Malignant neoplasm of ascending colon (CMS/HCC)   9/29/2023 Initial Diagnosis    Malignant neoplasm of ascending colon (CMS/HCC)     10/4/2023 - 12/13/2023 Chemotherapy    Floxuridine Hepatic Arterial Infusion + [id number], 14 Day Cycles     12/26/2023 - 2/7/2024 Chemotherapy     AMY Glycerin + FOLFIRI (Fluorouracil Continuous Infusion / Leucovorin / Irinotecan), 14 Day Cycles     3/4/2024 -  Chemotherapy    Bevacizumab + Trifluridine and tipiracil, 28 Day Cycles         Past Medical History:   Diagnosis Date    Cancer (CMS/HCC)     Diabetes mellitus (CMS/HCC)     History of transfusion      Past Surgical History:   Procedure Laterality Date    COLON SURGERY      CT ABDOMEN ANGIOGRAM W AND/OR WO IV CONTRAST  04/13/2023    CT ABDOMEN ANGIOGRAM W AND/OR WO IV CONTRAST POR [id number] CT    FRACTURE SURGERY       No family history on file.     SOCIAL HISTORY  Children: 3 of his own, 1 with his current wife   Social History:  reports that he has quit smoking. His smoking use included cigarettes. He has never used smokeless tobacco. He reports that he does not drink alcohol and does not use drugs.    Tenriism and Importance of Tenriism:  Hoahaoism     REVIEW OF SYSTEMS  Review of systems negative unless noted in HPI.       Objective     Palliative Performance Scale % (PPS)       Current Outpatient Medications   Medication Instructions    acetaminophen (TYLENOL) 650 mg, oral, Every 6 hours PRN    acyclovir (Zovirax) 800 mg tablet Every 12 hours    alteplase (CATHFLO ACTIVASE) 2 mg, intra-catheter    bisacodyl (Dulcolax) 5 mg EC tablet oral    blood-glucose sensor (FreeStyle Alessia 3 Sensor) device Use to check blood sugars 4 times per day.  Change every 14 days.    chlorhexidine (Hibiclens) 4 % external liquid 15 mL, Every 12 hours    cholecalciferol (VITAMIN D-3) 1,000 Units, oral, Daily    dicyclomine (BENTYL) 10 mg, oral, 4 times daily, As need for abdominal cramping    DOCOSAHEXAENOIC ACID ORAL 1,000 mg, oral, Daily RT    enoxaparin (LOVENOX) 40 mg, subcutaneous    ergocalciferol (Vitamin D-2) 50 MCG (2000 UT) capsule capsule oral    glucagon (GLUCAGEN) 1 mg, intramuscular    HYDROcodone-acetaminophen (Norco) 5-325 mg tablet     insulin lispro (HumaLOG KwikPen Insulin) 100 unit/mL injection  "Subcutaneous for 90    Lantus Solostar U-100 Insulin 100 unit/mL (3 mL) pen     LORazepam (ATIVAN) 0.5 mg, oral, Every 8 hours PRN    metroNIDAZOLE (Flagyl) 250 mg tablet oral    Miralax 17 g, oral, Daily    morphine CR (MS CONTIN) 30 mg, oral, 2 times daily, Do not crush, chew, or split.    multivit 46-iron-folate 6-dha 29 mg iron-1 mg -300 mg capsule oral, Daily RT    multivitamin capsule 1 capsule, oral, Daily    nystatin (Mycostatin) 100,000 unit/mL suspension SWISH & SWALLOW 5 ML BY MOUTH 4 TIMES DAILY    OLANZapine (ZYPREXA) 10 mg, oral, Nightly    omega 3-dha-epa-fish oil (Fish OiL) 1,000 mg (120 mg-180 mg) capsule 1,000 mg    ondansetron (ZOFRAN) 8 mg, oral, Every 8 hours PRN    ondansetron (ZOFRAN) 8 mg, oral, Every 8 hours PRN    ondansetron (ZOFRAN) 8 mg, oral, Every 8 hours PRN    oxyCODONE-acetaminophen (Percocet)  mg tablet 1 tablet, oral, Every 6 hours PRN    pantoprazole (PROTONIX) 40 mg, oral, Daily before breakfast    pen needle, diabetic 31 gauge x 5/16\" needle USE FOUR TIMES A DAY    pravastatin (Pravachol) 20 mg tablet Every 24 hours    pregabalin (LYRICA) 150 mg, oral, 2 times daily    prochlorperazine (COMPAZINE) 10 mg, oral, Every 6 hours PRN    psyllium (Metamucil) 3.4 gram packet 1 packet, oral, Daily PRN    sennosides-docusate sodium (Leila-Colace) 8.6-50 mg tablet oral, Every 12 hours    simvastatin (ZOCOR) 40 mg, oral, Nightly    traMADol (Ultram) 50 mg tablet TAKE 1 TABLET BY MOUTH EVERY 4 TO 6 HOURS AS NEEDED FOR PAIN FOR 7 DAYS    trifluridine-tipiraciL (LONSURF) 35 mg/m2, oral, [facility] ONC BID for 10 Days in a 28 Day Cycle, Take on Days 1-5 and Days 8-12 of each 28 day treatment cycle. Take within one hour of completion of morning and evening meals.       Allergies: No Known Allergies  Lab on 03/04/2024   Component Date Value Ref Range Status    WBC 03/04/2024 13.4 (H)  4.4 - 11.3 x10*3/uL Final    nRBC 03/04/2024 0.0  0.0 - 0.0 /100 WBCs Final    RBC 03/04/2024 4.19 (L)  4.50 - 5.90 " x10*6/uL Final    Hemoglobin 03/04/2024 11.8 (L)  13.5 - 17.5 g/dL Final    Hematocrit 03/04/2024 38.0 (L)  41.0 - 52.0 % Final    MCV 03/04/2024 91  80 - 100 fL Final    MCH 03/04/2024 28.2  26.0 - 34.0 pg Final    MCHC 03/04/2024 31.1 (L)  32.0 - 36.0 g/dL Final    RDW 03/04/2024 14.6 (H)  11.5 - 14.5 % Final    Platelets 03/04/2024 267  150 - 450 x10*3/uL Final    Neutrophils % 03/04/2024 82.9  40.0 - 80.0 % Final    Immature Granulocytes %, Automated 03/04/2024 0.9  0.0 - 0.9 % Final    Lymphocytes % 03/04/2024 8.1  13.0 - 44.0 % Final    Monocytes % 03/04/2024 7.4  2.0 - 10.0 % Final    Eosinophils % 03/04/2024 0.5  0.0 - 6.0 % Final    Basophils % 03/04/2024 0.2  0.0 - 2.0 % Final    Neutrophils Absolute 03/04/2024 11.07 (H)  1.20 - 7.70 x10*3/uL Final    Immature Granulocytes Absolute, Au* 03/04/2024 0.12  0.00 - 0.70 x10*3/uL Final    Lymphocytes Absolute 03/04/2024 1.08 (L)  1.20 - 4.80 x10*3/uL Final    Monocytes Absolute 03/04/2024 0.99  0.10 - 1.00 x10*3/uL Final    Eosinophils Absolute 03/04/2024 0.07  0.00 - 0.70 x10*3/uL Final    Basophils Absolute 03/04/2024 0.03  0.00 - 0.10 x10*3/uL Final    Glucose 03/04/2024 100 (H)  74 - 99 mg/dL Final    Sodium 03/04/2024 141  136 - 145 mmol/L Final    Potassium 03/04/2024 3.6  3.5 - 5.3 mmol/L Final    Chloride 03/04/2024 99  98 - 107 mmol/L Final    Bicarbonate 03/04/2024 30  21 - 32 mmol/L Final    Anion Gap 03/04/2024 16  10 - 20 mmol/L Final    Urea Nitrogen 03/04/2024 11  6 - 23 mg/dL Final    Creatinine 03/04/2024 0.61  0.50 - 1.30 mg/dL Final    eGFR 03/04/2024 >90  >60 mL/min/1.73m*2 Final    Calcium 03/04/2024 8.8  8.6 - 10.3 mg/dL Final    Albumin 03/04/2024 3.4  3.4 - 5.0 g/dL Final    Alkaline Phosphatase 03/04/2024 267 (H)  33 - 120 U/L Final    Total Protein 03/04/2024 6.7  6.4 - 8.2 g/dL Final    AST 03/04/2024 19  9 - 39 U/L Final    Bilirubin, Total 03/04/2024 0.5  0.0 - 1.2 mg/dL Final    ALT 03/04/2024 7 (L)  10 - 52 U/L Final    Hepatitis B  Surface AG 03/04/2024 Nonreactive  Nonreactive Final    Hepatitis B Core AB- Total 03/04/2024 Nonreactive  Nonreactive Final    Hepatitis B Surface AB 03/04/2024 <3.1  <10.0 mIU/mL Final    Color, Urine 03/04/2024 Yellow  Light-Yellow, Yellow, Dark-Yellow Final    Appearance, Urine 03/04/2024 Clear  Clear Final    Specific Gravity, Urine 03/04/2024 1.039 (N)  1.005 - 1.035 Final    pH, Urine 03/04/2024 6.0  5.0, 5.5, 6.0, 6.5, 7.0, 7.5, 8.0 Final    Protein, Urine 03/04/2024 200 (2+) (A)  NEGATIVE, 10 (TRACE), 20 (TRACE) mg/dL Final    Glucose, Urine 03/04/2024 Normal  Normal mg/dL Final    Blood, Urine 03/04/2024 NEGATIVE  NEGATIVE Final    Ketones, Urine 03/04/2024 NEGATIVE  NEGATIVE mg/dL Final    Bilirubin, Urine 03/04/2024 0.5 (1+) (A)  NEGATIVE Final    Urobilinogen, Urine 03/04/2024 4 (2+) (A)  Normal mg/dL Final    Nitrite, Urine 03/04/2024 NEGATIVE  NEGATIVE Final    Leukocyte Esterase, Urine 03/04/2024 NEGATIVE  NEGATIVE Final    WBC, Urine 03/04/2024 NONE  1-5, NONE /HPF Final    RBC, Urine 03/04/2024 NONE  NONE, 1-2, 3-5 /HPF Final        PHYSICAL EXAMINATION: not performed, virtual visit  Vital Signs: not performed, virtual visit     ASSESSMENT/PLAN    Pain  Pain is: cancer related pain and chronic; ower abdomen, radiates around (cancer-related); L foot and R shoulder (chronic- work injury, shingles), + 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  Overdose Risk Score 600.   This has been discussed with patient.   - We will continue to closely monitor the patient for signs of prescription misuse including UDS, OARRS review and subjective reports at each visit.  - No concurrent benzodiazepine use   - I am a  provider who either is or has consulted and collaborated with a provider certified in Hospice and Palliative Medicine and have conducted a face-face visit and examination for this patient.  - Routine Urine Drug Screen completed 12/29/23 appropriately positive for opioids and negative for illicit substances  - Controlled Substance Agreement completed 12/29/23  - Specifically discussed that controlled substance prescriptions will only be provided by our group as outlined in the completed agreement  - Prescribed naloxone: patient declined  - Red Flags: NA    Constipation  Related to opiates  Miralax daily as needed  Senna 2 tabs 1-2x/day as needed- encouraged to take at least 2 tabs at bedtime while feeling constipated  Diarrhea  Related to chemotherapy  Continue Loperamide 2mg TID as needed - MAX 8 caps/day  Dose reduced chemo  Encouraged ample hydration with electrolytes    Nausea/GERD/Loss of Appetite  Following with dietician- Jv Barahona RD  Pantoprazole 40mg once daily  Ondansetron 8mg [id number] PRN  Up Lorazepam to 1mg q8h as needed- states this has been helpful  Prochlorperazine 10mg [id number] PRN- not currently taking  Olanzapine to 10mg daily in the evening with food  Previously tried Dronabinol 2.5mg TID  Dexamethasone with treatment  Encouraged protein supplements  Encouraged small more frequent meals  Encouraged good hydration    Weakness/Fatigue- states this has improved with increased appetite  Declined PT referral  Steroids with treatment not effective - also with hyperglycemia induced by steroids ( limit therapy)  Encouraged daily exercise (can be gentle) in the morning to help manage energy levels throughout the day    Sleeping Difficulty:  Related to pain  Monitor with improvement in pain    Introduction to Supportive and Palliative Oncology:  Spoke with patient and his wife   Introduced the role and philosophy of Supportive and Palliative oncology in the evaluation and management of symptoms during  cancer treatment  Palliative care was introduced as a service for patients with serious illness to help with symptoms, assist with goals of care conversations, navigate complex decision making, improve quality of life for patients, and provide support both patients and families.  Patient seemed to appreciate the extra layer of support.    Advance Directives  Existence of Advance Directives:Yes, but NOT documented in medical record  Decision maker: HCPOA is Emperatriz Marrero (wife) [phone]  Code Status: Full code  Goals of Care: discussed pt's increase in symptom burden and recent CT in the ED. Pt agrees that he seems to be feeling worse with suspected progression of his disease. Plan for pt to monitor his symptoms at home prior to 4/1 appts with Oncology and Supportive Oncology to discuss further.    Next Follow-Up Visit:  Return to clinic in 3 weeks to align with oncology    Signature and billing  Thank you for allowing us to participate in the care of this patient. Recommendations will be communicated back to the consulting service by way of shared electronic medical record or face-to-face.    Medical complexity was high level due to due to complexity of problems, extensive data review, and high risk of management/treatment.  Time was spent on the following: Prep Time, Time Directly with Patient/Family/Caregiver, Documentation Time. Total time spent: 40min    Plan of Care discussed with: Patient and Family/Significant Other: wife      SIGNATURE: TORRI Bonds    Contact information:  Supportive and Palliative Oncology  Monday-Friday 8 AM-5 PM  Phone:  [phone], press option #5, then option #1.   Or Epic Secure Chat

## 2024-03-13 ENCOUNTER — NURSE TRIAGE (OUTPATIENT)
Dept: ADMISSION | Facility: HOSPITAL | Age: 59
End: 2024-03-13
Payer: MEDICARE

## 2024-03-13 ENCOUNTER — HOSPITAL ENCOUNTER (EMERGENCY)
Facility: HOSPITAL | Age: 59
Discharge: OTHER INSTITUTION | End: 2024-03-15
Attending: EMERGENCY MEDICINE
Payer: MEDICARE

## 2024-03-13 ENCOUNTER — APPOINTMENT (OUTPATIENT)
Dept: RADIOLOGY | Facility: HOSPITAL | Age: 59
End: 2024-03-13
Payer: MEDICARE

## 2024-03-13 DIAGNOSIS — R00.0 TACHYCARDIA: ICD-10-CM

## 2024-03-13 DIAGNOSIS — K56.600 PARTIAL SMALL BOWEL OBSTRUCTION (MULTI): Primary | ICD-10-CM

## 2024-03-13 DIAGNOSIS — R18.0 MALIGNANT ASCITES (CMS-HCC): ICD-10-CM

## 2024-03-13 DIAGNOSIS — I10 HYPERTENSION, UNSPECIFIED TYPE: ICD-10-CM

## 2024-03-13 LAB
ALBUMIN SERPL BCP-MCNC: 3.4 G/DL (ref 3.4–5)
ALP SERPL-CCNC: 354 U/L (ref 33–120)
ALT SERPL W P-5'-P-CCNC: 7 U/L (ref 10–52)
AMMONIA PLAS-SCNC: 28 UMOL/L (ref 16–53)
ANION GAP BLDV CALCULATED.4IONS-SCNC: 7 MMOL/L (ref 10–25)
ANION GAP SERPL CALC-SCNC: 14 MMOL/L (ref 10–20)
APPEARANCE UR: CLEAR
AST SERPL W P-5'-P-CCNC: 22 U/L (ref 9–39)
BASE EXCESS BLDV CALC-SCNC: 6.2 MMOL/L (ref -2–3)
BASOPHILS # BLD AUTO: 0.02 X10*3/UL (ref 0–0.1)
BASOPHILS NFR BLD AUTO: 0.2 %
BILIRUB SERPL-MCNC: 1.1 MG/DL (ref 0–1.2)
BILIRUB UR STRIP.AUTO-MCNC: ABNORMAL MG/DL
BODY TEMPERATURE: 37 DEGREES CELSIUS
BUN SERPL-MCNC: 11 MG/DL (ref 6–23)
CA-I BLDV-SCNC: 1.12 MMOL/L (ref 1.1–1.33)
CALCIUM SERPL-MCNC: 8.8 MG/DL (ref 8.6–10.3)
CHLORIDE BLDV-SCNC: 99 MMOL/L (ref 98–107)
CHLORIDE SERPL-SCNC: 97 MMOL/L (ref 98–107)
CO2 SERPL-SCNC: 29 MMOL/L (ref 21–32)
COLOR UR: ABNORMAL
CREAT SERPL-MCNC: 0.59 MG/DL (ref 0.5–1.3)
EGFRCR SERPLBLD CKD-EPI 2021: >90 ML/MIN/1.73M*2
EOSINOPHIL # BLD AUTO: 0.03 X10*3/UL (ref 0–0.7)
EOSINOPHIL NFR BLD AUTO: 0.3 %
ERYTHROCYTE [DISTWIDTH] IN BLOOD BY AUTOMATED COUNT: 14.1 % (ref 11.5–14.5)
GLUCOSE BLDV-MCNC: 144 MG/DL (ref 74–99)
GLUCOSE SERPL-MCNC: 140 MG/DL (ref 74–99)
GLUCOSE UR STRIP.AUTO-MCNC: NEGATIVE MG/DL
HCO3 BLDV-SCNC: 31.2 MMOL/L (ref 22–26)
HCT VFR BLD AUTO: 35.3 % (ref 41–52)
HCT VFR BLD EST: 35 % (ref 41–52)
HGB BLD-MCNC: 11.2 G/DL (ref 13.5–17.5)
HGB BLDV-MCNC: 11.5 G/DL (ref 13.5–17.5)
HYALINE CASTS #/AREA URNS AUTO: ABNORMAL /LPF
IMM GRANULOCYTES # BLD AUTO: 0.05 X10*3/UL (ref 0–0.7)
IMM GRANULOCYTES NFR BLD AUTO: 0.5 % (ref 0–0.9)
INHALED O2 CONCENTRATION: 28 %
KETONES UR STRIP.AUTO-MCNC: ABNORMAL MG/DL
LACTATE BLDV-SCNC: 1.3 MMOL/L (ref 0.4–2)
LACTATE SERPL-SCNC: 0.9 MMOL/L (ref 0.4–2)
LEUKOCYTE ESTERASE UR QL STRIP.AUTO: NEGATIVE
LYMPHOCYTES # BLD AUTO: 0.8 X10*3/UL (ref 1.2–4.8)
LYMPHOCYTES NFR BLD AUTO: 7.7 %
MCH RBC QN AUTO: 28.1 PG (ref 26–34)
MCHC RBC AUTO-ENTMCNC: 31.7 G/DL (ref 32–36)
MCV RBC AUTO: 89 FL (ref 80–100)
MONOCYTES # BLD AUTO: 0.42 X10*3/UL (ref 0.1–1)
MONOCYTES NFR BLD AUTO: 4 %
MUCOUS THREADS #/AREA URNS AUTO: ABNORMAL /LPF
NEUTROPHILS # BLD AUTO: 9.07 X10*3/UL (ref 1.2–7.7)
NEUTROPHILS NFR BLD AUTO: 87.3 %
NITRITE UR QL STRIP.AUTO: NEGATIVE
NRBC BLD-RTO: 0 /100 WBCS (ref 0–0)
OXYHGB MFR BLDV: 70.9 % (ref 45–75)
PCO2 BLDV: 46 MM HG (ref 41–51)
PH BLDV: 7.44 PH (ref 7.33–7.43)
PH UR STRIP.AUTO: 5 [PH]
PLATELET # BLD AUTO: 216 X10*3/UL (ref 150–450)
PO2 BLDV: 45 MM HG (ref 35–45)
POTASSIUM BLDV-SCNC: 3.7 MMOL/L (ref 3.5–5.3)
POTASSIUM SERPL-SCNC: 3.7 MMOL/L (ref 3.5–5.3)
PROT SERPL-MCNC: 7 G/DL (ref 6.4–8.2)
PROT UR STRIP.AUTO-MCNC: ABNORMAL MG/DL
RBC # BLD AUTO: 3.98 X10*6/UL (ref 4.5–5.9)
RBC # UR STRIP.AUTO: NEGATIVE /UL
RBC #/AREA URNS AUTO: ABNORMAL /HPF
SAO2 % BLDV: 73 % (ref 45–75)
SCAN RESULT: NORMAL
SODIUM BLDV-SCNC: 133 MMOL/L (ref 136–145)
SODIUM SERPL-SCNC: 136 MMOL/L (ref 136–145)
SP GR UR STRIP.AUTO: 1.04
SQUAMOUS #/AREA URNS AUTO: ABNORMAL /HPF
UROBILINOGEN UR STRIP.AUTO-MCNC: 4 MG/DL
WBC # BLD AUTO: 10.4 X10*3/UL (ref 4.4–11.3)
WBC #/AREA URNS AUTO: ABNORMAL /HPF

## 2024-03-13 PROCEDURE — 74177 CT ABD & PELVIS W/CONTRAST: CPT

## 2024-03-13 PROCEDURE — 71045 X-RAY EXAM CHEST 1 VIEW: CPT | Performed by: RADIOLOGY

## 2024-03-13 PROCEDURE — 85025 COMPLETE CBC W/AUTO DIFF WBC: CPT | Performed by: EMERGENCY MEDICINE

## 2024-03-13 PROCEDURE — 2500000002 HC RX 250 W HCPCS SELF ADMINISTERED DRUGS (ALT 637 FOR MEDICARE OP, ALT 636 FOR OP/ED): Performed by: EMERGENCY MEDICINE

## 2024-03-13 PROCEDURE — 2550000001 HC RX 255 CONTRASTS: Performed by: EMERGENCY MEDICINE

## 2024-03-13 PROCEDURE — 82140 ASSAY OF AMMONIA: CPT | Performed by: EMERGENCY MEDICINE

## 2024-03-13 PROCEDURE — 36415 COLL VENOUS BLD VENIPUNCTURE: CPT | Performed by: EMERGENCY MEDICINE

## 2024-03-13 PROCEDURE — 84132 ASSAY OF SERUM POTASSIUM: CPT | Performed by: EMERGENCY MEDICINE

## 2024-03-13 PROCEDURE — 70450 CT HEAD/BRAIN W/O DYE: CPT

## 2024-03-13 PROCEDURE — 70450 CT HEAD/BRAIN W/O DYE: CPT | Performed by: RADIOLOGY

## 2024-03-13 PROCEDURE — 81001 URINALYSIS AUTO W/SCOPE: CPT | Performed by: EMERGENCY MEDICINE

## 2024-03-13 PROCEDURE — 2500000004 HC RX 250 GENERAL PHARMACY W/ HCPCS (ALT 636 FOR OP/ED): Performed by: EMERGENCY MEDICINE

## 2024-03-13 PROCEDURE — 74177 CT ABD & PELVIS W/CONTRAST: CPT | Performed by: RADIOLOGY

## 2024-03-13 PROCEDURE — 99285 EMERGENCY DEPT VISIT HI MDM: CPT | Mod: 25

## 2024-03-13 PROCEDURE — 96361 HYDRATE IV INFUSION ADD-ON: CPT

## 2024-03-13 PROCEDURE — 87040 BLOOD CULTURE FOR BACTERIA: CPT | Mod: PORLAB | Performed by: EMERGENCY MEDICINE

## 2024-03-13 PROCEDURE — 83605 ASSAY OF LACTIC ACID: CPT | Performed by: EMERGENCY MEDICINE

## 2024-03-13 PROCEDURE — 71045 X-RAY EXAM CHEST 1 VIEW: CPT

## 2024-03-13 PROCEDURE — 2500000001 HC RX 250 WO HCPCS SELF ADMINISTERED DRUGS (ALT 637 FOR MEDICARE OP): Performed by: EMERGENCY MEDICINE

## 2024-03-13 RX ORDER — OLANZAPINE 5 MG/1
5 TABLET ORAL NIGHTLY
Status: DISCONTINUED | OUTPATIENT
Start: 2024-03-13 | End: 2024-03-15 | Stop reason: HOSPADM

## 2024-03-13 RX ORDER — LORAZEPAM 0.5 MG/1
0.5 TABLET ORAL EVERY 8 HOURS PRN
Status: DISCONTINUED | OUTPATIENT
Start: 2024-03-13 | End: 2024-03-15 | Stop reason: HOSPADM

## 2024-03-13 RX ORDER — INSULIN LISPRO 100 [IU]/ML
0-5 INJECTION, SOLUTION INTRAVENOUS; SUBCUTANEOUS
Status: DISCONTINUED | OUTPATIENT
Start: 2024-03-14 | End: 2024-03-15 | Stop reason: HOSPADM

## 2024-03-13 RX ORDER — OXYCODONE AND ACETAMINOPHEN 5; 325 MG/1; MG/1
2 TABLET ORAL EVERY 6 HOURS PRN
Status: DISCONTINUED | OUTPATIENT
Start: 2024-03-13 | End: 2024-03-15 | Stop reason: HOSPADM

## 2024-03-13 RX ORDER — DEXTROSE 50 % IN WATER (D50W) INTRAVENOUS SYRINGE
12.5
Status: DISCONTINUED | OUTPATIENT
Start: 2024-03-13 | End: 2024-03-15 | Stop reason: HOSPADM

## 2024-03-13 RX ORDER — ONDANSETRON HYDROCHLORIDE 2 MG/ML
4 INJECTION, SOLUTION INTRAVENOUS EVERY 6 HOURS PRN
Status: DISCONTINUED | OUTPATIENT
Start: 2024-03-13 | End: 2024-03-15 | Stop reason: HOSPADM

## 2024-03-13 RX ORDER — PANTOPRAZOLE SODIUM 40 MG/10ML
40 INJECTION, POWDER, LYOPHILIZED, FOR SOLUTION INTRAVENOUS DAILY
Status: DISCONTINUED | OUTPATIENT
Start: 2024-03-14 | End: 2024-03-15 | Stop reason: HOSPADM

## 2024-03-13 RX ORDER — DEXTROSE 50 % IN WATER (D50W) INTRAVENOUS SYRINGE
25
Status: DISCONTINUED | OUTPATIENT
Start: 2024-03-13 | End: 2024-03-15 | Stop reason: HOSPADM

## 2024-03-13 RX ORDER — SIMVASTATIN 20 MG/1
40 TABLET, FILM COATED ORAL NIGHTLY
Status: DISCONTINUED | OUTPATIENT
Start: 2024-03-13 | End: 2024-03-15 | Stop reason: HOSPADM

## 2024-03-13 RX ORDER — INSULIN GLARGINE 100 [IU]/ML
15 INJECTION, SOLUTION SUBCUTANEOUS NIGHTLY
Status: DISCONTINUED | OUTPATIENT
Start: 2024-03-13 | End: 2024-03-15 | Stop reason: HOSPADM

## 2024-03-13 RX ORDER — MORPHINE SULFATE 15 MG/1
15 TABLET, FILM COATED, EXTENDED RELEASE ORAL EVERY 12 HOURS SCHEDULED
Status: DISCONTINUED | OUTPATIENT
Start: 2024-03-13 | End: 2024-03-15 | Stop reason: HOSPADM

## 2024-03-13 RX ADMIN — IOHEXOL 75 ML: 350 INJECTION, SOLUTION INTRAVENOUS at 18:39

## 2024-03-13 RX ADMIN — MORPHINE SULFATE 15 MG: 15 TABLET, EXTENDED RELEASE ORAL at 23:44

## 2024-03-13 RX ADMIN — SODIUM CHLORIDE 1000 ML: 9 INJECTION, SOLUTION INTRAVENOUS at 17:14

## 2024-03-13 RX ADMIN — INSULIN GLARGINE 15 UNITS: 100 INJECTION, SOLUTION SUBCUTANEOUS at 23:48

## 2024-03-13 RX ADMIN — OXYCODONE HYDROCHLORIDE AND ACETAMINOPHEN 2 TABLET: 5; 325 TABLET ORAL at 23:45

## 2024-03-13 RX ADMIN — SIMVASTATIN 40 MG: 20 TABLET, FILM COATED ORAL at 23:46

## 2024-03-13 RX ADMIN — PREGABALIN 100 MG: 75 CAPSULE ORAL at 23:45

## 2024-03-13 RX ADMIN — OLANZAPINE 5 MG: 5 TABLET, FILM COATED ORAL at 23:44

## 2024-03-13 ASSESSMENT — LIFESTYLE VARIABLES
EVER FELT BAD OR GUILTY ABOUT YOUR DRINKING: NO
HAVE PEOPLE ANNOYED YOU BY CRITICIZING YOUR DRINKING: NO
EVER HAD A DRINK FIRST THING IN THE MORNING TO STEADY YOUR NERVES TO GET RID OF A HANGOVER: NO
HAVE YOU EVER FELT YOU SHOULD CUT DOWN ON YOUR DRINKING: NO

## 2024-03-13 ASSESSMENT — PAIN - FUNCTIONAL ASSESSMENT: PAIN_FUNCTIONAL_ASSESSMENT: 0-10

## 2024-03-13 ASSESSMENT — PAIN SCALES - GENERAL: PAINLEVEL_OUTOF10: 4

## 2024-03-13 NOTE — TELEPHONE ENCOUNTER
Per recommendations Dr. Russ pt needs to be evaluated in ED  Wife notified, in agreement with plan.   Will go to [facility].   Disposition note sent to [place] [facility]

## 2024-03-13 NOTE — ED TRIAGE NOTES
"Pt to ED with new onset confusion since Monday night. Pt reports he feels \"lost and wakes up disoriented\". Pt current oral and IV chemo pt for colon, liver and stomach cancer. Pt reports feeling warm and clammy but has not had a recorded temp. Pt reports SOB with exertion and increased weakness. Pt disoriented to what day it is, what month it is but able to state that [facility] day is coming up and franklyn is the president. Wife reports that pt called her today and did not know where he was, called his cancer dr and sent him in to be seen.   "

## 2024-03-13 NOTE — ED PROVIDER NOTES
HPI   Chief Complaint   Patient presents with    Altered Mental Status     Chemo pt       Patient presents to the emergency department secondary to weakness and confusion.  Patient symptoms have been going on for the last few days.  He feels mildly confused.  He has had decreased oral intake.  He has metastatic colon cancer.  About little over 2 weeks ago patient's chemo regimen was changed.  They stopped using his intra-abdominal pump for chemo.  He was placed on an IV infusion.  He then started 2 weeks of oral medication.  He will have finished those 2 weeks and 2 days.  Over the last few days wife has noted that he has had decreased oral intake.  He seems confused at times.  He is generally weak.  He was seen recently at [facility] after falling and landing on his abdomen.  They evaluated his pump and were able to send him home.  He denies pain.  He does take chronic pain medications.  He has had difficulty with bowel movements recently.  He has been taking MiraLAX.  He did not have a bowel movement today but states he had a bowel movement yesterday.  He states that he is drinking fluids and urinating well.  No vomiting.  No other complaints at this time.                          Remy Coma Scale Score: 14                  Patient History   Past Medical History:   Diagnosis Date    Cancer (CMS/HCC)     Diabetes mellitus (CMS/HCC)     History of transfusion      Past Surgical History:   Procedure Laterality Date    COLON SURGERY      CT ABDOMEN ANGIOGRAM W AND/OR WO IV CONTRAST  04/13/2023    CT ABDOMEN ANGIOGRAM W AND/OR WO IV CONTRAST POR [id number] CT    FRACTURE SURGERY       No family history on file.  Social History     Tobacco Use    Smoking status: Former     Types: Cigarettes    Smokeless tobacco: Never   Vaping Use    Vaping Use: Never used   Substance Use Topics    Alcohol use: Never    Drug use: Never       Physical Exam   ED Triage Vitals [03/13/24 1557]   Temperature Heart Rate Respirations BP   36.6  °C (97.8 °F) (!) 122 20 (!) 152/96      Pulse Ox Temp Source Heart Rate Source Patient Position   (!) 91 % Temporal -- --      BP Location FiO2 (%)     -- --       Physical Exam  Vitals and nursing note reviewed.   Constitutional:       Appearance: Normal appearance. He is ill-appearing.      Comments: Patient is chronically ill-appearing.  Abdomen is distended.  He is in no acute distress.   HENT:      Head: Normocephalic.      Right Ear: Tympanic membrane normal.      Left Ear: Tympanic membrane normal.      Nose: Nose normal.      Mouth/Throat:      Mouth: Mucous membranes are moist.   Eyes:      Extraocular Movements: Extraocular movements intact.      Pupils: Pupils are equal, round, and reactive to light.   Cardiovascular:      Rate and Rhythm: Regular rhythm. Tachycardia present.      Pulses: Normal pulses.      Heart sounds: Normal heart sounds.   Pulmonary:      Effort: Pulmonary effort is normal.      Breath sounds: Normal breath sounds.   Abdominal:      General: Abdomen is flat. There is distension.      Tenderness: There is no guarding.      Comments: Hyperactive bowel sound.  Patient denies tenderness on palpation.  Abdomen is distended.  There is a hard knot at the top of his healed incision site.  This is chronic for him.  He does have some mild bruising over his pump site in his left lower abdomen.  This has been present since his fall.   Musculoskeletal:         General: Normal range of motion.      Cervical back: No rigidity.   Skin:     General: Skin is warm and dry.      Capillary Refill: Capillary refill takes less than 2 seconds.   Neurological:      General: No focal deficit present.      Mental Status: He is alert and oriented to person, place, and time.   Psychiatric:         Mood and Affect: Mood normal.         Behavior: Behavior normal.       Labs Reviewed   CBC WITH AUTO DIFFERENTIAL - Abnormal       Result Value    WBC 10.4      nRBC 0.0      RBC 3.98 (*)     Hemoglobin 11.2 (*)      Hematocrit 35.3 (*)     MCV 89      MCH 28.1      MCHC 31.7 (*)     RDW 14.1      Platelets 216      Neutrophils % 87.3      Immature Granulocytes %, Automated 0.5      Lymphocytes % 7.7      Monocytes % 4.0      Eosinophils % 0.3      Basophils % 0.2      Neutrophils Absolute 9.07 (*)     Immature Granulocytes Absolute, Automated 0.05      Lymphocytes Absolute 0.80 (*)     Monocytes Absolute 0.42      Eosinophils Absolute 0.03      Basophils Absolute 0.02     COMPREHENSIVE METABOLIC PANEL - Abnormal    Glucose 140 (*)     Sodium 136      Potassium 3.7      Chloride 97 (*)     Bicarbonate 29      Anion Gap 14      Urea Nitrogen 11      Creatinine 0.59      eGFR >90      Calcium 8.8      Albumin 3.4      Alkaline Phosphatase 354 (*)     Total Protein 7.0      AST 22      Bilirubin, Total 1.1      ALT 7 (*)    BLOOD GAS VENOUS FULL PANEL - Abnormal    POCT pH, Venous 7.44 (*)     POCT pCO2, Venous 46      POCT pO2, Venous 45      POCT SO2, Venous 73      POCT Oxy Hemoglobin, Venous 70.9      POCT Hematocrit Calculated, Venous 35.0 (*)     POCT Sodium, Venous 133 (*)     POCT Potassium, Venous 3.7      POCT Chloride, Venous 99      POCT Ionized Calicum, Venous 1.12      POCT Glucose, Venous 144 (*)     POCT Lactate, Venous 1.3      POCT Base Excess, Venous 6.2 (*)     POCT HCO3 Calculated, Venous 31.2 (*)     POCT Hemoglobin, Venous 11.5 (*)     POCT Anion Gap, Venous 7.0 (*)     Patient Temperature 37.0      FiO2 28     URINALYSIS WITH REFLEX CULTURE AND MICROSCOPIC - Abnormal    Color, Urine Lucy (*)     Appearance, Urine Clear      Specific Gravity, Urine 1.039 (*)     pH, Urine 5.0      Protein, Urine 100 (2+) (*)     Glucose, Urine NEGATIVE      Blood, Urine NEGATIVE      Ketones, Urine 20 (1+) (*)     Bilirubin, Urine SMALL (1+) (*)     Urobilinogen, Urine 4.0 (*)     Nitrite, Urine NEGATIVE      Leukocyte Esterase, Urine NEGATIVE     URINALYSIS MICROSCOPIC WITH REFLEX CULTURE - Abnormal    WBC, Urine 1-5       RBC, Urine 1-2      Squamous Epithelial Cells, Urine 1-9 (SPARSE)      Mucus, Urine 4+      Hyaline Casts, Urine 2+ (*)    LACTATE - Normal    Lactate 0.9      Narrative:     Venipuncture immediately after or during the administration of Metamizole may lead to falsely low results. Testing should be performed immediately  prior to Metamizole dosing.   AMMONIA - Normal    Ammonia 28     BLOOD CULTURE   BLOOD CULTURE   URINALYSIS WITH REFLEX CULTURE AND MICROSCOPIC    Narrative:     The following orders were created for panel order Urinalysis with Reflex Culture and Microscopic.  Procedure                               Abnormality         Status                     ---------                               -----------         ------                     Urinalysis with Reflex C...[[id number]]  Abnormal            Final result               Extra Urine Gray Tube[[id number]]                            In process                   Please view results for these tests on the individual orders.   EXTRA URINE GRAY TUBE     Pain Management Panel          Latest Ref Rng & Units 12/29/2023   Pain Management Panel   Amphetamine Screen, Urine Presumptive Negative Presumptive Negative    Barbiturate Screen, Urine Presumptive Negative Presumptive Negative    Benzodiazepines Screen, Urine Presumptive Negative Presumptive Negative    Codeine IgE <50 ng/mL <50    Fentanyl Screen, Urine Presumptive Negative Presumptive Negative    Hydromorphone Urine <25 ng/mL <25    Morphine  <50 ng/mL <50      CT head wo IV contrast   Final Result   No acute intracranial abnormality.             MACRO:   None        Signed by: Sonia Avilez 3/13/2024 7:13 PM   Dictation workstation:   [id number]      CT abdomen pelvis w IV contrast   Final Result   Peritoneal metastatic disease again noted, with likely serosal   involvement in multiple areas of the small bowel and rectum,   resulting in several areas of alternating borderline bowel dilatation   and  narrowing. This is suggestive of multiple areas of partial   obstruction involving the small bowel and rectum.        Stable moderate to large volume ascites.        Stable hepatic, splenic and pulmonary metastatic disease. This   includes thrombus within the splenic vein, adjacent to the splenic   hilar mass, suggestive of tumor thrombus.        Moderate sized left pleural effusion has increased. Small right   pleural effusion is stable to slightly decreased.        MACRO:   None        Signed by: Sonia Avilez 3/13/2024 7:37 PM   Dictation workstation:   [id number]      XR chest 1 view   Final Result   Cardiomegaly with perihilar and basilar interstitial edema plus a   left lower lobe airspace opacity and effusion.        This has worsened from recent CT abdomen.        Signed by: Elias Aguilar 3/13/2024 6:08 PM   Dictation workstation:   [id number]      Consult to Interventional Radiology    (Results Pending)     ED Course & MDM   Diagnoses as of 03/13/24 2320   Partial small bowel obstruction (CMS/HCC)   Malignant ascites   Tachycardia   Hypertension, unspecified type       Medical Decision Making  Patient presents secondary to confusion and weakness.  Differential diagnosis for this patient is chemotherapy side effect, electrolyte abnormality, dehydration, elevated ammonia or other acute cause.  Patient is evaluated in the emergency department CT head chest x-ray and CT abdomen pelvis with IV contrast.  Laboratory workup is performed.  Patient is given IV fluids.  Patient presents secondary to weakness constipation and intermittent confusion.  Patient is evaluated in the emergency department with CT of the abdomen pelvis.  CT scan does show evidence of peritoneal metastatic disease.  There is several areas of alternating borderline bowel dilatation and narrowing.  This is suggestive of multiple areas of partial obstruction involving the small bowel and rectum.  There is large volume ascites.  There is  increased size moderate left-sided pleural effusion.  Patient was mildly hypoxic down to 87%.  He was placed on nasal cannula oxygen.  Urinalysis is negative for infection.  Chemistry shows a glucose of 149 chloride of 97.  Alk phos is elevated at 354.  White count is normal at 10.4.  Lactate is normal.  Patient is currently hypertensive.  He does not take blood pressure medication.  At this time he would benefit from hospitalization for monitoring of partial small bowel obstruction and likely drainage of the pleural effusion.  Patient receives the majority of his care at [facility].  Plan is for transfer.  Transfer order is placed.  Patient was discussed with oncology.  At this time he is pending transfer.  They are unsure when a bed will open up.  His home meds were ordered and patient will be signed out to the overnight physician pending transfer.        Procedure  Procedures     Laura Waite MD  03/13/24 4136       Laura Waite MD  03/13/24 8062

## 2024-03-13 NOTE — TELEPHONE ENCOUNTER
Pt's wife reporting that pt has had increased confusion (unaware of date, time, surroundings) and lethargy x 2 days.   Pt has been unable to get out of bed for approx 2 weeks- can get out of bed to use bathroom. Is still continent of bowel and bladder.   Pt is wall walking to move about the house, bent over. Pt had a fall 3 weeks ago.   Pt appetite very poor- little food/fluid intake, Urine is dark yellow/reddish in color.   Wife is aware that cancer has progressed but unsure how to proceed at this time.   Pt has infusion on 3/18 and FUV on 4/1

## 2024-03-14 ENCOUNTER — APPOINTMENT (OUTPATIENT)
Dept: RADIOLOGY | Facility: HOSPITAL | Age: 59
End: 2024-03-14
Payer: MEDICARE

## 2024-03-14 LAB
GLUCOSE BLD MANUAL STRIP-MCNC: 126 MG/DL (ref 74–99)
GLUCOSE BLD MANUAL STRIP-MCNC: 129 MG/DL (ref 74–99)
GLUCOSE BLD MANUAL STRIP-MCNC: 95 MG/DL (ref 74–99)
HOLD SPECIMEN: NORMAL

## 2024-03-14 PROCEDURE — 82947 ASSAY GLUCOSE BLOOD QUANT: CPT

## 2024-03-14 PROCEDURE — 96374 THER/PROPH/DIAG INJ IV PUSH: CPT

## 2024-03-14 PROCEDURE — C9113 INJ PANTOPRAZOLE SODIUM, VIA: HCPCS | Performed by: EMERGENCY MEDICINE

## 2024-03-14 PROCEDURE — 2500000001 HC RX 250 WO HCPCS SELF ADMINISTERED DRUGS (ALT 637 FOR MEDICARE OP): Performed by: EMERGENCY MEDICINE

## 2024-03-14 PROCEDURE — 96375 TX/PRO/DX INJ NEW DRUG ADDON: CPT

## 2024-03-14 PROCEDURE — 2500000002 HC RX 250 W HCPCS SELF ADMINISTERED DRUGS (ALT 637 FOR MEDICARE OP, ALT 636 FOR OP/ED): Performed by: EMERGENCY MEDICINE

## 2024-03-14 PROCEDURE — 2500000004 HC RX 250 GENERAL PHARMACY W/ HCPCS (ALT 636 FOR OP/ED): Performed by: EMERGENCY MEDICINE

## 2024-03-14 RX ADMIN — OLANZAPINE 5 MG: 5 TABLET, FILM COATED ORAL at 21:22

## 2024-03-14 RX ADMIN — LORAZEPAM 0.5 MG: 0.5 TABLET ORAL at 12:58

## 2024-03-14 RX ADMIN — MORPHINE SULFATE 15 MG: 15 TABLET, EXTENDED RELEASE ORAL at 21:51

## 2024-03-14 RX ADMIN — PREGABALIN 100 MG: 75 CAPSULE ORAL at 21:23

## 2024-03-14 RX ADMIN — SIMVASTATIN 40 MG: 20 TABLET, FILM COATED ORAL at 21:22

## 2024-03-14 RX ADMIN — PREGABALIN 100 MG: 75 CAPSULE ORAL at 09:14

## 2024-03-14 RX ADMIN — MORPHINE SULFATE 15 MG: 15 TABLET, EXTENDED RELEASE ORAL at 09:32

## 2024-03-14 RX ADMIN — ONDANSETRON 4 MG: 2 INJECTION INTRAMUSCULAR; INTRAVENOUS at 17:36

## 2024-03-14 RX ADMIN — PANTOPRAZOLE SODIUM 40 MG: 40 INJECTION, POWDER, FOR SOLUTION INTRAVENOUS at 09:14

## 2024-03-14 RX ADMIN — OXYCODONE HYDROCHLORIDE AND ACETAMINOPHEN 2 TABLET: 5; 325 TABLET ORAL at 17:22

## 2024-03-14 ASSESSMENT — PAIN - FUNCTIONAL ASSESSMENT
PAIN_FUNCTIONAL_ASSESSMENT: 0-10

## 2024-03-14 ASSESSMENT — PAIN SCALES - GENERAL
PAINLEVEL_OUTOF10: 3
PAINLEVEL_OUTOF10: 2
PAINLEVEL_OUTOF10: 3
PAINLEVEL_OUTOF10: 0 - NO PAIN
PAINLEVEL_OUTOF10: 5 - MODERATE PAIN
PAINLEVEL_OUTOF10: 6

## 2024-03-14 ASSESSMENT — PAIN DESCRIPTION - LOCATION: LOCATION: ABDOMEN

## 2024-03-14 NOTE — PROGRESS NOTES
Emergency Medicine Transition of Care Note.    I received Narciso Marrero in signout from Dr. Siegel.  Please see the previous ED provider note for all HPI, PE and MDM up to the time of signout at 1600. This is in addition to the primary record.    In brief Narciso Marrero is an 58 y.o. male presenting for   Chief Complaint   Patient presents with    Altered Mental Status     Chemo pt     At the time of signout we were awaiting: Bed assignment at [facility].    Diagnoses as of 03/14/24 1644   Partial small bowel obstruction (CMS/HCC)   Malignant ascites   Tachycardia   Hypertension, unspecified type       Medical Decision Making    Patient remained stable throughout his observation time in the emergency department.  He is still pending bed assignment.  He was signed out to the oncoming physician for continued observation.  Final diagnoses:   [K56.600] Partial small bowel obstruction (CMS/HCC)   [R18.0] Malignant ascites   [R00.0] Tachycardia   [I10] Hypertension, unspecified type           Procedure  Procedures    Laura Waite MD

## 2024-03-15 ENCOUNTER — HOSPITAL ENCOUNTER (INPATIENT)
Facility: HOSPITAL | Age: 59
LOS: 6 days | Discharge: HOME | DRG: 375 | End: 2024-03-21
Attending: STUDENT IN AN ORGANIZED HEALTH CARE EDUCATION/TRAINING PROGRAM | Admitting: INTERNAL MEDICINE
Payer: MEDICARE

## 2024-03-15 ENCOUNTER — APPOINTMENT (OUTPATIENT)
Dept: CARDIOLOGY | Facility: HOSPITAL | Age: 59
End: 2024-03-15
Payer: MEDICARE

## 2024-03-15 VITALS
WEIGHT: 205 LBS | TEMPERATURE: 98.3 F | HEART RATE: 105 BPM | HEIGHT: 72 IN | BODY MASS INDEX: 27.77 KG/M2 | DIASTOLIC BLOOD PRESSURE: 100 MMHG | OXYGEN SATURATION: 99 % | SYSTOLIC BLOOD PRESSURE: 180 MMHG | RESPIRATION RATE: 16 BRPM

## 2024-03-15 DIAGNOSIS — I10 PRIMARY HYPERTENSION: ICD-10-CM

## 2024-03-15 DIAGNOSIS — E11.65 TYPE 2 DIABETES MELLITUS WITH HYPERGLYCEMIA, WITH LONG-TERM CURRENT USE OF INSULIN (MULTI): ICD-10-CM

## 2024-03-15 DIAGNOSIS — C78.7 METASTATIC CARCINOMA TO LIVER (MULTI): ICD-10-CM

## 2024-03-15 DIAGNOSIS — K56.609 SMALL BOWEL OBSTRUCTION (MULTI): Primary | ICD-10-CM

## 2024-03-15 DIAGNOSIS — C18.2 MALIGNANT NEOPLASM OF ASCENDING COLON (MULTI): ICD-10-CM

## 2024-03-15 DIAGNOSIS — Z79.4 TYPE 2 DIABETES MELLITUS WITH HYPERGLYCEMIA, WITH LONG-TERM CURRENT USE OF INSULIN (MULTI): ICD-10-CM

## 2024-03-15 DIAGNOSIS — G89.3 NEOPLASM RELATED PAIN: ICD-10-CM

## 2024-03-15 LAB
ALBUMIN SERPL BCP-MCNC: 3.3 G/DL (ref 3.4–5)
ALBUMIN SERPL BCP-MCNC: 3.3 G/DL (ref 3.4–5)
ALP SERPL-CCNC: 341 U/L (ref 33–120)
ALP SERPL-CCNC: 345 U/L (ref 33–120)
ALT SERPL W P-5'-P-CCNC: 7 U/L (ref 10–52)
ALT SERPL W P-5'-P-CCNC: 8 U/L (ref 10–52)
ANION GAP SERPL CALC-SCNC: 13 MMOL/L (ref 10–20)
ANION GAP SERPL CALC-SCNC: 14 MMOL/L (ref 10–20)
AST SERPL W P-5'-P-CCNC: 20 U/L (ref 9–39)
AST SERPL W P-5'-P-CCNC: 20 U/L (ref 9–39)
BASOPHILS # BLD AUTO: 0.02 X10*3/UL (ref 0–0.1)
BASOPHILS # BLD AUTO: 0.02 X10*3/UL (ref 0–0.1)
BASOPHILS NFR BLD AUTO: 0.3 %
BASOPHILS NFR BLD AUTO: 0.3 %
BILIRUB DIRECT SERPL-MCNC: 0.3 MG/DL (ref 0–0.3)
BILIRUB SERPL-MCNC: 0.8 MG/DL (ref 0–1.2)
BILIRUB SERPL-MCNC: 0.8 MG/DL (ref 0–1.2)
BUN SERPL-MCNC: 7 MG/DL (ref 6–23)
BUN SERPL-MCNC: 8 MG/DL (ref 6–23)
CALCIUM SERPL-MCNC: 8.6 MG/DL (ref 8.6–10.3)
CALCIUM SERPL-MCNC: 8.9 MG/DL (ref 8.6–10.6)
CHLORIDE SERPL-SCNC: 98 MMOL/L (ref 98–107)
CHLORIDE SERPL-SCNC: 99 MMOL/L (ref 98–107)
CO2 SERPL-SCNC: 29 MMOL/L (ref 21–32)
CO2 SERPL-SCNC: 32 MMOL/L (ref 21–32)
CREAT SERPL-MCNC: 0.47 MG/DL (ref 0.5–1.3)
CREAT SERPL-MCNC: 0.47 MG/DL (ref 0.5–1.3)
EGFRCR SERPLBLD CKD-EPI 2021: >90 ML/MIN/1.73M*2
EGFRCR SERPLBLD CKD-EPI 2021: >90 ML/MIN/1.73M*2
EOSINOPHIL # BLD AUTO: 0.04 X10*3/UL (ref 0–0.7)
EOSINOPHIL # BLD AUTO: 0.07 X10*3/UL (ref 0–0.7)
EOSINOPHIL NFR BLD AUTO: 0.6 %
EOSINOPHIL NFR BLD AUTO: 1.1 %
ERYTHROCYTE [DISTWIDTH] IN BLOOD BY AUTOMATED COUNT: 13.9 % (ref 11.5–14.5)
ERYTHROCYTE [DISTWIDTH] IN BLOOD BY AUTOMATED COUNT: 14.1 % (ref 11.5–14.5)
GLUCOSE BLD MANUAL STRIP-MCNC: 104 MG/DL (ref 74–99)
GLUCOSE BLD MANUAL STRIP-MCNC: 118 MG/DL (ref 74–99)
GLUCOSE BLD MANUAL STRIP-MCNC: 120 MG/DL (ref 74–99)
GLUCOSE SERPL-MCNC: 109 MG/DL (ref 74–99)
GLUCOSE SERPL-MCNC: 122 MG/DL (ref 74–99)
HCT VFR BLD AUTO: 33.9 % (ref 41–52)
HCT VFR BLD AUTO: 33.9 % (ref 41–52)
HGB BLD-MCNC: 10.8 G/DL (ref 13.5–17.5)
HGB BLD-MCNC: 10.9 G/DL (ref 13.5–17.5)
IMM GRANULOCYTES # BLD AUTO: 0.02 X10*3/UL (ref 0–0.7)
IMM GRANULOCYTES # BLD AUTO: 0.03 X10*3/UL (ref 0–0.7)
IMM GRANULOCYTES NFR BLD AUTO: 0.3 % (ref 0–0.9)
IMM GRANULOCYTES NFR BLD AUTO: 0.5 % (ref 0–0.9)
INR PPP: 1.1 (ref 0.9–1.1)
LIPASE SERPL-CCNC: 9 U/L (ref 9–82)
LYMPHOCYTES # BLD AUTO: 0.64 X10*3/UL (ref 1.2–4.8)
LYMPHOCYTES # BLD AUTO: 0.77 X10*3/UL (ref 1.2–4.8)
LYMPHOCYTES NFR BLD AUTO: 11.9 %
LYMPHOCYTES NFR BLD AUTO: 9.4 %
MAGNESIUM SERPL-MCNC: 1.53 MG/DL (ref 1.6–2.4)
MAGNESIUM SERPL-MCNC: 1.54 MG/DL (ref 1.6–2.4)
MCH RBC QN AUTO: 28.4 PG (ref 26–34)
MCH RBC QN AUTO: 28.4 PG (ref 26–34)
MCHC RBC AUTO-ENTMCNC: 31.9 G/DL (ref 32–36)
MCHC RBC AUTO-ENTMCNC: 32.2 G/DL (ref 32–36)
MCV RBC AUTO: 88 FL (ref 80–100)
MCV RBC AUTO: 89 FL (ref 80–100)
MONOCYTES # BLD AUTO: 0.27 X10*3/UL (ref 0.1–1)
MONOCYTES # BLD AUTO: 0.29 X10*3/UL (ref 0.1–1)
MONOCYTES NFR BLD AUTO: 4 %
MONOCYTES NFR BLD AUTO: 4.5 %
NEUTROPHILS # BLD AUTO: 5.28 X10*3/UL (ref 1.2–7.7)
NEUTROPHILS # BLD AUTO: 5.84 X10*3/UL (ref 1.2–7.7)
NEUTROPHILS NFR BLD AUTO: 81.7 %
NEUTROPHILS NFR BLD AUTO: 85.4 %
NRBC BLD-RTO: 0 /100 WBCS (ref 0–0)
NRBC BLD-RTO: 0.3 /100 WBCS (ref 0–0)
PHOSPHATE SERPL-MCNC: 3.3 MG/DL (ref 2.5–4.9)
PLATELET # BLD AUTO: 167 X10*3/UL (ref 150–450)
PLATELET # BLD AUTO: 187 X10*3/UL (ref 150–450)
POTASSIUM SERPL-SCNC: 3.7 MMOL/L (ref 3.5–5.3)
POTASSIUM SERPL-SCNC: 3.9 MMOL/L (ref 3.5–5.3)
PROT SERPL-MCNC: 6.1 G/DL (ref 6.4–8.2)
PROT SERPL-MCNC: 6.6 G/DL (ref 6.4–8.2)
PROTHROMBIN TIME: 12.6 SECONDS (ref 9.8–12.8)
RBC # BLD AUTO: 3.8 X10*6/UL (ref 4.5–5.9)
RBC # BLD AUTO: 3.84 X10*6/UL (ref 4.5–5.9)
SODIUM SERPL-SCNC: 138 MMOL/L (ref 136–145)
SODIUM SERPL-SCNC: 139 MMOL/L (ref 136–145)
WBC # BLD AUTO: 6.5 X10*3/UL (ref 4.4–11.3)
WBC # BLD AUTO: 6.8 X10*3/UL (ref 4.4–11.3)

## 2024-03-15 PROCEDURE — 93005 ELECTROCARDIOGRAM TRACING: CPT

## 2024-03-15 PROCEDURE — 1170000001 HC PRIVATE ONCOLOGY ROOM DAILY

## 2024-03-15 PROCEDURE — 84075 ASSAY ALKALINE PHOSPHATASE: CPT | Performed by: STUDENT IN AN ORGANIZED HEALTH CARE EDUCATION/TRAINING PROGRAM

## 2024-03-15 PROCEDURE — 85025 COMPLETE CBC W/AUTO DIFF WBC: CPT

## 2024-03-15 PROCEDURE — 2500000002 HC RX 250 W HCPCS SELF ADMINISTERED DRUGS (ALT 637 FOR MEDICARE OP, ALT 636 FOR OP/ED)

## 2024-03-15 PROCEDURE — 80053 COMPREHEN METABOLIC PANEL: CPT

## 2024-03-15 PROCEDURE — 2500000001 HC RX 250 WO HCPCS SELF ADMINISTERED DRUGS (ALT 637 FOR MEDICARE OP)

## 2024-03-15 PROCEDURE — 82248 BILIRUBIN DIRECT: CPT

## 2024-03-15 PROCEDURE — 83735 ASSAY OF MAGNESIUM: CPT

## 2024-03-15 PROCEDURE — 83735 ASSAY OF MAGNESIUM: CPT | Performed by: STUDENT IN AN ORGANIZED HEALTH CARE EDUCATION/TRAINING PROGRAM

## 2024-03-15 PROCEDURE — 96376 TX/PRO/DX INJ SAME DRUG ADON: CPT

## 2024-03-15 PROCEDURE — 82947 ASSAY GLUCOSE BLOOD QUANT: CPT

## 2024-03-15 PROCEDURE — 2500000004 HC RX 250 GENERAL PHARMACY W/ HCPCS (ALT 636 FOR OP/ED)

## 2024-03-15 PROCEDURE — C9113 INJ PANTOPRAZOLE SODIUM, VIA: HCPCS

## 2024-03-15 PROCEDURE — 2500000004 HC RX 250 GENERAL PHARMACY W/ HCPCS (ALT 636 FOR OP/ED): Performed by: EMERGENCY MEDICINE

## 2024-03-15 PROCEDURE — 83690 ASSAY OF LIPASE: CPT | Performed by: STUDENT IN AN ORGANIZED HEALTH CARE EDUCATION/TRAINING PROGRAM

## 2024-03-15 PROCEDURE — 85025 COMPLETE CBC W/AUTO DIFF WBC: CPT | Performed by: STUDENT IN AN ORGANIZED HEALTH CARE EDUCATION/TRAINING PROGRAM

## 2024-03-15 PROCEDURE — 93010 ELECTROCARDIOGRAM REPORT: CPT | Performed by: INTERNAL MEDICINE

## 2024-03-15 PROCEDURE — 99223 1ST HOSP IP/OBS HIGH 75: CPT

## 2024-03-15 PROCEDURE — 2500000001 HC RX 250 WO HCPCS SELF ADMINISTERED DRUGS (ALT 637 FOR MEDICARE OP): Performed by: EMERGENCY MEDICINE

## 2024-03-15 PROCEDURE — 84443 ASSAY THYROID STIM HORMONE: CPT

## 2024-03-15 PROCEDURE — 84100 ASSAY OF PHOSPHORUS: CPT

## 2024-03-15 PROCEDURE — C9113 INJ PANTOPRAZOLE SODIUM, VIA: HCPCS | Performed by: EMERGENCY MEDICINE

## 2024-03-15 PROCEDURE — 85610 PROTHROMBIN TIME: CPT

## 2024-03-15 RX ORDER — SODIUM CHLORIDE, SODIUM LACTATE, POTASSIUM CHLORIDE, CALCIUM CHLORIDE 600; 310; 30; 20 MG/100ML; MG/100ML; MG/100ML; MG/100ML
50 INJECTION, SOLUTION INTRAVENOUS CONTINUOUS
Status: DISCONTINUED | OUTPATIENT
Start: 2024-03-15 | End: 2024-03-16

## 2024-03-15 RX ORDER — ENOXAPARIN SODIUM 100 MG/ML
40 INJECTION SUBCUTANEOUS EVERY 24 HOURS
Status: DISCONTINUED | OUTPATIENT
Start: 2024-03-15 | End: 2024-03-18

## 2024-03-15 RX ORDER — SIMVASTATIN 40 MG/1
40 TABLET, FILM COATED ORAL NIGHTLY
Status: DISCONTINUED | OUTPATIENT
Start: 2024-03-15 | End: 2024-03-21 | Stop reason: HOSPADM

## 2024-03-15 RX ORDER — SODIUM CHLORIDE, SODIUM LACTATE, POTASSIUM CHLORIDE, CALCIUM CHLORIDE 600; 310; 30; 20 MG/100ML; MG/100ML; MG/100ML; MG/100ML
75 INJECTION, SOLUTION INTRAVENOUS CONTINUOUS
Status: DISCONTINUED | OUTPATIENT
Start: 2024-03-15 | End: 2024-03-15

## 2024-03-15 RX ORDER — HYDRALAZINE HYDROCHLORIDE 25 MG/1
25 TABLET, FILM COATED ORAL 3 TIMES DAILY PRN
Status: DISCONTINUED | OUTPATIENT
Start: 2024-03-15 | End: 2024-03-21

## 2024-03-15 RX ORDER — DEXTROSE 50 % IN WATER (D50W) INTRAVENOUS SYRINGE
12.5
Status: DISCONTINUED | OUTPATIENT
Start: 2024-03-15 | End: 2024-03-21 | Stop reason: HOSPADM

## 2024-03-15 RX ORDER — ONDANSETRON HYDROCHLORIDE 2 MG/ML
4 INJECTION, SOLUTION INTRAVENOUS EVERY 6 HOURS PRN
Status: DISCONTINUED | OUTPATIENT
Start: 2024-03-15 | End: 2024-03-21 | Stop reason: HOSPADM

## 2024-03-15 RX ORDER — TALC
3 POWDER (GRAM) TOPICAL DAILY
Status: DISCONTINUED | OUTPATIENT
Start: 2024-03-15 | End: 2024-03-21 | Stop reason: HOSPADM

## 2024-03-15 RX ORDER — PANTOPRAZOLE SODIUM 40 MG/10ML
40 INJECTION, POWDER, LYOPHILIZED, FOR SOLUTION INTRAVENOUS DAILY
Status: DISCONTINUED | OUTPATIENT
Start: 2024-03-15 | End: 2024-03-18 | Stop reason: ALTCHOICE

## 2024-03-15 RX ORDER — PANTOPRAZOLE SODIUM 40 MG/1
40 TABLET, DELAYED RELEASE ORAL
Status: DISCONTINUED | OUTPATIENT
Start: 2024-03-16 | End: 2024-03-15

## 2024-03-15 RX ORDER — AMOXICILLIN 250 MG
2 CAPSULE ORAL EVERY 12 HOURS
Status: DISCONTINUED | OUTPATIENT
Start: 2024-03-15 | End: 2024-03-21 | Stop reason: HOSPADM

## 2024-03-15 RX ORDER — DICYCLOMINE HYDROCHLORIDE 10 MG/1
10 CAPSULE ORAL 4 TIMES DAILY
Status: DISCONTINUED | OUTPATIENT
Start: 2024-03-15 | End: 2024-03-16

## 2024-03-15 RX ORDER — DEXTROSE 50 % IN WATER (D50W) INTRAVENOUS SYRINGE
25
Status: DISCONTINUED | OUTPATIENT
Start: 2024-03-15 | End: 2024-03-21 | Stop reason: HOSPADM

## 2024-03-15 RX ORDER — ACETAMINOPHEN 10 MG/ML
1000 INJECTION, SOLUTION INTRAVENOUS EVERY 12 HOURS SCHEDULED
Status: DISCONTINUED | OUTPATIENT
Start: 2024-03-15 | End: 2024-03-19 | Stop reason: ALTCHOICE

## 2024-03-15 RX ORDER — POLYETHYLENE GLYCOL 3350 17 G/17G
17 POWDER, FOR SOLUTION ORAL DAILY PRN
Status: DISCONTINUED | OUTPATIENT
Start: 2024-03-15 | End: 2024-03-16

## 2024-03-15 RX ORDER — INSULIN LISPRO 100 [IU]/ML
0-10 INJECTION, SOLUTION INTRAVENOUS; SUBCUTANEOUS EVERY 4 HOURS
Status: DISCONTINUED | OUTPATIENT
Start: 2024-03-15 | End: 2024-03-21 | Stop reason: HOSPADM

## 2024-03-15 RX ADMIN — ONDANSETRON 4 MG: 2 INJECTION INTRAMUSCULAR; INTRAVENOUS at 02:18

## 2024-03-15 RX ADMIN — ACETAMINOPHEN 1000 MG: 10 INJECTION INTRAVENOUS at 21:02

## 2024-03-15 RX ADMIN — ONDANSETRON 4 MG: 2 INJECTION INTRAMUSCULAR; INTRAVENOUS at 08:28

## 2024-03-15 RX ADMIN — ONDANSETRON 4 MG: 2 INJECTION INTRAMUSCULAR; INTRAVENOUS at 18:07

## 2024-03-15 RX ADMIN — OXYCODONE HYDROCHLORIDE AND ACETAMINOPHEN 2 TABLET: 5; 325 TABLET ORAL at 02:18

## 2024-03-15 RX ADMIN — MORPHINE SULFATE 15 MG: 15 TABLET, EXTENDED RELEASE ORAL at 08:47

## 2024-03-15 RX ADMIN — PREGABALIN 100 MG: 75 CAPSULE ORAL at 21:03

## 2024-03-15 RX ADMIN — PREGABALIN 100 MG: 75 CAPSULE ORAL at 08:29

## 2024-03-15 RX ADMIN — LORAZEPAM 0.5 MG: 0.5 TABLET ORAL at 12:42

## 2024-03-15 RX ADMIN — MELATONIN 3 MG: 3 TAB ORAL at 21:03

## 2024-03-15 RX ADMIN — ENOXAPARIN SODIUM 40 MG: 100 INJECTION SUBCUTANEOUS at 17:45

## 2024-03-15 RX ADMIN — SIMVASTATIN 40 MG: 40 TABLET, FILM COATED ORAL at 21:03

## 2024-03-15 RX ADMIN — PANTOPRAZOLE SODIUM 40 MG: 40 INJECTION, POWDER, FOR SOLUTION INTRAVENOUS at 08:28

## 2024-03-15 RX ADMIN — OXYCODONE HYDROCHLORIDE AND ACETAMINOPHEN 2 TABLET: 5; 325 TABLET ORAL at 11:25

## 2024-03-15 RX ADMIN — PANTOPRAZOLE SODIUM 40 MG: 40 INJECTION, POWDER, FOR SOLUTION INTRAVENOUS at 21:03

## 2024-03-15 RX ADMIN — SODIUM CHLORIDE, POTASSIUM CHLORIDE, SODIUM LACTATE AND CALCIUM CHLORIDE 50 ML/HR: 600; 310; 30; 20 INJECTION, SOLUTION INTRAVENOUS at 21:02

## 2024-03-15 SDOH — SOCIAL STABILITY: SOCIAL INSECURITY: HAS ANYONE EVER THREATENED TO HURT YOUR FAMILY OR YOUR PETS?: NO

## 2024-03-15 SDOH — SOCIAL STABILITY: SOCIAL INSECURITY: HAVE YOU HAD THOUGHTS OF HARMING ANYONE ELSE?: NO

## 2024-03-15 SDOH — SOCIAL STABILITY: SOCIAL INSECURITY: ARE THERE ANY APPARENT SIGNS OF INJURIES/BEHAVIORS THAT COULD BE RELATED TO ABUSE/NEGLECT?: NO

## 2024-03-15 SDOH — SOCIAL STABILITY: SOCIAL INSECURITY: ARE YOU OR HAVE YOU BEEN THREATENED OR ABUSED PHYSICALLY, EMOTIONALLY, OR SEXUALLY BY ANYONE?: NO

## 2024-03-15 SDOH — SOCIAL STABILITY: SOCIAL INSECURITY: DO YOU FEEL UNSAFE GOING BACK TO THE PLACE WHERE YOU ARE LIVING?: NO

## 2024-03-15 SDOH — SOCIAL STABILITY: SOCIAL INSECURITY: DOES ANYONE TRY TO KEEP YOU FROM HAVING/CONTACTING OTHER FRIENDS OR DOING THINGS OUTSIDE YOUR HOME?: NO

## 2024-03-15 SDOH — SOCIAL STABILITY: SOCIAL INSECURITY: ABUSE: ADULT

## 2024-03-15 SDOH — SOCIAL STABILITY: SOCIAL INSECURITY: DO YOU FEEL ANYONE HAS EXPLOITED OR TAKEN ADVANTAGE OF YOU FINANCIALLY OR OF YOUR PERSONAL PROPERTY?: NO

## 2024-03-15 ASSESSMENT — COGNITIVE AND FUNCTIONAL STATUS - GENERAL
DRESSING REGULAR LOWER BODY CLOTHING: A LITTLE
MOVING TO AND FROM BED TO CHAIR: A LITTLE
PATIENT BASELINE BEDBOUND: NO
CLIMB 3 TO 5 STEPS WITH RAILING: A LOT
TOILETING: A LITTLE
MOVING FROM LYING ON BACK TO SITTING ON SIDE OF FLAT BED WITH BEDRAILS: A LITTLE
HELP NEEDED FOR BATHING: A LITTLE
DRESSING REGULAR UPPER BODY CLOTHING: A LITTLE
PERSONAL GROOMING: A LITTLE
WALKING IN HOSPITAL ROOM: A LITTLE
TURNING FROM BACK TO SIDE WHILE IN FLAT BAD: A LITTLE
DAILY ACTIVITIY SCORE: 18
MOBILITY SCORE: 17
STANDING UP FROM CHAIR USING ARMS: A LITTLE
EATING MEALS: A LITTLE

## 2024-03-15 ASSESSMENT — ACTIVITIES OF DAILY LIVING (ADL)
JUDGMENT_ADEQUATE_SAFELY_COMPLETE_DAILY_ACTIVITIES: YES
PATIENT'S MEMORY ADEQUATE TO SAFELY COMPLETE DAILY ACTIVITIES?: YES
HEARING - LEFT EAR: FUNCTIONAL
JUDGMENT_ADEQUATE_SAFELY_COMPLETE_DAILY_ACTIVITIES: YES
HEARING - RIGHT EAR: FUNCTIONAL
ADEQUATE_TO_COMPLETE_ADL: YES
TOILETING: NEEDS ASSISTANCE
PATIENT'S MEMORY ADEQUATE TO SAFELY COMPLETE DAILY ACTIVITIES?: YES
TOILETING: INDEPENDENT
LACK_OF_TRANSPORTATION: PATIENT DECLINED
WALKS IN HOME: NEEDS ASSISTANCE
FEEDING YOURSELF: INDEPENDENT
GROOMING: INDEPENDENT
HEARING - RIGHT EAR: FUNCTIONAL
WALKS IN HOME: NEEDS ASSISTANCE
HEARING - LEFT EAR: FUNCTIONAL
DRESSING YOURSELF: NEEDS ASSISTANCE
BATHING: NEEDS ASSISTANCE
ADEQUATE_TO_COMPLETE_ADL: YES
GROOMING: NEEDS ASSISTANCE
BATHING: NEEDS ASSISTANCE
DRESSING YOURSELF: NEEDS ASSISTANCE
FEEDING YOURSELF: INDEPENDENT
ASSISTIVE_DEVICE: EYEGLASSES

## 2024-03-15 ASSESSMENT — LIFESTYLE VARIABLES
AUDIT-C TOTAL SCORE: 0
AUDIT-C TOTAL SCORE: 0
HOW OFTEN DO YOU HAVE A DRINK CONTAINING ALCOHOL: NEVER
HOW MANY STANDARD DRINKS CONTAINING ALCOHOL DO YOU HAVE ON A TYPICAL DAY: PATIENT DOES NOT DRINK
SKIP TO QUESTIONS 9-10: 1
HOW OFTEN DO YOU HAVE 6 OR MORE DRINKS ON ONE OCCASION: NEVER

## 2024-03-15 ASSESSMENT — PAIN SCALES - GENERAL
PAINLEVEL_OUTOF10: 5 - MODERATE PAIN
PAINLEVEL_OUTOF10: 4
PAINLEVEL_OUTOF10: 4
PAINLEVEL_OUTOF10: 3

## 2024-03-15 ASSESSMENT — PAIN - FUNCTIONAL ASSESSMENT
PAIN_FUNCTIONAL_ASSESSMENT: 0-10

## 2024-03-15 ASSESSMENT — PATIENT HEALTH QUESTIONNAIRE - PHQ9
SUM OF ALL RESPONSES TO PHQ9 QUESTIONS 1 & 2: 0
1. LITTLE INTEREST OR PLEASURE IN DOING THINGS: NOT AT ALL
2. FEELING DOWN, DEPRESSED OR HOPELESS: NOT AT ALL

## 2024-03-15 ASSESSMENT — PAIN DESCRIPTION - LOCATION: LOCATION: ABDOMEN

## 2024-03-15 ASSESSMENT — COLUMBIA-SUICIDE SEVERITY RATING SCALE - C-SSRS
6. HAVE YOU EVER DONE ANYTHING, STARTED TO DO ANYTHING, OR PREPARED TO DO ANYTHING TO END YOUR LIFE?: NO
2. HAVE YOU ACTUALLY HAD ANY THOUGHTS OF KILLING YOURSELF?: NO
1. IN THE PAST MONTH, HAVE YOU WISHED YOU WERE DEAD OR WISHED YOU COULD GO TO SLEEP AND NOT WAKE UP?: NO

## 2024-03-15 ASSESSMENT — PAIN SCALES - PAIN ASSESSMENT IN ADVANCED DEMENTIA (PAINAD)
CONSOLABILITY: NO NEED TO CONSOLE
BODYLANGUAGE: RELAXED
BREATHING: NORMAL

## 2024-03-15 NOTE — H&P
"MEDICINE ADMISSION NOTE    History of Present Illness  Narciso Marrero is a 58 y.o. male with PMHx of Stage IV KRAS + Ascending colon cancer  s/p Right hemicolectomy (5/3/23), metastatic disease to the liver, and numerous peritoneal implants s/p AMY pump, DMII, HTN, GERD, Chronic pain presents to  as a transfer from [facility] for further evaluation of partial small bowel obstruction and recent onset altered mental status.    Patient was evaluated at bedside with wife Emperatriz in company.  Patient states that since recent mechanical fall (3/2), he has become more weak, and developed increased abdominal pressure and pain.  Furthermore he states that his appetite has decreased significantly and believes to have lost significant amount of weight.  Patient was recently treated with FOLFIRI (2/19/2024), however due to interval progression of multiple peritoneal nodules and pulmonary nodules his chemotherapy was changed to Lonsurf + bevacizumab (3/4).  After beginning the new treatment patient states that he began having difficulty with word retrieval and states \" I cannot seem to get the right words out.\"  Furthermore he describes experiencing brain fog which then led to episodes of disorientation.  Patient's symptoms progressively worsened and on 3/13 his wife states that he called her while at work crying and stating that he did not know where he was at in terms of location.  Per wife patient was only oriented to self and could not recall any dates, time or place.  Patient's disorientation/altered mental status led to a heightened anxiety.  His anxiety led to him becoming overly fixated on his current treatment regimen.  He would constantly ask whether or not he took his medication despite reassurances and at times would become nervous about running out of his medications.  His wife states that he just seemed to get worse every day until they presented to [facility] on 3/13/2024.    Patient second complaint " pertains to issues of constipation.  Since his last discharge he reported having regular bowel movements for several days however when he stopped taking his MiraLAX his stools immediately became formed and hard.  This led the patient to initiate and discontinue MiraLAX which led to alternating between loose stools and hard stools.  Patient denies prolonged constipation.  And his most recent bowel was soft small chunks on 3/14/2024.  He reports increased flatulence in addition to abdominal cramping.  He denies any melena or hematochezia.    Patient denies any headache, diplopia, blurry vision, lightheadedness, dizziness, chest pain or fever.  He does endorse shortness of breath with exertion, cough, intermittent chills, nausea and episodes of dry heaving.  He describes his abdominal pain as dull, achy pain that is always there and at its worst rates it 6 out of 10 however on average rates it 3 out of 10 with medication.  He denies any worsening distention however states he easily feels full with minimal food.  He denies any urinary frequency, hematuria, dysuria however endorses very dark urine.     Patient reports increased daytime sleepiness however attributes it to the combination of morphine and lorazepam.  He reports overall feeling weak however denies any numbness, tingling or focal neurological deficits.  He denies any recent contact with sick family members or friends.      CANCER HISTORY:  DIAGNOSIS: Ascending colon cancer  STAGING: IV  CURRENT SITES OF DISEASE:  Ascending colon, liver, peritoneal   MOLECULAR/GENOMICS:  Gayatrisushila xT 1/23/23 (from [facility])  KRAS G12D 32.8% VAF  PTEN V290fs  BCOR Q822*  LZTR1 R198T  SMAD4 R361H  APC S146fs  APC R876*  FUBP1 Q279*  PTEN D268fs  Microsatellite stable  TMB 4.2 (low)  ctDNA Signatera  4/17/23: 0  5/17/23: 1.99  5/31/23: 28.84  6/14/23: 4.20  6/28/23: 9.55  7/12/23: 24.03  7/26/23: 40.53  8/23/23: 9.75  9/20/23: 3.67  10/18/23: 19.35  11/29/23: 125.52  12/26/23: 177.74  Switched treatment to FOLFIRI.  1/22/24: 195.37  2/19/24: 462.54  3/4/24: 1977.70 Switched to Lonsurf + Bevacizumab   CEA:  2/1/23: 4400  3/1/23: 4661  3/15/23: 3061  3/29/23: 2363  4/12/23: 1529  5/17/23: 235.9  5/31/23: 238.3  6/14/23: 225  6/28/23: 181  7/12/23: 171  7/26/23: 266  8/9/23: 392  8/23/23: 718  9/6/23: 800  9/20/23: 905   10/18/23: >100  11/1/23: 613  11/15/23: 1249  11/29/23: 1189  12/11/23: 1572  12/26/23: 2021 Switched treatment to FOLFIRI.   1/8/24: 1662  1/22/24: 2013  2/5/24: 2439  2/19/24: 2775  CURRENT THERAPY:  Lonsurf + Bevacizumab start 3/4/24  Last Glycerin in AMY pump on 2/5/24     PREVIOUS THERAPY:  1/2023-4/12/23: FOLFOX x6 cycles. First 2 cycles with bevacizumab  AMY pump placed 5/3/23. Flow rate 1.2 mL/day. (Serial # [id number]).First FUdR via AMY pump 5/17/23. Systemic chemotherapy being held due to wound vac, Started FOLFOX on 8/9/23. On 11/1/23 dose reduction in Oxaliplatin and infusional 5FU only due to chemotherapy colitis. Treatment DC due to PD. Last treatment on 12.11.2023. Total cycles of FUdR (if applicable):7  12.26.2023 : Started on FOLFIRI, Last and 4th cycle on 2/5/24. Discontinued due to disease progression   ONCOLOGIC ISSUES:  Shingles  Midline incision dehisced    PROVIDERS:  Surg onc: Rajinder Arenas  CRS: Regina Raymundo  Local med onc: Dia Kendall  HISTORY:   1/2023: Presented with 20 pound unexpected weight loss and RUQ pain. Imaging showed large liver lesion. Biopsy c/w metastatic colon cancer. C-scope showed infiltrative, ulcerated, fungating mass in ascending colon.   Baseline CEA 2322. Started FOLFOX + bevacizumab  3/16/23: MRI chest and liver showed no evidence of metastatic disease in the chest. Focal circumferential wall thickening   5/3/23: AMY pump placed, right hemicolectomy, open cholecystectomy and peritonectomy      Labs:  Results from last 7 days   Lab Units 03/15/24  0120 03/13/24  1635   WBC AUTO x10*3/uL 6.5 10.4   HEMOGLOBIN g/dL 10.9* 11.2*    HEMATOCRIT % 33.9* 35.3*   PLATELETS AUTO x10*3/uL 167 216            Results from last 7 days   Lab Units 03/15/24  0120 03/13/24  1635   SODIUM mmol/L 138 136   POTASSIUM mmol/L 3.7 3.7   CHLORIDE mmol/L 99 97*   CO2 mmol/L 29 29   BUN mg/dL 7 11   CREATININE mg/dL 0.47* 0.59   CALCIUM mg/dL 8.6 8.8     Results from last 7 days   Lab Units 03/15/24  0120 03/13/24  1635   ALK PHOS U/L 341* 354*   BILIRUBIN TOTAL mg/dL 0.8 1.1   PROTEIN TOTAL g/dL 6.6 7.0   ALT U/L 7* 7*   AST U/L 20 22                    Past Medical History  Past Medical History:   Diagnosis Date    Cancer (CMS/HCC)     Diabetes mellitus (CMS/HCC)     History of transfusion        Surgical History  Past Surgical History:   Procedure Laterality Date    COLON SURGERY      CT ABDOMEN ANGIOGRAM W AND/OR WO IV CONTRAST  04/13/2023    CT ABDOMEN ANGIOGRAM W AND/OR WO IV CONTRAST POR [id number] CT    FRACTURE SURGERY         Social History  He reports that he has quit smoking. His smoking use included cigarettes. He has never used smokeless tobacco. He reports that he does not drink alcohol and does not use drugs.    Family History  No family history on file.     Allergies  Patient has no known allergies.     Physical Exam   Physical Exam  Constitutional:       General: He is not in acute distress.     Appearance: He is not toxic-appearing.   HENT:      Head: Normocephalic and atraumatic.      Nose: Nose normal.      Mouth/Throat:      Mouth: Mucous membranes are moist.      Pharynx: Oropharynx is clear.   Eyes:      Extraocular Movements: Extraocular movements intact.      Pupils: Pupils are equal, round, and reactive to light.   Cardiovascular:      Rate and Rhythm: Regular rhythm. Tachycardia present.      Pulses: Normal pulses.      Heart sounds: Normal heart sounds. No murmur heard.     No gallop.   Pulmonary:      Effort: Pulmonary effort is normal. No respiratory distress.      Breath sounds: No wheezing or rales.      Comments: Decreased BS only  in LLL  Abdominal:      General: There is distension.      Tenderness: There is no abdominal tenderness. There is no guarding or rebound.      Comments: Hypoactive bowel sounds. AMY pump (LUQ) with ecchymosis. Midline surgical scar   Musculoskeletal:         General: Normal range of motion.      Cervical back: Normal range of motion.      Right lower leg: No edema.      Left lower leg: No edema.      Comments: LLE slightly larger than RLE. Per patient, this has always been that way since crush injury.   Skin:     General: Skin is warm.      Findings: No lesion.   Neurological:      General: No focal deficit present.      Mental Status: He is alert and oriented to person, place, and time.   Psychiatric:         Mood and Affect: Mood normal.          Medications  Scheduled medications    Continuous medications    PRN medications     === 03/13/24 ===    CT ABDOMEN PELVIS W IV CONTRAST  - Impression -  1.Peritoneal metastatic disease again noted, with likely serosal  involvement in multiple areas of the small bowel and rectum,  resulting in several areas of alternating borderline bowel dilatation  and narrowing. This is suggestive of multiple areas of partial  obstruction involving the small bowel and rectum.  2.Stable moderate to large volume ascites.  3.Stable hepatic, splenic and pulmonary metastatic disease. This  includes thrombus within the splenic vein, adjacent to the splenic  hilar mass, suggestive of tumor thrombus.  4.Moderate sized left pleural effusion has increased. Small right  pleural effusion is stable to slightly decreased.    MACRO:  None  Signed by: Sonia Avilez 3/13/2024 7:37 PM  Dictation workstation:   [id number]     Assessment/Plan   Narciso Marrero is a 58 y.o. male with PMHx of Stage IV KRAS + Ascending colon cancer  s/p Right hemicolectomy (5/3/23), metastatic disease to the liver, and numerous peritoneal implants s/p AMY pump, DMII, HTN, GERD, Chronic pain presents to  as a transfer  from [place] for further evaluation of partial small bowel obstruction and recent onset altered mental status.  Patient denies prolonged periods of constipation and his presentation is likely due to his peritoneal metastatic disease.  Partial SBO described on CT imaging less likely secondary to a mechanical obstruction, or adhesions but rather due to his worsening peritoneal metastatic disease.  Patient's acute onset confusion, forgetfulness and difficulty expressing his words is concerning for intracranial metastatic disease.  His altered mental status less likely metabolic in nature given no electrolyte derangements, however will order TSH, vitamin B12 and folate to definitively rule out.  Less likely infectious given no leukocytosis , fever, chills or any other systemic signs of infection.  Less likely toxins however cannot be entirely excluded given recent initiation of Lonsurf chemotherapy drug, and patient denies excess ingestion of prescribed opiates.  Given concern for intracranial metastatic disease MRI brain with IV contrast will be ordered for further evaluation.  At this time patient is hypertensive with SBPs greater than 180 otherwise the rest of the vitals are stable.  Patient will be treated with p.o. hydralazine as needed with blood pressure goal of 160 in next 24 hours.      #Borderline Dilated Small Bowel   #C/f SBO  #Constipation  - CT A/P with several segments of fluid-filled borderline dilated small bowel, alternating with decompressed bowel. The the areas of decompressed bowel demonstrate wall nodular enhancement, suggestive of serosal or constrictive peritoneal implants.  -Pt reports regular BM's since 3/4. Patient states if he skips miralax dose, stool immediately harden. Last BM yesterday, soft, paste-like and no melena or hematochezia.  -Hypoactive bowel sounds, +Ascites.   PLAN:  -Avoid Opiates given c/f bowel obstruction   -Correct electrolyte abnormalities   -C/w Miralax once daily,  DocSenna BID   -Continue to monitor for acute abdomen     #Moderate-size L. Pleural Effusion  #Small R. Pleural Effusion   #Pulmonary Nodule   -Left pleural effusion increased in size compared to last imaging. Right pleural effusion stable.  PLAN:   -CTM      #Stage IV Ascending Colon Cancer  #Stable Hepatic Masses   #Stable Splenic Masses  #Hilar Mass   #Numerous Peritoneal Masses   #Large Volume Ascites   -1/2023: Presented with 20 pound unexpected weight loss and RUQ pain. Imaging showed large liver lesion. Biopsy c/w metastatic colon cancer. C-scope showed infiltrative, ulcerated, fungating mass in ascending colon.   -Baseline CEA 2322. Started FOLFOX + Bevacizumab  PREVIOUS THERAPY:  -1/2023-4/12/23: FOLFOX x6 cycles. First 2 cycles with bevacizumab  AMY pump placed 5/3/23. First FUdR via MAY pump   -5/17/23. Systemic chemotherapy being held due to wound vac, Started FOLFOX on 8/9/23. On -11/1/23 dose reduction in Oxaliplatin and infusional 5FU only due to chemotherapy colitis. Treatment DC due to PD. Last treatment on 12.11.2023. Total cycles of FUdR (if applicable):7  -12.26.2023 : Started on FOLFIRI, Last and 4th cycle on 2/5/24. Discontinued due to disease progression  CURRENT THERAPY:  -Lonsurf + Bevacizumab start 3/4/24  -Last Glycerin in AMY pump on 2/5/24  -Providers:  -Surg onc: Rajinder Arenas  -CRS: Regina Raymundo  -Local med onc: Dia Kendall  PLAN:  -Email primary oncologist about admission in AM   -Hold Lonsurf for now     #HLD  - C/w home simvastatin 40 mg daily     #Chronic Pain  ::Has crushed shoulder injury 2/2 work-related accident  ::follows with pain management at [facility]  - C/w pregabalin 150 mg tid  - Hold percoset 5-325 mg q6hr as needed  - IV Tylenol 1000 mg BID  - Ok to spot dose Toradol for breakthrough pain.     #GERD  - C/w home pantoprazole 40 mg daily     #DMII  :: Home regimen: lantus 22 units, humalog 3 units w/ meals  PLAN:  - Hypoglycemia protocol   -  Mild insulin sliding scale #2      F : PRN  E: PRN  N: MPO  A:pIV     Bowel Regimen: Miralax, DocSenna  DVT prophylaxis: Lovenox subq  Code Status: FULL CODE (confirmed on admission 3/15/24)   NOK: Extended Emergency Contact Information  Primary Emergency Contact: ROHITH MARIE Phone: [phone]  Relation: Spouse   needed? No          Not yet staffed with the attending.  Flory Martinez MD  PGY-1 Internal Medicine

## 2024-03-15 NOTE — SIGNIFICANT EVENT
Senior staffing note    59 y/o M patient with PMHx of ascending colon cancer with metastases to liver, peritoneum, spleen, lungs, and abdominal wall s/p AMY, regional lymphadenectomy, cholecystectomy, peritonectomy, right hemicolectomy w/ ileocolic anastomosis 5/3/2023 who presents as a transfer from [facility].     The patient reported that he had a mechanical fall about 3 weeks ago, where he dropped his foot (2/2 to an injury many years ago) and fell on his pump side, did not lose consc and the fall was mechanical with no seizure like activyt, sycnope, post fall confusion, no dizziness/lightheadedness. He was subsequently directed to the ED for evaluation which was unremarkable.     He had progression of disease on FOLFOX/FUdR and subsequently FOLFIRI. He is now planned to start bevacizumab/ lonsurf this coming Monday. Undergoing q6 week AMY refills with glycerin, last pump fill was 2/5.     The wife reported that his confusion started on Monday and got worse to the point she called onc on Wed, he was forgetting where he was and he was getting weaker too. He reported some worsening shortness of breath on exertion.     The patient started on ryan/lonsurf 2 weeks ago and today is his last dose of chemo.   Reported having regular BM (maybe less frequent but still getting EOD), no vomiting, and is passing flatus     Course:L   Vitals on arrival O2 94 on RA, /105, RR 18,    Chem: MONICA 341     CBC: Unremrakable Hgb 109 at baseline   U/A unremrakable at [place]    CTH: Unremarkable   CT abdomen:   Peritoneal metastatic disease again noted, with likely serosal involvement in multiple areas of the small bowel and rectum, resulting in several areas of alternating borderline bowel dilatation and narrowing. This is suggestive of multiple areas of partial obstruction involving the small bowel and rectum.    Stable moderate to large volume ascites.  Stable hepatic, splenic and pulmonary metastatic disease. This  includes thrombus within the splenic vein, adjacent to the splenic hilar mass, suggestive of tumor thrombus.    Moderate sized left pleural effusion has increased. Small right pleural effusion is stable to slightly decreased.    CXR:   Cardiomegaly with perihilar and basilar interstitial edema plus a  left lower lobe airspace opacity and effusion.      This has worsened from recent CT abdomen.      P/E:  AO3, alert, conversant  RRR  CTB  NT, soft lax abdomen, bruise over the sight of AMY   Hypoactive bowel sounds   No focal neuro dificit     59 y/o M patient with PMHx of ascending colon cancer with metastases to liver, peritoneum, spleen, lungs, and abdominal wall s/p AMY, regional lymphadenectomy, cholecystectomy, peritonectomy, right hemicolectomy w/ ileocolic anastomosis 5/3/2023 who presents as a transfer from [facility].  Concerns for alia lobstruction per CT scan at [place] however the patient is having regular bowel movements, no need for decompression today, NPO for tonight to monitor, he is also having more pain in the abdominal area from the carcinomatosis.     # Confusion likely related to progressive disease vs chemo related vs mets vs medication  - Hold Opioids  - c/w pregabalin   - MRI brain on Monday  - last dose of lonsurf today  - Decide on further chemo on Monday  - Hold lonsurf tonight   - Rest as per Dr. Martinez     # Peritoneal carcinomatisis and concerns for SBO  - Currently with regular bm, and no vomiting  - Hold off decompression  - c.w bisacodyl   - NPO and IVF   - doc senna  - miralax    # DM  - ISS     Rest as per Dr. Martinez     Full code  NOK:     ROHITH MARIE (Spouse)  [phone]

## 2024-03-15 NOTE — NURSING NOTE
Auth: NPR  Date: 10/25/22  Reference # None  Spoke with: None  Type of SX: Inpatient  Location: [facility]  CPT: 79401   DX: M16.11  SX area: Rt hip  Insurance: Self-Pay RN paged team d/t pt admitting vitals. 184/110 and . Team stated they will order PRN medications.

## 2024-03-15 NOTE — PROGRESS NOTES
.  I assumed care of the patient at change of shift patient has been hemodynamically stable I went and spoke with him and his wife they had some questions about his test results which I reviewed with them and he has a bed at [facility] and is awaiting transport to [facility]

## 2024-03-15 NOTE — CARE PLAN
The patient's goals for the shift include      The clinical goals for the shift include        Problem: Pain  Goal: My pain/discomfort is manageable  Outcome: Progressing     Problem: Safety  Goal: Patient will be injury free during hospitalization  Outcome: Progressing  Goal: I will remain free of falls  Outcome: Progressing     Problem: Daily Care  Goal: Daily care needs are met  Outcome: Progressing     Problem: Psychosocial Needs  Goal: Demonstrates ability to cope with hospitalization/illness  Outcome: Progressing  Goal: Collaborate with me, my family, and caregiver to identify my specific goals  Outcome: Progressing     Problem: Discharge Barriers  Goal: My discharge needs are met  Outcome: Progressing

## 2024-03-15 NOTE — H&P (VIEW-ONLY)
"MEDICINE ADMISSION NOTE    History of Present Illness  Narciso Marrero is a 58 y.o. male with PMHx of Stage IV KRAS + Ascending colon cancer  s/p Right hemicolectomy (5/3/23), metastatic disease to the liver, and numerous peritoneal implants s/p AMY pump, DMII, HTN, GERD, Chronic pain presents to  as a transfer from [facility] for further evaluation of partial small bowel obstruction and recent onset altered mental status.    Patient was evaluated at bedside with wife Emperatriz in company.  Patient states that since recent mechanical fall (3/2), he has become more weak, and developed increased abdominal pressure and pain.  Furthermore he states that his appetite has decreased significantly and believes to have lost significant amount of weight.  Patient was recently treated with FOLFIRI (2/19/2024), however due to interval progression of multiple peritoneal nodules and pulmonary nodules his chemotherapy was changed to Lonsurf + bevacizumab (3/4).  After beginning the new treatment patient states that he began having difficulty with word retrieval and states \" I cannot seem to get the right words out.\"  Furthermore he describes experiencing brain fog which then led to episodes of disorientation.  Patient's symptoms progressively worsened and on 3/13 his wife states that he called her while at work crying and stating that he did not know where he was at in terms of location.  Per wife patient was only oriented to self and could not recall any dates, time or place.  Patient's disorientation/altered mental status led to a heightened anxiety.  His anxiety led to him becoming overly fixated on his current treatment regimen.  He would constantly ask whether or not he took his medication despite reassurances and at times would become nervous about running out of his medications.  His wife states that he just seemed to get worse every day until they presented to [facility] on 3/13/2024.    Patient second complaint " pertains to issues of constipation.  Since his last discharge he reported having regular bowel movements for several days however when he stopped taking his MiraLAX his stools immediately became formed and hard.  This led the patient to initiate and discontinue MiraLAX which led to alternating between loose stools and hard stools.  Patient denies prolonged constipation.  And his most recent bowel was soft small chunks on 3/14/2024.  He reports increased flatulence in addition to abdominal cramping.  He denies any melena or hematochezia.    Patient denies any headache, diplopia, blurry vision, lightheadedness, dizziness, chest pain or fever.  He does endorse shortness of breath with exertion, cough, intermittent chills, nausea and episodes of dry heaving.  He describes his abdominal pain as dull, achy pain that is always there and at its worst rates it 6 out of 10 however on average rates it 3 out of 10 with medication.  He denies any worsening distention however states he easily feels full with minimal food.  He denies any urinary frequency, hematuria, dysuria however endorses very dark urine.     Patient reports increased daytime sleepiness however attributes it to the combination of morphine and lorazepam.  He reports overall feeling weak however denies any numbness, tingling or focal neurological deficits.  He denies any recent contact with sick family members or friends.      CANCER HISTORY:  DIAGNOSIS: Ascending colon cancer  STAGING: IV  CURRENT SITES OF DISEASE:  Ascending colon, liver, peritoneal   MOLECULAR/GENOMICS:  Gayatrisushila xT 1/23/23 (from [place])  KRAS G12D 32.8% VAF  PTEN V290fs  BCOR Q822*  LZTR1 R198T  SMAD4 R361H  APC S146fs  APC R876*  FUBP1 Q279*  PTEN D268fs  Microsatellite stable  TMB 4.2 (low)  ctDNA Signatera  4/17/23: 0  5/17/23: 1.99  5/31/23: 28.84  6/14/23: 4.20  6/28/23: 9.55  7/12/23: 24.03  7/26/23: 40.53  8/23/23: 9.75  9/20/23: 3.67  10/18/23: 19.35  11/29/23: 125.52  12/26/23: 177.74  Switched treatment to FOLFIRI.  1/22/24: 195.37  2/19/24: 462.54  3/4/24: 1977.70 Switched to Lonsurf + Bevacizumab   CEA:  2/1/23: 4400  3/1/23: 4661  3/15/23: 3061  3/29/23: 2363  4/12/23: 1529  5/17/23: 235.9  5/31/23: 238.3  6/14/23: 225  6/28/23: 181  7/12/23: 171  7/26/23: 266  8/9/23: 392  8/23/23: 718  9/6/23: 800  9/20/23: 905   10/18/23: >100  11/1/23: 613  11/15/23: 1249  11/29/23: 1189  12/11/23: 1572  12/26/23: 2021 Switched treatment to FOLFIRI.   1/8/24: 1662  1/22/24: 2013  2/5/24: 2439  2/19/24: 2775  CURRENT THERAPY:  Lonsurf + Bevacizumab start 3/4/24  Last Glycerin in AMY pump on 2/5/24     PREVIOUS THERAPY:  1/2023-4/12/23: FOLFOX x6 cycles. First 2 cycles with bevacizumab  AMY pump placed 5/3/23. Flow rate 1.2 mL/day. (Serial # [id number]).First FUdR via AMY pump 5/17/23. Systemic chemotherapy being held due to wound vac, Started FOLFOX on 8/9/23. On 11/1/23 dose reduction in Oxaliplatin and infusional 5FU only due to chemotherapy colitis. Treatment DC due to PD. Last treatment on 12.11.2023. Total cycles of FUdR (if applicable):7  12.26.2023 : Started on FOLFIRI, Last and 4th cycle on 2/5/24. Discontinued due to disease progression   ONCOLOGIC ISSUES:  Shingles  Midline incision dehisced    PROVIDERS:  Surg onc: Rajinder Arenas  CRS: Regina Raymundo  Local med onc: Dia Kendall  HISTORY:   1/2023: Presented with 20 pound unexpected weight loss and RUQ pain. Imaging showed large liver lesion. Biopsy c/w metastatic colon cancer. C-scope showed infiltrative, ulcerated, fungating mass in ascending colon.   Baseline CEA 2322. Started FOLFOX + bevacizumab  3/16/23: MRI chest and liver showed no evidence of metastatic disease in the chest. Focal circumferential wall thickening   5/3/23: AMY pump placed, right hemicolectomy, open cholecystectomy and peritonectomy      Labs:  Results from last 7 days   Lab Units 03/15/24  0120 03/13/24  1635   WBC AUTO x10*3/uL 6.5 10.4   HEMOGLOBIN g/dL 10.9* 11.2*    HEMATOCRIT % 33.9* 35.3*   PLATELETS AUTO x10*3/uL 167 216            Results from last 7 days   Lab Units 03/15/24  0120 03/13/24  1635   SODIUM mmol/L 138 136   POTASSIUM mmol/L 3.7 3.7   CHLORIDE mmol/L 99 97*   CO2 mmol/L 29 29   BUN mg/dL 7 11   CREATININE mg/dL 0.47* 0.59   CALCIUM mg/dL 8.6 8.8     Results from last 7 days   Lab Units 03/15/24  0120 03/13/24  1635   ALK PHOS U/L 341* 354*   BILIRUBIN TOTAL mg/dL 0.8 1.1   PROTEIN TOTAL g/dL 6.6 7.0   ALT U/L 7* 7*   AST U/L 20 22                    Past Medical History  Past Medical History:   Diagnosis Date    Cancer (CMS/HCC)     Diabetes mellitus (CMS/HCC)     History of transfusion        Surgical History  Past Surgical History:   Procedure Laterality Date    COLON SURGERY      CT ABDOMEN ANGIOGRAM W AND/OR WO IV CONTRAST  04/13/2023    CT ABDOMEN ANGIOGRAM W AND/OR WO IV CONTRAST POR [id number] CT    FRACTURE SURGERY         Social History  He reports that he has quit smoking. His smoking use included cigarettes. He has never used smokeless tobacco. He reports that he does not drink alcohol and does not use drugs.    Family History  No family history on file.     Allergies  Patient has no known allergies.     Physical Exam   Physical Exam  Constitutional:       General: He is not in acute distress.     Appearance: He is not toxic-appearing.   HENT:      Head: Normocephalic and atraumatic.      Nose: Nose normal.      Mouth/Throat:      Mouth: Mucous membranes are moist.      Pharynx: Oropharynx is clear.   Eyes:      Extraocular Movements: Extraocular movements intact.      Pupils: Pupils are equal, round, and reactive to light.   Cardiovascular:      Rate and Rhythm: Regular rhythm. Tachycardia present.      Pulses: Normal pulses.      Heart sounds: Normal heart sounds. No murmur heard.     No gallop.   Pulmonary:      Effort: Pulmonary effort is normal. No respiratory distress.      Breath sounds: No wheezing or rales.      Comments: Decreased BS only  in LLL  Abdominal:      General: There is distension.      Tenderness: There is no abdominal tenderness. There is no guarding or rebound.      Comments: Hypoactive bowel sounds. AMY pump (LUQ) with ecchymosis. Midline surgical scar   Musculoskeletal:         General: Normal range of motion.      Cervical back: Normal range of motion.      Right lower leg: No edema.      Left lower leg: No edema.      Comments: LLE slightly larger than RLE. Per patient, this has always been that way since crush injury.   Skin:     General: Skin is warm.      Findings: No lesion.   Neurological:      General: No focal deficit present.      Mental Status: He is alert and oriented to person, place, and time.   Psychiatric:         Mood and Affect: Mood normal.          Medications  Scheduled medications    Continuous medications    PRN medications     === 03/13/24 ===    CT ABDOMEN PELVIS W IV CONTRAST  - Impression -  1.Peritoneal metastatic disease again noted, with likely serosal  involvement in multiple areas of the small bowel and rectum,  resulting in several areas of alternating borderline bowel dilatation  and narrowing. This is suggestive of multiple areas of partial  obstruction involving the small bowel and rectum.  2.Stable moderate to large volume ascites.  3.Stable hepatic, splenic and pulmonary metastatic disease. This  includes thrombus within the splenic vein, adjacent to the splenic  hilar mass, suggestive of tumor thrombus.  4.Moderate sized left pleural effusion has increased. Small right  pleural effusion is stable to slightly decreased.    MACRO:  None  Signed by: Sonia Avilez 3/13/2024 7:37 PM  Dictation workstation:   [id number]     Assessment/Plan   Narciso Marrero is a 58 y.o. male with PMHx of Stage IV KRAS + Ascending colon cancer  s/p Right hemicolectomy (5/3/23), metastatic disease to the liver, and numerous peritoneal implants s/p AMY pump, DMII, HTN, GERD, Chronic pain presents to  as a transfer  from [place] for further evaluation of partial small bowel obstruction and recent onset altered mental status.  Patient denies prolonged periods of constipation and his presentation is likely due to his peritoneal metastatic disease.  Partial SBO described on CT imaging less likely secondary to a mechanical obstruction, or adhesions but rather due to his worsening peritoneal metastatic disease.  Patient's acute onset confusion, forgetfulness and difficulty expressing his words is concerning for intracranial metastatic disease.  His altered mental status less likely metabolic in nature given no electrolyte derangements, however will order TSH, vitamin B12 and folate to definitively rule out.  Less likely infectious given no leukocytosis , fever, chills or any other systemic signs of infection.  Less likely toxins however cannot be entirely excluded given recent initiation of Lonsurf chemotherapy drug, and patient denies excess ingestion of prescribed opiates.  Given concern for intracranial metastatic disease MRI brain with IV contrast will be ordered for further evaluation.  At this time patient is hypertensive with SBPs greater than 180 otherwise the rest of the vitals are stable.  Patient will be treated with p.o. hydralazine as needed with blood pressure goal of 160 in next 24 hours.      #Borderline Dilated Small Bowel   #C/f SBO  #Constipation  - CT A/P with several segments of fluid-filled borderline dilated small bowel, alternating with decompressed bowel. The the areas of decompressed bowel demonstrate wall nodular enhancement, suggestive of serosal or constrictive peritoneal implants.  -Pt reports regular BM's since 3/4. Patient states if he skips miralax dose, stool immediately harden. Last BM yesterday, soft, paste-like and no melena or hematochezia.  -Hypoactive bowel sounds, +Ascites.   PLAN:  -Avoid Opiates given c/f bowel obstruction   -Correct electrolyte abnormalities   -C/w Miralax once daily,  DocSenna BID   -Continue to monitor for acute abdomen     #Moderate-size L. Pleural Effusion  #Small R. Pleural Effusion   #Pulmonary Nodule   -Left pleural effusion increased in size compared to last imaging. Right pleural effusion stable.  PLAN:   -CTM      #Stage IV Ascending Colon Cancer  #Stable Hepatic Masses   #Stable Splenic Masses  #Hilar Mass   #Numerous Peritoneal Masses   #Large Volume Ascites   -1/2023: Presented with 20 pound unexpected weight loss and RUQ pain. Imaging showed large liver lesion. Biopsy c/w metastatic colon cancer. C-scope showed infiltrative, ulcerated, fungating mass in ascending colon.   -Baseline CEA 2322. Started FOLFOX + Bevacizumab  PREVIOUS THERAPY:  -1/2023-4/12/23: FOLFOX x6 cycles. First 2 cycles with bevacizumab  AMY pump placed 5/3/23. First FUdR via AMY pump   -5/17/23. Systemic chemotherapy being held due to wound vac, Started FOLFOX on 8/9/23. On -11/1/23 dose reduction in Oxaliplatin and infusional 5FU only due to chemotherapy colitis. Treatment DC due to PD. Last treatment on 12.11.2023. Total cycles of FUdR (if applicable):7  -12.26.2023 : Started on FOLFIRI, Last and 4th cycle on 2/5/24. Discontinued due to disease progression  CURRENT THERAPY:  -Lonsurf + Bevacizumab start 3/4/24  -Last Glycerin in AMY pump on 2/5/24  -Providers:  -Surg onc: Rajinder Arenas  -CRS: Regina Raymundo  -Local med onc: Dia Kendall  PLAN:  -Email primary oncologist about admission in AM   -Hold Lonsurf for now     #HLD  - C/w home simvastatin 40 mg daily     #Chronic Pain  ::Has crushed shoulder injury 2/2 work-related accident  ::follows with [facility] management at [facility]  - C/w pregabalin 150 mg tid  - Hold percoset 5-325 mg q6hr as needed  - IV Tylenol 1000 mg BID  - Ok to spot dose Toradol for breakthrough pain.     #GERD  - C/w home pantoprazole 40 mg daily     #DMII  :: Home regimen: lantus 22 units, humalog 3 units w/ meals  PLAN:  - Hypoglycemia protocol   -  Mild insulin sliding scale #2      F : PRN  E: PRN  N: MPO  A:pIV     Bowel Regimen: Miralax, DocSenna  DVT prophylaxis: Lovenox subq  Code Status: FULL CODE (confirmed on admission 3/15/24)   NOK: Extended Emergency Contact Information  Primary Emergency Contact: ROHITH MARIE Phone: [phone]  Relation: Spouse   needed? No          Not yet staffed with the attending.  Flory Martinez MD  PGY-1 Internal Medicine

## 2024-03-16 LAB
ALBUMIN SERPL BCP-MCNC: 3.2 G/DL (ref 3.4–5)
ALP SERPL-CCNC: 295 U/L (ref 33–120)
ALT SERPL W P-5'-P-CCNC: 6 U/L (ref 10–52)
ANION GAP BLDV CALCULATED.4IONS-SCNC: 7 MMOL/L (ref 10–25)
ANION GAP SERPL CALC-SCNC: 15 MMOL/L (ref 10–20)
APPEARANCE UR: CLEAR
AST SERPL W P-5'-P-CCNC: 17 U/L (ref 9–39)
BASE EXCESS BLDV CALC-SCNC: 6.4 MMOL/L (ref -2–3)
BASOPHILS # BLD AUTO: 0.01 X10*3/UL (ref 0–0.1)
BASOPHILS NFR BLD AUTO: 0.1 %
BILIRUB DIRECT SERPL-MCNC: 0.4 MG/DL (ref 0–0.3)
BILIRUB SERPL-MCNC: 0.8 MG/DL (ref 0–1.2)
BILIRUB UR STRIP.AUTO-MCNC: NEGATIVE MG/DL
BODY TEMPERATURE: 37 DEGREES CELSIUS
BUN SERPL-MCNC: 8 MG/DL (ref 6–23)
CA-I BLDV-SCNC: 1.19 MMOL/L (ref 1.1–1.33)
CALCIUM SERPL-MCNC: 9.2 MG/DL (ref 8.6–10.6)
CHLORIDE BLDV-SCNC: 100 MMOL/L (ref 98–107)
CHLORIDE SERPL-SCNC: 98 MMOL/L (ref 98–107)
CO2 SERPL-SCNC: 30 MMOL/L (ref 21–32)
COLOR UR: ABNORMAL
CREAT SERPL-MCNC: 0.49 MG/DL (ref 0.5–1.3)
EGFRCR SERPLBLD CKD-EPI 2021: >90 ML/MIN/1.73M*2
EOSINOPHIL # BLD AUTO: 0.03 X10*3/UL (ref 0–0.7)
EOSINOPHIL NFR BLD AUTO: 0.4 %
ERYTHROCYTE [DISTWIDTH] IN BLOOD BY AUTOMATED COUNT: 13.8 % (ref 11.5–14.5)
FOLATE SERPL-MCNC: 7.6 NG/ML
GLUCOSE BLD MANUAL STRIP-MCNC: 126 MG/DL (ref 74–99)
GLUCOSE BLD MANUAL STRIP-MCNC: 132 MG/DL (ref 74–99)
GLUCOSE BLD MANUAL STRIP-MCNC: 141 MG/DL (ref 74–99)
GLUCOSE BLD MANUAL STRIP-MCNC: 151 MG/DL (ref 74–99)
GLUCOSE BLDV-MCNC: 126 MG/DL (ref 74–99)
GLUCOSE SERPL-MCNC: 124 MG/DL (ref 74–99)
GLUCOSE UR STRIP.AUTO-MCNC: NORMAL MG/DL
HCO3 BLDV-SCNC: 31.3 MMOL/L (ref 22–26)
HCT VFR BLD AUTO: 28.8 % (ref 41–52)
HCT VFR BLD EST: 37 % (ref 41–52)
HGB BLD-MCNC: 9.3 G/DL (ref 13.5–17.5)
HGB BLDV-MCNC: 12.4 G/DL (ref 13.5–17.5)
IMM GRANULOCYTES # BLD AUTO: 0.05 X10*3/UL (ref 0–0.7)
IMM GRANULOCYTES NFR BLD AUTO: 0.7 % (ref 0–0.9)
INHALED O2 CONCENTRATION: 95 %
INR PPP: 1 (ref 0.9–1.1)
KETONES UR STRIP.AUTO-MCNC: ABNORMAL MG/DL
LACTATE BLDV-SCNC: 1.3 MMOL/L (ref 0.4–2)
LEUKOCYTE ESTERASE UR QL STRIP.AUTO: NEGATIVE
LYMPHOCYTES # BLD AUTO: 0.62 X10*3/UL (ref 1.2–4.8)
LYMPHOCYTES NFR BLD AUTO: 8.6 %
MAGNESIUM SERPL-MCNC: 3.25 MG/DL (ref 1.6–2.4)
MCH RBC QN AUTO: 28.4 PG (ref 26–34)
MCHC RBC AUTO-ENTMCNC: 32.3 G/DL (ref 32–36)
MCV RBC AUTO: 88 FL (ref 80–100)
MONOCYTES # BLD AUTO: 0.26 X10*3/UL (ref 0.1–1)
MONOCYTES NFR BLD AUTO: 3.6 %
MUCOUS THREADS #/AREA URNS AUTO: NORMAL /LPF
NEUTROPHILS # BLD AUTO: 6.27 X10*3/UL (ref 1.2–7.7)
NEUTROPHILS NFR BLD AUTO: 86.6 %
NITRITE UR QL STRIP.AUTO: NEGATIVE
NRBC BLD-RTO: 0 /100 WBCS (ref 0–0)
OXYHGB MFR BLDV: 71.8 % (ref 45–75)
PCO2 BLDV: 45 MM HG (ref 41–51)
PH BLDV: 7.45 PH (ref 7.33–7.43)
PH UR STRIP.AUTO: 6.5 [PH]
PHOSPHATE SERPL-MCNC: 3.3 MG/DL (ref 2.5–4.9)
PLATELET # BLD AUTO: 176 X10*3/UL (ref 150–450)
PO2 BLDV: 45 MM HG (ref 35–45)
POTASSIUM BLDV-SCNC: 3.7 MMOL/L (ref 3.5–5.3)
POTASSIUM SERPL-SCNC: 3.7 MMOL/L (ref 3.5–5.3)
PROT SERPL-MCNC: 6.4 G/DL (ref 6.4–8.2)
PROT UR STRIP.AUTO-MCNC: ABNORMAL MG/DL
PROTHROMBIN TIME: 11.6 SECONDS (ref 9.8–12.8)
RBC # BLD AUTO: 3.28 X10*6/UL (ref 4.5–5.9)
RBC # UR STRIP.AUTO: NEGATIVE /UL
RBC #/AREA URNS AUTO: NORMAL /HPF
SAO2 % BLDV: 74 % (ref 45–75)
SODIUM BLDV-SCNC: 135 MMOL/L (ref 136–145)
SODIUM SERPL-SCNC: 139 MMOL/L (ref 136–145)
SP GR UR STRIP.AUTO: 1.01
TSH SERPL-ACNC: 0.64 MIU/L (ref 0.44–3.98)
UROBILINOGEN UR STRIP.AUTO-MCNC: NORMAL MG/DL
VIT B12 SERPL-MCNC: 721 PG/ML (ref 211–911)
WBC # BLD AUTO: 7.2 X10*3/UL (ref 4.4–11.3)
WBC #/AREA URNS AUTO: NORMAL /HPF

## 2024-03-16 PROCEDURE — 2500000004 HC RX 250 GENERAL PHARMACY W/ HCPCS (ALT 636 FOR OP/ED)

## 2024-03-16 PROCEDURE — 2500000001 HC RX 250 WO HCPCS SELF ADMINISTERED DRUGS (ALT 637 FOR MEDICARE OP)

## 2024-03-16 PROCEDURE — 99232 SBSQ HOSP IP/OBS MODERATE 35: CPT

## 2024-03-16 PROCEDURE — 2500000002 HC RX 250 W HCPCS SELF ADMINISTERED DRUGS (ALT 637 FOR MEDICARE OP, ALT 636 FOR OP/ED)

## 2024-03-16 PROCEDURE — C9113 INJ PANTOPRAZOLE SODIUM, VIA: HCPCS

## 2024-03-16 PROCEDURE — 85610 PROTHROMBIN TIME: CPT

## 2024-03-16 PROCEDURE — 84132 ASSAY OF SERUM POTASSIUM: CPT

## 2024-03-16 PROCEDURE — 85025 COMPLETE CBC W/AUTO DIFF WBC: CPT

## 2024-03-16 PROCEDURE — 84100 ASSAY OF PHOSPHORUS: CPT

## 2024-03-16 PROCEDURE — 82947 ASSAY GLUCOSE BLOOD QUANT: CPT

## 2024-03-16 PROCEDURE — 99223 1ST HOSP IP/OBS HIGH 75: CPT | Performed by: NURSE PRACTITIONER

## 2024-03-16 PROCEDURE — 83735 ASSAY OF MAGNESIUM: CPT

## 2024-03-16 PROCEDURE — 82746 ASSAY OF FOLIC ACID SERUM: CPT

## 2024-03-16 PROCEDURE — 84075 ASSAY ALKALINE PHOSPHATASE: CPT

## 2024-03-16 PROCEDURE — 81001 URINALYSIS AUTO W/SCOPE: CPT

## 2024-03-16 PROCEDURE — 1170000001 HC PRIVATE ONCOLOGY ROOM DAILY

## 2024-03-16 PROCEDURE — 82607 VITAMIN B-12: CPT

## 2024-03-16 RX ORDER — DICYCLOMINE HYDROCHLORIDE 10 MG/1
10 CAPSULE ORAL EVERY 6 HOURS PRN
Status: DISCONTINUED | OUTPATIENT
Start: 2024-03-16 | End: 2024-03-21 | Stop reason: HOSPADM

## 2024-03-16 RX ORDER — AMLODIPINE BESYLATE 5 MG/1
5 TABLET ORAL DAILY
Status: DISCONTINUED | OUTPATIENT
Start: 2024-03-16 | End: 2024-03-21

## 2024-03-16 RX ORDER — MORPHINE SULFATE 15 MG/1
15 TABLET, FILM COATED, EXTENDED RELEASE ORAL EVERY 12 HOURS SCHEDULED
Status: DISCONTINUED | OUTPATIENT
Start: 2024-03-16 | End: 2024-03-21

## 2024-03-16 RX ORDER — PROCHLORPERAZINE EDISYLATE 5 MG/ML
10 INJECTION INTRAMUSCULAR; INTRAVENOUS EVERY 6 HOURS PRN
Status: DISCONTINUED | OUTPATIENT
Start: 2024-03-16 | End: 2024-03-21 | Stop reason: HOSPADM

## 2024-03-16 RX ORDER — OLANZAPINE 5 MG/1
10 TABLET ORAL NIGHTLY
Status: DISCONTINUED | OUTPATIENT
Start: 2024-03-16 | End: 2024-03-21 | Stop reason: HOSPADM

## 2024-03-16 RX ORDER — POLYETHYLENE GLYCOL 3350 17 G/17G
17 POWDER, FOR SOLUTION ORAL DAILY
Status: DISCONTINUED | OUTPATIENT
Start: 2024-03-16 | End: 2024-03-21 | Stop reason: HOSPADM

## 2024-03-16 RX ORDER — DEXTROSE 50 % IN WATER (D50W) INTRAVENOUS SYRINGE
25
Status: DISCONTINUED | OUTPATIENT
Start: 2024-03-16 | End: 2024-03-16 | Stop reason: SDUPTHER

## 2024-03-16 RX ORDER — MORPHINE SULFATE 15 MG/1
15 TABLET, FILM COATED, EXTENDED RELEASE ORAL EVERY 12 HOURS SCHEDULED
Status: DISCONTINUED | OUTPATIENT
Start: 2024-03-16 | End: 2024-03-16

## 2024-03-16 RX ORDER — INSULIN GLARGINE 100 [IU]/ML
7 INJECTION, SOLUTION SUBCUTANEOUS NIGHTLY
Status: DISCONTINUED | OUTPATIENT
Start: 2024-03-16 | End: 2024-03-18

## 2024-03-16 RX ORDER — LORAZEPAM 0.5 MG/1
0.5 TABLET ORAL EVERY 8 HOURS PRN
COMMUNITY

## 2024-03-16 RX ORDER — MORPHINE SULFATE 30 MG/1
30 TABLET, FILM COATED, EXTENDED RELEASE ORAL 2 TIMES DAILY
COMMUNITY
End: 2024-03-25 | Stop reason: DRUGHIGH

## 2024-03-16 RX ORDER — MAGNESIUM SULFATE HEPTAHYDRATE 40 MG/ML
4 INJECTION, SOLUTION INTRAVENOUS ONCE
Status: COMPLETED | OUTPATIENT
Start: 2024-03-16 | End: 2024-03-16

## 2024-03-16 RX ORDER — PROCHLORPERAZINE 25 MG/1
25 SUPPOSITORY RECTAL EVERY 12 HOURS PRN
Status: DISCONTINUED | OUTPATIENT
Start: 2024-03-16 | End: 2024-03-21 | Stop reason: HOSPADM

## 2024-03-16 RX ORDER — PROCHLORPERAZINE MALEATE 10 MG
10 TABLET ORAL EVERY 6 HOURS PRN
Status: DISCONTINUED | OUTPATIENT
Start: 2024-03-16 | End: 2024-03-21 | Stop reason: HOSPADM

## 2024-03-16 RX ORDER — LORAZEPAM 0.5 MG/1
0.5 TABLET ORAL DAILY
Status: DISCONTINUED | OUTPATIENT
Start: 2024-03-16 | End: 2024-03-21 | Stop reason: HOSPADM

## 2024-03-16 RX ORDER — DEXTROSE 50 % IN WATER (D50W) INTRAVENOUS SYRINGE
12.5
Status: DISCONTINUED | OUTPATIENT
Start: 2024-03-16 | End: 2024-03-16 | Stop reason: SDUPTHER

## 2024-03-16 RX ADMIN — MELATONIN 3 MG: 3 TAB ORAL at 20:53

## 2024-03-16 RX ADMIN — ONDANSETRON 4 MG: 2 INJECTION INTRAMUSCULAR; INTRAVENOUS at 08:36

## 2024-03-16 RX ADMIN — PANTOPRAZOLE SODIUM 40 MG: 40 INJECTION, POWDER, FOR SOLUTION INTRAVENOUS at 20:53

## 2024-03-16 RX ADMIN — ONDANSETRON 4 MG: 2 INJECTION INTRAMUSCULAR; INTRAVENOUS at 21:30

## 2024-03-16 RX ADMIN — ENOXAPARIN SODIUM 40 MG: 100 INJECTION SUBCUTANEOUS at 16:59

## 2024-03-16 RX ADMIN — PREGABALIN 100 MG: 75 CAPSULE ORAL at 20:50

## 2024-03-16 RX ADMIN — MAGNESIUM SULFATE HEPTAHYDRATE 4 G: 40 INJECTION, SOLUTION INTRAVENOUS at 02:35

## 2024-03-16 RX ADMIN — DICYCLOMINE HYDROCHLORIDE 10 MG: 10 CAPSULE ORAL at 14:48

## 2024-03-16 RX ADMIN — HYDRALAZINE HYDROCHLORIDE 25 MG: 25 TABLET ORAL at 05:28

## 2024-03-16 RX ADMIN — MORPHINE SULFATE 15 MG: 15 TABLET, EXTENDED RELEASE ORAL at 16:59

## 2024-03-16 RX ADMIN — DOCUSATE SODIUM 50MG AND SENNOSIDES 8.6MG 2 TABLET: 8.6; 5 TABLET, FILM COATED ORAL at 20:49

## 2024-03-16 RX ADMIN — OLANZAPINE 10 MG: 5 TABLET, FILM COATED ORAL at 20:49

## 2024-03-16 RX ADMIN — AMLODIPINE BESYLATE 5 MG: 5 TABLET ORAL at 08:22

## 2024-03-16 RX ADMIN — ONDANSETRON 4 MG: 2 INJECTION INTRAMUSCULAR; INTRAVENOUS at 14:53

## 2024-03-16 RX ADMIN — ACETAMINOPHEN 1000 MG: 10 INJECTION INTRAVENOUS at 20:48

## 2024-03-16 RX ADMIN — ACETAMINOPHEN 1000 MG: 10 INJECTION INTRAVENOUS at 10:00

## 2024-03-16 RX ADMIN — LORAZEPAM 0.5 MG: 0.5 TABLET ORAL at 17:52

## 2024-03-16 RX ADMIN — PREGABALIN 100 MG: 75 CAPSULE ORAL at 08:23

## 2024-03-16 RX ADMIN — SIMVASTATIN 40 MG: 40 TABLET, FILM COATED ORAL at 20:50

## 2024-03-16 ASSESSMENT — PAIN - FUNCTIONAL ASSESSMENT
PAIN_FUNCTIONAL_ASSESSMENT: 0-10
PAIN_FUNCTIONAL_ASSESSMENT: 0-10

## 2024-03-16 ASSESSMENT — PAIN SCALES - GENERAL
PAINLEVEL_OUTOF10: 5 - MODERATE PAIN
PAINLEVEL_OUTOF10: 6

## 2024-03-16 NOTE — CARE PLAN
The patient's goals for the shift include      The clinical goals for the shift include pt will remain safe and free from injury during shift      Problem: Pain  Goal: My pain/discomfort is manageable  Outcome: Progressing     Problem: Safety  Goal: Patient will be injury free during hospitalization  Outcome: Progressing  Goal: I will remain free of falls  Outcome: Progressing     Problem: Daily Care  Goal: Daily care needs are met  Outcome: Progressing     Problem: Psychosocial Needs  Goal: Demonstrates ability to cope with hospitalization/illness  Outcome: Progressing  Goal: Collaborate with me, my family, and caregiver to identify my specific goals  Outcome: Progressing     Problem: Discharge Barriers  Goal: My discharge needs are met  Outcome: Progressing

## 2024-03-16 NOTE — PROGRESS NOTES
Pharmacy Medication History Review    Narciso Marrero is a 58 y.o. male admitted for Small bowel obstruction (CMS/[facility]). Pharmacy reviewed the patient's wdyxe-co-vbnjnsyyt medications and allergies for accuracy.    The list below reflects the updated PTA list. Comments regarding how patient may be taking medications differently can be found in the Admit Orders Activity.  Prior to Admission Medications   Prescriptions Last Dose Informant Patient Reported?   LORazepam (Ativan) 0.5 mg tablet  Self, Spouse/Significant Other Yes   Sig: Take 1 tablet (0.5 mg) by mouth every 8 hours if needed for anxiety.   Lantus Solostar U-100 Insulin 100 unit/mL (3 mL) pen  Self, Spouse/Significant Other Yes   Sig: Inject 15 Units under the skin once daily at bedtime.   OLANZapine (ZyPREXA) 10 mg tablet  Self, Spouse/Significant Other No   Sig: Take 1 tablet (10 mg) by mouth once daily at bedtime.   bisacodyl (Dulcolax) 5 mg EC tablet  Self, Spouse/Significant Other Yes   Sig: Take 1 tablet (5 mg) by mouth once daily as needed.   blood-glucose sensor (FreeStyle Alessia 3 Sensor) device  Self, Spouse/Significant Other No   Sig: Use to check blood sugars 4 times per day.  Change every 14 days.   dicyclomine (Bentyl) 10 mg capsule  Self, Spouse/Significant Other No   Sig: Take 1 capsule (10 mg) by mouth 4 times a day. As need for abdominal cramping   insulin lispro (HumaLOG KwikPen Insulin) 100 unit/mL injection  Self, Spouse/Significant Other Yes   Sig: Inject under the skin. Inject 3 units under the skin three times a day with meals + SS if blood sugar over 150 mg/dL (3 units if 151-200 mg/dL; 6 units if 201-250 mg/dL; 9 units if 251-300 mg/dL).   morphine CR (MS Contin) 30 mg 12 hr tablet  Self, Spouse/Significant Other Yes   Sig: Take 1 tablet (30 mg) by mouth 2 times a day. Do not crush, chew, or split.   ondansetron (Zofran) 8 mg tablet  Self, Spouse/Significant Other No   Sig: Take 1 tablet (8 mg) by mouth every 8 hours if needed for  "nausea or vomiting.      oxyCODONE-acetaminophen (Percocet)  mg tablet  Self, Spouse/Significant Other No   Sig: Take 1 tablet by mouth every 6 hours if needed for severe pain (7 - 10).   pantoprazole (ProtoNix) 40 mg EC tablet  Self, Spouse/Significant Other Yes   Sig: Take 1 tablet (40 mg) by mouth once daily in the morning. Take before meals.   pen needle, diabetic 31 gauge x 5/16\" needle  Self, Spouse/Significant Other No   Sig: USE FOUR TIMES A DAY   pregabalin (Lyrica) 150 mg capsule  Self, Spouse/Significant Other No   Sig: Take 1 capsule (150 mg) by mouth 2 times a day.   prochlorperazine (Compazine) 10 mg tablet  Self, Spouse/Significant Other No   Sig: Take 1 tablet (10 mg) by mouth every 6 hours if needed for nausea or vomiting.   sennosides (Senokot) 8.6 mg tablet  Self, Spouse/Significant Other No   Sig: Take 2 tablets (17.2 mg) by mouth once daily at bedtime. This is to help manage constipation. HOLD for diarrhea.   simvastatin (Zocor) 40 mg tablet  Self, Spouse/Significant Other Yes   Sig: Take 1 tablet (40 mg) by mouth once daily at bedtime.   trifluridine-tipiraciL (Lonsurf) 15-6.14 mg tablet Yesterday Self, Spouse/Significant Other No   Sig: Take 1 tablet (15 mg total) by mouth twice daily.  Take on Days 1-5 and Days 8-12 of each 28 day treatment cycle. Take within one hour of completion of morning and evening meals.   trifluridine-tipiraciL (Lonsurf) 20-8.19 mg tablet Yesterday Self, Spouse/Significant Other No   Sig: Take 3 tablets (60 mg total) by mouth twice daily.  Take on Days 1-5 and Days 8-12 of each 28 day treatment cycle. Take within one hour of completion of morning and evening meals.      Facility-Administered Medications: None        The list below reflects the updated allergy list. Please review each documented allergy for additional clarification and justification.  Allergies  Reviewed by Arlette Pierre, PharmD on 3/16/2024   No Known Allergies         Patient accepts M2B at " discharge. Pharmacy has been updated to [facility].    Sources used to complete the med history include reviewing the Epic medication dispense history, checking [place] and     Below are additional concerns with the patient's PTA list.  Patient states that the dose of lorazepam was just increased to 1 mg q8h and morphine was just increased to three times a day, but he was not taking these strengths at home yet (these are both confirmed with the dispense history from [facility] and [place]).     According to patient, he has just finished his treatment with the Lonsurf and that the dose he took yesterday was his last dose.    Arlette Pierre, Pharm. D.  PGY-1 Pharmacy Resident  [facility]  Please reach out via [employer] Secure Chat for questions, or if no response call [employer] or [employer] “[facility]Rec”

## 2024-03-16 NOTE — NURSING NOTE
/112. Provider notified. Will recheck BP again in 2hrs.    1043: BP recheck is 164/116. No new interventions at this time.

## 2024-03-16 NOTE — PROGRESS NOTES
"  MEDICINE INPATIENT PROGRESS NOTE    Narciso Marrero is a 58 y.o. male on hospital day 1    Subjective   No acute events overnight.  Upon evaluation this AM, patient resting comfortably in bed.  Reports having 3 bowel movements and continues to pass gas.  Denies any melena or hematochezia.  Denies any chest pain, shortness of breath, however reports nausea and dry heaving episodes.  He denies any numbness, paresthesias and weakness.       Objective     Last Recorded Vitals  Blood pressure (!) 156/97, pulse 110, temperature 36.6 °C (97.9 °F), temperature source Temporal, resp. rate 18, height 1.8 m (5' 10.87\"), weight 92.6 kg (204 lb 2.3 oz), SpO2 96 %.  Intake/Output last 3 Shifts:  No intake/output data recorded.  Relevant Results  Results from last 7 days   Lab Units 03/16/24  0522 03/15/24  1802 03/15/24  0120   WBC AUTO x10*3/uL 7.2 6.8 6.5   HEMOGLOBIN g/dL 9.3* 10.8* 10.9*   HEMATOCRIT % 28.8* 33.9* 33.9*   PLATELETS AUTO x10*3/uL 176 187 167            Results from last 7 days   Lab Units 03/16/24  0522 03/15/24  1802 03/15/24  0120   SODIUM mmol/L 139 139 138   POTASSIUM mmol/L 3.7 3.9 3.7   CHLORIDE mmol/L 98 98 99   CO2 mmol/L 30 32 29   BUN mg/dL 8 8 7   CREATININE mg/dL 0.49* 0.47* 0.47*   CALCIUM mg/dL 9.2 8.9 8.6     Results from last 7 days   Lab Units 03/16/24  0522 03/15/24  1802 03/15/24  0120   ALK PHOS U/L 295* 345* 341*   BILIRUBIN TOTAL mg/dL 0.8 0.8 0.8   BILIRUBIN DIRECT mg/dL 0.4* 0.3  --    PROTEIN TOTAL g/dL 6.4 6.1* 6.6   ALT U/L 6* 8* 7*   AST U/L 17 20 20      Results from last 7 days   Lab Units 03/16/24  0522 03/15/24  1802   INR  1.0 1.1        Physical Exam  Cardiovascular:      Rate and Rhythm: Normal rate and regular rhythm.      Pulses: Normal pulses.      Heart sounds: Normal heart sounds. No murmur heard.     No gallop.   Pulmonary:      Effort: Pulmonary effort is normal. No respiratory distress.      Breath sounds: Normal breath sounds. No wheezing.      Comments: Bilateral " lower lobe crackles.  Abdominal:      General: Bowel sounds are normal. There is distension.      Tenderness: There is abdominal tenderness. There is no guarding or rebound.      Hernia: A hernia is present.      Comments: AMY pump (LUQ) with ecchymosis. Midline surgical scar    Musculoskeletal:         General: Normal range of motion.      Cervical back: Normal range of motion.      Right lower leg: No edema.      Left lower leg: No edema.      Comments: LLE slightly larger than RLE. Per patient, this has always been that way since crush injury.    Skin:     General: Skin is warm.   Neurological:      General: No focal deficit present.      Mental Status: He is alert and oriented to person, place, and time.   Psychiatric:         Mood and Affect: Mood normal.       === 03/13/24 ===  CT ABDOMEN PELVIS W IV CONTRAST  - Impression -  Peritoneal metastatic disease again noted, with likely serosal  involvement in multiple areas of the small bowel and rectum,  resulting in several areas of alternating borderline bowel dilatation  and narrowing. This is suggestive of multiple areas of partial  obstruction involving the small bowel and rectum.    Stable moderate to large volume ascites.    Stable hepatic, splenic and pulmonary metastatic disease. This  includes thrombus within the splenic vein, adjacent to the splenic  hilar mass, suggestive of tumor thrombus.    Moderate sized left pleural effusion has increased. Small right  pleural effusion is stable to slightly decreased.    MACRO:  None    Signed by: Sonia Avilez 3/13/2024 7:37 PM  Dictation workstation:   [id number]    Assessment/Plan   Narciso Marrero is a 58 y.o. male with PMHx of Stage IV KRAS + Ascending colon cancer  s/p Right hemicolectomy (5/3/23), metastatic disease to the liver, and numerous peritoneal implants s/p AMY pump, DMII, HTN, GERD, Chronic pain presents to  as a transfer from [place] for further evaluation of partial small bowel obstruction  and recent onset altered mental status.  Patient denies prolonged periods of constipation and his presentation is likely due to his peritoneal metastatic disease.  Partial SBO described on CT imaging less likely secondary to a mechanical obstruction, or adhesions but rather due to his worsening peritoneal metastatic disease. Patient's acute onset confusion, forgetfulness and difficulty expressing his words is concerning for intracranial metastatic disease.  His altered mental status less likely metabolic in nature given no electrolyte derangements, however will order TSH, vitamin B12 and folate to definitively rule out.  Less likely infectious given no leukocytosis , fever, chills or any other systemic signs of infection. Less likely toxins however cannot be entirely excluded given recent initiation of Lonsurf chemotherapy drug, and patient denies excess ingestion of prescribed opiates. Given concern for intracranial metastatic disease MRI brain with IV contrast will be ordered for further evaluation. At this time patient is hypertensive with SBPs greater than 180 otherwise the rest of the vitals are stable. Patient will be treated with p.o. hydralazine as needed with blood pressure goal of 160 in next 24 hours.      3/16 UPDATES:  -Amlodipine 5 mg added to HTN regimen   -CLD   -Supportive Onc c/s  -Zyprexa 10 mg nightly, and Ativan 0.5 mg restarted  -Morphine 15 mg BID   -C/s IR on Monday for diagnostic and therapeutic para       #Acute Encephalopathy  -Patient's acute onset confusion, forgetfulness and difficulty expressing his words is concerning for intracranial metastatic disease.  His altered mental status less likely metabolic in nature given no electrolyte derangements, however will order TSH, vitamin B12 and folate to definitively rule out.  Less likely infectious given no leukocytosis , fever, chills or any other systemic signs of infection. Less likely toxins however cannot be entirely excluded given  recent initiation of Lonsurf chemotherapy drug, and patient denies excess ingestion of prescribed opiates. Given concern for intracranial metastatic disease MRI brain with IV contrast will be ordered for further evaluation.  -TSH, B12, Folate wnl   -Blood cx pending   PLAN:   -MRI Brain w/IV Con. Pt w/ hepatic artery infusion (AMY) pump.        #Borderline Dilated Small Bowel   #C/f SBO  #Constipation  - CT A/P with several segments of fluid-filled borderline dilated small bowel, alternating with decompressed bowel. The the areas of decompressed bowel demonstrate wall nodular enhancement, suggestive of serosal or constrictive peritoneal implants.  -Pt reports regular BM's since 3/4. Patient states if he skips miralax dose, stool immediately harden. Last BM yesterday, soft, paste-like and no melena or hematochezia.  -Hypoactive bowel sounds, +Ascites.   PLAN:  -Avoid Opiates given c/f bowel obstruction   -Correct electrolyte abnormalities   -C/w Miralax once daily, DocSenna BID   -Continue to monitor for acute abdomen      #Moderate-size L. Pleural Effusion  #Small R. Pleural Effusion   #Pulmonary Nodule   -Left pleural effusion increased in size compared to last imaging. Right pleural effusion stable.  -Patient reports SOB w/ exertion, however asymptomatic at rest.  -Consider Thoracentesis if symptoms worsen.  PLAN:   -CTM    #Stage IV Ascending Colon Cancer  #Stable Hepatic Masses   #Stable Splenic Masses  #Hilar Mass   #Numerous Peritoneal Masses   #Large Volume Ascites   #C/f SBP  -1/2023: Presented with 20 pound unexpected weight loss and RUQ pain. Imaging showed large liver lesion. Biopsy c/w metastatic colon cancer. C-scope showed infiltrative, ulcerated, fungating mass in ascending colon.   -Baseline CEA 2322. Started FOLFOX + Bevacizumab  PREVIOUS THERAPY:  -1/2023-4/12/23: FOLFOX x6 cycles. First 2 cycles with bevacizumab  AMY pump placed 5/3/23. First FUdR via AMY pump   -5/17/23. Systemic chemotherapy  being held due to wound vac, Started FOLFOX on 8/9/23. On -11/1/23 dose reduction in Oxaliplatin and infusional 5FU only due to chemotherapy colitis. Treatment DC due to PD. Last treatment on 12.11.2023. Total cycles of FUdR (if applicable):7  -12.26.2023 : Started on FOLFIRI, Last and 4th cycle on 2/5/24. Discontinued due to disease progression  CURRENT THERAPY:  -Lonsurf + Bevacizumab start 3/4/24  -Last Glycerin in AMY pump on 2/5/24  -Providers:  -Surg onc: Rajinder Arenas  -CRS: Regina Raymundo  -Local med onc: Dia Kendall  PLAN:  -C/s IR for para Monday 3/18  -Email primary oncologist about admission   -Hold Lonsurf for now      #HLD  - C/w home simvastatin 40 mg daily     #Chronic Pain  ::Has crushed shoulder injury 2/2 work-related accident  ::follows with pain management at [facility]  - C/w pregabalin 150 mg tid  - Hold percoset 5-325 mg q6hr as needed  - IV Tylenol 1000 mg BID  - Ok to spot dose Toradol for breakthrough pain.     #GERD  - C/w home pantoprazole 40 mg daily     #DMII  :: Home regimen: lantus 22 units, humalog 3 units w/ meals  PLAN:  - Hypoglycemia protocol   - Mild insulin sliding scale #2    MISC:  -Zypreza 10 mg  once nightly     F:PRN  E:PRN  N:   Dietary Orders (From admission, onward)       Start     Ordered    03/16/24 0947  Adult diet Clear Liquid  Diet effective now        Question:  Diet type  Answer:  Clear Liquid    03/16/24 0946                  A:pIV  DVT: Lovenox  GI:PPI    Bowel Regimen:  Code Status: Full code (confirmed on admission)  NOK: Primary Emergency Contact: ROHITH MARIE, Home Phone: [phone]             Patient staffed with the attending physician.  Flory Martinez MD, MPH  PGY-1 Internal Medicine

## 2024-03-16 NOTE — CONSULTS
SUPPORTIVE AND PALLIATIVE ONCOLOGY CONSULT    Inpatient consult to [facility] Adult Supportive Oncology  Consult performed by: TORRI Garduno, DNP  Consult ordered by: Michelle Powers MD        SERVICE DATE: 3/16/2024      PALLIATIVE MEDICINE OUTPATIENT PROVIDER:  TORRI Bonds  CURRENT ATTENDING PROVIDER: Michelle Powers MD     Medical Oncologist: Laney Russ MD PhD  Brooklynn Enciso MD   Radiation Oncologist: No care team member to display  Primary Physician: Jonny Murdock  [phone]    REASON FOR CONSULT/CHIEF CONSULT COMPLAINT: pain management and goals of care discussion    Subjective   HISTORY OF PRESENT ILLNESS: Narciso Marrero is a 58 y.o. male diagnosed with metastatic ascending colon cancer with liver and peritoneal involvement. He has an AMY pump and is s/p multiple lines of systemic treatment. He was started on FOLFIRI 12/26/23, though this is now on hold d/t disease progression, recently started on Lonsurf/Bevacizumab. PMH significant for T2DM, HTN, GERD, chronic pain. Presented to [facility] 3/13/2024 with AMS, weakness, transferred to [facility] 3/15/2024 for further evaluation and management of SBO and AMS. Supportive and Palliative Oncology is consulted for pain management and goals of care discussion.     Follows with Rosalinda Barksdale CNP in outpatient Supp Onc, last seen 3/11/2024 where his Morphine ER dose was increased to 30 mg TID, olanzapine increased to 10 mg at bedtime and lorazepam to 1 mg PO prn nausea.   Pt reports was taking Morphine ER 30 mg TID. Wife reports patient took lorazepam 1 mg dose one night but was confused and reduced the dose back to 0.5 mg prn. Did not increase olanzapine to 10 mg and was taking 5 mg at bedtime.   Reports frequent nausea that was well managed with alternating lorazepam and ondansetron.     Opioids held due to concern for SBO, constipation however pain has been worsening off opioids. Reports tylenol ineffective, noticed all  pain has been worsening off opioids (legs, CIPN, shingles pain, abdomen). Was receiving lower doses of opioids at [place] Morphine Er 15 mg BID, percocet 5/324 prn and pain was not controlled.     Reports chronic bilateral LE pain from work injury  Mild neuropathic pain right face after shingles. Reports pregabalin was increased to 150 mg TID for shingles pain but was reduced back to 100 mg BID.   Constant pressure abdominal pain.    New confusion past week, no new pain medications started per patient and wife. Confusion improving since admission.       Pain Assessment:  Onset:  Months  Location: Right head, Bilateral upper leg and lower leg, and abdomen  Duration: Constant  Characteristics:   Ratin   Descriptors: aching, burning, and pressure abdominal pain   Aggravating: movement    Relieving: Analgesics     Treatment/Home Regimen: Morphine ER 30 mg TID, Muscle Relaxers tizanidine 4 mg TID prn, and Percocet 10/325 mg q4h prn    Intolerances:Narciso Marrero has No Known Allergies.   Personal Pain Goal: 2    Interference with Function: Somewhat   Coping Strategies: Relaxation   Emotional Response:  none   Barriers to Pain Management:  not ordered    Opioid Use  Past 24 h prn opioid use:  none  Total 24h OME use:  0    Note: OME calculations based on equianalgesic table below. Please note this table is based on best available evidence but conversions are still approximate. These are NOT opioid DOSES for individual patient use; this is equivalency information.  Drug Parenteral Enteral   Morphine 10 25   Oxycodone N/A 20   Hydromorphone 2 5   Fentanyl 0.15 N/A   Tramadol N/A 120   Citation: Faye JAY. Demystifying opioid conversion calculations: A guide for effective dosing, Second edition. MD [place]: American Society of Health-System Pharmacists, 2018.    OARRS/PDMP reviewed no aberrant behavior noted.    Symptom Assessment:  Pain:very much   Headache: a little  Dizziness:none  Lack of energy: very  much  Difficulty sleeping: none  Worrying: none  Anxiety: none  Depression: a little  Pain in mouth/swallowing: none  Dry mouth: none  Taste changes: a little  Shortness of breath: somewhat  Lack of appetite: very much   Nausea: somewhat  Vomiting: a little  Constipation: somewhat  Diarrhea:  today  Sore muscles: none  Numbness or tingling in hands/feet/other: somewhat  Weight loss: a little  Other: none        Information obtained from: chart review, interview of patient, and interview of family  ______________________________________________________________________     Oncology History   Malignant neoplasm of ascending colon (CMS/HCC)   9/29/2023 Initial Diagnosis    Malignant neoplasm of ascending colon (CMS/HCC)     10/4/2023 - 12/13/2023 Chemotherapy    Floxuridine Hepatic Arterial Infusion + mFOLFOX6, 14 Day Cycles     12/26/2023 - 2/7/2024 Chemotherapy    AMY Glycerin + FOLFIRI (Fluorouracil Continuous Infusion / Leucovorin / Irinotecan), 14 Day Cycles     3/4/2024 -  Chemotherapy    Bevacizumab + Trifluridine and tipiracil, 28 Day Cycles         Past Medical History:   Diagnosis Date    Cancer (CMS/HCC)     Diabetes mellitus (CMS/HCC)     History of transfusion      Past Surgical History:   Procedure Laterality Date    COLON SURGERY      CT ABDOMEN ANGIOGRAM W AND/OR WO IV CONTRAST  04/13/2023    CT ABDOMEN ANGIOGRAM W AND/OR WO IV CONTRAST POR [id number] CT    FRACTURE SURGERY       No family history on file.     SOCIAL HISTORY:  Marital Status  to Emperatriz   Social History:  reports that he has quit smoking. His smoking use included cigarettes. He has never used smokeless tobacco. He reports that he does not drink alcohol and does not use drugs.      REVIEW OF SYSTEMS:  Review of systems negative unless noted in HPI.       Objective       Lab Results   Component Value Date    WBC 7.2 03/16/2024    HGB 9.3 (L) 03/16/2024    HCT 28.8 (L) 03/16/2024    MCV 88 03/16/2024     03/16/2024      Lab  Results   Component Value Date    GLUCOSE 124 (H) 03/16/2024    CALCIUM 9.2 03/16/2024     03/16/2024    K 3.7 03/16/2024    CO2 30 03/16/2024    CL 98 03/16/2024    BUN 8 03/16/2024    CREATININE 0.49 (L) 03/16/2024     Lab Results   Component Value Date    ALT 6 (L) 03/16/2024    AST 17 03/16/2024    ALKPHOS 295 (H) 03/16/2024    BILITOT 0.8 03/16/2024     Estimated Creatinine Clearance: 125 mL/min (A) (by C-G formula based on SCr of 0.49 mg/dL (L)).     Current Outpatient Medications   Medication Instructions    acetaminophen (TYLENOL) 650 mg, oral, Every 6 hours PRN    acyclovir (Zovirax) 800 mg tablet Every 12 hours    alteplase (CATHFLO ACTIVASE) 2 mg, intra-catheter    bisacodyl (Dulcolax) 5 mg EC tablet oral    blood-glucose sensor (MediVisionStyle Alessia 3 Sensor) device Use to check blood sugars 4 times per day.  Change every 14 days.    chlorhexidine (Hibiclens) 4 % external liquid 15 mL, Every 12 hours    cholecalciferol (VITAMIN D-3) 1,000 Units, oral, Daily    dicyclomine (BENTYL) 10 mg, oral, 4 times daily, As need for abdominal cramping    DOCOSAHEXAENOIC ACID ORAL 1,000 mg, oral, Daily RT    enoxaparin (LOVENOX) 40 mg, subcutaneous    ergocalciferol (Vitamin D-2) 50 MCG (2000 UT) capsule capsule oral    glucagon (GLUCAGEN) 1 mg, intramuscular    HYDROcodone-acetaminophen (Norco) 5-325 mg tablet     insulin lispro (HumaLOG KwikPen Insulin) 100 unit/mL injection Subcutaneous for 90    Lantus Solostar U-100 Insulin 100 unit/mL (3 mL) pen     LORazepam (ATIVAN) 1 mg, oral, Every 8 hours PRN    metroNIDAZOLE (Flagyl) 250 mg tablet oral    Miralax 17 g, oral, Daily    morphine CR (MS CONTIN) 30 mg, oral, 3 times daily, Do not crush, chew, or split.    multivit 46-iron-folate 6-dha 29 mg iron-1 mg -300 mg capsule oral, Daily RT    multivitamin capsule 1 capsule, oral, Daily    nystatin (Mycostatin) 100,000 unit/mL suspension SWISH & SWALLOW 5 ML BY MOUTH 4 TIMES DAILY    OLANZapine (ZYPREXA) 10 mg, oral,  "Nightly    omega 3-dha-epa-fish oil (Fish OiL) 1,000 mg (120 mg-180 mg) capsule 1,000 mg    ondansetron (ZOFRAN) 8 mg, oral, Every 8 hours PRN    ondansetron (ZOFRAN) 8 mg, oral, Every 8 hours PRN    ondansetron (ZOFRAN) 8 mg, oral, Every 8 hours PRN    oxyCODONE-acetaminophen (Percocet)  mg tablet 1 tablet, oral, Every 6 hours PRN    pantoprazole (PROTONIX) 40 mg, oral, Daily before breakfast    pen needle, diabetic 31 gauge x 5/16\" needle USE FOUR TIMES A DAY    pravastatin (Pravachol) 20 mg tablet Every 24 hours    pregabalin (LYRICA) 150 mg, oral, 2 times daily    prochlorperazine (COMPAZINE) 10 mg, oral, Every 6 hours PRN    psyllium (Metamucil) 3.4 gram packet 1 packet, oral, Daily PRN    sennosides (SENOKOT) 17.2 mg, oral, Nightly, This is to help manage constipation. HOLD for diarrhea.    sennosides-docusate sodium (Leila-Colace) 8.6-50 mg tablet oral, Every 12 hours    simvastatin (ZOCOR) 40 mg, oral, Nightly    traMADol (Ultram) 50 mg tablet TAKE 1 TABLET BY MOUTH EVERY 4 TO 6 HOURS AS NEEDED FOR PAIN FOR 7 DAYS    trifluridine-tipiraciL (LONSURF) 35 mg/m2, oral, [facility] ONC BID for 10 Days in a 28 Day Cycle, Take on Days 1-5 and Days 8-12 of each 28 day treatment cycle. Take within one hour of completion of morning and evening meals.     Scheduled medications   acetaminophen, 1,000 mg, intravenous, q12h VAHID  amLODIPine, 5 mg, oral, Daily  dicyclomine, 10 mg, oral, 4x daily  enoxaparin, 40 mg, subcutaneous, q24h  insulin glargine, 7 Units, subcutaneous, Nightly  insulin lispro, 0-10 Units, subcutaneous, q4h  melatonin, 3 mg, oral, Daily  pantoprazole, 40 mg, intravenous, Daily  polyethylene glycol, 17 g, oral, Daily  pregabalin, 100 mg, oral, BID  sennosides-docusate sodium, 2 tablet, oral, q12h  simvastatin, 40 mg, oral, Nightly  [Held by provider] trifluridine-tipiraciL (Lonsurf) 15-6.14 mg 15 mg, trifluridine-tipiraciL (Lonsurf) 20-8.19 mg 60 mg, 75 mg, oral, Once      Continuous medications     PRN " medications  PRN medications: dextrose, dextrose, glucagon, hydrALAZINE, ondansetron     Allergies: No Known Allergies          CT AP 3/13/2024  IMPRESSION:  Peritoneal metastatic disease again noted, with likely serosal  involvement in multiple areas of the small bowel and rectum,  resulting in several areas of alternating borderline bowel dilatation  and narrowing. This is suggestive of multiple areas of partial  obstruction involving the small bowel and rectum.      Stable moderate to large volume ascites.      Stable hepatic, splenic and pulmonary metastatic disease. This  includes thrombus within the splenic vein, adjacent to the splenic  hilar mass, suggestive of tumor thrombus.      Moderate sized left pleural effusion has increased. Small right  pleural effusion is stable to slightly decreased.    CT head 3/13/2024  IMPRESSION:  No acute intracranial abnormality.     PHYSICAL EXAMINATION:  Vital Signs:   Vital signs reviewed  Vitals:    03/16/24 1043   BP: (!) 164/116   Pulse:    Resp:    Temp:    SpO2:      Pain Score: 5 - Moderate pain     Physical Exam  Vitals reviewed.   Constitutional:       General: He is not in acute distress.  HENT:      Head: Normocephalic and atraumatic.      Mouth/Throat:      Mouth: Mucous membranes are moist.   Eyes:      Conjunctiva/sclera: Conjunctivae normal.   Pulmonary:      Effort: Pulmonary effort is normal. No respiratory distress.   Abdominal:      General: There is distension.   Musculoskeletal:         General: Normal range of motion.   Neurological:      General: No focal deficit present.      Mental Status: He is alert and oriented to person, place, and time.   Psychiatric:         Mood and Affect: Mood normal.         Behavior: Behavior normal.         Thought Content: Thought content normal.         Judgment: Judgment normal.       ASSESSMENT/PLAN:  Narciso Marrero is a 58 y.o. male diagnosed with metastatic ascending colon cancer with liver and peritoneal  involvement. He has an AMY pump and is s/p multiple lines of systemic treatment. He was started on FOLFIRI 12/26/23, though this is now on hold d/t disease progression, recently started on Lonsurf/Bevacizumab. PMH significant for T2DM, HTN, GERD, chronic pain. Presented to  [place] 3/13/2024 with AMS, weakness, transferred to [facility] 3/15/2024 for further evaluation and management of SBO and AMS. Supportive and Palliative Oncology is consulted for pain management and goals of care discussion.     Cancer related Pain:  Lower abdominal pain related to metastatic colon cancer with liver and peritoneal mets, ascites  CIPN bilateral fingers, right face/head pain related to post herpetic neuralgia  Chronic bilateral LE pain related to work injury  Pain is: cancer related pain and chronic  Type: visceral, somatic, and neuropathic  Pain control: sub-optimally controlled  Home regimen:  Morphine ER 30 mg TID, Pregabalin 100 mg BID, and Percocet 10/325 mg q6h prn   Intolerances/previously tried: none  Personalized pain goal: 2  Total OME usage for the past 24 hours:  0  Opioids held due to concern for SBO, constipation however pain has been worsening off opioids. Pt had large diarrhea stool today  Recommend starting Morphine ER 30 mg PO q12h  Recommend starting Oxycodone IR 10 mg PO q4h prn moderate to severe pain  Continue Acetaminophen 1000 mg IV q12h   Continue Pregabalin 100 mg BID (consider increasing to TID)  Consider addition of short course dexamethasone for pain, nausea, reduction in intestinal edema 4 mg PO qAM, monitor blood sugars in the setting of T2DM  Continue to monitor pain scores and administer PRN medications as appropriate  Continue/initiate nonpharmacologic pain management strategies including ice/heat therapy, distraction techniques, deep breathing/relaxation techniques, calming music, and repositioning  Continue to monitor for signs of opioid efficacy (pain scores, improved functionality) and  toxicity (pinpoint pupils, excess sedation/drowsiness/confusion, respiratory depression, etc.)    Nausea:  Intermittent nausea with vomiting related to constipation and bowel obstruction   Home regimen:  Ondansetron , Olanzapine, and Lorazepam  well-controlled  Continue ondansetron 4 mg PO q6h prn   Start lorazepam 0.5 mg PO q8h prn (1 mg dose caused confusion)  Start olanzapine 5 mg PO at bedtime (home med)    Constipation  At risk for constipation related to opioids, currently not constipated  Usual bowel pattern: every other day  Home regimen: Miralax 17 gm daily to prn  LBM 3/16, large diarrhea  Monitor BM frequency, adjust regimen as needed  Goal to have BM without straining q48-72h  Change Bentyl to QID PRN cramping  Continue miralax 17 gm daily  Continue senna-S 2 tabs BId    Decreased appetite:  Appetite loss related to malignancy, taste changes, and disease process  Home regimen:  Olanzapine 5 mg qhs  Start olanzapine 5 mg PO at bedtime (home med)  Control nausea  Consider dexamethasone as above    Medical Decision Making/Goals of Care/Advance Care Planning:  Patient's current clinical condition, including diagnosis, prognosis, and management plan, and goals of care were discussed.   Life limiting disease: metastatic malignancy  Family: Supportive wife  Performance status: Moderate limitations due to pain and fatigue  Joys/meaning/strength: Family  Understanding of health: Demonstrates good prognostic understanding of disease process    Advance Directives  Existence of Advance Directives:Yes, but NOT documented in medical record  Decision maker: Surrogate decision maker is wife Emperatriz  Code Status: Full code    Supportive and Palliative Oncology encounter:  Spoke with patient at bedside  Emotional support provided  Coordination of care     Disposition:  Please  start the process of having prior authorization with meds to beds deliver medications to patient prior to discharge via [facility] pharmacy.  Prescriptions will need to be sent 48-72 hours prior to discharge so that a prior authorization can be completed.     Discharge date: unknown pending acute issues  Will request an appointment with Outpatient Supportive Oncology TORRI Bonds      Signature and billing:  Thank you for allowing us to participate in the care of this patient. Recommendations will be communicated back to the consulting service by way of shared electronic medical record or face-to-face.    Medical complexity was high level due to due to complexity of problems, extensive data review, and high risk of management/treatment.    I spent 75 minutes in the care of this patient which included chart review, interviewing patient/family, discussion with primary team, coordination of care, and documentation.      DATA   Diagnostic tests and information reviewed for today's visit:  Conversation with primary team, Most recent labs and imaging results, Medications       Plan of Care discussed with: Provider, Patient, and Family/Significant Other: wife    Thank you for asking Supportive and Palliative Oncology to assist with care of this patient.  We will continue to follow.  Please contact us for additional questions or concerns.      SIGNATURE: TORRI Garduno, VAIBHAV  PAGER/CONTACT:  Contact information:  Supportive and Palliative Oncology  Monday-Friday 8 AM-5 PM  Epic Secure chat or pager [phone].  After hours and weekends:  pager [phone]

## 2024-03-17 LAB
ALBUMIN SERPL BCP-MCNC: 3.3 G/DL (ref 3.4–5)
ANION GAP SERPL CALC-SCNC: 18 MMOL/L (ref 10–20)
BASOPHILS # BLD AUTO: 0.01 X10*3/UL (ref 0–0.1)
BASOPHILS NFR BLD AUTO: 0.1 %
BUN SERPL-MCNC: 13 MG/DL (ref 6–23)
CALCIUM SERPL-MCNC: 8.8 MG/DL (ref 8.6–10.6)
CHLORIDE SERPL-SCNC: 98 MMOL/L (ref 98–107)
CO2 SERPL-SCNC: 26 MMOL/L (ref 21–32)
CREAT SERPL-MCNC: 0.56 MG/DL (ref 0.5–1.3)
EGFRCR SERPLBLD CKD-EPI 2021: >90 ML/MIN/1.73M*2
EOSINOPHIL # BLD AUTO: 0.01 X10*3/UL (ref 0–0.7)
EOSINOPHIL NFR BLD AUTO: 0.1 %
ERYTHROCYTE [DISTWIDTH] IN BLOOD BY AUTOMATED COUNT: 13.9 % (ref 11.5–14.5)
GLUCOSE BLD MANUAL STRIP-MCNC: 109 MG/DL (ref 74–99)
GLUCOSE BLD MANUAL STRIP-MCNC: 110 MG/DL (ref 74–99)
GLUCOSE BLD MANUAL STRIP-MCNC: 115 MG/DL (ref 74–99)
GLUCOSE BLD MANUAL STRIP-MCNC: 128 MG/DL (ref 74–99)
GLUCOSE BLD MANUAL STRIP-MCNC: 132 MG/DL (ref 74–99)
GLUCOSE SERPL-MCNC: 161 MG/DL (ref 74–99)
HCT VFR BLD AUTO: 32.8 % (ref 41–52)
HGB BLD-MCNC: 10.5 G/DL (ref 13.5–17.5)
IMM GRANULOCYTES # BLD AUTO: 0.08 X10*3/UL (ref 0–0.7)
IMM GRANULOCYTES NFR BLD AUTO: 1 % (ref 0–0.9)
LYMPHOCYTES # BLD AUTO: 0.43 X10*3/UL (ref 1.2–4.8)
LYMPHOCYTES NFR BLD AUTO: 5.6 %
MAGNESIUM SERPL-MCNC: 1.73 MG/DL (ref 1.6–2.4)
MCH RBC QN AUTO: 28.2 PG (ref 26–34)
MCHC RBC AUTO-ENTMCNC: 32 G/DL (ref 32–36)
MCV RBC AUTO: 88 FL (ref 80–100)
MONOCYTES # BLD AUTO: 0.23 X10*3/UL (ref 0.1–1)
MONOCYTES NFR BLD AUTO: 3 %
NEUTROPHILS # BLD AUTO: 6.9 X10*3/UL (ref 1.2–7.7)
NEUTROPHILS NFR BLD AUTO: 90.2 %
NRBC BLD-RTO: 0 /100 WBCS (ref 0–0)
PHOSPHATE SERPL-MCNC: 3.7 MG/DL (ref 2.5–4.9)
PLATELET # BLD AUTO: 198 X10*3/UL (ref 150–450)
POTASSIUM SERPL-SCNC: 3.4 MMOL/L (ref 3.5–5.3)
RBC # BLD AUTO: 3.72 X10*6/UL (ref 4.5–5.9)
SODIUM SERPL-SCNC: 139 MMOL/L (ref 136–145)
WBC # BLD AUTO: 7.7 X10*3/UL (ref 4.4–11.3)

## 2024-03-17 PROCEDURE — 99233 SBSQ HOSP IP/OBS HIGH 50: CPT | Performed by: NURSE PRACTITIONER

## 2024-03-17 PROCEDURE — 2500000001 HC RX 250 WO HCPCS SELF ADMINISTERED DRUGS (ALT 637 FOR MEDICARE OP)

## 2024-03-17 PROCEDURE — 2500000002 HC RX 250 W HCPCS SELF ADMINISTERED DRUGS (ALT 637 FOR MEDICARE OP, ALT 636 FOR OP/ED)

## 2024-03-17 PROCEDURE — 85025 COMPLETE CBC W/AUTO DIFF WBC: CPT

## 2024-03-17 PROCEDURE — C9113 INJ PANTOPRAZOLE SODIUM, VIA: HCPCS

## 2024-03-17 PROCEDURE — 99232 SBSQ HOSP IP/OBS MODERATE 35: CPT

## 2024-03-17 PROCEDURE — 82947 ASSAY GLUCOSE BLOOD QUANT: CPT

## 2024-03-17 PROCEDURE — 2500000004 HC RX 250 GENERAL PHARMACY W/ HCPCS (ALT 636 FOR OP/ED)

## 2024-03-17 PROCEDURE — 83735 ASSAY OF MAGNESIUM: CPT

## 2024-03-17 PROCEDURE — 1170000001 HC PRIVATE ONCOLOGY ROOM DAILY

## 2024-03-17 PROCEDURE — 80069 RENAL FUNCTION PANEL: CPT

## 2024-03-17 RX ORDER — POTASSIUM CHLORIDE 29.8 MG/ML
40 INJECTION INTRAVENOUS ONCE
Status: COMPLETED | OUTPATIENT
Start: 2024-03-17 | End: 2024-03-17

## 2024-03-17 RX ORDER — LOSARTAN POTASSIUM 25 MG/1
25 TABLET ORAL DAILY
Status: DISCONTINUED | OUTPATIENT
Start: 2024-03-17 | End: 2024-03-21 | Stop reason: HOSPADM

## 2024-03-17 RX ORDER — OXYCODONE HYDROCHLORIDE 5 MG/1
5 TABLET ORAL EVERY 6 HOURS PRN
Status: DISCONTINUED | OUTPATIENT
Start: 2024-03-17 | End: 2024-03-21 | Stop reason: HOSPADM

## 2024-03-17 RX ADMIN — ONDANSETRON 4 MG: 2 INJECTION INTRAMUSCULAR; INTRAVENOUS at 05:02

## 2024-03-17 RX ADMIN — PANTOPRAZOLE SODIUM 40 MG: 40 INJECTION, POWDER, FOR SOLUTION INTRAVENOUS at 20:37

## 2024-03-17 RX ADMIN — PROCHLORPERAZINE EDISYLATE 10 MG: 5 INJECTION INTRAMUSCULAR; INTRAVENOUS at 08:19

## 2024-03-17 RX ADMIN — ACETAMINOPHEN 1000 MG: 10 INJECTION INTRAVENOUS at 20:37

## 2024-03-17 RX ADMIN — SIMVASTATIN 40 MG: 40 TABLET, FILM COATED ORAL at 20:31

## 2024-03-17 RX ADMIN — POTASSIUM CHLORIDE 40 MEQ: 29.8 INJECTION, SOLUTION INTRAVENOUS at 08:15

## 2024-03-17 RX ADMIN — LOSARTAN POTASSIUM 25 MG: 25 TABLET, FILM COATED ORAL at 14:22

## 2024-03-17 RX ADMIN — ONDANSETRON 4 MG: 2 INJECTION INTRAMUSCULAR; INTRAVENOUS at 14:22

## 2024-03-17 RX ADMIN — ONDANSETRON 4 MG: 2 INJECTION INTRAMUSCULAR; INTRAVENOUS at 20:30

## 2024-03-17 RX ADMIN — DICYCLOMINE HYDROCHLORIDE 10 MG: 10 CAPSULE ORAL at 05:10

## 2024-03-17 RX ADMIN — PREGABALIN 100 MG: 75 CAPSULE ORAL at 08:04

## 2024-03-17 RX ADMIN — LORAZEPAM 0.5 MG: 0.5 TABLET ORAL at 17:34

## 2024-03-17 RX ADMIN — ACETAMINOPHEN 1000 MG: 10 INJECTION INTRAVENOUS at 12:41

## 2024-03-17 RX ADMIN — MELATONIN 3 MG: 3 TAB ORAL at 20:31

## 2024-03-17 RX ADMIN — OXYCODONE HYDROCHLORIDE 5 MG: 5 TABLET ORAL at 20:41

## 2024-03-17 RX ADMIN — MORPHINE SULFATE 15 MG: 15 TABLET, EXTENDED RELEASE ORAL at 05:09

## 2024-03-17 RX ADMIN — MORPHINE SULFATE 15 MG: 15 TABLET, EXTENDED RELEASE ORAL at 17:34

## 2024-03-17 RX ADMIN — PREGABALIN 100 MG: 75 CAPSULE ORAL at 20:31

## 2024-03-17 RX ADMIN — DOCUSATE SODIUM 50MG AND SENNOSIDES 8.6MG 2 TABLET: 8.6; 5 TABLET, FILM COATED ORAL at 20:31

## 2024-03-17 RX ADMIN — OLANZAPINE 10 MG: 5 TABLET, FILM COATED ORAL at 20:30

## 2024-03-17 ASSESSMENT — COGNITIVE AND FUNCTIONAL STATUS - GENERAL
MOVING FROM LYING ON BACK TO SITTING ON SIDE OF FLAT BED WITH BEDRAILS: A LITTLE
TURNING FROM BACK TO SIDE WHILE IN FLAT BAD: A LITTLE
TOILETING: A LITTLE
MOBILITY SCORE: 14
WALKING IN HOSPITAL ROOM: A LOT
CLIMB 3 TO 5 STEPS WITH RAILING: A LOT
MOVING TO AND FROM BED TO CHAIR: A LOT
STANDING UP FROM CHAIR USING ARMS: A LOT
DRESSING REGULAR LOWER BODY CLOTHING: A LITTLE
DAILY ACTIVITIY SCORE: 19
DRESSING REGULAR UPPER BODY CLOTHING: A LITTLE
PERSONAL GROOMING: A LITTLE
HELP NEEDED FOR BATHING: A LITTLE

## 2024-03-17 ASSESSMENT — PAIN SCALES - GENERAL
PAINLEVEL_OUTOF10: 8
PAINLEVEL_OUTOF10: 4
PAINLEVEL_OUTOF10: 9
PAINLEVEL_OUTOF10: 6

## 2024-03-17 ASSESSMENT — PAIN - FUNCTIONAL ASSESSMENT: PAIN_FUNCTIONAL_ASSESSMENT: 0-10

## 2024-03-17 NOTE — NURSING NOTE
End of Shift Nursing Report:  The patient experienced nausea and vomiting shortly after taking the evening medication pass. The medical team was informed, and an additional one-time order of Lyrica was prescribed. However, the patient declined this medication, expressing hesitance due to concerns about potential n/v.  During the shift, the patient's blood pressure and heart rate were elevated. The medical team was notified again, and it was noted that the patient had experienced elevated blood pressure and heart rate throughout the previous day as well. The MD indicated that no immediate interventions were required at the time. The patient also experienced a bout of incontinence during the shift, accompanied by a large bowel movement.

## 2024-03-17 NOTE — HOSPITAL COURSE
ASSESMENT AND PLAN:  Diagnoses and all orders for this visit:  Iron deficiency anemia secondary to inadequate dietary iron intake  -     Hemoglobin done today in clinic shows complete normalization.  This is very likely secondary to the dietary improvements detailed below.  The child is no longer taking the iron vitamin and this does not need to be restarted given the normalization of the hemoglobin.  Counseling done on this plan today with the patient parents.  Routine follow-up at the next well-child check, sooner if needed.  Constipation, unspecified constipation type  Improved with dietary changes.  Encouraged to continue with high-fiber lower milk dietary changes.  Immunization deficiency  -     ibuprofen (ADVIL/MOTRIN) 100 MG/5ML suspension; Take 7 mLs (140 mg) by mouth every 6 hours as needed for fever or moderate pain      Reviewed the risks and benefits of the treatment plan with the patient and/or caregiver and we discussed indications for routine and emergent follow-up.        SUBJECTIVE: 2-year-old female had a significant anemia previously, see previous notes for details, since then the child took 1 month of a multivitamin with iron and significantly changed her diet to significantly reduce milk intake and increase high iron foods and variety of other foods.  Child's been doing very well overall.  Previously had been struggling with some constipation but this has resolved completely since the reduction in dairy in the diet.  The mother does like to keep ibuprofen on hand for her children for in case have a fever after immunizations  or fevers or pains for other reasons.  She is out of this liquid ibuprofen and would like refills sent to the pharmacy.    History reviewed. No pertinent past medical history.  Patient Active Problem List   Diagnosis     Normal  (single liveborn)     Term , current hospitalization     Probable Thalassemia trait based on  screen     Current Outpatient  Narciso Marrero is a 58 y.o. male with PMHx of Stage IV KRAS + Ascending colon cancer  s/p Right hemicolectomy (5/3/23), metastatic disease to the liver, and numerous peritoneal implants s/p AMY pump, DMII, HTN, GERD, Chronic pain presents to  as a transfer from [place] for further evaluation of partial small bowel obstruction and recent onset altered mental status.  Patient denies prolonged periods of constipation and his presentation is likely due to his peritoneal metastatic disease.  Partial SBO described on CT imaging less likely secondary to a mechanical obstruction, or adhesions but rather due to his worsening peritoneal metastatic disease. Patient's acute onset confusion, forgetfulness and difficulty expressing his words is concerning for intracranial metastatic disease.  His altered mental status less likely metabolic in nature given no electrolyte derangements, however will order TSH, vitamin B12 and folate to definitively rule out.  Less likely infectious given no leukocytosis , fever, chills or any other systemic signs of infection. Less likely toxins however cannot be entirely excluded given recent initiation of Lonsurf chemotherapy drug, and patient denies excess ingestion of prescribed opiates. Given concern for intracranial metastatic disease MRI brain with IV contrast will be ordered for further evaluation. At this time patient is hypertensive with SBPs greater than 180 otherwise the rest of the vitals are stable. Patient will be treated with p.o. hydralazine as needed with blood pressure goal of 160 in next 24 hours.  CT Imaging result showed no intracranial lesions or evidence of metastatic disease. Patient had a Paracentesis done and 3900cc of fluid was removed. Patient was medically ready for Discharge. Dr. Russ (patients primary oncologist) was updated with Patient's hospital course. Patient is scheduled to see Dr. Russ in clinic on 3/25.     Medications Started on  "Medications   Medication Sig Dispense Refill     ibuprofen (ADVIL/MOTRIN) 100 MG/5ML suspension Take 7 mLs (140 mg) by mouth every 6 hours as needed for fever or moderate pain 240 mL 3     History   Smoking Status     Never Smoker   Smokeless Tobacco     Never Used     Comment: Grand mother smoke outside       OBJECTICE: BP (!) 86/52 (BP Location: Left arm, Patient Position: Sitting, Cuff Size: Child)   Pulse 84   Temp 98.4  F (36.9  C) (Oral)   Resp 20   Ht 0.94 m (3' 1.01\")   Wt 13.7 kg (30 lb 4 oz)   SpO2 100%   BMI 15.53 kg/m       Recent Results (from the past 24 hour(s))   Hemoglobin    Collection Time: 08/30/22 10:08 AM   Result Value Ref Range    Hemoglobin 12.9 10.5 - 14.0 g/dL        GEN-alert, active, in no apparent distress   HEENT-good color to the conjunctiva, mucous membranes are moist   CV-regular rate and rhythm with no murmur   RESP-lungs clear to auscultation   ABDOMINAL-soft, nontender, no palpable mass organomegaly       Diaz Marrufo MD     " Discharge:  -Losartan 25mg daily  -Tylenol 325mg as needed     F/U  -Oncology Apt on 3/25  -Supportive Onc Apt  on 4/1

## 2024-03-17 NOTE — PROGRESS NOTES
"Narciso Marrero is a 58 y.o. male on day 2 of admission presenting with Small bowel obstruction (CMS/HCC).    Subjective   Feels better this morning, pain more controlled with morphine, experienced n/v last evening and he was given lyrica after that but he had hesitance regarding the rest due to potential n/v.     Still having BM and is tolerating CLD well.         Objective   Vitals:    03/17/24 0100   BP: (!) 161/96   Pulse: (!) 111   Resp:    Temp: 36.6 °C (97.9 °F)   SpO2: 94%       Physical Exam  Cardiovascular:      Rate and Rhythm: Normal rate and regular rhythm.      Pulses: Normal pulses.      Heart sounds: Normal heart sounds. No murmur heard.     No gallop.   Pulmonary:      Effort: Pulmonary effort is normal. No respiratory distress.      Breath sounds: Normal breath sounds. No wheezing.   Abdominal:      General: Bowel sounds are normal. There is distension.      Tenderness: There is no abdominal tenderness. There is no guarding or rebound.      Hernia: A hernia is present.      Comments: AMY pump (LUQ) with ecchymosis. Midline surgical scar    Musculoskeletal:         General: Normal range of motion.      Cervical back: Normal range of motion.      Right lower leg: No edema.      Left lower leg: No edema.      Comments: LLE slightly larger than RLE. Per patient, this has always been that way since crush injury.    Skin:     General: Skin is warm.   Neurological:      General: No focal deficit present.      Mental Status: He is alert and oriented to person, place, and time.   Psychiatric:         Mood and Affect: Mood normal.     Last Recorded Vitals  Blood pressure (!) 161/96, pulse (!) 111, temperature 36.6 °C (97.9 °F), resp. rate 18, height 1.8 m (5' 10.87\"), weight 92.6 kg (204 lb 2.3 oz), SpO2 94 %.  Intake/Output last 3 Shifts:  No intake/output data recorded.    Relevant Results    Results for orders placed or performed during the hospital encounter of 03/15/24 (from the past 24 hour(s))   POCT " GLUCOSE   Result Value Ref Range    POCT Glucose 141 (H) 74 - 99 mg/dL   POCT GLUCOSE   Result Value Ref Range    POCT Glucose 132 (H) 74 - 99 mg/dL   POCT GLUCOSE   Result Value Ref Range    POCT Glucose 151 (H) 74 - 99 mg/dL   POCT GLUCOSE   Result Value Ref Range    POCT Glucose 126 (H) 74 - 99 mg/dL   Magnesium   Result Value Ref Range    Magnesium 1.73 1.60 - 2.40 mg/dL   CBC and Auto Differential   Result Value Ref Range    WBC 7.7 4.4 - 11.3 x10*3/uL    nRBC 0.0 0.0 - 0.0 /100 WBCs    RBC 3.72 (L) 4.50 - 5.90 x10*6/uL    Hemoglobin 10.5 (L) 13.5 - 17.5 g/dL    Hematocrit 32.8 (L) 41.0 - 52.0 %    MCV 88 80 - 100 fL    MCH 28.2 26.0 - 34.0 pg    MCHC 32.0 32.0 - 36.0 g/dL    RDW 13.9 11.5 - 14.5 %    Platelets 198 150 - 450 x10*3/uL    Neutrophils % 90.2 40.0 - 80.0 %    Immature Granulocytes %, Automated 1.0 (H) 0.0 - 0.9 %    Lymphocytes % 5.6 13.0 - 44.0 %    Monocytes % 3.0 2.0 - 10.0 %    Eosinophils % 0.1 0.0 - 6.0 %    Basophils % 0.1 0.0 - 2.0 %    Neutrophils Absolute 6.90 1.20 - 7.70 x10*3/uL    Immature Granulocytes Absolute, Automated 0.08 0.00 - 0.70 x10*3/uL    Lymphocytes Absolute 0.43 (L) 1.20 - 4.80 x10*3/uL    Monocytes Absolute 0.23 0.10 - 1.00 x10*3/uL    Eosinophils Absolute 0.01 0.00 - 0.70 x10*3/uL    Basophils Absolute 0.01 0.00 - 0.10 x10*3/uL   Renal Function Panel   Result Value Ref Range    Glucose 161 (H) 74 - 99 mg/dL    Sodium 139 136 - 145 mmol/L    Potassium 3.4 (L) 3.5 - 5.3 mmol/L    Chloride 98 98 - 107 mmol/L    Bicarbonate 26 21 - 32 mmol/L    Anion Gap 18 10 - 20 mmol/L    Urea Nitrogen 13 6 - 23 mg/dL    Creatinine 0.56 0.50 - 1.30 mg/dL    eGFR >90 >60 mL/min/1.73m*2    Calcium 8.8 8.6 - 10.6 mg/dL    Phosphorus 3.7 2.5 - 4.9 mg/dL    Albumin 3.3 (L) 3.4 - 5.0 g/dL                    Assessment/Plan   Principal Problem:    Small bowel obstruction (CMS/HCC)    Narciso Marrero is a 58 y.o. male with PMHx of Stage IV KRAS + Ascending colon cancer  s/p Right hemicolectomy  (5/3/23), metastatic disease to the liver, and numerous peritoneal implants s/p AMY pump, DMII, HTN, GERD, Chronic pain presents to  as a transfer from [place] for further evaluation of partial small bowel obstruction and recent onset altered mental status.  Patient denies prolonged periods of constipation and his presentation is likely due to his peritoneal metastatic disease.  Partial SBO described on CT imaging less likely secondary to a mechanical obstruction, or adhesions but rather due to his worsening peritoneal metastatic disease. Patient's acute onset confusion, forgetfulness and difficulty expressing his words is concerning for intracranial metastatic disease.  His altered mental status less likely metabolic in nature given no electrolyte derangements, however will order TSH, vitamin B12 and folate to definitively rule out.  Less likely infectious given no leukocytosis , fever, chills or any other systemic signs of infection. Less likely toxins however cannot be entirely excluded given recent initiation of Lonsurf chemotherapy drug, and patient denies excess ingestion of prescribed opiates. Given concern for intracranial metastatic disease MRI brain with IV contrast will be ordered for further evaluation.        3/17 UPDATES:  -Amlodipine held as BP is more controlled - restarted late in the day   - C/w CLD and added morphine 15 BID with monitoring Bms  - c/w zyprexa and ativan   - IR tomorrow for diagnostic and therapeutic para  - AC stopped, no need for NPO     #Acute Encephalopathy  -Patient's acute onset confusion, forgetfulness and difficulty expressing his words is concerning for intracranial metastatic disease.  His altered mental status less likely metabolic in nature given no electrolyte derangements, however will order TSH, vitamin B12 and folate to definitively rule out.  Less likely infectious given no leukocytosis , fever, chills or any other systemic signs of infection. Less likely toxins  however cannot be entirely excluded given recent initiation of Lonsurf chemotherapy drug, and patient denies excess ingestion of prescribed opiates. Given concern for intracranial metastatic disease MRI brain with IV contrast will be ordered for further evaluation.  -TSH, B12, Folate wnl   -Blood cx pending   PLAN:   -MRI Brain w/IV Con. Pt w/ hepatic artery infusion (AMY) pump.          #Borderline Dilated Small Bowel   #C/f SBO  #Constipation  - CT A/P with several segments of fluid-filled borderline dilated small bowel, alternating with decompressed bowel. The the areas of decompressed bowel demonstrate wall nodular enhancement, suggestive of serosal or constrictive peritoneal implants.  -Pt reports regular BM's since 3/4. Patient states if he skips miralax dose, stool immediately harden. Last BM yesterday, soft, paste-like and no melena or hematochezia.  -Hypoactive bowel sounds, +Ascites.   PLAN:  -Correct electrolyte abnormalities   -C/w Miralax once daily, DocSenna BID   -Continue to monitor for acute abdomen      #Moderate-size L. Pleural Effusion  #Small R. Pleural Effusion   #Pulmonary Nodule   -Left pleural effusion increased in size compared to last imaging. Right pleural effusion stable.  -Patient reports SOB w/ exertion, however asymptomatic at rest.  -Consider Thoracentesis if symptoms worsen.  PLAN:   -CTM     #Stage IV Ascending Colon Cancer  #Stable Hepatic Masses   #Stable Splenic Masses  #Hilar Mass   #Numerous Peritoneal Masses   #Large Volume Ascites   #C/f SBP  -1/2023: Presented with 20 pound unexpected weight loss and RUQ pain. Imaging showed large liver lesion. Biopsy c/w metastatic colon cancer. C-scope showed infiltrative, ulcerated, fungating mass in ascending colon.   -Baseline CEA 2322. Started FOLFOX + Bevacizumab  PREVIOUS THERAPY:  -1/2023-4/12/23: FOLFOX x6 cycles. First 2 cycles with bevacizumab  AMY pump placed 5/3/23. First FUdR via AMY pump   -5/17/23. Systemic chemotherapy  being held due to wound vac, Started FOLFOX on 8/9/23. On -11/1/23 dose reduction in Oxaliplatin and infusional 5FU only due to chemotherapy colitis. Treatment DC due to PD. Last treatment on 12.11.2023. Total cycles of FUdR (if applicable):7  -12.26.2023 : Started on FOLFIRI, Last and 4th cycle on 2/5/24. Discontinued due to disease progression  CURRENT THERAPY:  -Lonsurf + Bevacizumab start 3/4/24  -Last Glycerin in AMY pump on 2/5/24  -Providers:  -Surg onc: Rajinder Arenas  -CRS: Regina Raymundo  -Local med onc: Dia Kendall  PLAN:  -C/s IR for para Monday 3/18  -Email primary oncologist about admission   -Hold Lonsurf for now      #HLD  - C/w home simvastatin 40 mg daily     #Chronic Pain  ::Has crushed shoulder injury 2/2 work-related accident  ::follows with pain management at [facility]  - C/w pregabalin 100 BID   - Restarted morphine at half dose 15 BID   - IV Tylenol 1000 mg BID  - Ok to spot dose Toradol for breakthrough pain.     #GERD  - C/w home pantoprazole 40 mg daily     #DMII  :: Home regimen: lantus 22 units, humalog 3 units w/ meals  PLAN:  - Hypoglycemia protocol   - Mild insulin sliding scale #2     MISC:  -Zypreza 10 mg  once nightly      F: PRN  E: PRN  N: CLD   DVT: Lovenox (held for IR procedure in the AM)   GI: PPI  Code Status: Full code (confirmed on admission)  NOK: Primary Emergency Contact: ROHITH MARIE, Home Phone: [phone]            Sarah Haque MD

## 2024-03-18 ENCOUNTER — APPOINTMENT (OUTPATIENT)
Dept: HEMATOLOGY/ONCOLOGY | Facility: HOSPITAL | Age: 59
End: 2024-03-18
Payer: MEDICARE

## 2024-03-18 ENCOUNTER — APPOINTMENT (OUTPATIENT)
Dept: RADIOLOGY | Facility: HOSPITAL | Age: 59
DRG: 375 | End: 2024-03-18
Payer: MEDICARE

## 2024-03-18 LAB
ALBUMIN SERPL BCP-MCNC: 3.2 G/DL (ref 3.4–5)
ANION GAP SERPL CALC-SCNC: 14 MMOL/L (ref 10–20)
BACTERIA BLD CULT: NORMAL
BACTERIA BLD CULT: NORMAL
BASOPHILS # BLD AUTO: 0.01 X10*3/UL (ref 0–0.1)
BASOPHILS NFR BLD AUTO: 0.2 %
BASOPHILS NFR FLD MANUAL: 0 %
BLASTS NFR FLD MANUAL: 0 %
BUN SERPL-MCNC: 12 MG/DL (ref 6–23)
CALCIUM SERPL-MCNC: 8.6 MG/DL (ref 8.6–10.6)
CHLORIDE SERPL-SCNC: 99 MMOL/L (ref 98–107)
CLARITY FLD: CLEAR
CO2 SERPL-SCNC: 29 MMOL/L (ref 21–32)
COLOR FLD: YELLOW
CREAT SERPL-MCNC: 0.45 MG/DL (ref 0.5–1.3)
EGFRCR SERPLBLD CKD-EPI 2021: >90 ML/MIN/1.73M*2
EOSINOPHIL # BLD AUTO: 0.03 X10*3/UL (ref 0–0.7)
EOSINOPHIL NFR BLD AUTO: 0.6 %
EOSINOPHIL NFR FLD MANUAL: 0 %
ERYTHROCYTE [DISTWIDTH] IN BLOOD BY AUTOMATED COUNT: 14.3 % (ref 11.5–14.5)
GLUCOSE BLD MANUAL STRIP-MCNC: 102 MG/DL (ref 74–99)
GLUCOSE BLD MANUAL STRIP-MCNC: 104 MG/DL (ref 74–99)
GLUCOSE BLD MANUAL STRIP-MCNC: 110 MG/DL (ref 74–99)
GLUCOSE BLD MANUAL STRIP-MCNC: 119 MG/DL (ref 74–99)
GLUCOSE BLD MANUAL STRIP-MCNC: 95 MG/DL (ref 74–99)
GLUCOSE SERPL-MCNC: 105 MG/DL (ref 74–99)
HCT VFR BLD AUTO: 29.5 % (ref 41–52)
HGB BLD-MCNC: 9.2 G/DL (ref 13.5–17.5)
IMM GRANULOCYTES # BLD AUTO: 0.04 X10*3/UL (ref 0–0.7)
IMM GRANULOCYTES NFR BLD AUTO: 0.8 % (ref 0–0.9)
IMMATURE GRANULOCYTES IN FLUID: 0 %
LYMPHOCYTES # BLD AUTO: 0.62 X10*3/UL (ref 1.2–4.8)
LYMPHOCYTES NFR BLD AUTO: 12.2 %
LYMPHOCYTES NFR FLD MANUAL: 43 %
MCH RBC QN AUTO: 28 PG (ref 26–34)
MCHC RBC AUTO-ENTMCNC: 31.2 G/DL (ref 32–36)
MCV RBC AUTO: 90 FL (ref 80–100)
MONOCYTES # BLD AUTO: 0.2 X10*3/UL (ref 0.1–1)
MONOCYTES NFR BLD AUTO: 3.9 %
MONOS+MACROS NFR FLD MANUAL: 51 %
NEUTROPHILS # BLD AUTO: 4.17 X10*3/UL (ref 1.2–7.7)
NEUTROPHILS NFR BLD AUTO: 82.3 %
NEUTROPHILS NFR FLD MANUAL: 6 %
NRBC BLD-RTO: 0.4 /100 WBCS (ref 0–0)
OTHER CELLS NFR FLD MANUAL: 0 %
PHOSPHATE SERPL-MCNC: 3.1 MG/DL (ref 2.5–4.9)
PLASMA CELLS NFR FLD MANUAL: 0 %
PLATELET # BLD AUTO: 155 X10*3/UL (ref 150–450)
POTASSIUM SERPL-SCNC: 3.7 MMOL/L (ref 3.5–5.3)
RBC # BLD AUTO: 3.28 X10*6/UL (ref 4.5–5.9)
RBC # FLD AUTO: 149 /UL
SODIUM SERPL-SCNC: 138 MMOL/L (ref 136–145)
TOTAL CELLS COUNTED FLD: 100
WBC # BLD AUTO: 5.1 X10*3/UL (ref 4.4–11.3)
WBC # FLD MANUAL: 78 /UL

## 2024-03-18 PROCEDURE — 1170000001 HC PRIVATE ONCOLOGY ROOM DAILY

## 2024-03-18 PROCEDURE — 99233 SBSQ HOSP IP/OBS HIGH 50: CPT | Performed by: NURSE PRACTITIONER

## 2024-03-18 PROCEDURE — 85025 COMPLETE CBC W/AUTO DIFF WBC: CPT

## 2024-03-18 PROCEDURE — 97165 OT EVAL LOW COMPLEX 30 MIN: CPT | Mod: GO | Performed by: OCCUPATIONAL THERAPIST

## 2024-03-18 PROCEDURE — 88342 IMHCHEM/IMCYTCHM 1ST ANTB: CPT | Performed by: PATHOLOGY

## 2024-03-18 PROCEDURE — 2500000002 HC RX 250 W HCPCS SELF ADMINISTERED DRUGS (ALT 637 FOR MEDICARE OP, ALT 636 FOR OP/ED)

## 2024-03-18 PROCEDURE — 2500000004 HC RX 250 GENERAL PHARMACY W/ HCPCS (ALT 636 FOR OP/ED)

## 2024-03-18 PROCEDURE — 2500000001 HC RX 250 WO HCPCS SELF ADMINISTERED DRUGS (ALT 637 FOR MEDICARE OP)

## 2024-03-18 PROCEDURE — 82947 ASSAY GLUCOSE BLOOD QUANT: CPT

## 2024-03-18 PROCEDURE — C9113 INJ PANTOPRAZOLE SODIUM, VIA: HCPCS

## 2024-03-18 PROCEDURE — 88104 CYTOPATH FL NONGYN SMEARS: CPT

## 2024-03-18 PROCEDURE — 88341 IMHCHEM/IMCYTCHM EA ADD ANTB: CPT | Performed by: PATHOLOGY

## 2024-03-18 PROCEDURE — 99233 SBSQ HOSP IP/OBS HIGH 50: CPT

## 2024-03-18 PROCEDURE — 80069 RENAL FUNCTION PANEL: CPT

## 2024-03-18 PROCEDURE — 97162 PT EVAL MOD COMPLEX 30 MIN: CPT | Mod: GP

## 2024-03-18 PROCEDURE — 88112 CYTOPATH CELL ENHANCE TECH: CPT | Performed by: PATHOLOGY

## 2024-03-18 PROCEDURE — 49083 ABD PARACENTESIS W/IMAGING: CPT

## 2024-03-18 PROCEDURE — 88305 TISSUE EXAM BY PATHOLOGIST: CPT | Performed by: PATHOLOGY

## 2024-03-18 PROCEDURE — 49083 ABD PARACENTESIS W/IMAGING: CPT | Performed by: PHYSICIAN ASSISTANT

## 2024-03-18 PROCEDURE — 88341 IMHCHEM/IMCYTCHM EA ADD ANTB: CPT | Mod: TC,MCY

## 2024-03-18 PROCEDURE — 89051 BODY FLUID CELL COUNT: CPT

## 2024-03-18 RX ORDER — LORAZEPAM 1 MG/1
1 TABLET ORAL ONCE
Status: COMPLETED | OUTPATIENT
Start: 2024-03-18 | End: 2024-03-19

## 2024-03-18 RX ORDER — ENOXAPARIN SODIUM 100 MG/ML
40 INJECTION SUBCUTANEOUS DAILY
Status: DISCONTINUED | OUTPATIENT
Start: 2024-03-18 | End: 2024-03-21 | Stop reason: HOSPADM

## 2024-03-18 RX ORDER — MAGNESIUM SULFATE HEPTAHYDRATE 40 MG/ML
2 INJECTION, SOLUTION INTRAVENOUS ONCE
Status: COMPLETED | OUTPATIENT
Start: 2024-03-18 | End: 2024-03-18

## 2024-03-18 RX ORDER — PANTOPRAZOLE SODIUM 40 MG/1
40 TABLET, DELAYED RELEASE ORAL DAILY
Status: DISCONTINUED | OUTPATIENT
Start: 2024-03-19 | End: 2024-03-21 | Stop reason: HOSPADM

## 2024-03-18 RX ORDER — POTASSIUM CHLORIDE 20 MEQ/1
20 TABLET, EXTENDED RELEASE ORAL ONCE
Status: COMPLETED | OUTPATIENT
Start: 2024-03-18 | End: 2024-03-18

## 2024-03-18 RX ADMIN — SIMVASTATIN 40 MG: 40 TABLET, FILM COATED ORAL at 21:08

## 2024-03-18 RX ADMIN — LOSARTAN POTASSIUM 25 MG: 25 TABLET, FILM COATED ORAL at 09:00

## 2024-03-18 RX ADMIN — LORAZEPAM 0.5 MG: 0.5 TABLET ORAL at 09:00

## 2024-03-18 RX ADMIN — MORPHINE SULFATE 15 MG: 15 TABLET, EXTENDED RELEASE ORAL at 21:08

## 2024-03-18 RX ADMIN — ONDANSETRON 4 MG: 2 INJECTION INTRAMUSCULAR; INTRAVENOUS at 19:47

## 2024-03-18 RX ADMIN — MELATONIN 3 MG: 3 TAB ORAL at 21:17

## 2024-03-18 RX ADMIN — ONDANSETRON 4 MG: 2 INJECTION INTRAMUSCULAR; INTRAVENOUS at 06:45

## 2024-03-18 RX ADMIN — ACETAMINOPHEN 1000 MG: 10 INJECTION INTRAVENOUS at 09:00

## 2024-03-18 RX ADMIN — POTASSIUM CHLORIDE 20 MEQ: 1500 TABLET, EXTENDED RELEASE ORAL at 14:09

## 2024-03-18 RX ADMIN — OXYCODONE HYDROCHLORIDE 5 MG: 5 TABLET ORAL at 06:45

## 2024-03-18 RX ADMIN — OLANZAPINE 10 MG: 5 TABLET, FILM COATED ORAL at 21:08

## 2024-03-18 RX ADMIN — PREGABALIN 100 MG: 75 CAPSULE ORAL at 09:00

## 2024-03-18 RX ADMIN — MORPHINE SULFATE 15 MG: 15 TABLET, EXTENDED RELEASE ORAL at 09:00

## 2024-03-18 RX ADMIN — PANTOPRAZOLE SODIUM 40 MG: 40 INJECTION, POWDER, FOR SOLUTION INTRAVENOUS at 09:01

## 2024-03-18 RX ADMIN — OXYCODONE HYDROCHLORIDE 5 MG: 5 TABLET ORAL at 21:09

## 2024-03-18 RX ADMIN — MAGNESIUM SULFATE HEPTAHYDRATE 2 G: 40 INJECTION, SOLUTION INTRAVENOUS at 14:09

## 2024-03-18 RX ADMIN — ENOXAPARIN SODIUM 40 MG: 100 INJECTION SUBCUTANEOUS at 21:20

## 2024-03-18 RX ADMIN — PREGABALIN 100 MG: 75 CAPSULE ORAL at 21:08

## 2024-03-18 RX ADMIN — OXYCODONE HYDROCHLORIDE 5 MG: 5 TABLET ORAL at 14:52

## 2024-03-18 RX ADMIN — MORPHINE SULFATE 15 MG: 15 TABLET, EXTENDED RELEASE ORAL at 04:41

## 2024-03-18 ASSESSMENT — COGNITIVE AND FUNCTIONAL STATUS - GENERAL
TURNING FROM BACK TO SIDE WHILE IN FLAT BAD: A LITTLE
DRESSING REGULAR LOWER BODY CLOTHING: A LITTLE
MOBILITY SCORE: 17
MOVING FROM LYING ON BACK TO SITTING ON SIDE OF FLAT BED WITH BEDRAILS: A LITTLE
DRESSING REGULAR UPPER BODY CLOTHING: A LITTLE
HELP NEEDED FOR BATHING: A LITTLE
DRESSING REGULAR UPPER BODY CLOTHING: A LITTLE
HELP NEEDED FOR BATHING: A LITTLE
STANDING UP FROM CHAIR USING ARMS: A LOT
TOILETING: A LITTLE
CLIMB 3 TO 5 STEPS WITH RAILING: A LOT
MOVING TO AND FROM BED TO CHAIR: A LITTLE
TURNING FROM BACK TO SIDE WHILE IN FLAT BAD: A LITTLE
MOVING TO AND FROM BED TO CHAIR: A LITTLE
CLIMB 3 TO 5 STEPS WITH RAILING: A LOT
WALKING IN HOSPITAL ROOM: A LITTLE
DAILY ACTIVITIY SCORE: 19
TOILETING: A LITTLE
DRESSING REGULAR LOWER BODY CLOTHING: A LITTLE
PERSONAL GROOMING: A LITTLE
WALKING IN HOSPITAL ROOM: A LOT
STANDING UP FROM CHAIR USING ARMS: A LITTLE
MOVING FROM LYING ON BACK TO SITTING ON SIDE OF FLAT BED WITH BEDRAILS: A LITTLE
DAILY ACTIVITIY SCORE: 19
DRESSING REGULAR UPPER BODY CLOTHING: A LITTLE
PERSONAL GROOMING: A LITTLE
CLIMB 3 TO 5 STEPS WITH RAILING: A LOT
HELP NEEDED FOR BATHING: A LITTLE
STANDING UP FROM CHAIR USING ARMS: A LITTLE
MOBILITY SCORE: 17
PERSONAL GROOMING: A LITTLE
WALKING IN HOSPITAL ROOM: A LITTLE
MOVING TO AND FROM BED TO CHAIR: A LOT
TOILETING: A LITTLE
MOBILITY SCORE: 14
MOVING FROM LYING ON BACK TO SITTING ON SIDE OF FLAT BED WITH BEDRAILS: A LITTLE
TURNING FROM BACK TO SIDE WHILE IN FLAT BAD: A LITTLE
DAILY ACTIVITIY SCORE: 19
DRESSING REGULAR LOWER BODY CLOTHING: A LITTLE

## 2024-03-18 ASSESSMENT — PAIN - FUNCTIONAL ASSESSMENT
PAIN_FUNCTIONAL_ASSESSMENT: 0-10

## 2024-03-18 ASSESSMENT — PAIN SCALES - GENERAL
PAINLEVEL_OUTOF10: 7
PAINLEVEL_OUTOF10: 7
PAINLEVEL_OUTOF10: 3
PAINLEVEL_OUTOF10: 7
PAINLEVEL_OUTOF10: 3
PAINLEVEL_OUTOF10: 3

## 2024-03-18 ASSESSMENT — ACTIVITIES OF DAILY LIVING (ADL)
BATHING_ASSISTANCE: STAND BY
ADL_ASSISTANCE: INDEPENDENT
ADL_ASSISTANCE: INDEPENDENT

## 2024-03-18 NOTE — PROGRESS NOTES
Occupational Therapy      Evaluation   Patient Name: Narciso Marrero  MRN: [id number]  Today's Date: 3/18/2024  Time Calculation  Start Time: 1547  Stop Time: 1603  Time Calculation (min): 16 min    Assessment  IP OT Assessment  OT Assessment: Patient is a 58 year old male who presents to OT with impaired ADL, functional mobility, functional transfer, activity tolerance, balance. He would benefit from skilled OT services while in hospital and post discharge.  Prognosis: Good  Medical Staff Made Aware: Yes  End of Session Communication: Bedside nurse  End of Session Patient Position: Bed, 3 rail up, Alarm off, not on at start of session  Plan:  Treatment Interventions: ADL retraining, Functional transfer training, Endurance training, Patient/family training, Compensatory technique education  OT Frequency: 3 times per week  OT Discharge Recommendations: Low intensity level of continued care  OT Recommended Transfer Status: Assist of 1  OT - OK to Discharge: Yes    Subjective   Current Problem:  1. Small bowel obstruction (CMS/HCC)        2. Metastatic carcinoma to liver (CMS/HCC)  US guided abdominal paracentesis    Protime-INR    Body Fluid Cell Count With Differential    Albumin    Protein, Total    Body Fluid Cell Count With Differential    Body Fluid Cell Count With Differential        General:  Reason for Referral: fall 3/2, weakness  Past Medical History Relevant to Rehab: Stage IV KRAS + Ascending colon cancer  s/p Right hemicolectomy (5/3/23), metastatic disease to the liver, and numerous peritoneal implants s/p AMY pump, DMII, HTN, GERD, Chronic pain  Prior to Session Communication: Bedside nurse  Patient Position Received: Bed, 3 rail up, Alarm off, not on at start of session  Family/Caregiver Present: Yes  Caregiver Feedback: wife present and supportive   Precautions:  Hearing/Visual Limitations: WFL  Medical Precautions: Fall precautions  Vital Signs:     Pain:  Pain Assessment  Pain Assessment: 0-10  Pain  Score: 3  Pain Location: Abdomen  Lines/Tubes/Drains:  Implantable Port Right Chest Single lumen port (Active)   Number of days:          Objective   Cognition:  Overall Cognitive Status: Within Functional Limits  Orientation Level: Oriented X44AT  Alertness    xNormal (fully alert, but not agitated, throughout assessment) 0          Mild sleepiness for <10 seconds after waking, then normal 0  Clearly abnormal 4  Score: 0    AMT4  Age, date of birth, place (name of the hospital or building), current year  xNo mistakes 0  1 mistake 1  2 or more mistakes/untestable 2  Score:0    Attention  Months of the year backwards Achieves   7 months or more correctly 0  xStarts but scores <7 months / refuses to start 1  Untestable (cannot start because unwell, drowsy, inattentive) 2  Score: 1    Acute change or fluctuating course  Evidence of significant change or fluctuation in: alertness, cognition, other mental function  (eg. paranoia, hallucinations) arising over the last 2 weeks and still evident in last 24hrs  No 0  xYes 4  Score: 4    Total score: 5  4 or above: possible delirium +/- cognitive impairment  1-3: possible cognitive impairment  0: delirium or severe cognitive impairment unlikely (but  delirium still possible if [4] information incomplete)            Home Living:  Type of Home: House  Home Layout: Two level, Stairs to alternate level with rails (bedroom upstairs, bathroom on main floor)  Alternate Level Stairs-Number of Steps: 12  Bathroom Shower/Tub: Tub/shower unit   Prior Function:  Level of [place]: Independent with ADLs and functional transfers, Independent with homemaking with ambulation  ADL Assistance: Independent  Homemaking Assistance: Independent  Ambulatory Assistance: Independent  Leisure: enjoys fishing and hiking  Hand Dominance: Left  IADL History:  Current License: Yes  Mode of Transportation: Car  ADL:  Eating Assistance: Independent  Grooming Assistance: Stand by  Bathing Assistance:  Stand by  UE Dressing Assistance: Stand by  LE Dressing Assistance: Stand by (socks)  Toileting Assistance with Device: Stand by       Balance:  Dynamic Sitting Balance  Dynamic Sitting-Balance:  (close supervision)  Dynamic Standing Balance  Dynamic Standing-Balance:  (not tested, patient fatigued after procedure earlier today)  Static Sitting Balance  Static Sitting-Level of Assistance: Close supervision  Static Standing Balance  Static Standing-Level of Assistance: Close supervision  Bed Mobility/Transfers: Bed Mobility/Transfers: Bed Mobility 1  Bed Mobility 1: Supine to sitting  Level of Assistance 1: Close supervision   and Transfer 1  Transfer From 1: Sit to, Stand to  Transfer to 1: Stand, Sit  Transfer Level of Assistance 1: Close supervision  IADL's:   Current License: Yes  Mode of Transportation: Car  Sensation  Sensation Comment: Numbness and tingling in fingers of both hands, drop foot left LE from an accident 20 year ago. He reports that his foot is numb.  Coordination:  Movements are Fluid and Coordinated: Yes   Hand Function:  Hand Function  Gross Grasp: Functional  Extremities: RUE   RUE : Within Functional Limits, LUE   LUE: Within Functional Limits,  , and      Outcome Measures: [facility] Daily Activity  Putting on and taking off regular lower body clothing: A little  Bathing (including washing, rinsing, drying): A little  Putting on and taking off regular upper body clothing: A little  Toileting, which includes using toilet, bedpan or urinal: A little  Taking care of personal grooming such as brushing teeth: A little  Eating Meals: None  Daily Activity - Total Score: 19         ,     OT Adult Other Outcome Measures  4AT: 5- possible delirium/cognitive impairment    Education Documentation  Precautions, taught by Mel Donovan OT at 3/18/2024  6:54 PM.  Learner: Significant Other, Patient  Readiness: Acceptance  Method: Explanation  Response: Verbalizes Understanding            Goals:   Encounter  Problems       Encounter Problems (Active)       ADLs       OT-Patient will perform UB and LB bathing seated EOB with independent level of assistance and long-handled sponge. (Progressing)       Start:  03/18/24    Expected End:  04/01/24            OT-Patient with complete upper body dressing with modified independent level of assistance donning and doffing all UE clothes with PRN adaptive equipment while edge of bed  (Progressing)       Start:  03/18/24    Expected End:  04/01/24            OT-Patient with complete lower body dressing with modified independent level of assistance donning and doffing all LE clothes  with PRN adaptive equipment while edge of bed  (Progressing)       Start:  03/18/24    Expected End:  04/01/24            OT-Patient will complete daily grooming tasks with independent level of assistance and PRN adaptive equipment while edge of bed . (Progressing)       Start:  03/18/24    Expected End:  04/01/24            OT-Patient will complete toileting including hygiene clothing management/hygiene with modified independent level of assistance and grab bars. (Progressing)       Start:  03/18/24    Expected End:  04/01/24               COGNITION/SAFETY       OT -Patient will score WFL on standardized cognitive assessment with no cues and within reasonable time frame (Progressing)       Start:  03/18/24    Expected End:  04/01/24               MOBILITY       OT-Patient will perform Functional mobility min Household distances/Community Distances with modified independent level of assistance and least restrictive device in order to improve safety and functional mobility. (Progressing)       Start:  03/18/24    Expected End:  04/01/24               TRANSFERS       OT-Patient will perform bed mobility modified independent level of assistance and bed rails in order to improve safety and independence with mobility (Progressing)       Start:  03/18/24    Expected End:  04/01/24            OT-Patient will  complete functional transfer to all surfaces with least restrictive device with modified independent level of assistance. (Progressing)       Start:  03/18/24    Expected End:  04/01/24 03/18/24 at 6:54 PM   Mel Donovan, OT   Rehab Office: [phone]

## 2024-03-18 NOTE — PROGRESS NOTES
"Narciso Marrero is a 58 y.o. male on day 3 of admission presenting with Small bowel obstruction (CMS/HCC).    Subjective   Feels better this morning, pain more controlled with morphine, experienced n/v last evening and he was given lyrica after that but he had hesitance regarding the rest due to potential n/v.       Objective   Vitals:    03/18/24 0855   BP: (!) 168/101   Pulse: 103   Resp: 16   Temp: 36.9 °C (98.4 °F)   SpO2: 93%       Physical Exam  Cardiovascular:      Rate and Rhythm: Normal rate and regular rhythm.      Pulses: Normal pulses.      Heart sounds: Normal heart sounds. No murmur heard.     No gallop.   Pulmonary:      Effort: Pulmonary effort is normal. No respiratory distress.      Breath sounds: Normal breath sounds. No wheezing.   Abdominal:      General: Bowel sounds are normal. There is distension.      Tenderness: There is no abdominal tenderness. There is no guarding or rebound.      Hernia: A hernia is present.      Comments: AMY pump (LUQ) with ecchymosis. Midline surgical scar    Musculoskeletal:         General: Normal range of motion.      Cervical back: Normal range of motion.      Right lower leg: No edema.      Left lower leg: No edema.      Comments: LLE slightly larger than RLE. Per patient, this has always been that way since crush injury.    Skin:     General: Skin is warm.   Neurological:      General: No focal deficit present.      Mental Status: He is alert and oriented to person, place, and time.   Psychiatric:         Mood and Affect: Mood normal.     Last Recorded Vitals  Blood pressure (!) 168/101, pulse 103, temperature 36.9 °C (98.4 °F), temperature source Temporal, resp. rate 16, height 1.8 m (5' 10.87\"), weight 92.6 kg (204 lb 2.3 oz), SpO2 93 %.  Intake/Output last 3 Shifts:  I/O last 3 completed shifts:  In: 100 (1.1 mL/kg) [IV Piggyback:100]  Out: - (0 mL/kg)   Weight: 92.6 kg     Relevant Results    Results for orders placed or performed during the hospital " encounter of 03/15/24 (from the past 24 hour(s))   POCT GLUCOSE   Result Value Ref Range    POCT Glucose 109 (H) 74 - 99 mg/dL   POCT GLUCOSE   Result Value Ref Range    POCT Glucose 128 (H) 74 - 99 mg/dL   POCT GLUCOSE   Result Value Ref Range    POCT Glucose 115 (H) 74 - 99 mg/dL   CBC and Auto Differential   Result Value Ref Range    WBC 5.1 4.4 - 11.3 x10*3/uL    nRBC 0.4 (H) 0.0 - 0.0 /100 WBCs    RBC 3.28 (L) 4.50 - 5.90 x10*6/uL    Hemoglobin 9.2 (L) 13.5 - 17.5 g/dL    Hematocrit 29.5 (L) 41.0 - 52.0 %    MCV 90 80 - 100 fL    MCH 28.0 26.0 - 34.0 pg    MCHC 31.2 (L) 32.0 - 36.0 g/dL    RDW 14.3 11.5 - 14.5 %    Platelets 155 150 - 450 x10*3/uL    Neutrophils % 82.3 40.0 - 80.0 %    Immature Granulocytes %, Automated 0.8 0.0 - 0.9 %    Lymphocytes % 12.2 13.0 - 44.0 %    Monocytes % 3.9 2.0 - 10.0 %    Eosinophils % 0.6 0.0 - 6.0 %    Basophils % 0.2 0.0 - 2.0 %    Neutrophils Absolute 4.17 1.20 - 7.70 x10*3/uL    Immature Granulocytes Absolute, Automated 0.04 0.00 - 0.70 x10*3/uL    Lymphocytes Absolute 0.62 (L) 1.20 - 4.80 x10*3/uL    Monocytes Absolute 0.20 0.10 - 1.00 x10*3/uL    Eosinophils Absolute 0.03 0.00 - 0.70 x10*3/uL    Basophils Absolute 0.01 0.00 - 0.10 x10*3/uL   Renal Function Panel   Result Value Ref Range    Glucose 105 (H) 74 - 99 mg/dL    Sodium 138 136 - 145 mmol/L    Potassium 3.7 3.5 - 5.3 mmol/L    Chloride 99 98 - 107 mmol/L    Bicarbonate 29 21 - 32 mmol/L    Anion Gap 14 10 - 20 mmol/L    Urea Nitrogen 12 6 - 23 mg/dL    Creatinine 0.45 (L) 0.50 - 1.30 mg/dL    eGFR >90 >60 mL/min/1.73m*2    Calcium 8.6 8.6 - 10.6 mg/dL    Phosphorus 3.1 2.5 - 4.9 mg/dL    Albumin 3.2 (L) 3.4 - 5.0 g/dL   POCT GLUCOSE   Result Value Ref Range    POCT Glucose 104 (H) 74 - 99 mg/dL             Assessment/Plan   Principal Problem:    Small bowel obstruction (CMS/HCC)    Narciso Marrero is a 58 y.o. male with PMHx of Stage IV KRAS + Ascending colon cancer  s/p Right hemicolectomy (5/3/23), metastatic  disease to the liver, and numerous peritoneal implants s/p AMY pump, DMII, HTN, GERD, Chronic pain presents to  as a transfer from [place] for further evaluation of partial small bowel obstruction and recent onset altered mental status.  Patient denies prolonged periods of constipation and his presentation is likely due to his peritoneal metastatic disease.  Partial SBO described on CT imaging less likely secondary to a mechanical obstruction, or adhesions but rather due to his worsening peritoneal metastatic disease. Patient's acute onset confusion, forgetfulness and difficulty expressing his words is concerning for intracranial metastatic disease.  His altered mental status less likely metabolic in nature given no electrolyte derangements, however will order TSH, vitamin B12 and folate to definitively rule out.  Less likely infectious given no leukocytosis , fever, chills or any other systemic signs of infection. Less likely toxins however cannot be entirely excluded given recent initiation of Lonsurf chemotherapy drug, and patient denies excess ingestion of prescribed opiates. Given concern for intracranial metastatic disease MRI brain with IV contrast will be ordered for further evaluation.        3/18 UPDATES:  -MRI today; pending result  -IR planned paracentesis today for symptomatic management of ascites   -Holding off on Lonsurf therapy for today   -C/w CLD and added morphine 15 BID with monitoring Bms  -Okay to restart AC after Para today  -C/w zyprexa and ativan   Dispo: tentatively after bedside procedures    #Acute Encephalopathy  -Patient's acute onset confusion, forgetfulness and difficulty expressing his words is concerning for intracranial metastatic disease.  His altered mental status less likely metabolic in nature given no electrolyte derangements, however will order TSH, vitamin B12 and folate to definitively rule out.  Less likely infectious given no leukocytosis , fever, chills or any other  systemic signs of infection. Less likely toxins however cannot be entirely excluded given recent initiation of Lonsurf chemotherapy drug, and patient denies excess ingestion of prescribed opiates. Given concern for intracranial metastatic disease MRI brain with IV contrast will be ordered for further evaluation.  -TSH, B12, Folate wnl   -Blood cx pending   PLAN:   -MRI Brain w/IV Con. Pt w/ hepatic artery infusion (AMY) pump.          #Borderline Dilated Small Bowel   #C/f SBO  #Constipation  - CT A/P with several segments of fluid-filled borderline dilated small bowel, alternating with decompressed bowel. The the areas of decompressed bowel demonstrate wall nodular enhancement, suggestive of serosal or constrictive peritoneal implants.  -Pt reports regular BM's since 3/4. Patient states if he skips miralax dose, stool immediately harden. Last BM yesterday, soft, paste-like and no melena or hematochezia.  -Hypoactive bowel sounds, +Ascites.   PLAN:  -Correct electrolyte abnormalities   -C/w Miralax once daily, DocSenna BID   -Continue to monitor for acute abdomen      #Moderate-size L. Pleural Effusion  #Small R. Pleural Effusion   #Pulmonary Nodule   -Left pleural effusion increased in size compared to last imaging. Right pleural effusion stable.  -Patient reports SOB w/ exertion, however asymptomatic at rest.  -Consider Thoracentesis if symptoms worsen.  PLAN:   -CTM     #Stage IV Ascending Colon Cancer  #Stable Hepatic Masses   #Stable Splenic Masses  #Hilar Mass   #Numerous Peritoneal Masses   #Large Volume Ascites   #C/f SBP  -1/2023: Presented with 20 pound unexpected weight loss and RUQ pain. Imaging showed large liver lesion. Biopsy c/w metastatic colon cancer. C-scope showed infiltrative, ulcerated, fungating mass in ascending colon.   -Baseline CEA 2322. Started FOLFOX + Bevacizumab  PREVIOUS THERAPY:  -1/2023-4/12/23: FOLFOX x6 cycles. First 2 cycles with bevacizumab  AMY pump placed 5/3/23. First FUdR  via AMY pump   -5/17/23. Systemic chemotherapy being held due to wound vac, Started FOLFOX on 8/9/23. On -11/1/23 dose reduction in Oxaliplatin and infusional 5FU only due to chemotherapy colitis. Treatment DC due to PD. Last treatment on 12.11.2023. Total cycles of FUdR (if applicable):7  -12.26.2023 : Started on FOLFIRI, Last and 4th cycle on 2/5/24. Discontinued due to disease progression  CURRENT THERAPY:  -Lonsurf + Bevacizumab start 3/4/24  -Last Glycerin in AMY pump on 2/5/24  -Providers:  -Surg onc: Rajinder Arenas  -CRS: Regina Raymundo  -Local med onc: Dia Kendall  PLAN:  -C/s IR for para Monday 3/18  -Email primary oncologist about admission   -Hold Lonsurf for now      #HLD  - C/w home simvastatin 40 mg daily     #Chronic Pain  ::Has crushed shoulder injury 2/2 work-related accident  ::follows with pain management at [facility]  - C/w pregabalin 100 BID   - Restarted morphine at half dose 15 BID   - IV Tylenol 1000 mg BID  - Ok to spot dose Toradol for breakthrough pain.     #GERD  - C/w home pantoprazole 40 mg daily     #DMII  :: Home regimen: lantus 22 units, humalog 3 units w/ meals  PLAN:  - Hypoglycemia protocol   - Mild insulin sliding scale #2     MISC:  -Zypreza 10 mg  once nightly      F: PRN  E: PRN  N: CLD   DVT: Lovenox (held for IR procedure in the AM)   GI: PPI  Code Status: Full code (confirmed on admission)  NOK: Primary Emergency Contact: ROHITH MARIE, Home Phone: [phone]            Jordan Esqueda MD  PGY1, Internal Medicine

## 2024-03-18 NOTE — CARE PLAN
Problem: Pain  Goal: My pain/discomfort is manageable  3/18/2024 0035 by Shan Ford RN  Outcome: Progressing  3/18/2024 0035 by Shan Ford RN  Outcome: Progressing

## 2024-03-18 NOTE — CARE PLAN
The patient's goals for the shift include      The clinical goals for the shift include pain  well managed through shift      Problem: Pain  Goal: My pain/discomfort is manageable  Outcome: Progressing     Problem: Safety  Goal: Patient will be injury free during hospitalization  Outcome: Progressing  Goal: I will remain free of falls  Outcome: Progressing     Problem: Daily Care  Goal: Daily care needs are met  Outcome: Progressing     Problem: Psychosocial Needs  Goal: Demonstrates ability to cope with hospitalization/illness  Outcome: Progressing  Goal: Collaborate with me, my family, and caregiver to identify my specific goals  Outcome: Progressing     Problem: Discharge Barriers  Goal: My discharge needs are met  Outcome: Progressing

## 2024-03-18 NOTE — SIGNIFICANT EVENT
Patient has Intera 3000 hepatic artery infusion pump manufactured by [employer] implanted into abdominal wall with catheter ligated into the hepatic artery via gastroduodenal artery.      This pump is MR conditional and currently filled with glycerin. This is safe for MRI assuming MR protocol is performed per Intera recommendations below.      Full 's details can be found: [email or web address]         MRI INFORMATION  MR Conditional  Read and understand this document in its entirety prior to performing a Magnetic Resonance Imaging (MRI) procedure on a patient with an implanted Intera 3000 Hepatic Artery Infusion Pump. Failure to adhere to the conditions for safe use may result in serious injury to the patient.  The Intera 3000 Hepatic Artery Infusion Pump is MR Conditional.  MRI SAFETY INFORMATION  Non-clinical testing has demonstrated that the Intera 3000 Hepatic Artery Infusion Pump is MR Conditional. A patient implanted with this device can be safely scanned in an MR system which meets or is operated under the following conditions:   Static magnetic field of 1.5 Oliva or 3 Oliva, only   Maximum spatial gradient magnetic field of 4,000 G/cm (40 T/m)   Maximum MR system reported, whole body averaged specific absorption rate (PALMA) of  2.0 W/kg   Maximum gradient slew-rate (SR) of 150T/m/s per axis     WARNING: Exceeding the listed MRI Parameters could result in excessive force or  torque, which could lead to patient injury.      MRI-Related Heating  Under the scanning conditions defined above, the Intera 3000 Hepatic Artery Infusion Pump is expected to produce a maximum temperature rise of less than 6.5°C after 15 minutes of continuous scanning. Scans longer than 15 minutes will require cooling delay periods equal in length to the exposure period to minimize risk of patient injury.  WARNING: A temperature increase of 6.5?C may temporarily increase the flow rate by  90%. Please see the table below for the maximum volume that can be administered to a patient during a 15 minute MRI exposure for each pump.     WARNING: Consult with a physician to ensure that the patient can safely receive an increased dosage of the specific drug that is contained in the pump. The physician should calculate the exact drug dosage that will be administered during the MRI scan based on the concentration of the drug contained within the pump and the duration of the scan as determined by the MRI technician and/or radiologist. Please be aware, that the increased flow rate of the pump will persist until the pump has returned to body temperature. This may take up to 65 minutes.  Note: In the event that the patient experiences uncomfortable warmth near the pump, the MRI scan should be stopped and the scan parameters should be adjusted to comfortable levels.  If the patient CANNOT safely receive the increased dosage please empty the pump of all drug prior to the MRI or do not perform the MRI scan.  WARNING: MRI must be completed at a facility with resuscitative equipment and has proper drug reversal agents on hand. A physician must evaluate the patient immediately after MRI for signs and symptoms of drug overdose and develop a plan for immediate monitoring in a medically supervised and adequately equipped environment.

## 2024-03-18 NOTE — POST-PROCEDURE NOTE
INTERVENTIONAL RADIOLOGY ADVANCED PRACTICE PROCEDURE  [facility]    A time out was performed and Right Hemiabdomen was examined with US and appropriate entry point was confirmed and marked.   The patient was prepped and draped in a sterile manner, 1% lidocaine was used to anesthesize the skin and subcutaneous tissue.   A 5F Centesis needle was then introduced through the skin into the peritoneal space, the centesis catheter was then threaded without difficulty.   3900 ml of yellow fluid was removed without difficulty. The catheter was then removed.   No immediate complications were noted during and immediately following the procedure.

## 2024-03-18 NOTE — PROGRESS NOTES
SUPPORTIVE AND PALLIATIVE ONCOLOGY INPATIENT FOLLOW-UP      SERVICE DATE: 24     SUBJECTIVE:  Interval Events:  Lower dose prn oxycodone started yesterday, reports helpful for pain  Pt today reports he was taking Morphine ER 30 mg BID at home, had not yet started TID dosing before admission.  Used zofran x3 doses    S/p paracentesis today pt reports removal of 3.9 L. Reports distention improved, aching abdominal pain post procedure    Pain Assessment:  Location:  Right head, Bilateral upper leg and lower leg, and abdomen  Duration: Constant  Characteristics:   Ratin   Descriptors: aching, burning, and pressure   Aggravating: movement    Relieving: Analgesics     Interference with Function: Somewhat    Opioid Use  Past 24 h prn opioid use: Morphine CR 15 mg PO x 2 doses = 30 mg = 30 OME  Oxycodone 5 mg x2 doses 10 mg 12.5 OME  Total 24h OME use:  42.5    Note: OME calculations based on equianalgesic table below. Please note this table is based on best available evidence but conversions are still approximate. These are NOT opioid DOSES for individual patient use; this is equivalency information.  Drug Parenteral Enteral   Morphine 10 25   Oxycodone N/A 20   Hydromorphone 2 5   Fentanyl 0.15 N/A   Tramadol N/A 120   Citation: Faye JAY. Demystifying opioid conversion calculations: A guide for effective dosing, Second edition. MD Nancy: American Society of Health-System Pharmacists, 2018.    Symptom Assessment:  Nausea a little  Vomiting none  Constipation none  Diarrhea a little      Information obtained from: chart review and interview of patient  ______________________________________________________________________        OBJECTIVE:    Lab Results   Component Value Date    WBC 5.1 2024    HGB 9.2 (L) 2024    HCT 29.5 (L) 2024    MCV 90 2024     2024      Lab Results   Component Value Date    GLUCOSE 105 (H) 2024    CALCIUM 8.6 2024      03/18/2024    K 3.7 03/18/2024    CO2 29 03/18/2024    CL 99 03/18/2024    BUN 12 03/18/2024    CREATININE 0.45 (L) 03/18/2024     Lab Results   Component Value Date    ALT 6 (L) 03/16/2024    AST 17 03/16/2024    ALKPHOS 295 (H) 03/16/2024    BILITOT 0.8 03/16/2024     Estimated Creatinine Clearance: 125 mL/min (A) (by C-G formula based on SCr of 0.45 mg/dL (L)).     Scheduled medications  acetaminophen, 1,000 mg, intravenous, q12h VAHID  [Held by provider] amLODIPine, 5 mg, oral, Daily  [Held by provider] enoxaparin, 40 mg, subcutaneous, q24h  insulin glargine, 7 Units, subcutaneous, Nightly  insulin lispro, 0-10 Units, subcutaneous, q4h  LORazepam, 0.5 mg, oral, Daily  LORazepam, 1 mg, oral, Once  losartan, 25 mg, oral, Daily  melatonin, 3 mg, oral, Daily  morphine CR, 15 mg, oral, q12h VAHID  OLANZapine, 10 mg, oral, Nightly  pantoprazole, 40 mg, intravenous, Daily  polyethylene glycol, 17 g, oral, Daily  pregabalin, 100 mg, oral, BID  pregabalin, 100 mg, oral, Once  sennosides-docusate sodium, 2 tablet, oral, q12h  simvastatin, 40 mg, oral, Nightly  [Held by provider] trifluridine-tipiraciL (Lonsurf) 15-6.14 mg 15 mg, trifluridine-tipiraciL (Lonsurf) 20-8.19 mg 60 mg, 75 mg, oral, Once      Continuous medications     PRN medications  PRN medications: dextrose, dextrose, dicyclomine, glucagon, hydrALAZINE, ondansetron, oxyCODONE, prochlorperazine **OR** prochlorperazine **OR** prochlorperazine   }     PHYSICAL EXAMINATION:    Vital Signs:   Vital signs reviewed  Visit Vitals  BP (!) 168/101 (BP Location: Left arm, Patient Position: Lying)   Pulse 103   Temp 36.9 °C (98.4 °F) (Temporal)   Resp 16        Pain Score: 3       Physical Exam  Vitals reviewed.   Constitutional:       General: He is not in acute distress.  HENT:      Head: Normocephalic.      Mouth/Throat:      Mouth: Mucous membranes are moist.   Eyes:      Conjunctiva/sclera: Conjunctivae normal.   Pulmonary:      Effort: Pulmonary effort is normal. No  respiratory distress.   Abdominal:      General: There is distension (less distention s/p paracentesis).   Neurological:      Mental Status: He is alert and oriented to person, place, and time.   Psychiatric:         Mood and Affect: Mood normal.         Behavior: Behavior normal.         Thought Content: Thought content normal.         Cognition and Memory: Cognition normal.         Judgment: Judgment normal.          ASSESSMENT/PLAN:  Narciso Marrero is a 58 y.o. male diagnosed with metastatic ascending colon cancer with liver and peritoneal involvement. He has an AMY pump and is s/p multiple lines of systemic treatment. He was started on FOLFIRI 12/26/23, though this is now on hold d/t disease progression, recently started on Lonsurf/Bevacizumab. PMH significant for T2DM, HTN, GERD, chronic pain. Presented to [facility] 3/13/2024 with AMS, weakness, transferred to [facility] 3/15/2024 for further evaluation and management of SBO and AMS. Supportive and Palliative Oncology is consulted for pain management and goals of care discussion.      Cancer related Pain:  Lower abdominal pain related to metastatic colon cancer with liver and peritoneal mets, ascites  CIPN bilateral fingers, right face/head pain related to post herpetic neuralgia  Chronic bilateral LE pain related to work injury  Pain is: cancer related pain and chronic  Type: visceral, somatic, and neuropathic  Pain control: sub-optimally controlled  Home regimen:  Morphine ER 30 mg TID pt reports had not yet started, Pregabalin 100 mg BID, and Percocet 10/325 mg q6h prn   Intolerances/previously tried: none  Personalized pain goal: 2  Total OME usage for the past 24 hours:  0  Opioids held due to concern for SBO and constipation however pain has been worsening off opioids. Pt had large diarrhea stool yesterday and overnight  Continue Morphine ER 15 mg PO q12h  Recommend increasing Oxycodone IR to 5-10 mg PO q4h prn moderate to severe pain  Continue  Acetaminophen 1000 mg IV q12h   Continue Pregabalin 100 mg BID (consider increasing to TID)  Consider addition of short course dexamethasone for pain, nausea, reduction in intestinal edema 4 mg PO qAM, monitor blood sugars in the setting of T2DM  Continue to monitor pain scores and administer PRN medications as appropriate  Continue/initiate nonpharmacologic pain management strategies including ice/heat therapy, distraction techniques, deep breathing/relaxation techniques, calming music, and repositioning  Continue to monitor for signs of opioid efficacy (pain scores, improved functionality) and toxicity (pinpoint pupils, excess sedation/drowsiness/confusion, respiratory depression, etc.)     Nausea:  Intermittent nausea with vomiting related to constipation and bowel obstruction, worsening nausea/vomiting possibly related to opioid withdrawal (last dose Morphine 0800 3/15 36h)  Home regimen:  Ondansetron , Olanzapine, and Lorazepam  well-controlled  Continue ondansetron 4 mg PO q6h prn   Continue compazine 10 mg PO q6h prn second line  Continue Lorazepam 0.5 mg daily  Start lorazepam 0.5 mg PO q8h prn (1 mg dose caused confusion)  Continue olanzapine 10 mg PO at bedtime      Constipation  At risk for constipation related to opioids, currently not constipated, having diarrhea  Usual bowel pattern: every other day  Home regimen: Miralax 17 gm daily to prn  LBM 3/17, large diarrhea  Monitor BM frequency, adjust regimen as needed  Goal to have BM without straining q48-72h  Continue Bentyl QID PRN cramping  Continue miralax 17 gm daily  Continue senna-S 2 tabs BID     Decreased appetite:  Appetite loss related to malignancy, taste changes, and disease process  Home regimen:  Olanzapine 5 mg qhs  Continue olanzapine 10 mg PO at bedtime   Control nausea  Consider dexamethasone as above     Medical Decision Making/Goals of Care/Advance Care Planning:  Patient's current clinical condition, including diagnosis, prognosis,  and management plan, and goals of care were discussed.   Life limiting disease: metastatic malignancy  Family: Supportive wife  Performance status: Moderate limitations due to pain and fatigue  Joys/meaning/strength: Family  Understanding of health: Demonstrates good prognostic understanding of disease process     Advance Directives  Existence of Advance Directives:Yes, but NOT documented in medical record  Decision maker: Surrogate decision maker is wife Emperatriz  Code Status: Full code     Supportive and Palliative Oncology encounter:  Spoke with patient at bedside  Emotional support provided  Coordination of care      Disposition:  Please  start the process of having prior authorization with meds to beds deliver medications to patient prior to discharge via [facility] pharmacy. Prescriptions will need to be sent 48-72 hours prior to discharge so that a prior authorization can be completed.      Discharge date: unknown pending acute issues  Will request an appointment with Outpatient Supportive Oncology [facility]      Signature and billing:  Thank you for allowing us to participate in the care of this patient. Recommendations will be communicated back to the consulting service by way of shared electronic medical record or face-to-face.    Medical complexity was high level due to due to complexity of problems, extensive data review, and high risk of management/treatment.    I spent 50 minutes in the care of this patient which included chart review, interviewing patient/family, discussion with primary team, coordination of care, and documentation.    Data:   Diagnostic tests and information reviewed for today's visit:  Conversation with primary team, Most recent labs, Medications       Some elements copied from my note on 3/17/2024, the elements have been updated and all reflect current decision making from today, 03/18/24       Plan of Care discussed with: Provider, Patient, and Family/Significant  Other: wife    Thank you for asking Supportive and Palliative Oncology to assist with care of this patient.  We will continue to follow  Please contact us for additional questions or concerns.      SIGNATURE: LA Garduno-CNP, VAIBHAV   PAGER/CONTACT:  Contact information:  Supportive and Palliative Oncology  Monday-Friday 8 AM-5 PM  Epic Secure chat or pager [phone].  After hours and weekends:  pager [phone]

## 2024-03-19 ENCOUNTER — APPOINTMENT (OUTPATIENT)
Dept: RADIOLOGY | Facility: HOSPITAL | Age: 59
DRG: 375 | End: 2024-03-19
Payer: MEDICARE

## 2024-03-19 LAB
ALBUMIN SERPL BCP-MCNC: 3.1 G/DL (ref 3.4–5)
ANION GAP SERPL CALC-SCNC: 17 MMOL/L (ref 10–20)
BASOPHILS # BLD AUTO: 0.01 X10*3/UL (ref 0–0.1)
BASOPHILS NFR BLD AUTO: 0.1 %
BUN SERPL-MCNC: 11 MG/DL (ref 6–23)
CALCIUM SERPL-MCNC: 8.3 MG/DL (ref 8.6–10.6)
CHLORIDE SERPL-SCNC: 99 MMOL/L (ref 98–107)
CO2 SERPL-SCNC: 25 MMOL/L (ref 21–32)
CREAT SERPL-MCNC: 0.41 MG/DL (ref 0.5–1.3)
EGFRCR SERPLBLD CKD-EPI 2021: >90 ML/MIN/1.73M*2
EOSINOPHIL # BLD AUTO: 0.01 X10*3/UL (ref 0–0.7)
EOSINOPHIL NFR BLD AUTO: 0.1 %
ERYTHROCYTE [DISTWIDTH] IN BLOOD BY AUTOMATED COUNT: 14.3 % (ref 11.5–14.5)
GLUCOSE BLD MANUAL STRIP-MCNC: 106 MG/DL (ref 74–99)
GLUCOSE BLD MANUAL STRIP-MCNC: 117 MG/DL (ref 74–99)
GLUCOSE BLD MANUAL STRIP-MCNC: 124 MG/DL (ref 74–99)
GLUCOSE SERPL-MCNC: 121 MG/DL (ref 74–99)
HCT VFR BLD AUTO: 30.7 % (ref 41–52)
HGB BLD-MCNC: 9.7 G/DL (ref 13.5–17.5)
IMM GRANULOCYTES # BLD AUTO: 0.05 X10*3/UL (ref 0–0.7)
IMM GRANULOCYTES NFR BLD AUTO: 0.7 % (ref 0–0.9)
LYMPHOCYTES # BLD AUTO: 0.73 X10*3/UL (ref 1.2–4.8)
LYMPHOCYTES NFR BLD AUTO: 10.1 %
MAGNESIUM SERPL-MCNC: 1.56 MG/DL (ref 1.6–2.4)
MCH RBC QN AUTO: 28 PG (ref 26–34)
MCHC RBC AUTO-ENTMCNC: 31.6 G/DL (ref 32–36)
MCV RBC AUTO: 89 FL (ref 80–100)
MONOCYTES # BLD AUTO: 0.32 X10*3/UL (ref 0.1–1)
MONOCYTES NFR BLD AUTO: 4.4 %
NEUTROPHILS # BLD AUTO: 6.11 X10*3/UL (ref 1.2–7.7)
NEUTROPHILS NFR BLD AUTO: 84.6 %
NRBC BLD-RTO: 0 /100 WBCS (ref 0–0)
PATH REVIEW-CELL CT,FLUID: NORMAL
PHOSPHATE SERPL-MCNC: 3.3 MG/DL (ref 2.5–4.9)
PLATELET # BLD AUTO: 156 X10*3/UL (ref 150–450)
POTASSIUM SERPL-SCNC: 4.1 MMOL/L (ref 3.5–5.3)
RBC # BLD AUTO: 3.47 X10*6/UL (ref 4.5–5.9)
SODIUM SERPL-SCNC: 137 MMOL/L (ref 136–145)
WBC # BLD AUTO: 7.2 X10*3/UL (ref 4.4–11.3)

## 2024-03-19 PROCEDURE — 2500000001 HC RX 250 WO HCPCS SELF ADMINISTERED DRUGS (ALT 637 FOR MEDICARE OP)

## 2024-03-19 PROCEDURE — 99233 SBSQ HOSP IP/OBS HIGH 50: CPT | Performed by: NURSE PRACTITIONER

## 2024-03-19 PROCEDURE — 70553 MRI BRAIN STEM W/O & W/DYE: CPT | Performed by: STUDENT IN AN ORGANIZED HEALTH CARE EDUCATION/TRAINING PROGRAM

## 2024-03-19 PROCEDURE — 70553 MRI BRAIN STEM W/O & W/DYE: CPT

## 2024-03-19 PROCEDURE — 2500000004 HC RX 250 GENERAL PHARMACY W/ HCPCS (ALT 636 FOR OP/ED)

## 2024-03-19 PROCEDURE — 80069 RENAL FUNCTION PANEL: CPT

## 2024-03-19 PROCEDURE — 85025 COMPLETE CBC W/AUTO DIFF WBC: CPT

## 2024-03-19 PROCEDURE — 99233 SBSQ HOSP IP/OBS HIGH 50: CPT

## 2024-03-19 PROCEDURE — 1170000001 HC PRIVATE ONCOLOGY ROOM DAILY

## 2024-03-19 PROCEDURE — 83735 ASSAY OF MAGNESIUM: CPT

## 2024-03-19 PROCEDURE — 82947 ASSAY GLUCOSE BLOOD QUANT: CPT

## 2024-03-19 PROCEDURE — 2500000002 HC RX 250 W HCPCS SELF ADMINISTERED DRUGS (ALT 637 FOR MEDICARE OP, ALT 636 FOR OP/ED)

## 2024-03-19 PROCEDURE — A9575 INJ GADOTERATE MEGLUMI 0.1ML: HCPCS | Performed by: INTERNAL MEDICINE

## 2024-03-19 PROCEDURE — 2550000001 HC RX 255 CONTRASTS: Performed by: INTERNAL MEDICINE

## 2024-03-19 RX ORDER — MAGNESIUM SULFATE HEPTAHYDRATE 40 MG/ML
2 INJECTION, SOLUTION INTRAVENOUS ONCE
Status: COMPLETED | OUTPATIENT
Start: 2024-03-19 | End: 2024-03-19

## 2024-03-19 RX ORDER — GADOTERATE MEGLUMINE 376.9 MG/ML
15 INJECTION INTRAVENOUS
Status: COMPLETED | OUTPATIENT
Start: 2024-03-19 | End: 2024-03-19

## 2024-03-19 RX ORDER — ACETAMINOPHEN 325 MG/1
975 TABLET ORAL EVERY 6 HOURS PRN
Status: DISCONTINUED | OUTPATIENT
Start: 2024-03-19 | End: 2024-03-21 | Stop reason: HOSPADM

## 2024-03-19 RX ADMIN — LORAZEPAM 0.5 MG: 0.5 TABLET ORAL at 08:16

## 2024-03-19 RX ADMIN — ONDANSETRON 4 MG: 2 INJECTION INTRAMUSCULAR; INTRAVENOUS at 11:35

## 2024-03-19 RX ADMIN — MELATONIN 3 MG: 3 TAB ORAL at 20:03

## 2024-03-19 RX ADMIN — PREGABALIN 100 MG: 75 CAPSULE ORAL at 08:15

## 2024-03-19 RX ADMIN — ACETAMINOPHEN 1000 MG: 10 INJECTION INTRAVENOUS at 08:15

## 2024-03-19 RX ADMIN — OLANZAPINE 10 MG: 5 TABLET, FILM COATED ORAL at 20:03

## 2024-03-19 RX ADMIN — MAGNESIUM SULFATE 2 G: 2 INJECTION INTRAVENOUS at 20:14

## 2024-03-19 RX ADMIN — ENOXAPARIN SODIUM 40 MG: 100 INJECTION SUBCUTANEOUS at 20:04

## 2024-03-19 RX ADMIN — DIBASIC SODIUM PHOSPHATE, MONOBASIC POTASSIUM PHOSPHATE AND MONOBASIC SODIUM PHOSPHATE 250 MG: 852; 155; 130 TABLET ORAL at 08:46

## 2024-03-19 RX ADMIN — LORAZEPAM 1 MG: 1 TABLET ORAL at 16:58

## 2024-03-19 RX ADMIN — SIMVASTATIN 40 MG: 40 TABLET, FILM COATED ORAL at 20:03

## 2024-03-19 RX ADMIN — ONDANSETRON 4 MG: 2 INJECTION INTRAMUSCULAR; INTRAVENOUS at 20:10

## 2024-03-19 RX ADMIN — POTASSIUM PHOSPHATE, MONOBASIC 500 MG: 500 TABLET, SOLUBLE ORAL at 20:04

## 2024-03-19 RX ADMIN — MAGNESIUM SULFATE HEPTAHYDRATE 2 G: 40 INJECTION, SOLUTION INTRAVENOUS at 08:46

## 2024-03-19 RX ADMIN — PREGABALIN 100 MG: 75 CAPSULE ORAL at 20:03

## 2024-03-19 RX ADMIN — LOSARTAN POTASSIUM 25 MG: 25 TABLET, FILM COATED ORAL at 08:15

## 2024-03-19 RX ADMIN — PROCHLORPERAZINE EDISYLATE 10 MG: 5 INJECTION INTRAMUSCULAR; INTRAVENOUS at 00:52

## 2024-03-19 RX ADMIN — DIBASIC SODIUM PHOSPHATE, MONOBASIC POTASSIUM PHOSPHATE AND MONOBASIC SODIUM PHOSPHATE 250 MG: 852; 155; 130 TABLET ORAL at 12:29

## 2024-03-19 RX ADMIN — GADOTERATE MEGLUMINE 15 ML: 376.9 INJECTION INTRAVENOUS at 19:21

## 2024-03-19 RX ADMIN — MORPHINE SULFATE 15 MG: 15 TABLET, EXTENDED RELEASE ORAL at 08:15

## 2024-03-19 RX ADMIN — PANTOPRAZOLE SODIUM 40 MG: 40 TABLET, DELAYED RELEASE ORAL at 08:15

## 2024-03-19 RX ADMIN — MORPHINE SULFATE 15 MG: 15 TABLET, EXTENDED RELEASE ORAL at 20:03

## 2024-03-19 ASSESSMENT — PAIN SCALES - GENERAL
PAINLEVEL_OUTOF10: 5 - MODERATE PAIN
PAINLEVEL_OUTOF10: 5 - MODERATE PAIN

## 2024-03-19 ASSESSMENT — COGNITIVE AND FUNCTIONAL STATUS - GENERAL
TOILETING: A LITTLE
WALKING IN HOSPITAL ROOM: A LITTLE
STANDING UP FROM CHAIR USING ARMS: A LITTLE
HELP NEEDED FOR BATHING: A LITTLE
DAILY ACTIVITIY SCORE: 20
CLIMB 3 TO 5 STEPS WITH RAILING: A LOT
DRESSING REGULAR LOWER BODY CLOTHING: A LITTLE
MOBILITY SCORE: 20
DRESSING REGULAR UPPER BODY CLOTHING: A LITTLE

## 2024-03-19 ASSESSMENT — PAIN - FUNCTIONAL ASSESSMENT: PAIN_FUNCTIONAL_ASSESSMENT: 0-10

## 2024-03-19 NOTE — PROGRESS NOTES
"Narciso Marrero is a 58 y.o. male on day 4 of admission presenting with Small bowel obstruction (CMS/HCC).    Subjective   Feels better this morning, pain more controlled with morphine.  Endorses improved abdominal distention, states that pain has gotten better.  Denies fevers/chills, CP, SOB, abdominal pain, lower extremity pain.    Objective   Vitals:    03/19/24 0814   BP: 134/81   Pulse:    Resp: 16   Temp: 36.5 °C (97.7 °F)   SpO2: 93%       Physical Exam  Cardiovascular:      Rate and Rhythm: Normal rate and regular rhythm.      Pulses: Normal pulses.      Heart sounds: Normal heart sounds. No murmur heard.     No gallop.   Pulmonary:      Effort: Pulmonary effort is normal. No respiratory distress.      Breath sounds: Normal breath sounds. No wheezing.   Abdominal:      General: Bowel sounds are normal. There is distension.      Tenderness: There is no abdominal tenderness. There is no guarding or rebound.      Hernia: A hernia is present.      Comments: AMY pump (LUQ) with ecchymosis. Midline surgical scar    Musculoskeletal:         General: Normal range of motion.      Cervical back: Normal range of motion.      Right lower leg: No edema.      Left lower leg: No edema.      Comments: LLE slightly larger than RLE. Per patient, this has always been that way since crush injury.    Skin:     General: Skin is warm.   Neurological:      General: No focal deficit present.      Mental Status: He is alert and oriented to person, place, and time.   Psychiatric:         Mood and Affect: Mood normal.     Last Recorded Vitals  Blood pressure 134/81, pulse (!) 118, temperature 36.5 °C (97.7 °F), resp. rate 16, height 1.8 m (5' 10.87\"), weight 92.6 kg (204 lb 2.3 oz), SpO2 93 %.  Intake/Output last 3 Shifts:  I/O last 3 completed shifts:  In: 100 (1.1 mL/kg) [P.O.:100]  Out: - (0 mL/kg)   Weight: 92.6 kg     Relevant Results    Results for orders placed or performed during the hospital encounter of 03/15/24 (from the " past 24 hour(s))   Body Fluid Cell Count   Result Value Ref Range    Color, Fluid Yellow Colorless, Straw, Yellow    Clarity, Fluid Clear Clear    WBC, Fluid 78 See Comment /uL    RBC, Fluid 149 0  /uL /uL   Body Fluid Differential   Result Value Ref Range    Neutrophils %, Manual, Fluid 6 <25 % %    Lymphocytes %, Manual, Fluid 43 <75 % %    Mono/Macrophages %, Manual, Fluid 51 <70 % %    Eosinophils %, Manual, Fluid 0 0 % %    Basophils %, Manual, Fluid 0 0 % %    Immature Granulocytes %, Manual, Fluid 0 0 % %    Blasts %, Manual, Fluid 0 0 % %    Unclassified Cells %, Manual, Fluid 0 0 % %    Plasma Cells %, Manual, Fluid 0 0 % %    Total Cells Counted, Fluid 100    POCT GLUCOSE   Result Value Ref Range    POCT Glucose 95 74 - 99 mg/dL   POCT GLUCOSE   Result Value Ref Range    POCT Glucose 102 (H) 74 - 99 mg/dL   POCT GLUCOSE   Result Value Ref Range    POCT Glucose 110 (H) 74 - 99 mg/dL   POCT GLUCOSE   Result Value Ref Range    POCT Glucose 119 (H) 74 - 99 mg/dL   CBC and Auto Differential   Result Value Ref Range    WBC 7.2 4.4 - 11.3 x10*3/uL    nRBC 0.0 0.0 - 0.0 /100 WBCs    RBC 3.47 (L) 4.50 - 5.90 x10*6/uL    Hemoglobin 9.7 (L) 13.5 - 17.5 g/dL    Hematocrit 30.7 (L) 41.0 - 52.0 %    MCV 89 80 - 100 fL    MCH 28.0 26.0 - 34.0 pg    MCHC 31.6 (L) 32.0 - 36.0 g/dL    RDW 14.3 11.5 - 14.5 %    Platelets 156 150 - 450 x10*3/uL    Neutrophils % 84.6 40.0 - 80.0 %    Immature Granulocytes %, Automated 0.7 0.0 - 0.9 %    Lymphocytes % 10.1 13.0 - 44.0 %    Monocytes % 4.4 2.0 - 10.0 %    Eosinophils % 0.1 0.0 - 6.0 %    Basophils % 0.1 0.0 - 2.0 %    Neutrophils Absolute 6.11 1.20 - 7.70 x10*3/uL    Immature Granulocytes Absolute, Automated 0.05 0.00 - 0.70 x10*3/uL    Lymphocytes Absolute 0.73 (L) 1.20 - 4.80 x10*3/uL    Monocytes Absolute 0.32 0.10 - 1.00 x10*3/uL    Eosinophils Absolute 0.01 0.00 - 0.70 x10*3/uL    Basophils Absolute 0.01 0.00 - 0.10 x10*3/uL   Renal Function Panel   Result Value Ref Range     Glucose 121 (H) 74 - 99 mg/dL    Sodium 137 136 - 145 mmol/L    Potassium 4.1 3.5 - 5.3 mmol/L    Chloride 99 98 - 107 mmol/L    Bicarbonate 25 21 - 32 mmol/L    Anion Gap 17 10 - 20 mmol/L    Urea Nitrogen 11 6 - 23 mg/dL    Creatinine 0.41 (L) 0.50 - 1.30 mg/dL    eGFR >90 >60 mL/min/1.73m*2    Calcium 8.3 (L) 8.6 - 10.6 mg/dL    Phosphorus 3.3 2.5 - 4.9 mg/dL    Albumin 3.1 (L) 3.4 - 5.0 g/dL   Magnesium   Result Value Ref Range    Magnesium 1.56 (L) 1.60 - 2.40 mg/dL   POCT GLUCOSE   Result Value Ref Range    POCT Glucose 117 (H) 74 - 99 mg/dL             Assessment/Plan   Principal Problem:    Small bowel obstruction (CMS/HCC)    Narciso Marrero is a 58 y.o. male with PMHx of Stage IV KRAS + Ascending colon cancer  s/p Right hemicolectomy (5/3/23), metastatic disease to the liver, and numerous peritoneal implants s/p AMY pump, DMII, HTN, GERD, Chronic pain presents to  as a transfer from [place] for further evaluation of partial small bowel obstruction and recent onset altered mental status.  Patient denies prolonged periods of constipation and his presentation is likely due to his peritoneal metastatic disease.  Partial SBO described on CT imaging less likely secondary to a mechanical obstruction, or adhesions but rather due to his worsening peritoneal metastatic disease. Patient's acute onset confusion, forgetfulness and difficulty expressing his words is concerning for intracranial metastatic disease.  His altered mental status less likely metabolic in nature given no electrolyte derangements, however will order TSH, vitamin B12 and folate to definitively rule out.  Less likely infectious given no leukocytosis , fever, chills or any other systemic signs of infection. Less likely toxins however cannot be entirely excluded given recent initiation of Lonsurf chemotherapy drug, and patient denies excess ingestion of prescribed opiates. Given concern for intracranial metastatic disease MRI brain with IV  contrast will be ordered for further evaluation.        3/19 UPDATES:  -MRI today   -IR planned paracentesis today for symptomatic management of ascites   -Holding off on Lonsurf therapy    -C/w CLD and added morphine 15 BID with monitoring Bms  -C/w zyprexa and ativan     Dispo: tentatively tomorrow     #Acute Encephalopathy  -Patient's acute onset confusion, forgetfulness and difficulty expressing his words is concerning for intracranial metastatic disease.  His altered mental status less likely metabolic in nature given no electrolyte derangements, however will order TSH, vitamin B12 and folate to definitively rule out.  Less likely infectious given no leukocytosis , fever, chills or any other systemic signs of infection. Less likely toxins however cannot be entirely excluded given recent initiation of Lonsurf chemotherapy drug, and patient denies excess ingestion of prescribed opiates. Given concern for intracranial metastatic disease MRI brain with IV contrast will be ordered for further evaluation.  -TSH, B12, Folate wnl   -Blood cx pending   PLAN:   -MRI Brain w/IV Con.   -Pt w/ hepatic artery infusion (AMY) pump.        #Borderline Dilated Small Bowel   #C/f SBO  #Constipation  - CT A/P with several segments of fluid-filled borderline dilated small bowel, alternating with decompressed bowel. The the areas of decompressed bowel demonstrate wall nodular enhancement, suggestive of serosal or constrictive peritoneal implants.  -Pt reports regular BM's since 3/4. Patient states if he skips miralax dose, stool immediately harden. Last BM yesterday, soft, paste-like and no melena or hematochezia.  -Hypoactive bowel sounds, +Ascites.   PLAN:  -Correct electrolyte abnormalities   -C/w Miralax once daily, DocSenna BID   -Continue to monitor for acute abdomen      #Moderate-size L. Pleural Effusion  #Small R. Pleural Effusion   #Pulmonary Nodule   -Left pleural effusion increased in size compared to last imaging. Right  pleural effusion stable.  -Patient reports SOB w/ exertion, however asymptomatic at rest.  -Consider Thoracentesis if symptoms worsen.  PLAN:   -CTM     #Stage IV Ascending Colon Cancer  #Stable Hepatic Masses   #Stable Splenic Masses  #Hilar Mass   #Numerous Peritoneal Masses   #Large Volume Ascites   #C/f SBP  -1/2023: Presented with 20 pound unexpected weight loss and RUQ pain. Imaging showed large liver lesion. Biopsy c/w metastatic colon cancer. C-scope showed infiltrative, ulcerated, fungating mass in ascending colon.   -Baseline CEA 2322. Started FOLFOX + Bevacizumab  PREVIOUS THERAPY:  -1/2023-4/12/23: FOLFOX x6 cycles. First 2 cycles with bevacizumab  AMY pump placed 5/3/23. First FUdR via AMY pump   -5/17/23. Systemic chemotherapy being held due to wound vac, Started FOLFOX on 8/9/23. On -11/1/23 dose reduction in Oxaliplatin and infusional 5FU only due to chemotherapy colitis. Treatment DC due to PD. Last treatment on 12.11.2023. Total cycles of FUdR (if applicable):7  -12.26.2023 : Started on FOLFIRI, Last and 4th cycle on 2/5/24. Discontinued due to disease progression  CURRENT THERAPY:  -Lonsurf + Bevacizumab start 3/4/24  -Last Glycerin in AMY pump on 2/5/24  -Providers:  -Surg onc: Rajinder Arenas  -CRS: Regina Raymundo  -Local med onc: Dia Kendall  PLAN:  -Paracentesis done: 3900cc ascitic fluid removed   -Email primary oncologist about admission   -Hold Lonsurf for now      #HLD  - C/w home simvastatin 40 mg daily     #Chronic Pain  ::Has crushed shoulder injury 2/2 work-related accident  ::follows with [facility] management at [facility]  - C/w pregabalin 100 BID   - Restarted morphine at half dose 15 BID   - IV Tylenol 1000 mg BID  - Ok to spot dose Toradol for breakthrough pain.     #GERD  - C/w home pantoprazole 40 mg daily     #DMII  :: Home regimen: lantus 22 units, humalog 3 units w/ meals  PLAN:  - Hypoglycemia protocol   - Mild insulin sliding scale #2     MISC:  -Ashlyn  10 mg  once nightly      F: PRN  E: PRN  N: CLD   DVT: Lovenox (held for IR procedure in the AM)   GI: PPI  Code Status: Full code (confirmed on admission)  NOK: Primary Emergency Contact: ROHITH MARIE, Home Phone: [phone]            Jordan Esqueda MD  PGY1, Internal Medicine

## 2024-03-19 NOTE — CARE PLAN
The clinical goals for the shift include patient will remain safe and free rom injury throughout shift 3/19 at 0700      Problem: Pain  Goal: My pain/discomfort is manageable  Outcome: Progressing     Problem: Safety  Goal: Patient will be injury free during hospitalization  Outcome: Progressing     Problem: Safety  Goal: I will remain free of falls  Outcome: Progressing     Problem: Daily Care  Goal: Daily care needs are met  Outcome: Progressing     Problem: Psychosocial Needs  Goal: Demonstrates ability to cope with hospitalization/illness  Outcome: Progressing

## 2024-03-19 NOTE — PROGRESS NOTES
Narciso Marrero is a 58 y.o. male on day 4 of admission presenting with Small bowel obstruction (CMS/HCC).    PMHx of Stage IV KRAS + Ascending colon cancer  s/p Right hemicolectomy (5/3/23), metastatic disease to the liver, and numerous peritoneal implants s/p AMY pump, DMII, HTN, GERD, Chronic pain presents to  as a transfer from  [place] for further evaluation of partial small bowel obstruction and recent onset altered mental status.     3/19/24 @ 1520  [facility] Note    Plan per Medical/Surgical Team: MRI, paracentesis  Status: Inpatient  Payor Source: [facility]  Discharge disposition: home  Expected date of discharge: 3/20  Barriers: none  Primary Oncologist: Dia Kendall     PT/OT rec low intensity. Will follow up with patient to see if he would like home care. Not in [facility] territory. Will continue to follow patient for any discharge needs.   Juanita Olguin RN [facility]

## 2024-03-19 NOTE — PROGRESS NOTES
SUPPORTIVE AND PALLIATIVE ONCOLOGY INPATIENT FOLLOW-UP      SERVICE DATE: 24     SUBJECTIVE:  Interval Events:  Plan for MRI brain today  Pt reports pain is suboptimally controlled and was better controlled at home on higher doses of Morphine ER.   Reports oxycodone IR 5 mg helps but not enough.        Pain Assessment:  Location:  Right head, Bilateral upper leg and lower leg, and abdomen  Duration: Constant  Characteristics:   Ratin   Descriptors: aching, burning, and pressure   Aggravating: movement    Relieving: Analgesics     Interference with Function: Somewhat    Opioid Use  Past 24 h prn opioid use: Morphine CR 15 mg PO x 2 doses = 30 mg = 30 OME  Oxycodone 5 mg PO x3 doses =15 mg =18.75 OME  Total 24h OME use:  48.75    Note: OME calculations based on equianalgesic table below. Please note this table is based on best available evidence but conversions are still approximate. These are NOT opioid DOSES for individual patient use; this is equivalency information.  Drug Parenteral Enteral   Morphine 10 25   Oxycodone N/A 20   Hydromorphone 2 5   Fentanyl 0.15 N/A   Tramadol N/A 120   Citation: Faye JAY. Demystifying opioid conversion calculations: A guide for effective dosing, Second edition. MD Nancy: American Society of Health-System Pharmacists, 2018.    Symptom Assessment:  Nausea a little improved  Vomiting none  Constipation none  Diarrhea somewhat      Information obtained from: chart review and interview of patient  ______________________________________________________________________        OBJECTIVE:    Lab Results   Component Value Date    WBC 7.2 2024    HGB 9.7 (L) 2024    HCT 30.7 (L) 2024    MCV 89 2024     2024      Lab Results   Component Value Date    GLUCOSE 121 (H) 2024    CALCIUM 8.3 (L) 2024     2024    K 4.1 2024    CO2 25 2024    CL 99 2024    BUN 11 2024    CREATININE 0.41 (L)  03/19/2024     Lab Results   Component Value Date    ALT 6 (L) 03/16/2024    AST 17 03/16/2024    ALKPHOS 295 (H) 03/16/2024    BILITOT 0.8 03/16/2024     Estimated Creatinine Clearance: 125 mL/min (A) (by C-G formula based on SCr of 0.41 mg/dL (L)).     Scheduled medications  [Held by provider] amLODIPine, 5 mg, oral, Daily  enoxaparin, 40 mg, subcutaneous, Daily  insulin lispro, 0-10 Units, subcutaneous, q4h  LORazepam, 0.5 mg, oral, Daily  LORazepam, 1 mg, oral, Once  losartan, 25 mg, oral, Daily  magnesium sulfate, 2 g, intravenous, Once  melatonin, 3 mg, oral, Daily  morphine CR, 15 mg, oral, q12h VAHID  OLANZapine, 10 mg, oral, Nightly  pantoprazole, 40 mg, oral, Daily  polyethylene glycol, 17 g, oral, Daily  pregabalin, 100 mg, oral, BID  sennosides-docusate sodium, 2 tablet, oral, q12h  simvastatin, 40 mg, oral, Nightly  sod phos di, mono-K phos mono, 250 mg, oral, 4x daily  [Held by provider] trifluridine-tipiraciL (Lonsurf) 15-6.14 mg 15 mg, trifluridine-tipiraciL (Lonsurf) 20-8.19 mg 60 mg, 75 mg, oral, Once      Continuous medications     PRN medications  PRN medications: acetaminophen, dextrose, dextrose, dicyclomine, glucagon, hydrALAZINE, ondansetron, oxyCODONE, prochlorperazine **OR** prochlorperazine **OR** prochlorperazine   }     PHYSICAL EXAMINATION:    Vital Signs:   Vital signs reviewed  Visit Vitals  /81   Pulse (!) 118   Temp 36.5 °C (97.7 °F)   Resp 16        Pain Score: 5 - Moderate pain       Physical Exam  Vitals reviewed.   Constitutional:       General: He is not in acute distress.  HENT:      Head: Normocephalic.      Mouth/Throat:      Mouth: Mucous membranes are moist.   Eyes:      Conjunctiva/sclera: Conjunctivae normal.   Pulmonary:      Effort: Pulmonary effort is normal. No respiratory distress.   Abdominal:      General: There is distension (mild decreased post para).   Neurological:      Mental Status: He is alert and oriented to person, place, and time.   Psychiatric:          Mood and Affect: Mood normal.         Behavior: Behavior normal.         Thought Content: Thought content normal.         Cognition and Memory: Cognition normal.         Judgment: Judgment normal.          ASSESSMENT/PLAN:  Narciso Marrero is a 58 y.o. male diagnosed with metastatic ascending colon cancer with liver and peritoneal involvement. He has an AMY pump and is s/p multiple lines of systemic treatment. He was started on FOLFIRI 12/26/23, though this is now on hold d/t disease progression, recently started on Lonsurf/Bevacizumab. PMH significant for T2DM, HTN, GERD, chronic pain. Presented to [facility]/13/2024 with AMS, weakness, transferred to [facility] 3/15/2024 for further evaluation and management of SBO and AMS. Supportive and Palliative Oncology is consulted for pain management and goals of care discussion.      Cancer related Pain:  Lower abdominal pain related to metastatic colon cancer with liver and peritoneal mets, ascites  CIPN bilateral fingers, right face/head pain related to post herpetic neuralgia  Chronic bilateral LE pain related to work injury  Pain is: cancer related pain and chronic  Type: visceral, somatic, and neuropathic  Pain control: sub-optimally controlled  Home regimen:  Morphine ER 30 mg TID, Pregabalin 100 mg BID, and Percocet 10/325 mg q6h prn   Intolerances/previously tried: none  Personalized pain goal: 2  Total OME usage for the past 24 hours:  0  Opioids held due to concern for SBO and constipation however pain has been worsening off opioids. Now having BMs  Recommend Oxycodone IR to 5-10 mg PO q4h prn moderate to severe pain  Continue Morphine ER 15 mg PO q12h, consider increasing to 30 mg q12h based on oxycodone use  Continue Acetaminophen 1000 mg IV q12h   Continue Pregabalin 100 mg BID (consider increasing to TID)  Consider addition of short course dexamethasone for pain, nausea, reduction in intestinal edema 4 mg PO qAM, monitor blood sugars in the  setting of T2DM  Continue to monitor pain scores and administer PRN medications as appropriate  Continue/initiate nonpharmacologic pain management strategies including ice/heat therapy, distraction techniques, deep breathing/relaxation techniques, calming music, and repositioning  Continue to monitor for signs of opioid efficacy (pain scores, improved functionality) and toxicity (pinpoint pupils, excess sedation/drowsiness/confusion, respiratory depression, etc.)     Nausea:  Intermittent nausea with vomiting related to constipation and bowel obstruction, worsening nausea/vomiting possibly related to opioid withdrawal (last dose Morphine 0800 3/15 36h)  Home regimen:  Ondansetron , Olanzapine, and Lorazepam  well-controlled  Continue ondansetron 4 mg PO q6h prn   Continue compazine 10 mg PO q6h prn second line  Continue Lorazepam 0.5 mg daily  Start lorazepam 0.5 mg PO q8h prn (1 mg dose caused confusion)  Continue olanzapine 10 mg PO at bedtime   Dexamethasone as above     Constipation  At risk for constipation related to opioids, currently not constipated, having diarrhea  Usual bowel pattern: every other day  Home regimen: Miralax 17 gm daily to prn  LBM 3/17, large diarrhea  Monitor BM frequency, adjust regimen as needed  Goal to have BM without straining q48-72h  Continue Bentyl QID PRN cramping  Continue miralax 17 gm daily  Continue senna-S 2 tabs BId     Decreased appetite:  Appetite loss related to malignancy, taste changes, and disease process  Home regimen:  Olanzapine 5 mg qhs  Continue olanzapine 10 mg PO at bedtime   Control nausea  Consider dexamethasone as above     Medical Decision Making/Goals of Care/Advance Care Planning:  Patient's current clinical condition, including diagnosis, prognosis, and management plan, and goals of care were discussed.   Life limiting disease: metastatic malignancy  Family: Supportive wife  Performance status: Moderate limitations due to pain and  fatigue  Joys/meaning/strength: Family  Understanding of health: Demonstrates good prognostic understanding of disease process     Advance Directives  Existence of Advance Directives:Yes, but NOT documented in medical record  Decision maker: Surrogate decision maker is wife Emperatriz  Code Status: Full code     Supportive and Palliative Oncology encounter:  Spoke with patient at bedside  Emotional support provided  Coordination of care      Disposition:  Please  start the process of having prior authorization with meds to beds deliver medications to patient prior to discharge via [facility] pharmacy. Prescriptions will need to be sent 48-72 hours prior to discharge so that a prior authorization can be completed.      Discharge date: unknown pending acute issues  Will request an appointment with Outpatient Supportive Oncology TORRI Bonds      Signature and billing:  Thank you for allowing us to participate in the care of this patient. Recommendations will be communicated back to the consulting service by way of shared electronic medical record or face-to-face.    Medical complexity was high level due to due to complexity of problems, extensive data review, and high risk of management/treatment.    Data:   Diagnostic tests and information reviewed for today's visit:  Conversation with primary team, Most recent labs, Medications       Some elements copied from my note on 3/18/2024, the elements have been updated and all reflect current decision making from today, 03/19/24       Plan of Care discussed with: Provider, Patient, and Family/Significant Other: wife    Thank you for asking Supportive and Palliative Oncology to assist with care of this patient.  We will continue to follow  Please contact us for additional questions or concerns.      SIGNATURE: TORRI Garduno, VAIBHAV   PAGER/CONTACT:  Contact information:  Supportive and Palliative Oncology  Monday-Friday 8 AM-5 PM  Epic Secure chat or pager  [phone].  After hours and weekends:  pager [phone]

## 2024-03-20 ENCOUNTER — APPOINTMENT (OUTPATIENT)
Dept: HEMATOLOGY/ONCOLOGY | Facility: CLINIC | Age: 59
End: 2024-03-20
Payer: MEDICARE

## 2024-03-20 ENCOUNTER — TELEPHONE (OUTPATIENT)
Dept: HOME HEALTH SERVICES | Facility: HOME HEALTH | Age: 59
End: 2024-03-20
Payer: MEDICARE

## 2024-03-20 ENCOUNTER — DOCUMENTATION (OUTPATIENT)
Dept: HOME HEALTH SERVICES | Facility: HOME HEALTH | Age: 59
End: 2024-03-20
Payer: MEDICARE

## 2024-03-20 ENCOUNTER — HOME HEALTH ADMISSION (OUTPATIENT)
Dept: HOME HEALTH SERVICES | Facility: HOME HEALTH | Age: 59
End: 2024-03-20
Payer: OTHER MISCELLANEOUS

## 2024-03-20 LAB
ALBUMIN SERPL BCP-MCNC: 2.8 G/DL (ref 3.4–5)
ANION GAP SERPL CALC-SCNC: 15 MMOL/L (ref 10–20)
BASOPHILS # BLD AUTO: 0.01 X10*3/UL (ref 0–0.1)
BASOPHILS NFR BLD AUTO: 0.2 %
BUN SERPL-MCNC: 10 MG/DL (ref 6–23)
CALCIUM SERPL-MCNC: 8.4 MG/DL (ref 8.6–10.6)
CHLORIDE SERPL-SCNC: 96 MMOL/L (ref 98–107)
CO2 SERPL-SCNC: 27 MMOL/L (ref 21–32)
CREAT SERPL-MCNC: 0.38 MG/DL (ref 0.5–1.3)
EGFRCR SERPLBLD CKD-EPI 2021: >90 ML/MIN/1.73M*2
EOSINOPHIL # BLD AUTO: 0.02 X10*3/UL (ref 0–0.7)
EOSINOPHIL NFR BLD AUTO: 0.3 %
ERYTHROCYTE [DISTWIDTH] IN BLOOD BY AUTOMATED COUNT: 14.7 % (ref 11.5–14.5)
GLUCOSE BLD MANUAL STRIP-MCNC: 112 MG/DL (ref 74–99)
GLUCOSE BLD MANUAL STRIP-MCNC: 120 MG/DL (ref 74–99)
GLUCOSE BLD MANUAL STRIP-MCNC: 120 MG/DL (ref 74–99)
GLUCOSE BLD MANUAL STRIP-MCNC: 143 MG/DL (ref 74–99)
GLUCOSE SERPL-MCNC: 112 MG/DL (ref 74–99)
HCT VFR BLD AUTO: 30.5 % (ref 41–52)
HGB BLD-MCNC: 9.6 G/DL (ref 13.5–17.5)
IMM GRANULOCYTES # BLD AUTO: 0.05 X10*3/UL (ref 0–0.7)
IMM GRANULOCYTES NFR BLD AUTO: 0.8 % (ref 0–0.9)
LYMPHOCYTES # BLD AUTO: 0.67 X10*3/UL (ref 1.2–4.8)
LYMPHOCYTES NFR BLD AUTO: 10.4 %
MCH RBC QN AUTO: 27.9 PG (ref 26–34)
MCHC RBC AUTO-ENTMCNC: 31.5 G/DL (ref 32–36)
MCV RBC AUTO: 89 FL (ref 80–100)
MONOCYTES # BLD AUTO: 0.37 X10*3/UL (ref 0.1–1)
MONOCYTES NFR BLD AUTO: 5.7 %
NEUTROPHILS # BLD AUTO: 5.32 X10*3/UL (ref 1.2–7.7)
NEUTROPHILS NFR BLD AUTO: 82.6 %
NRBC BLD-RTO: 0 /100 WBCS (ref 0–0)
PHOSPHATE SERPL-MCNC: 3 MG/DL (ref 2.5–4.9)
PLATELET # BLD AUTO: 154 X10*3/UL (ref 150–450)
POTASSIUM SERPL-SCNC: 3.6 MMOL/L (ref 3.5–5.3)
RBC # BLD AUTO: 3.44 X10*6/UL (ref 4.5–5.9)
SODIUM SERPL-SCNC: 134 MMOL/L (ref 136–145)
WBC # BLD AUTO: 6.4 X10*3/UL (ref 4.4–11.3)

## 2024-03-20 PROCEDURE — 2500000001 HC RX 250 WO HCPCS SELF ADMINISTERED DRUGS (ALT 637 FOR MEDICARE OP)

## 2024-03-20 PROCEDURE — 2500000004 HC RX 250 GENERAL PHARMACY W/ HCPCS (ALT 636 FOR OP/ED)

## 2024-03-20 PROCEDURE — 2500000002 HC RX 250 W HCPCS SELF ADMINISTERED DRUGS (ALT 637 FOR MEDICARE OP, ALT 636 FOR OP/ED)

## 2024-03-20 PROCEDURE — 85025 COMPLETE CBC W/AUTO DIFF WBC: CPT

## 2024-03-20 PROCEDURE — 82947 ASSAY GLUCOSE BLOOD QUANT: CPT

## 2024-03-20 PROCEDURE — 1170000001 HC PRIVATE ONCOLOGY ROOM DAILY

## 2024-03-20 PROCEDURE — 84100 ASSAY OF PHOSPHORUS: CPT

## 2024-03-20 PROCEDURE — 99233 SBSQ HOSP IP/OBS HIGH 50: CPT | Performed by: INTERNAL MEDICINE

## 2024-03-20 RX ORDER — MORPHINE SULFATE 15 MG/1
15 TABLET, FILM COATED, EXTENDED RELEASE ORAL EVERY 12 HOURS SCHEDULED
Status: CANCELLED | OUTPATIENT
Start: 2024-03-20

## 2024-03-20 RX ADMIN — SIMVASTATIN 40 MG: 40 TABLET, FILM COATED ORAL at 21:11

## 2024-03-20 RX ADMIN — POTASSIUM PHOSPHATE, MONOBASIC 500 MG: 500 TABLET, SOLUBLE ORAL at 13:38

## 2024-03-20 RX ADMIN — LORAZEPAM 0.5 MG: 0.5 TABLET ORAL at 08:23

## 2024-03-20 RX ADMIN — ENOXAPARIN SODIUM 40 MG: 100 INJECTION SUBCUTANEOUS at 21:04

## 2024-03-20 RX ADMIN — LOSARTAN POTASSIUM 25 MG: 25 TABLET, FILM COATED ORAL at 08:24

## 2024-03-20 RX ADMIN — OLANZAPINE 10 MG: 5 TABLET, FILM COATED ORAL at 21:04

## 2024-03-20 RX ADMIN — PREGABALIN 100 MG: 75 CAPSULE ORAL at 21:04

## 2024-03-20 RX ADMIN — MORPHINE SULFATE 15 MG: 15 TABLET, EXTENDED RELEASE ORAL at 08:23

## 2024-03-20 RX ADMIN — POTASSIUM PHOSPHATE, MONOBASIC 500 MG: 500 TABLET, SOLUBLE ORAL at 08:24

## 2024-03-20 RX ADMIN — OXYCODONE HYDROCHLORIDE 5 MG: 5 TABLET ORAL at 21:04

## 2024-03-20 RX ADMIN — PREGABALIN 100 MG: 75 CAPSULE ORAL at 08:23

## 2024-03-20 RX ADMIN — MORPHINE SULFATE 15 MG: 15 TABLET, EXTENDED RELEASE ORAL at 21:04

## 2024-03-20 RX ADMIN — PANTOPRAZOLE SODIUM 40 MG: 40 TABLET, DELAYED RELEASE ORAL at 08:26

## 2024-03-20 RX ADMIN — MELATONIN 3 MG: 3 TAB ORAL at 21:04

## 2024-03-20 ASSESSMENT — PAIN SCALES - GENERAL
PAINLEVEL_OUTOF10: 0 - NO PAIN
PAINLEVEL_OUTOF10: 7
PAINLEVEL_OUTOF10: 5 - MODERATE PAIN

## 2024-03-20 ASSESSMENT — COGNITIVE AND FUNCTIONAL STATUS - GENERAL
WALKING IN HOSPITAL ROOM: A LITTLE
WALKING IN HOSPITAL ROOM: A LITTLE
DRESSING REGULAR LOWER BODY CLOTHING: A LITTLE
DAILY ACTIVITIY SCORE: 24
MOBILITY SCORE: 20
DAILY ACTIVITIY SCORE: 20
MOBILITY SCORE: 22
DRESSING REGULAR UPPER BODY CLOTHING: A LITTLE
STANDING UP FROM CHAIR USING ARMS: A LITTLE
CLIMB 3 TO 5 STEPS WITH RAILING: A LITTLE
TOILETING: A LITTLE
HELP NEEDED FOR BATHING: A LITTLE
CLIMB 3 TO 5 STEPS WITH RAILING: A LOT

## 2024-03-20 ASSESSMENT — PAIN DESCRIPTION - DESCRIPTORS: DESCRIPTORS: PRESSURE

## 2024-03-20 ASSESSMENT — PAIN - FUNCTIONAL ASSESSMENT: PAIN_FUNCTIONAL_ASSESSMENT: 0-10

## 2024-03-20 NOTE — PROGRESS NOTES
"Narciso Marrero is a 58 y.o. male on day 5 of admission presenting with Small bowel obstruction (CMS/HCC).    Subjective   Feels better this morning, pain more controlled with morphine.  Endorses improved abdominal distention, states that pain has gotten better.  Denies fevers/chills, CP, SOB, abdominal pain, lower extremity pain.    Objective   Vitals:    03/20/24 0927   BP: 118/75   Pulse: (!) 116   Resp: 16   Temp: 36.5 °C (97.7 °F)   SpO2: 91%       Physical Exam  Cardiovascular:      Rate and Rhythm: Normal rate and regular rhythm.      Pulses: Normal pulses.      Heart sounds: Normal heart sounds. No murmur heard.     No gallop.   Pulmonary:      Effort: Pulmonary effort is normal. No respiratory distress.      Breath sounds: Normal breath sounds. No wheezing.   Abdominal:      General: Bowel sounds are normal. There is distension.      Tenderness: There is no abdominal tenderness. There is no guarding or rebound.      Hernia: A hernia is present.      Comments: AMY pump (LUQ) with ecchymosis. Midline surgical scar    Musculoskeletal:         General: Normal range of motion.      Cervical back: Normal range of motion.      Right lower leg: No edema.      Left lower leg: No edema.      Comments: LLE slightly larger than RLE. Per patient, this has always been that way since crush injury.    Skin:     General: Skin is warm.   Neurological:      General: No focal deficit present.      Mental Status: He is alert and oriented to person, place, and time.   Psychiatric:         Mood and Affect: Mood normal.     Last Recorded Vitals  Blood pressure 118/75, pulse (!) 116, temperature 36.5 °C (97.7 °F), temperature source Temporal, resp. rate 16, height 1.8 m (5' 10.87\"), weight 92.6 kg (204 lb 2.3 oz), SpO2 91 %.  Intake/Output last 3 Shifts:  I/O last 3 completed shifts:  In: 100 (1.1 mL/kg) [P.O.:100]  Out: - (0 mL/kg)   Weight: 92.6 kg     Relevant Results    Results for orders placed or performed during the " hospital encounter of 03/15/24 (from the past 24 hour(s))   POCT GLUCOSE   Result Value Ref Range    POCT Glucose 124 (H) 74 - 99 mg/dL   POCT GLUCOSE   Result Value Ref Range    POCT Glucose 106 (H) 74 - 99 mg/dL   CBC and Auto Differential   Result Value Ref Range    WBC 6.4 4.4 - 11.3 x10*3/uL    nRBC 0.0 0.0 - 0.0 /100 WBCs    RBC 3.44 (L) 4.50 - 5.90 x10*6/uL    Hemoglobin 9.6 (L) 13.5 - 17.5 g/dL    Hematocrit 30.5 (L) 41.0 - 52.0 %    MCV 89 80 - 100 fL    MCH 27.9 26.0 - 34.0 pg    MCHC 31.5 (L) 32.0 - 36.0 g/dL    RDW 14.7 (H) 11.5 - 14.5 %    Platelets 154 150 - 450 x10*3/uL    Neutrophils % 82.6 40.0 - 80.0 %    Immature Granulocytes %, Automated 0.8 0.0 - 0.9 %    Lymphocytes % 10.4 13.0 - 44.0 %    Monocytes % 5.7 2.0 - 10.0 %    Eosinophils % 0.3 0.0 - 6.0 %    Basophils % 0.2 0.0 - 2.0 %    Neutrophils Absolute 5.32 1.20 - 7.70 x10*3/uL    Immature Granulocytes Absolute, Automated 0.05 0.00 - 0.70 x10*3/uL    Lymphocytes Absolute 0.67 (L) 1.20 - 4.80 x10*3/uL    Monocytes Absolute 0.37 0.10 - 1.00 x10*3/uL    Eosinophils Absolute 0.02 0.00 - 0.70 x10*3/uL    Basophils Absolute 0.01 0.00 - 0.10 x10*3/uL   Renal Function Panel   Result Value Ref Range    Glucose 112 (H) 74 - 99 mg/dL    Sodium 134 (L) 136 - 145 mmol/L    Potassium 3.6 3.5 - 5.3 mmol/L    Chloride 96 (L) 98 - 107 mmol/L    Bicarbonate 27 21 - 32 mmol/L    Anion Gap 15 10 - 20 mmol/L    Urea Nitrogen 10 6 - 23 mg/dL    Creatinine 0.38 (L) 0.50 - 1.30 mg/dL    eGFR >90 >60 mL/min/1.73m*2    Calcium 8.4 (L) 8.6 - 10.6 mg/dL    Phosphorus 3.0 2.5 - 4.9 mg/dL    Albumin 2.8 (L) 3.4 - 5.0 g/dL   POCT GLUCOSE   Result Value Ref Range    POCT Glucose 120 (H) 74 - 99 mg/dL       MRI brain w and wo IV contrast:  IMPRESSION:  No acute intracranial pathology or evidence of intracranial  metastatic disease.      Assessment/Plan   Principal Problem:    Small bowel obstruction (CMS/HCC)    Narciso Marrero is a 58 y.o. male with PMHx of Stage IV KRAS +  Ascending colon cancer  s/p Right hemicolectomy (5/3/23), metastatic disease to the liver, and numerous peritoneal implants s/p AMY pump, DMII, HTN, GERD, Chronic pain presents to  as a transfer from [place] for further evaluation of partial small bowel obstruction and recent onset altered mental status.  Patient denies prolonged periods of constipation and his presentation is likely due to his peritoneal metastatic disease.  Partial SBO described on CT imaging less likely secondary to a mechanical obstruction, or adhesions but rather due to his worsening peritoneal metastatic disease. Patient's acute onset confusion, forgetfulness and difficulty expressing his words is concerning for intracranial metastatic disease.  His altered mental status less likely metabolic in nature given no electrolyte derangements, however will order TSH, vitamin B12 and folate to definitively rule out.  Less likely infectious given no leukocytosis , fever, chills or any other systemic signs of infection. Less likely toxins however cannot be entirely excluded given recent initiation of Lonsurf chemotherapy drug, and patient denies excess ingestion of prescribed opiates. Given concern for intracranial metastatic disease MRI brain with IV contrast will be ordered for further evaluation.      3/20 UPDATES:  MRI done yesterday showed no acute intracranial pathology or evidence of intracranial  metastatic disease, so the plan was to discharge  the patient, however the patient preferred to stay in the hospital for one more day to regain his strength.        Dispo: tentatively tomorrow     #Acute Encephalopathy  -Patient's acute onset confusion, forgetfulness and difficulty expressing his words is concerning for intracranial metastatic disease.  His altered mental status less likely metabolic in nature given no electrolyte derangements, however will order TSH, vitamin B12 and folate to definitively rule out.  Less likely infectious given no  leukocytosis , fever, chills or any other systemic signs of infection. Less likely toxins however cannot be entirely excluded given recent initiation of Lonsurf chemotherapy drug, and patient denies excess ingestion of prescribed opiates. Given concern for intracranial metastatic disease MRI brain with IV contrast will be ordered for further evaluation.  -TSH, B12, Folate wnl   -Blood cx pending   PLAN:   -MRI Brain w/IV Con.   IMPRESSION:  No acute intracranial pathology or evidence of intracranial  metastatic disease.  -Pt w/ hepatic artery infusion (AMY) pump.        #Borderline Dilated Small Bowel   #C/f SBO  #Constipation  - CT A/P with several segments of fluid-filled borderline dilated small bowel, alternating with decompressed bowel. The the areas of decompressed bowel demonstrate wall nodular enhancement, suggestive of serosal or constrictive peritoneal implants.  -Pt reports regular BM's since 3/4. Patient states if he skips miralax dose, stool immediately harden. Last BM yesterday, soft, paste-like and no melena or hematochezia.  -Hypoactive bowel sounds, +Ascites.   PLAN:  -Correct electrolyte abnormalities   -C/w Miralax once daily, DocSenna BID   -Continue to monitor for acute abdomen      #Moderate-size L. Pleural Effusion  #Small R. Pleural Effusion   #Pulmonary Nodule   -Left pleural effusion increased in size compared to last imaging. Right pleural effusion stable.  -Patient reports SOB w/ exertion, however asymptomatic at rest.  -Consider Thoracentesis if symptoms worsen.  PLAN:   -CTM     #Stage IV Ascending Colon Cancer  #Stable Hepatic Masses   #Stable Splenic Masses  #Hilar Mass   #Numerous Peritoneal Masses   #Large Volume Ascites   #C/f SBP  -1/2023: Presented with 20 pound unexpected weight loss and RUQ pain. Imaging showed large liver lesion. Biopsy c/w metastatic colon cancer. C-scope showed infiltrative, ulcerated, fungating mass in ascending colon.   -Baseline CEA 2322. Started FOLFOX +  Bevacizumab  PREVIOUS THERAPY:  -1/2023-4/12/23: FOLFOX x6 cycles. First 2 cycles with bevacizumab  AMY pump placed 5/3/23. First FUdR via AMY pump   -5/17/23. Systemic chemotherapy being held due to wound vac, Started FOLFOX on 8/9/23. On -11/1/23 dose reduction in Oxaliplatin and infusional 5FU only due to chemotherapy colitis. Treatment DC due to PD. Last treatment on 12.11.2023. Total cycles of FUdR (if applicable):7  -12.26.2023 : Started on FOLFIRI, Last and 4th cycle on 2/5/24. Discontinued due to disease progression  CURRENT THERAPY:  -Lonsurf + Bevacizumab start 3/4/24  -Last Glycerin in AMY pump on 2/5/24  -Providers:  -Surg onc: Rajinder Arenas  -CRS: Regina Raymundo  -Local med onc: Dia Kendall  PLAN:  -Paracentesis done: 3900cc ascitic fluid removed   -Email primary oncologist about admission   -Hold Lonsurf for now      #HLD  - C/w home simvastatin 40 mg daily     #Chronic Pain  ::Has crushed shoulder injury 2/2 work-related accident  ::follows [facility]  - C/w pregabalin 100 BID   - Restarted morphine at half dose 15 BID   - IV Tylenol 1000 mg BID  - Ok to spot dose Toradol for breakthrough pain.     #GERD  - C/w home pantoprazole 40 mg daily     #DMII  :: Home regimen: lantus 22 units, humalog 3 units w/ meals  PLAN:  - Hypoglycemia protocol   - Mild insulin sliding scale #2     MISC:  -Zypreza 10 mg  once nightly      F: PRN  E: PRN  N: CLD   DVT: Lovenox (held for IR procedure in the AM)   GI: PPI  Code Status: Full code (confirmed on admission)  NOK: Primary Emergency Contact: ROHITH MARIE, Home Phone: [phone]            [facility]  Medical student.

## 2024-03-20 NOTE — TELEPHONE ENCOUNTER
This referral has been made a Non Admit with  Home Care due to Patient's Home is Outside of [facility] Service Area. If you have further questions, feel free to reach out to our office at [phone]. Thank you, [facility] Intake.

## 2024-03-20 NOTE — HH CARE COORDINATION
This referral has been made a Non Admit with  Home Care due to Patient's Home is Outside of [facility] Service Area. If you have further questions, feel free to reach out to our office at [phone]. Thank you, [facility] Intake.

## 2024-03-20 NOTE — CARE PLAN
The clinical goals for the shift include patient will remain safe and free from injury throughout shift 3/20 at 0700      Problem: Safety  Goal: Patient will be injury free during hospitalization  Outcome: Progressing     Problem: Safety  Goal: I will remain free of falls  Outcome: Progressing     Problem: Pain  Goal: My pain/discomfort is manageable  Outcome: Progressing     Problem: Psychosocial Needs  Goal: Demonstrates ability to cope with hospitalization/illness  Outcome: Progressing     Problem: Psychosocial Needs  Goal: Collaborate with me, my family, and caregiver to identify my specific goals  Outcome: Progressing

## 2024-03-20 NOTE — PROGRESS NOTES
Narciso Marrero is a 58 y.o. male on day 5 of admission presenting with Small bowel obstruction (CMS/HCC).    PMHx of Stage IV KRAS + Ascending colon cancer  s/p Right hemicolectomy (5/3/23), metastatic disease to the liver, and numerous peritoneal implants s/p AMY pump, DMII, HTN, GERD, Chronic pain presents to  as a transfer from  [place] for further evaluation of partial small bowel obstruction and recent onset altered mental status.     3/19/24 @ 1520  [facility] Note    Plan per Medical/Surgical Team: MRI, paracentesis  Status: Inpatient  Payor Source: [facility]  Discharge disposition: home  Expected date of discharge: 3/20  Barriers: none  Primary Oncologist: Dia Kendall     PT/OT rec low intensity. Will follow up with patient to see if he would like home care. Not in [facility] territory. Will continue to follow patient for any discharge needs.   Juanita Olguin RN [facility]      3/20/24 3:00pm   Spoke with pt and wife at bedside, discussed the pros of physical therapy. However, at this time, wife and  do not want any home services for pt/ot. Pt will contact PCP if they feel that they need pt/ot after getting home and readjusted. Will continue to monitor for needs.   Olya Arcos LPN,

## 2024-03-21 ENCOUNTER — PHARMACY VISIT (OUTPATIENT)
Dept: PHARMACY | Facility: CLINIC | Age: 59
End: 2024-03-21
Payer: COMMERCIAL

## 2024-03-21 ENCOUNTER — APPOINTMENT (OUTPATIENT)
Dept: HEMATOLOGY/ONCOLOGY | Facility: CLINIC | Age: 59
DRG: 375 | End: 2024-03-21
Payer: MEDICARE

## 2024-03-21 VITALS
BODY MASS INDEX: 28.58 KG/M2 | HEIGHT: 71 IN | HEART RATE: 109 BPM | SYSTOLIC BLOOD PRESSURE: 137 MMHG | OXYGEN SATURATION: 95 % | DIASTOLIC BLOOD PRESSURE: 82 MMHG | WEIGHT: 204.15 LBS | RESPIRATION RATE: 18 BRPM | TEMPERATURE: 97.7 F

## 2024-03-21 PROBLEM — I10 PRIMARY HYPERTENSION: Status: ACTIVE | Noted: 2024-03-21

## 2024-03-21 PROBLEM — K56.609 SMALL BOWEL OBSTRUCTION (MULTI): Status: RESOLVED | Noted: 2024-03-15 | Resolved: 2024-03-21

## 2024-03-21 PROBLEM — R50.9 FEVER, UNSPECIFIED FEVER CAUSE: Status: RESOLVED | Noted: 2023-10-06 | Resolved: 2024-03-21

## 2024-03-21 LAB
GLUCOSE BLD MANUAL STRIP-MCNC: 122 MG/DL (ref 74–99)
LAB AP ASR DISCLAIMER: NORMAL
LABORATORY COMMENT REPORT: NORMAL
LABORATORY COMMENT REPORT: NORMAL
PATH REPORT.FINAL DX SPEC: NORMAL
PATH REPORT.GROSS SPEC: NORMAL
PATH REPORT.RELEVANT HX SPEC: NORMAL
PATH REPORT.TOTAL CANCER: NORMAL

## 2024-03-21 PROCEDURE — 99233 SBSQ HOSP IP/OBS HIGH 50: CPT | Performed by: NURSE PRACTITIONER

## 2024-03-21 PROCEDURE — 82947 ASSAY GLUCOSE BLOOD QUANT: CPT

## 2024-03-21 PROCEDURE — RXMED WILLOW AMBULATORY MEDICATION CHARGE

## 2024-03-21 PROCEDURE — 2500000004 HC RX 250 GENERAL PHARMACY W/ HCPCS (ALT 636 FOR OP/ED)

## 2024-03-21 PROCEDURE — 2500000001 HC RX 250 WO HCPCS SELF ADMINISTERED DRUGS (ALT 637 FOR MEDICARE OP): Performed by: STUDENT IN AN ORGANIZED HEALTH CARE EDUCATION/TRAINING PROGRAM

## 2024-03-21 PROCEDURE — 2500000001 HC RX 250 WO HCPCS SELF ADMINISTERED DRUGS (ALT 637 FOR MEDICARE OP)

## 2024-03-21 PROCEDURE — 99239 HOSP IP/OBS DSCHRG MGMT >30: CPT

## 2024-03-21 RX ORDER — MORPHINE SULFATE 30 MG/1
30 TABLET, FILM COATED, EXTENDED RELEASE ORAL EVERY 12 HOURS SCHEDULED
Qty: 6 TABLET | Refills: 0 | Status: SHIPPED | OUTPATIENT
Start: 2024-03-21

## 2024-03-21 RX ORDER — HEPARIN 100 UNIT/ML
5 SYRINGE INTRAVENOUS ONCE
Status: COMPLETED | OUTPATIENT
Start: 2024-03-21 | End: 2024-03-21

## 2024-03-21 RX ORDER — LOSARTAN POTASSIUM 25 MG/1
25 TABLET ORAL DAILY
Qty: 30 TABLET | Refills: 0 | Status: SHIPPED | OUTPATIENT
Start: 2024-03-21 | End: 2024-04-17 | Stop reason: HOSPADM

## 2024-03-21 RX ORDER — MORPHINE SULFATE 30 MG/1
30 TABLET, FILM COATED, EXTENDED RELEASE ORAL EVERY 12 HOURS SCHEDULED
Qty: 28 TABLET | Refills: 0 | Status: SHIPPED | OUTPATIENT
Start: 2024-03-21 | End: 2024-03-21 | Stop reason: SDUPTHER

## 2024-03-21 RX ORDER — POLYETHYLENE GLYCOL 3350 17 G/17G
17 POWDER, FOR SOLUTION ORAL DAILY
Qty: 1530 G | Refills: 3 | Status: SHIPPED | OUTPATIENT
Start: 2024-03-21

## 2024-03-21 RX ORDER — HEPARIN 100 UNIT/ML
5 SYRINGE INTRAVENOUS AS NEEDED
Status: DISCONTINUED | OUTPATIENT
Start: 2024-03-21 | End: 2024-03-21

## 2024-03-21 RX ORDER — MORPHINE SULFATE 30 MG/1
30 TABLET, FILM COATED, EXTENDED RELEASE ORAL EVERY 12 HOURS SCHEDULED
Status: DISCONTINUED | OUTPATIENT
Start: 2024-03-21 | End: 2024-03-21 | Stop reason: HOSPADM

## 2024-03-21 RX ORDER — ACETAMINOPHEN 325 MG/1
975 TABLET ORAL EVERY 6 HOURS PRN
Qty: 30 TABLET | Refills: 0
Start: 2024-03-21

## 2024-03-21 RX ORDER — AMOXICILLIN 250 MG
2 CAPSULE ORAL EVERY 12 HOURS
Qty: 120 TABLET | Refills: 0 | Status: SHIPPED | OUTPATIENT
Start: 2024-03-21 | End: 2024-04-20

## 2024-03-21 RX ADMIN — PANTOPRAZOLE SODIUM 40 MG: 40 TABLET, DELAYED RELEASE ORAL at 08:44

## 2024-03-21 RX ADMIN — MORPHINE SULFATE 30 MG: 30 TABLET, EXTENDED RELEASE ORAL at 08:44

## 2024-03-21 RX ADMIN — PREGABALIN 100 MG: 75 CAPSULE ORAL at 08:44

## 2024-03-21 RX ADMIN — SODIUM CHLORIDE, PRESERVATIVE FREE 500 UNITS: 5 INJECTION INTRAVENOUS at 15:30

## 2024-03-21 RX ADMIN — LOSARTAN POTASSIUM 25 MG: 25 TABLET, FILM COATED ORAL at 08:44

## 2024-03-21 RX ADMIN — LORAZEPAM 0.5 MG: 0.5 TABLET ORAL at 08:44

## 2024-03-21 ASSESSMENT — PAIN DESCRIPTION - ORIENTATION: ORIENTATION: MID

## 2024-03-21 ASSESSMENT — PAIN - FUNCTIONAL ASSESSMENT
PAIN_FUNCTIONAL_ASSESSMENT: 0-10
PAIN_FUNCTIONAL_ASSESSMENT: 0-10

## 2024-03-21 ASSESSMENT — COGNITIVE AND FUNCTIONAL STATUS - GENERAL
STANDING UP FROM CHAIR USING ARMS: A LITTLE
MOBILITY SCORE: 19
MOVING TO AND FROM BED TO CHAIR: A LITTLE
TURNING FROM BACK TO SIDE WHILE IN FLAT BAD: A LITTLE
DAILY ACTIVITIY SCORE: 19
HELP NEEDED FOR BATHING: A LITTLE
TOILETING: A LITTLE
DRESSING REGULAR UPPER BODY CLOTHING: A LITTLE
CLIMB 3 TO 5 STEPS WITH RAILING: A LITTLE
DRESSING REGULAR LOWER BODY CLOTHING: A LITTLE
PERSONAL GROOMING: A LITTLE
WALKING IN HOSPITAL ROOM: A LITTLE

## 2024-03-21 ASSESSMENT — PAIN SCALES - GENERAL
PAINLEVEL_OUTOF10: 0 - NO PAIN
PAINLEVEL_OUTOF10: 5 - MODERATE PAIN

## 2024-03-21 ASSESSMENT — PAIN DESCRIPTION - LOCATION: LOCATION: ABDOMEN

## 2024-03-21 NOTE — DISCHARGE INSTRUCTIONS
You were hospitalized for a bowel obstruction that has improved.    You were referred to see the following specialists: Dr. Russ. You should receive a call from central scheduling in the next few days if you do not receive a call within 3-5 business days please call [phone] to schedule your appointment.   Please review all your medication changes. We have started you on losartan.  Do not continue to take your Lonsurf until you meet with Dr. Russ.

## 2024-03-21 NOTE — DISCHARGE SUMMARY
Discharge Diagnosis  Small bowel obstruction (CMS/HCC)    Issues Requiring Follow-Up  Primary Onc follow-up about GOC/Expected Prognosis     Test Results Pending At Discharge  Pending Labs       Order Current Status    Non-gynecologic cytology In process            Hospital Course  Narciso Marrero is a 58 y.o. male with PMHx of Stage IV KRAS + Ascending colon cancer  s/p Right hemicolectomy (5/3/23), metastatic disease to the liver, and numerous peritoneal implants s/p AMY pump, DMII, HTN, GERD, Chronic pain presents to  as a transfer from [place] for further evaluation of partial small bowel obstruction and recent onset altered mental status.  Patient denies prolonged periods of constipation and his presentation is likely due to his peritoneal metastatic disease.  Partial SBO described on CT imaging less likely secondary to a mechanical obstruction, or adhesions but rather due to his worsening peritoneal metastatic disease. Patient's acute onset confusion, forgetfulness and difficulty expressing his words is concerning for intracranial metastatic disease.  His altered mental status less likely metabolic in nature given no electrolyte derangements, however will order TSH, vitamin B12 and folate to definitively rule out.  Less likely infectious given no leukocytosis , fever, chills or any other systemic signs of infection. Less likely toxins however cannot be entirely excluded given recent initiation of Lonsurf chemotherapy drug, and patient denies excess ingestion of prescribed opiates. Given concern for intracranial metastatic disease MRI brain with IV contrast will be ordered for further evaluation. At this time patient is hypertensive with SBPs greater than 180 otherwise the rest of the vitals are stable. Patient will be treated with p.o. hydralazine as needed with blood pressure goal of 160 in next 24 hours.  CT Imaging result showed no intracranial lesions or evidence of metastatic disease. Patient had a  Paracentesis done and 3900cc of fluid was removed. Patient was medically ready for Discharge. Dr. Russ (patients primary oncologist) was updated with Patient's hospital course. Patient is scheduled to see Dr. Russ in clinic on 3/25.     Medications Started on Discharge:  -Losartan 25mg daily  -Tylenol 325mg as needed     F/U  -Oncology Apt on 3/25  -Supportive Onc Apt  on 4/1    Pertinent Physical Exam At Time of Discharge  Physical Exam  Cardiovascular:      Rate and Rhythm: Normal rate and regular rhythm.      Pulses: Normal pulses.      Heart sounds: Normal heart sounds. No murmur heard.     No gallop.   Pulmonary:      Effort: Pulmonary effort is normal. No respiratory distress.      Breath sounds: Normal breath sounds. No wheezing.   Abdominal:      General: Bowel sounds are normal. There is distension.      Tenderness: There is no abdominal tenderness. There is no guarding or rebound.      Hernia: A hernia is present.      Comments: AMY pump (LUQ) with ecchymosis. Midline surgical scar    Musculoskeletal:         General: Normal range of motion.      Cervical back: Normal range of motion.      Right lower leg: No edema.      Left lower leg: No edema.      Comments: LLE slightly larger than RLE. Per patient, this has always been that way since crush injury.    Skin:     General: Skin is warm.   Neurological:      General: No focal deficit present.      Mental Status: He is alert and oriented to person, place, and time.   Psychiatric:         Mood and Affect: Mood normal.     Home Medications     Medication List      START taking these medications     acetaminophen 325 mg tablet; Commonly known as: Tylenol; Take 3 tablets   (975 mg) by mouth every 6 hours if needed for mild pain (1 - 3) or   moderate pain (4 - 6).   losartan 25 mg tablet; Commonly known as: Cozaar; Take 1 tablet (25 mg)   by mouth once daily.     CHANGE how you take these medications     LORazepam 0.5 mg tablet; Commonly known as:  "Ativan; What changed:   Another medication with the same name was removed. Continue taking this   medication, and follow the directions you see here.   * MS Contin 30 mg 12 hr tablet; Generic drug: morphine CR; What changed:   Another medication with the same name was changed. Make sure you   understand how and when to take each.   * morphine CR 30 mg 12 hr tablet; Commonly known as: MS Contin; Take 1   tablet (30 mg) by mouth every 12 hours for 14 days. Do not crush, chew, or   split.; What changed: when to take this   ondansetron 8 mg tablet; Commonly known as: Zofran; Take 1 tablet (8 mg)   by mouth every 8 hours if needed for nausea or vomiting.; What changed:   Another medication with the same name was removed. Continue taking this   medication, and follow the directions you see here.   sennosides-docusate sodium 8.6-50 mg tablet; Commonly known as:   Leila-Colace; Take 2 tablets by mouth every 12 hours.; What changed: how   much to take  * This list has 2 medication(s) that are the same as other medications   prescribed for you. Read the directions carefully, and ask your doctor or   other care provider to review them with you.     CONTINUE taking these medications     BD Ultra-Fine Short Pen Needle 31 gauge x 5/16\" needle; Generic drug:   pen needle, diabetic; USE FOUR TIMES A DAY   bisacodyl 5 mg EC tablet; Commonly known as: Dulcolax   dicyclomine 10 mg capsule; Commonly known as: Bentyl; Take 1 capsule (10   mg) by mouth 4 times a day. As need for abdominal cramping   FreeStyle Alessia 3 Sensor device; Generic drug: blood-glucose sensor; Use   to check blood sugars 4 times per day.  Change every 14 days.   HumaLOG KwikPen Insulin 100 unit/mL injection; Generic drug: insulin   lispro   Lantus Solostar U-100 Insulin 100 unit/mL (3 mL) pen; Generic drug:   insulin glargine   OLANZapine 10 mg tablet; Commonly known as: ZyPREXA; Take 1 tablet (10   mg) by mouth once daily at bedtime.   oxyCODONE-acetaminophen "  mg tablet; Commonly known as: Percocet;   Take 1 tablet by mouth every 6 hours if needed for severe pain (7 - 10).   pantoprazole 40 mg EC tablet; Commonly known as: ProtoNix   polyethylene glycol 17 gram/dose powder; Commonly known as: Miralax;   Take 17 g and mix with 8 oz of water by mouth once daily.   pregabalin 150 mg capsule; Commonly known as: Lyrica; Take 1 capsule   (150 mg) by mouth 2 times a day.   prochlorperazine 10 mg tablet; Commonly known as: Compazine; Take 1   tablet (10 mg) by mouth every 6 hours if needed for nausea or vomiting.   sennosides 8.6 mg tablet; Commonly known as: Senokot; Take 2 tablets   (17.2 mg) by mouth once daily at bedtime. This is to help manage   constipation. HOLD for diarrhea.   simvastatin 40 mg tablet; Commonly known as: Zocor   * trifluridine-tipiraciL 15-6.14 mg tablet; Commonly known as: Lonsurf;   Take 1 tablet (15 mg total) by mouth twice daily.  Take on Days 1-5 and   Days 8-12 of each 28 day treatment cycle. Take within one hour of   completion of morning and evening meals.   * trifluridine-tipiraciL 20-8.19 mg tablet; Commonly known as: Lonsurf;   Take 3 tablets (60 mg total) by mouth twice daily.  Take on Days 1-5 and   Days 8-12 of each 28 day treatment cycle. Take within one hour of   completion of morning and evening meals.  * This list has 2 medication(s) that are the same as other medications   prescribed for you. Read the directions carefully, and ask your doctor or   other care provider to review them with you.       Outpatient Follow-Up  Future Appointments   Date Time Provider Department Center   3/25/2024  1:20 PM Laney Russ MD PhD [id number] [facility]   3/28/2024  1:20 PM DO DIANE Moreno[address]   4/1/2024  1:40 PM [facility] CENTRAL LINE 02 [id number] [facility]   4/1/2024  2:00 PM Sandy Cruz APRN-CNP [id number] [facility]   4/1/2024  2:45 [facility]   4/1/2024  3:00 PM LA Bonds-CNP  [id number] [facility]   4/29/2024  2:45 PM INF 21 [id number]   6/7/2024  2:00 PM Marisa Parry PharmD [id number] [facility]   9/4/2024 11:45 AM Marieal Meyers, APRN-CNP [id number] [facility]       Jordan Esqueda MD  PGY1, Internal Medicine

## 2024-03-21 NOTE — PROGRESS NOTES
Spiritual Care Visit      attempted to visit, however the patient was sleeping and the  let them continue to rest.  will attempt to follow-up later.    Rev. Christoph Vásquez, Supportive Oncology

## 2024-03-21 NOTE — PROGRESS NOTES
SUPPORTIVE AND PALLIATIVE ONCOLOGY INPATIENT FOLLOW-UP      SERVICE DATE: 24     SUBJECTIVE:  Interval Events:  Plan for discharge today  Pt reports pain is controlled while sedentary in the hospital but will worsen once he is home and more active, we discussed that he will be on higher doses of Morphine ER at home.   Reports oxycodone IR 5 mg helps but not enough.    Pain Assessment:  Location:  Right head, Bilateral upper leg and lower leg, and abdomen  Duration: Constant  Characteristics:   Ratin Reports this is moderate to severe for him   Descriptors: aching, burning, and pressure   Aggravating: movement    Relieving: Analgesics     Interference with Function: Somewhat    Opioid Use  Past 24 h prn opioid use: Morphine CR 15 mg PO x 2 doses = 30 mg = 30 OME  Oxycodone 5 mg PO x1 doses =5 mg =6.25 OME  Total 24h OME use:  36.25    Note: OME calculations based on equianalgesic table below. Please note this table is based on best available evidence but conversions are still approximate. These are NOT opioid DOSES for individual patient use; this is equivalency information.  Drug Parenteral Enteral   Morphine 10 25   Oxycodone N/A 20   Hydromorphone 2 5   Fentanyl 0.15 N/A   Tramadol N/A 120   Citation: Faye JAY. Demystifying opioid conversion calculations: A guide for effective dosing, Second edition. [place]: American Society of Health-System Pharmacists, 2018.    Symptom Assessment:  Nausea none improved  Vomiting none  Constipation none   Diarrhea somewhat loose stool yesterday      Information obtained from: chart review and interview of patient  ______________________________________________________________________        OBJECTIVE:    Lab Results   Component Value Date    WBC 6.4 2024    HGB 9.6 (L) 2024    HCT 30.5 (L) 2024    MCV 89 2024     2024      Lab Results   Component Value Date    GLUCOSE 112 (H) 2024    CALCIUM 8.4 (L) 2024    NA  134 (L) 03/20/2024    K 3.6 03/20/2024    CO2 27 03/20/2024    CL 96 (L) 03/20/2024    BUN 10 03/20/2024    CREATININE 0.38 (L) 03/20/2024     Lab Results   Component Value Date    ALT 6 (L) 03/16/2024    AST 17 03/16/2024    ALKPHOS 295 (H) 03/16/2024    BILITOT 0.8 03/16/2024     Estimated Creatinine Clearance: 125 mL/min (A) (by C-G formula based on SCr of 0.38 mg/dL (L)).     Scheduled medications  enoxaparin, 40 mg, subcutaneous, Daily  insulin lispro, 0-10 Units, subcutaneous, q4h  LORazepam, 0.5 mg, oral, Daily  losartan, 25 mg, oral, Daily  melatonin, 3 mg, oral, Daily  morphine CR, 30 mg, oral, q12h VAHID  OLANZapine, 10 mg, oral, Nightly  pantoprazole, 40 mg, oral, Daily  polyethylene glycol, 17 g, oral, Daily  pregabalin, 100 mg, oral, BID  sennosides-docusate sodium, 2 tablet, oral, q12h  simvastatin, 40 mg, oral, Nightly  [Held by provider] trifluridine-tipiraciL (Lonsurf) 15-6.14 mg 15 mg, trifluridine-tipiraciL (Lonsurf) 20-8.19 mg 60 mg, 75 mg, oral, Once      Continuous medications     PRN medications  PRN medications: acetaminophen, dextrose, dextrose, dicyclomine, glucagon, ondansetron, oxyCODONE, prochlorperazine **OR** prochlorperazine **OR** prochlorperazine   }     PHYSICAL EXAMINATION:    Vital Signs:   Vital signs reviewed  Visit Vitals  /82 (BP Location: Left arm, Patient Position: Lying)   Pulse 109   Temp 36.5 °C (97.7 °F) (Temporal)   Resp 18        Pain Score: 5 - Moderate pain       Physical Exam  Vitals and nursing note reviewed.   Constitutional:       General: He is not in acute distress.  HENT:      Mouth/Throat:      Mouth: Mucous membranes are moist.   Eyes:      Conjunctiva/sclera: Conjunctivae normal.   Pulmonary:      Effort: Pulmonary effort is normal. No respiratory distress.   Abdominal:      General: There is no distension (mild decreased post para).   Neurological:      Mental Status: He is alert and oriented to person, place, and time.   Psychiatric:         Mood and  Affect: Mood normal.         Behavior: Behavior normal.         Thought Content: Thought content normal.         Cognition and Memory: Cognition normal.         Judgment: Judgment normal.          ASSESSMENT/PLAN:  Narciso Marrero is a 58 y.o. male diagnosed with metastatic ascending colon cancer with liver and peritoneal involvement. He has an AMY pump and is s/p multiple lines of systemic treatment. He was started on FOLFIRI 12/26/23, though this is now on hold d/t disease progression, recently started on Lonsurf/Bevacizumab. PMH significant for T2DM, HTN, GERD, chronic pain. Presented [facility]/13/20[facility] with AMS, weakness, transferred to [facility] 3/15/2024 for further evaluation and management of SBO and AMS. Supportive and Palliative Oncology is consulted for pain management and goals of care discussion.      Cancer related Pain:  Lower abdominal pain related to metastatic colon cancer with liver and peritoneal mets, ascites  CIPN bilateral fingers, right face/head pain related to post herpetic neuralgia  Chronic bilateral LE pain related to work injury  Pain is: cancer related pain and chronic  Type: visceral, somatic, and neuropathic  Pain control: sub-optimally controlled  Home regimen:  Morphine ER 30 mg TID, Pregabalin 100 mg BID, and Percocet 10/325 mg q6h prn   Intolerances/previously tried: none  Personalized pain goal: 2  Total OME usage for the past 24 hours:  36.25  Opioids held due to concern for SBO and constipation however pain has been worsening off opioids. Now having BMs  Recommend Oxycodone IR to 5-10 mg PO q4h prn moderate to severe pain  Increase Morphine ER to 30 mg q12h   Continue Acetaminophen 1000 mg prn  Continue Pregabalin 100 mg BID (consider increasing to TID)  Consider addition of short course dexamethasone for pain, nausea, reduction in intestinal edema 4 mg PO qAM, monitor blood sugars in the setting of T2DM  Continue to monitor pain scores and administer PRN  medications as appropriate  Continue/initiate nonpharmacologic pain management strategies including ice/heat therapy, distraction techniques, deep breathing/relaxation techniques, calming music, and repositioning  Continue to monitor for signs of opioid efficacy (pain scores, improved functionality) and toxicity (pinpoint pupils, excess sedation/drowsiness/confusion, respiratory depression, etc.)     Nausea:  Intermittent nausea with vomiting related to constipation and bowel obstruction, worsening nausea/vomiting possibly related to opioid withdrawal (last dose Morphine 0800 3/15 36h)  Home regimen:  Ondansetron , Olanzapine, and Lorazepam  well-controlled  Continue ondansetron 4 mg PO q6h prn   Continue compazine 10 mg PO q6h prn second line  Continue Lorazepam 0.5 mg daily  Continue olanzapine 10 mg PO at bedtime   Dexamethasone as above     Constipation  At risk for constipation related to opioids, currently not constipated, having diarrhea  Usual bowel pattern: every other day  Home regimen: Miralax 17 gm daily to prn  LBM 3/20, large diarrhea  Monitor BM frequency, adjust regimen as needed  Goal to have BM without straining q48-72h  Continue Bentyl QID PRN cramping  Continue miralax 17 gm daily  Continue senna-S 2 tabs BID hold for loose stools     Decreased appetite:  Appetite loss related to malignancy, taste changes, and disease process  Home regimen:  Olanzapine 5 mg qhs  Continue olanzapine 10 mg PO at bedtime   Control nausea  Consider dexamethasone as above     Medical Decision Making/Goals of Care/Advance Care Planning:  Patient's current clinical condition, including diagnosis, prognosis, and management plan, and goals of care were discussed.   Life limiting disease: metastatic malignancy  Family: Supportive wife  Performance status: Moderate limitations due to pain and fatigue  Joys/meaning/strength: Family  Understanding of health: Demonstrates good prognostic understanding of disease process      Advance Directives  Existence of Advance Directives:Yes, but NOT documented in medical record  Decision maker: Surrogate decision maker is wife Emperatriz  Code Status: Full code     Supportive and Palliative Oncology encounter:  Spoke with patient at bedside  Emotional support provided  Coordination of care      Disposition:  Please  start the process of having prior authorization with meds to beds deliver medications to patient prior to discharge via [facility] pharmacy. Prescriptions will need to be sent 48-72 hours prior to discharge so that a prior authorization can be completed.      Discharge date: unknown pending acute issues  Has an appointment with Outpatient Supportive Oncology TORRI Bonds scheduled 4/1.    Morphine ER 30 mg q12h #28 for 14 days will get him to his next appnt 4/1.  Recently picked up a script for Percocet 10/325 no script needed  Outpatient RN will call patient tomorrow to check in with return to home dosing of opioids    Signature and billing:  Thank you for allowing us to participate in the care of this patient. Recommendations will be communicated back to the consulting service by way of shared electronic medical record or face-to-face.    Medical complexity was high level due to due to complexity of problems, extensive data review, and high risk of management/treatment.    Data:   Diagnostic tests and information reviewed for today's visit:  Conversation with primary team, Most recent labs, Medications       Some elements copied from my note on 3/19/2024, the elements have been updated and all reflect current decision making from today, 03/21/24       Plan of Care discussed with: Provider, Patient,    Thank you for asking Supportive and Palliative Oncology to assist with care of this patient.  We will continue to follow  Please contact us for additional questions or concerns.      SIGNATURE: TORRI Garduno, DNP   PAGER/CONTACT:  Contact information:  Supportive and  Palliative Oncology  Monday-Friday 8 AM-5 PM  Epic Secure chat or pager [phone].  After hours and weekends:  pager [phone]

## 2024-03-24 LAB
ATRIAL RATE: 101 BPM
P AXIS: 32 DEGREES
P OFFSET: 191 MS
P ONSET: 131 MS
PR INTERVAL: 166 MS
Q ONSET: 214 MS
QRS COUNT: 16 BEATS
QRS DURATION: 90 MS
QT INTERVAL: 368 MS
QTC CALCULATION(BAZETT): 477 MS
QTC FREDERICIA: 437 MS
R AXIS: -10 DEGREES
T AXIS: 25 DEGREES
T OFFSET: 398 MS
VENTRICULAR RATE: 101 BPM

## 2024-03-25 ENCOUNTER — TELEPHONE (OUTPATIENT)
Dept: PALLIATIVE MEDICINE | Facility: HOSPITAL | Age: 59
End: 2024-03-25
Payer: MEDICARE

## 2024-03-25 ENCOUNTER — APPOINTMENT (OUTPATIENT)
Dept: HEMATOLOGY/ONCOLOGY | Facility: HOSPITAL | Age: 59
End: 2024-03-25
Payer: MEDICARE

## 2024-03-25 DIAGNOSIS — G89.3 CANCER RELATED PAIN: ICD-10-CM

## 2024-03-25 RX ORDER — MORPHINE SULFATE 30 MG/1
30 TABLET, FILM COATED, EXTENDED RELEASE ORAL 3 TIMES DAILY
Qty: 90 TABLET | Refills: 0 | Status: SHIPPED | OUTPATIENT
Start: 2024-03-25 | End: 2024-03-29 | Stop reason: SDUPTHER

## 2024-03-28 ENCOUNTER — APPOINTMENT (OUTPATIENT)
Dept: PRIMARY CARE | Facility: CLINIC | Age: 59
End: 2024-03-28
Payer: MEDICARE

## 2024-04-01 ENCOUNTER — LAB (OUTPATIENT)
Dept: HEMATOLOGY/ONCOLOGY | Facility: HOSPITAL | Age: 59
End: 2024-04-01
Payer: MEDICARE

## 2024-04-01 ENCOUNTER — OFFICE VISIT (OUTPATIENT)
Dept: PALLIATIVE MEDICINE | Facility: HOSPITAL | Age: 59
End: 2024-04-01
Payer: MEDICARE

## 2024-04-01 ENCOUNTER — TELEPHONE (OUTPATIENT)
Dept: PALLIATIVE MEDICINE | Facility: HOSPITAL | Age: 59
End: 2024-04-01

## 2024-04-01 ENCOUNTER — OFFICE VISIT (OUTPATIENT)
Dept: HEMATOLOGY/ONCOLOGY | Facility: HOSPITAL | Age: 59
End: 2024-04-01
Payer: MEDICARE

## 2024-04-01 ENCOUNTER — INFUSION (OUTPATIENT)
Dept: HEMATOLOGY/ONCOLOGY | Facility: HOSPITAL | Age: 59
End: 2024-04-01
Payer: MEDICARE

## 2024-04-01 VITALS
RESPIRATION RATE: 18 BRPM | TEMPERATURE: 97.9 F | HEART RATE: 115 BPM | SYSTOLIC BLOOD PRESSURE: 102 MMHG | DIASTOLIC BLOOD PRESSURE: 53 MMHG | BODY MASS INDEX: 26.11 KG/M2 | OXYGEN SATURATION: 95 % | WEIGHT: 186.5 LBS

## 2024-04-01 DIAGNOSIS — C18.2 MALIGNANT NEOPLASM OF ASCENDING COLON (MULTI): ICD-10-CM

## 2024-04-01 DIAGNOSIS — Z96.89 PRESENCE OF HEPATIC ARTERIAL INFUSION PUMP: ICD-10-CM

## 2024-04-01 DIAGNOSIS — R63.0 LOSS OF APPETITE: ICD-10-CM

## 2024-04-01 DIAGNOSIS — R19.7 DIARRHEA, UNSPECIFIED TYPE: ICD-10-CM

## 2024-04-01 DIAGNOSIS — R11.0 NAUSEA: ICD-10-CM

## 2024-04-01 DIAGNOSIS — C18.2 MALIGNANT NEOPLASM OF ASCENDING COLON (MULTI): Primary | ICD-10-CM

## 2024-04-01 DIAGNOSIS — Z51.81 ENCOUNTER FOR MONITORING OPIOID MAINTENANCE THERAPY: ICD-10-CM

## 2024-04-01 DIAGNOSIS — Z79.891 ENCOUNTER FOR MONITORING OPIOID MAINTENANCE THERAPY: ICD-10-CM

## 2024-04-01 DIAGNOSIS — G89.3 NEOPLASM RELATED PAIN: Primary | ICD-10-CM

## 2024-04-01 DIAGNOSIS — G89.3 CANCER RELATED PAIN: Primary | ICD-10-CM

## 2024-04-01 DIAGNOSIS — Z51.5 PALLIATIVE CARE ENCOUNTER: ICD-10-CM

## 2024-04-01 DIAGNOSIS — G89.3 NEOPLASM RELATED PAIN: ICD-10-CM

## 2024-04-01 DIAGNOSIS — C78.7 METASTATIC CARCINOMA TO LIVER (MULTI): ICD-10-CM

## 2024-04-01 DIAGNOSIS — R53.0 NEOPLASTIC MALIGNANT RELATED FATIGUE: ICD-10-CM

## 2024-04-01 LAB
ALBUMIN SERPL BCP-MCNC: 3.3 G/DL (ref 3.4–5)
ALP SERPL-CCNC: 406 U/L (ref 33–120)
ALT SERPL W P-5'-P-CCNC: 13 U/L (ref 10–52)
ANION GAP SERPL CALC-SCNC: 14 MMOL/L (ref 10–20)
AST SERPL W P-5'-P-CCNC: 27 U/L (ref 9–39)
BASOPHILS # BLD AUTO: 0.02 X10*3/UL (ref 0–0.1)
BASOPHILS NFR BLD AUTO: 0.2 %
BILIRUB SERPL-MCNC: 0.5 MG/DL (ref 0–1.2)
BUN SERPL-MCNC: 12 MG/DL (ref 6–23)
CALCIUM SERPL-MCNC: 9.2 MG/DL (ref 8.6–10.3)
CEA SERPL-MCNC: 6215.8 UG/L
CHLORIDE SERPL-SCNC: 97 MMOL/L (ref 98–107)
CO2 SERPL-SCNC: 31 MMOL/L (ref 21–32)
CREAT SERPL-MCNC: 0.57 MG/DL (ref 0.5–1.3)
EGFRCR SERPLBLD CKD-EPI 2021: >90 ML/MIN/1.73M*2
EOSINOPHIL # BLD AUTO: 0 X10*3/UL (ref 0–0.7)
EOSINOPHIL NFR BLD AUTO: 0 %
ERYTHROCYTE [DISTWIDTH] IN BLOOD BY AUTOMATED COUNT: 17.2 % (ref 11.5–14.5)
GLUCOSE SERPL-MCNC: 152 MG/DL (ref 74–99)
HCT VFR BLD AUTO: 31.9 % (ref 41–52)
HGB BLD-MCNC: 9.8 G/DL (ref 13.5–17.5)
IMM GRANULOCYTES # BLD AUTO: 0.21 X10*3/UL (ref 0–0.7)
IMM GRANULOCYTES NFR BLD AUTO: 2.4 % (ref 0–0.9)
LYMPHOCYTES # BLD AUTO: 0.89 X10*3/UL (ref 1.2–4.8)
LYMPHOCYTES NFR BLD AUTO: 10.3 %
MCH RBC QN AUTO: 27.8 PG (ref 26–34)
MCHC RBC AUTO-ENTMCNC: 30.7 G/DL (ref 32–36)
MCV RBC AUTO: 91 FL (ref 80–100)
MONOCYTES # BLD AUTO: 0.87 X10*3/UL (ref 0.1–1)
MONOCYTES NFR BLD AUTO: 10 %
NEUTROPHILS # BLD AUTO: 6.68 X10*3/UL (ref 1.2–7.7)
NEUTROPHILS NFR BLD AUTO: 77.1 %
NRBC BLD-RTO: 0 /100 WBCS (ref 0–0)
PLATELET # BLD AUTO: 340 X10*3/UL (ref 150–450)
POTASSIUM SERPL-SCNC: 4.2 MMOL/L (ref 3.5–5.3)
PROT SERPL-MCNC: 6.7 G/DL (ref 6.4–8.2)
RBC # BLD AUTO: 3.52 X10*6/UL (ref 4.5–5.9)
SODIUM SERPL-SCNC: 138 MMOL/L (ref 136–145)
WBC # BLD AUTO: 8.7 X10*3/UL (ref 4.4–11.3)

## 2024-04-01 PROCEDURE — 96374 THER/PROPH/DIAG INJ IV PUSH: CPT | Mod: INF

## 2024-04-01 PROCEDURE — 2500000005 HC RX 250 GENERAL PHARMACY W/O HCPCS: Performed by: STUDENT IN AN ORGANIZED HEALTH CARE EDUCATION/TRAINING PROGRAM

## 2024-04-01 PROCEDURE — 3078F DIAST BP <80 MM HG: CPT | Performed by: STUDENT IN AN ORGANIZED HEALTH CARE EDUCATION/TRAINING PROGRAM

## 2024-04-01 PROCEDURE — 85025 COMPLETE CBC W/AUTO DIFF WBC: CPT

## 2024-04-01 PROCEDURE — 2500000004 HC RX 250 GENERAL PHARMACY W/ HCPCS (ALT 636 FOR OP/ED): Performed by: STUDENT IN AN ORGANIZED HEALTH CARE EDUCATION/TRAINING PROGRAM

## 2024-04-01 PROCEDURE — 4010F ACE/ARB THERAPY RXD/TAKEN: CPT | Performed by: STUDENT IN AN ORGANIZED HEALTH CARE EDUCATION/TRAINING PROGRAM

## 2024-04-01 PROCEDURE — 99215 OFFICE O/P EST HI 40 MIN: CPT | Mod: 25

## 2024-04-01 PROCEDURE — 3008F BODY MASS INDEX DOCD: CPT | Performed by: STUDENT IN AN ORGANIZED HEALTH CARE EDUCATION/TRAINING PROGRAM

## 2024-04-01 PROCEDURE — 99215 OFFICE O/P EST HI 40 MIN: CPT

## 2024-04-01 PROCEDURE — 3074F SYST BP LT 130 MM HG: CPT | Performed by: STUDENT IN AN ORGANIZED HEALTH CARE EDUCATION/TRAINING PROGRAM

## 2024-04-01 PROCEDURE — 36591 DRAW BLOOD OFF VENOUS DEVICE: CPT

## 2024-04-01 PROCEDURE — 96361 HYDRATE IV INFUSION ADD-ON: CPT | Mod: INF

## 2024-04-01 PROCEDURE — 80053 COMPREHEN METABOLIC PANEL: CPT

## 2024-04-01 PROCEDURE — 99215 OFFICE O/P EST HI 40 MIN: CPT | Performed by: STUDENT IN AN ORGANIZED HEALTH CARE EDUCATION/TRAINING PROGRAM

## 2024-04-01 PROCEDURE — 99215 OFFICE O/P EST HI 40 MIN: CPT | Mod: 27 | Performed by: STUDENT IN AN ORGANIZED HEALTH CARE EDUCATION/TRAINING PROGRAM

## 2024-04-01 PROCEDURE — 1036F TOBACCO NON-USER: CPT

## 2024-04-01 PROCEDURE — 3008F BODY MASS INDEX DOCD: CPT

## 2024-04-01 PROCEDURE — 4010F ACE/ARB THERAPY RXD/TAKEN: CPT

## 2024-04-01 PROCEDURE — 82378 CARCINOEMBRYONIC ANTIGEN: CPT

## 2024-04-01 RX ORDER — MORPHINE SULFATE 15 MG/1
15 TABLET, FILM COATED, EXTENDED RELEASE ORAL 3 TIMES DAILY
Qty: 90 TABLET | Refills: 0 | Status: SHIPPED | OUTPATIENT
Start: 2024-04-01 | End: 2024-05-01

## 2024-04-01 RX ORDER — HEPARIN SODIUM,PORCINE/PF 10 UNIT/ML
50 SYRINGE (ML) INTRAVENOUS AS NEEDED
OUTPATIENT
Start: 2024-04-01

## 2024-04-01 RX ORDER — HEPARIN 100 UNIT/ML
500 SYRINGE INTRAVENOUS AS NEEDED
OUTPATIENT
Start: 2024-04-01

## 2024-04-01 RX ORDER — PREGABALIN 150 MG/1
150 CAPSULE ORAL 2 TIMES DAILY
Qty: 60 CAPSULE | Refills: 2 | Status: SHIPPED | OUTPATIENT
Start: 2024-04-01 | End: 2024-06-30

## 2024-04-01 RX ORDER — HEPARIN SODIUM,PORCINE/PF 10 UNIT/ML
50 SYRINGE (ML) INTRAVENOUS AS NEEDED
Status: DISCONTINUED | OUTPATIENT
Start: 2024-04-01 | End: 2024-04-01 | Stop reason: HOSPADM

## 2024-04-01 RX ORDER — FAMOTIDINE 10 MG/ML
20 INJECTION INTRAVENOUS ONCE AS NEEDED
OUTPATIENT
Start: 2024-04-29

## 2024-04-01 RX ORDER — HEPARIN 100 UNIT/ML
500 SYRINGE INTRAVENOUS AS NEEDED
Status: CANCELLED | OUTPATIENT
Start: 2024-04-01

## 2024-04-01 RX ORDER — EPINEPHRINE 0.3 MG/.3ML
0.3 INJECTION SUBCUTANEOUS EVERY 5 MIN PRN
OUTPATIENT
Start: 2024-04-29

## 2024-04-01 RX ORDER — HEPARIN 100 UNIT/ML
500 SYRINGE INTRAVENOUS AS NEEDED
Status: DISCONTINUED | OUTPATIENT
Start: 2024-04-01 | End: 2024-04-01 | Stop reason: HOSPADM

## 2024-04-01 RX ORDER — HEPARIN SODIUM,PORCINE/PF 10 UNIT/ML
50 SYRINGE (ML) INTRAVENOUS AS NEEDED
Status: CANCELLED | OUTPATIENT
Start: 2024-04-01

## 2024-04-01 RX ORDER — ALBUTEROL SULFATE 0.83 MG/ML
3 SOLUTION RESPIRATORY (INHALATION) AS NEEDED
OUTPATIENT
Start: 2024-04-29

## 2024-04-01 RX ORDER — DEXAMETHASONE 2 MG/1
2 TABLET ORAL
Qty: 15 TABLET | Refills: 0 | Status: SHIPPED | OUTPATIENT
Start: 2024-04-01 | End: 2024-04-16

## 2024-04-01 RX ORDER — DIPHENHYDRAMINE HYDROCHLORIDE 50 MG/ML
50 INJECTION INTRAMUSCULAR; INTRAVENOUS AS NEEDED
OUTPATIENT
Start: 2024-04-29

## 2024-04-01 RX ADMIN — DEXAMETHASONE SODIUM PHOSPHATE 2 MG: 4 INJECTION, SOLUTION INTRA-ARTICULAR; INTRALESIONAL; INTRAMUSCULAR; INTRAVENOUS; SOFT TISSUE at 17:11

## 2024-04-01 RX ADMIN — Medication 15 G: at 16:39

## 2024-04-01 RX ADMIN — SODIUM CHLORIDE 1000 ML: 9 INJECTION, SOLUTION INTRAVENOUS at 16:36

## 2024-04-01 RX ADMIN — HEPARIN 500 UNITS: 100 SYRINGE at 17:17

## 2024-04-01 ASSESSMENT — PAIN SCALES - GENERAL: PAINLEVEL: 8

## 2024-04-01 NOTE — PROGRESS NOTES
Narciso Marrero is a 58 y.o. male who presents in stable condition for AMY and IVF after being seen by provider.     Since his last visit, he has been doing fair.  Overall, he states his energy level is low.  His appetite has been decreasing -- given dexamethasone 2 mg today.  He reports moderate fatigue and 7/10 pain in his abdominal region.      Patient tolerated IVF infusion & AMY well. He has been educated with the overall plan of therapy. AVS, lab results & future appointment provided. Patient discharged in stable condition with wife via wheelchair.     Reviewed and approved by DELMIS KIRK on 4/1/24 at 5:51 PM.

## 2024-04-01 NOTE — PROGRESS NOTES
SUPPORTIVE AND PALLIATIVE ONCOLOGY CONSULT - OUTPATIENT      SERVICE DATE: 4/1/2024    Medical Oncologist: Laney Russ MD PhD  Brooklynn Enciso MD   Primary Physician: Jonny Murdock  [phone]    REASON FOR CONSULT/CHIEF CONSULT COMPLAINT: pain management and other symptom control     Subjective   HISTORY OF PRESENT ILLNESS: Narciso Marrero is a 58 y.o. male who presents with metastatic ascending colon cancer with liver and peritoneal involvement. He has an AMY pump and is s/p multiple lines of systemic treatment. He was started on FOLFIRI 12/26/23, though this is now on hold d/t disease progression (noted 2/19/24). Future treatment plans are currently pending. He follows with Dr. Russ/Sandy Cruz CNP. He previously followed with Keily Crawford CNP in Supportive Oncology.    Pain Assessment:  Pain Score:  8  Location:  lower abdomen, radiates around (cancer-related); L foot and R shoulder (chronic- work injury, shingles), + CIPN in feet    Symptom Assessment:  Pain:very much  - worsening abd pain  Numbness or Tingling in hands/feet/other: a little   Sore Muscles/Spasms: none  Headache: none  Dizziness:none  Constipation: none - though was recently hospitalized with a bowel obstruction  Diarrhea: somewhat- since starting Ensure   Nausea: somewhat  Vomiting: somewhat -  Lack of Appetite: very much -did not tolerate increased dose of Lorazepam to help with nausea/appetite  Weight Loss: none  Taste changes: a little - metallic taste in mouth   Dry Mouth: none  Pain in Mouth/Swallowing: none  Lack of Energy: somewhat -  Difficulty Sleeping: somewhat  Worrying: none  Anxiety: none  Depression: none  Shortness of breath: none  Other: none      Information obtained from: chart review and interview of patient  ______________________________________________________________________     Oncology History   Malignant neoplasm of ascending colon (CMS/HCC)   9/29/2023 Initial Diagnosis    Malignant neoplasm  of ascending colon (CMS/HCC)     10/4/2023 - 12/13/2023 Chemotherapy    Floxuridine Hepatic Arterial Infusion + [id number], 14 Day Cycles     12/26/2023 - 2/7/2024 Chemotherapy    AMY Glycerin + FOLFIRI (Fluorouracil Continuous Infusion / Leucovorin / Irinotecan), 14 Day Cycles     3/4/2024 -  Chemotherapy    Bevacizumab + Trifluridine and tipiracil, 28 Day Cycles         Past Medical History:   Diagnosis Date    Cancer (CMS/HCC)     Diabetes mellitus (CMS/HCC)     History of transfusion      Past Surgical History:   Procedure Laterality Date    COLON SURGERY      CT ABDOMEN ANGIOGRAM W AND/OR WO IV CONTRAST  04/13/2023    CT ABDOMEN ANGIOGRAM W AND/OR WO IV CONTRAST POR [id number] CT    FRACTURE SURGERY       No family history on file.     SOCIAL HISTORY  Children: 3 of his own, 1 with his current wife   Social History:  reports that he has quit smoking. His smoking use included cigarettes. He has never used smokeless tobacco. He reports that he does not drink alcohol and does not use drugs.    L[employer] day and Importance of Latter day:  Jewish     REVIEW OF SYSTEMS  Review of systems negative unless noted in HPI.       Objective     Palliative Performance Scale % (PPS)       Current Outpatient Medications   Medication Instructions    acetaminophen (TYLENOL) 975 mg, oral, Every 6 hours PRN    bisacodyl (DULCOLAX) 5 mg, oral, Daily PRN    blood-glucose sensor (FreeStyle Alessia 3 Sensor) device Use to check blood sugars 4 times per day.  Change every 14 days.    dicyclomine (BENTYL) 10 mg, oral, 4 times daily, As need for abdominal cramping    insulin lispro (HumaLOG KwikPen Insulin) 100 unit/mL injection Inject under the skin. Inject 3 units under the skin three times a day with meals + SS if blood sugar over 150 mg/dL (3 units if 151-200 mg/dL; 6 units if 201-250 mg/dL; 9 units if 251-300 mg/dL).    Lantus Solostar U-100 Insulin 100 unit/mL (3 mL) pen Inject 15 Units under the skin once daily at bedtime.    LORazepam  "(ATIVAN) 0.5 mg, oral, Every 8 hours PRN    losartan (COZAAR) 25 mg, oral, Daily    morphine CR (MS CONTIN) 30 mg, oral, Every 12 hours scheduled, Do not crush, chew, or split.    morphine CR (MS CONTIN) 30 mg, oral, 3 times daily, Do not crush, chew, or split.    OLANZapine (ZYPREXA) 10 mg, oral, Nightly    ondansetron (ZOFRAN) 8 mg, oral, Every 8 hours PRN    oxyCODONE-acetaminophen (Percocet)  mg tablet 1 tablet, oral, Every 6 hours PRN    pantoprazole (PROTONIX) 40 mg, oral, Daily before breakfast    pen needle, diabetic 31 gauge x 5/16\" needle USE FOUR TIMES A DAY    polyethylene glycol (Miralax) 17 gram/dose powder Take 17 g and mix with 8 oz of water by mouth once daily.    pregabalin (LYRICA) 150 mg, oral, 2 times daily    prochlorperazine (COMPAZINE) 10 mg, oral, Every 6 hours PRN    sennosides (SENOKOT) 17.2 mg, oral, Nightly, This is to help manage constipation. HOLD for diarrhea.    sennosides-docusate sodium (Leila-Colace) 8.6-50 mg tablet 2 tablets, oral, Every 12 hours    simvastatin (ZOCOR) 40 mg, oral, Nightly    trifluridine-tipiraciL (LONSURF) 35 mg/m2, oral, [facility] ON[facility] BID for 10 Days in a 28 Day Cycle, Take on Days 1-5 and Days 8-12 of each 28 day treatment cycle. Take within one hour of completion of morning and evening meals.       Allergies: No Known Allergies  Lab on 04/01/2024   Component Date Value Ref Range Status    WBC 04/01/2024 8.7  4.4 - 11.3 x10*3/uL Final    nRBC 04/01/2024 0.0  0.0 - 0.0 /100 WBCs Final    RBC 04/01/2024 3.52 (L)  4.50 - 5.90 x10*6/uL Final    Hemoglobin 04/01/2024 9.8 (L)  13.5 - 17.5 g/dL Final    Hematocrit 04/01/2024 31.9 (L)  41.0 - 52.0 % Final    MCV 04/01/2024 91  80 - 100 fL Final    MCH 04/01/2024 27.8  26.0 - 34.0 pg Final    MCHC 04/01/2024 30.7 (L)  32.0 - 36.0 g/dL Final    RDW 04/01/2024 17.2 (H)  11.5 - 14.5 % Final    Platelets 04/01/2024 340  150 - 450 x10*3/uL Final    Neutrophils % 04/01/2024 77.1  40.0 - 80.0 % Final    Immature Granulocytes " %, Automated 04/01/2024 2.4 (H)  0.0 - 0.9 % Final    Lymphocytes % 04/01/2024 10.3  13.0 - 44.0 % Final    Monocytes % 04/01/2024 10.0  2.0 - 10.0 % Final    Eosinophils % 04/01/2024 0.0  0.0 - 6.0 % Final    Basophils % 04/01/2024 0.2  0.0 - 2.0 % Final    Neutrophils Absolute 04/01/2024 6.68  1.20 - 7.70 x10*3/uL Final    Immature Granulocytes Absolute, Au* 04/01/2024 0.21  0.00 - 0.70 x10*3/uL Final    Lymphocytes Absolute 04/01/2024 0.89 (L)  1.20 - 4.80 x10*3/uL Final    Monocytes Absolute 04/01/2024 0.87  0.10 - 1.00 x10*3/uL Final    Eosinophils Absolute 04/01/2024 0.00  0.00 - 0.70 x10*3/uL Final    Basophils Absolute 04/01/2024 0.02  0.00 - 0.10 x10*3/uL Final    Glucose 04/01/2024 152 (H)  74 - 99 mg/dL Final    Sodium 04/01/2024 138  136 - 145 mmol/L Final    Potassium 04/01/2024 4.2  3.5 - 5.3 mmol/L Final    Chloride 04/01/2024 97 (L)  98 - 107 mmol/L Final    Bicarbonate 04/01/2024 31  21 - 32 mmol/L Final    Anion Gap 04/01/2024 14  10 - 20 mmol/L Final    Urea Nitrogen 04/01/2024 12  6 - 23 mg/dL Final    Creatinine 04/01/2024 0.57  0.50 - 1.30 mg/dL Final    eGFR 04/01/2024 >90  >60 mL/min/1.73m*2 Final    Calcium 04/01/2024 9.2  8.6 - 10.3 mg/dL Final    Albumin 04/01/2024 3.3 (L)  3.4 - 5.0 g/dL Final    Alkaline Phosphatase 04/01/2024 406 (H)  33 - 120 U/L Final    Total Protein 04/01/2024 6.7  6.4 - 8.2 g/dL Final    AST 04/01/2024 27  9 - 39 U/L Final    Bilirubin, Total 04/01/2024 0.5  0.0 - 1.2 mg/dL Final    ALT 04/01/2024 13  10 - 52 U/L Final        PHYSICAL EXAMINATION:   Vital Signs:       3/20/2024     9:27 AM 3/20/2024     1:44 PM 3/20/2024     5:27 PM 3/20/2024     9:12 PM 3/21/2024     3:48 AM 3/21/2024     8:24 AM 4/1/2024     2:04 PM   Vitals   Systolic 118 130 114 121 118 137 102   Diastolic 75 82 75 78 74 82 53   Heart Rate 116 112 115 111 107 109 115   Temp 36.5 °C (97.7 °F) 36.5 °C (97.7 °F) 36.9 °C (98.4 °F) 36.7 °C (98.1 °F) 36.5 °C (97.7 °F) 36.5 °C (97.7 °F) 36.6 °C (97.9  °F)   Resp 16 18 18 18 18 18 18   Weight (lb)       186.5   BMI       26.11 kg/m2   BSA (m2)       2.06 m2   Visit Report       Report     Physical Exam  Constitutional:       Appearance: He is ill-appearing.      Comments: Appears in wheelchair   HENT:      Mouth/Throat:      Mouth: Mucous membranes are moist.   Eyes:      Extraocular Movements: Extraocular movements intact.      Pupils: Pupils are equal, round, and reactive to light.   Cardiovascular:      Rate and Rhythm: Tachycardia present.   Pulmonary:      Effort: Pulmonary effort is normal.   Abdominal:      General: There is distension.      Tenderness: There is abdominal tenderness.   Musculoskeletal:         General: Tenderness present.   Skin:     General: Skin is warm and dry.      Coloration: Skin is jaundiced.   Neurological:      Mental Status: He is alert and oriented to person, place, and time.   Psychiatric:         Mood and Affect: Mood normal.         Behavior: Behavior normal.            ASSESSMENT/PLAN    Pain  Pain is: cancer related pain and chronic; ower abdomen, radiates around (cancer-related); L foot and R shoulder (chronic- work injury, shingles), + CIPN in feet  Up Morphine ER to 45mg TID- plan for RN phone call in 1 week for close monitoring regarding severe pain/constipation; 15mg tabs sent  Continue Percocet 10mg/325mg to q4h as needed (taking ~4-6x/day to some relief, increase in use)  Pregabalin 150mg BID  Dicyclomine 10mg QID with meals and at bedtime for abdominal cramping- taking more often to good relief  Start Dexamethasone 2mg daily in the morning for 2 weeks to help with: pain, loss of appetite, nausea, fatigue    Opioid Use  Medication Management:   - OARRS report reviewed with no aberrant behavior; consistent with  prescriptions/records and patient history  - .  Overdose Risk Score 610.   This has been discussed with patient.   - We will continue to closely monitor the patient for signs of prescription misuse  including UDS, OARRS review and subjective reports at each visit.  - No concurrent benzodiazepine use   - I am a provider who either is or has consulted and collaborated with a provider certified in Hospice and Palliative Medicine and have conducted a face-face visit and examination for this patient.  - Routine Urine Drug Screen completed 12/29/23 appropriately positive for opioids and negative for illicit substances  - Controlled Substance Agreement completed 12/29/23  - Specifically discussed that controlled substance prescriptions will only be provided by our group as outlined in the completed agreement  - Prescribed naloxone: patient declined  - Red Flags: NA    Constipation  Related to opiates  Miralax daily as needed  Senna 2 tabs 1-2x/day as needed- encouraged to take at least 2 tabs at bedtime while feeling constipated  Diarrhea  Related to chemotherapy  Continue Loperamide 2mg TID as needed - MAX 8 caps/day  Dose reduced chemo  Encouraged ample hydration with electrolytes  4/1/24: recently suffered from bowel obstruction, but now is more prone to diarrhea. Not currently taking any bowel meds (fearful that Imodium will cause severe constipation). Pt is going to get fluids today, which will hopefully help with any necessary rehydration from recent diarrhea.    Nausea/GERD/Loss of Appetite  Following with dietician- Jv Barahona, RD  Pantoprazole 40mg once daily  Ondansetron 8mg [id number] PRN  Prochlorperazine 10mg [id number] PRN- not currently taking  Olanzapine to 10mg daily in the evening with food  See Dexamethasone note above  Did not tolerate Lorazepam at 1mg dose- wishing to remain off medication, for now  Previously tried Dronabinol 2.5mg TID  Encouraged protein supplements  Encouraged small more frequent meals  Encouraged good hydration    Weakness/Fatigue- states this has improved with increased appetite  Declined PT referral  Steroids with treatment not effective - also with hyperglycemia induced by  steroids ( limit therapy)  Encouraged daily exercise (can be gentle) in the morning to help manage energy levels throughout the day    Sleeping Difficulty:  Related to pain  Monitor with improvement in pain    Introduction to Supportive and Palliative Oncology:  Spoke with patient and his wife   Introduced the role and philosophy of Supportive and Palliative oncology in the evaluation and management of symptoms during cancer treatment  Palliative care was introduced as a service for patients with serious illness to help with symptoms, assist with goals of care conversations, navigate complex decision making, improve quality of life for patients, and provide support both patients and families.  Patient seemed to appreciate the extra layer of support.    Advance Directives  Existence of Advance Directives:Yes, but NOT documented in medical record  Decision maker: HCPOA is Emperatriz Marrero (wife) [phone]  Code Status: Full code  Goals of Care 3/11/24: discussed pt's increase in symptom burden and recent CT in the ED. Pt agrees that he seems to be feeling worse with suspected progression of his disease. 4/1/24: current treatment being held. Plan pending with oncology.    Next Follow-Up Visit:  Return to clinic in 2-3 weeks to align with oncology    4/1/24: changes to plan indicated in bold. Continue all other regimens as listed.  Appointment was conducted with Sandy Cruz CNP (oncology) present to allow for cohesive, patient-centered care.     Signature and billing  Thank you for allowing us to participate in the care of this patient. Recommendations will be communicated back to the consulting service by way of shared electronic medical record or face-to-face.    Medical complexity was high level due to due to complexity of problems, extensive data review, and high risk of management/treatment.  Time was spent on the following: Prep Time, Time Directly with Patient/Family/Caregiver, Documentation Time. Total time  spent: 40min    Plan of Care discussed with: Patient and Family/Significant Other: wife      SIGNATURE: TORRI Bonds    Contact information:  Supportive and Palliative Oncology  Monday-Friday 8 AM-5 PM  Phone:  [phone], press option #5, then option #1.   Or Epic Secure Chat

## 2024-04-01 NOTE — TELEPHONE ENCOUNTER
Per [facility] pharmacy, [place] is required for MSContin 45mg TID (30+15mg). Sent to [facility] for processing.

## 2024-04-03 NOTE — TELEPHONE ENCOUNTER
"Per [facility] \"Coverage has been approved for both this patient's medications from 3/3/24 until 7/2/24.  The approval notices from the insurance plan are attached for your reference. We are unable to run a test claim at this time. Please let me know if  you continue to have issues. Thank you for your help!\" Pharmacy updated    "

## 2024-04-05 NOTE — PROGRESS NOTES
Cancer History:  DIAGNOSIS: Ascending colon cancer     STAGING: IV     CURRENT SITES OF DISEASE:  Ascending colon, liver, peritoneal      MOLECULAR/GENOMICS:  [facility] xT 1/23/23 (from [place])  KRAS G12D 32.8% VAF  PTEN V290fs  BCOR Q822*  LZTR1 R198T  SMAD4 R361H  APC S146fs  APC R876*  FUBP1 Q279*  PTEN D268fs  Microsatellite stable  TMB 4.2 (low)     ctDNA Signatera  4/17/23: 0  5/17/23: 1.99  5/31/23: 28.84  6/14/23: 4.20  6/28/23: 9.55  7/12/23: 24.03  7/26/23: 40.53  8/23/23: 9.75  9/20/23: 3.67  10/18/23: 19.35  11/29/23: 125.52  12/26/23: 177.74 Switched treatment to FOLFIRI.  1/22/24: 195.37  2/19/24: 462.54  3/4/24: 1977.70 Switched to Lonsurf + Bevacizumab      CEA:  2/1/23: 4400  3/1/23: 4661  3/15/23: 3061  3/29/23: 2363  4/12/23: 1529  5/17/23: 235.9  5/31/23: 238.3  6/14/23: 225  6/28/23: 181  7/12/23: 171  7/26/23: 266  8/9/23: 392  8/23/23: 718  9/6/23: 800  9/20/23: 905   10/18/23: >100  11/1/23: 613  11/15/23: 1249  11/29/23: 1189  12/11/23: 1572  12/26/23: 2021 Switched treatment to FOLFIRI.   1/8/24: 1662  1/22/24: 2013  2/5/24: 2439  2/19/24: 2775  4/1/24: 6215        CURRENT THERAPY:  Last Glycerin in AMY pump on 2/5/24, 4/1/24     PREVIOUS THERAPY:  1/2023-4/12/23: FOLFOX x6 cycles. First 2 cycles with bevacizumab  AMY pump placed 5/3/23. Flow rate 1.2 mL/day. (Serial # [id number]).First FUdR via AMY pump 5/17/23. Systemic chemotherapy being held due to wound vac, Started FOLFOX on 8/9/23. On 11/1/23 dose reduction in Oxaliplatin and infusional 5FU only due to chemotherapy colitis. Treatment DC due to PD. Last treatment on 12.11.2023. Total cycles of FUdR (if applicable): 7  12.26.2023 : Started on FOLFIRI, Last and 4th cycle on 2/5/24. Discontinued due to disease progression  Lonsurf + Bevacizumab start 3/4/24 discontinued after 1 cycle due to poor tolerance     ONCOLOGIC ISSUES:  Shingles  Midline incision dehisced      PROVIDERS:  Surg onc: Rajinder Arenas  CRS: Regina Raymundo  Local med onc:  Dia Kendall     HISTORY:   1/2023: Presented with 20 pound unexpected weight loss and RUQ pain. Imaging showed large liver lesion. Biopsy c/w metastatic colon cancer. C-scope showed infiltrative, ulcerated, fungating mass in ascending colon.   Baseline CEA 2322. Started FOLFOX + bevacizumab  3/16/23: MRI chest and liver showed no evidence of metastatic disease in the chest. Focal circumferential wall thickening   5/3/23: AMY pump placed, right hemicolectomy, open cholecystectomy and peritonectomy     SH: , lives with wife. Former tobacco, quit in 2017, no illicits or EtOH     PMH: physical trauma on R side of body with multiple fractures, HLD     FH: Father and daughter with cancer      Subjective:   eMmo was recently hospitalized for AMS and was found to have a SBO  Workup for AMS was unremarkable. It was thought it might have been from the ativan he started to take for nausea  CT scan showed serosal involvement in multiple areas of the small bowel and rectum resulting in several areas of alternating borderline bowel dilatation and narrowing, suggesting of multiple areas of partial obstruction. He also had 3.9L of non-malignant fluid drained from his abdomen.   Since his admission he has had severe abdominal pain, mid to lower abdomin. Today it started to radiate to his back. It is a jotting, stabbing pain.  Has no appetite. Drinks 2 ensures a day then has diarrhea, Had 4 episodes today.  Alternating Zofran and compazine which helps his nausea      No fevers, chills, chest pain, shortness of breath, rashes or masses.     ROS as above. Remainder of 10 point review of systems elicited and otherwise unremarkable.         Objective:    Vitals:    04/01/24 1404   BP: 102/53   Pulse: (!) 115   Resp: 18   Temp: 36.6 °C (97.9 °F)   SpO2: 95%          Daily Weight  04/01/24 : 84.6 kg (186 lb 8 oz)  03/15/24 : 92.6 kg (204 lb 2.3 oz)  03/13/24 : 93 kg (205 lb)  03/04/24 : 98.3 kg (216 lb 11.2 oz)  03/02/24 :  95.3 kg (210 lb)     Physical Exam:   Gen: A&O, NAD, cachectic, fatigue and in pain. In wheelchair  Head: Normocephalic, atraumatic  Eyes: no scleral icterus  ENT: mucous membranes dry, no oropharyngeal lesions  Resp: Lungs CTAB  Cardiac: Normal rate, regular rhythm, no murmurs appreciated  Abdomen: Firm, distended, very tender to touch lower abdomen, +BS AMY Pump in place in LLQ, no swelling or redness, ecchymotic area over scar of AMY pump, firm lump at Xiphoid process  Neuro: CNII-XII grossly intact  Psych: appropriate mood & affect, tearful at times  Skin: warm, dry, no apparent rashes    ECOG Performance Status: 2      MR BRAIN W AND WO IV CONTRAST; 3/19/2024   IMPRESSION:  No acute intracranial pathology or evidence of intracranial  metastatic disease.    CT ABDOMEN PELVIS W IV CONTRAST; 3/13/2024   IMPRESSION:  Peritoneal metastatic disease again noted, with likely serosal  involvement in multiple areas of the small bowel and rectum,  resulting in several areas of alternating borderline bowel dilatation  and narrowing. This is suggestive of multiple areas of partial  obstruction involving the small bowel and rectum.      Stable moderate to large volume ascites.      Stable hepatic, splenic and pulmonary metastatic disease. This  includes thrombus within the splenic vein, adjacent to the splenic  hilar mass, suggestive of tumor thrombus.      Moderate sized left pleural effusion has increased. Small right  pleural effusion is stable to slightly decreased.    CT HEAD WO IV CONTRAST; 3/13/2024   IMPRESSION:  No acute intracranial abnormality.    Assessment & Plan:  Mr. 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      He was diagnosed in Jan 2023 after presenting with abdominal pain and unintentional weight loss. Imaging showed liver mass, biopsy showed adenocarcinoma of colonic origin. C-scope showed mass in ascending colon. He started palliative chemotherapy  with FOLFOX + bevacizumab and has received 6 cycles total, ryan d/c after 2 cycles.      I discussed with him at length the risks and benefits to floxuridine chemotherapy delivered via hepatic artery infusion pump in combination with systemic chemotherapy with FOLFOX. I discussed that the goals of AMY therapy are to delay progression of disease and to prolong survival.      Risks of AMY therapy include but are not limited to:   - Gastritis if extrahepatic perfusion (will be mitigated by ensuring no extrahepatic perfusion on Tc99-MAA scan and by prophylactic H2 antagonist  - Biliary sclerosis  - Seroma, hematoma overlying pump site  - Infection at pump site     I discussed that he must call us immediately if she develops abdominal pain, redness at pump site, fever, or jaundice. Avoid hot packs/heating pads over the site and avoid hot tubs, as temperature changes to the pump can affect pump flow rate. Similarly, avoid changes in atmospheric pressure, and notify AMY team if prolonged air travel or travel to different atmospheric pressures is anticipated.      AMY pump placed on 5/3/23, [employer] serial number [id number].      5/31/23: Midline incision dehisced. Hep Saline in AMY pump      6/14/23L Midline incision dehisced. Hold systemic chemotherapy. Fill AMY pump with FUdR with a 50% dose reduction due to 1.37xRV of alk phos from 5/17/23.     6/28/23 - Now with wound vac to abdominal wound. Continue to hold systemic chemotherapy until wound is healed.      7/12/23: Wound vac is now off. Wound is healing very nicely. Wound bed with pink granulation tissue but the wound is still open. Dr. Russ came in to see the wound. Will continue to hold systemic chemotherapy until the wound is fully healed/closed. Continue with AMY pump.      7/26/23: Wound healing continues. Still has some open area after scab broke off showering. Dr. Russ examined site as well today. In setting of wound and recent shingles, we will continue  to HOLD systemic FOLFOX today and give HepNS via AMY. In the meantime, after further discussion with Dr. Russ we will plan on obtaining restaging scans now.      8/9/23: Reviewed CT with Dr. Russ. Liver lesions are decreased in size. There are multiple new lung lesions and numerous peritoneal implants. Since his abdominal wound is now healed will finally be able to start systemic chemotherapy. Due to his ALkPhos being >1.5 x RV, will hold FUdR and give HepNS today.     8/23/23: Proceed with FOLFOX and will give FUdR via AMY pump but at 50% dose reduction due to alk phos being > 1.4 xRV from 7/12/23.     9/6/23: Tolerating systemic chemotherapy. Continue HepNS     10/4/23: Having nausea and diarrhea. Will hold systemic chemotherapy and give HepNS via HAIP. Will give supportive medications today.     10/18/23: Last week as admitted for several days d/t colitis; infectious etiologies not identified. Concern is for chemotherapy-induced colitis.  I personally reviewed the images from the recent CT, which show decreased size of liver metastasis, stable peritoneal disease. Hold systemic today & will refill pump with heparin saline.     11/1/23: No longer having diarrhea. Stools are back to being formed. Having more neuropathic pain from previous sites of shingles since he ran out of the increased dose of lyrica. Will give FOLFOX + FUdR today    11/15/23: Less fatigued. Stools formed. FOLFOX + HepNS today    11/29/23: Starting to have lower abdominal pain not responsive to Percocet. Happens mostly after a bowel movement. FOLFOX + FUdR with a 50% dose reduction due to elevated Alk Phos from 1.35xRV from 11/1/23 12/11/23: Continues with abdominal pain. CEA continues to rise as well as Signatera. Due for imaging.  Will receive FOLFOX + HepNS.     12/21/23: I personally reviewed the images from the recent CT, which show interval increase in b/l pulmonary nodules and peritoneal nodules. I discussed with him that we  should discontinue FOLFOX and AMY and change to FOLFIRI. Will add bevacizumab in near future, as with KRAS mutation, he will not be a candidate for EGFRi. It is too soon after receiving FUDR in AMY to start ryan, will plan with C3 FOLFIRI.  Will start with dose reduced 5FU and irinotecan d/t prior severe diarrhea     1/8/24: Proceed with C2 FOLFIRI. Will add Fosaprepitant to premeds and have asked him to start taking compazine PO the evening of chemotherapy and then around the clock until day 4. He can start Zofran on day 3 at pump disconnect. He will also be using Dronabinol 3x day for nausea and appetite. Also encouraged him to use Miralax for constipation which also should help his pain and nausea. I will also reach out to Palliative care.       1/22/24: Starting to feel better symptomatically from his chemotherapy. Continue with C3 FOLFIRI. Still needing pain medicine every 6 hours.     2/5/24: Due for Glycerin today. Having vomiting. Not moving bowels regularly. BP continues to stay elevated. Continue with FOLFIRI.     2/19/24: I personally reviewed the images from the recent CT, which show interval progression of multiple peritoneal nodules and pulmonary nodules. I discussed with him and his wife that unfortunately the FOLFIRI is not working anymore. I recommended changing his chemotherapy to Lonsurf + bevacizumab and adding him to the Phase 1 clinical trials list. I discussed the risks and benefits of the chemotherapy regimen at length, and he agrees to proceed.     3/4/24: Will start C1D1 Lonsurf and Bevacizumab today. Had a  fall onto his AMY pump a few days ago. CT scan showed stable position of AMY pump. Abdomen hurts at this time and it hurt even before his fall.     4/1/24: Recent admission for AMS was unremarkable but CT scan showed SBO with serosal involvement in multiple areas of the small bowel and rectum resulting in several areas of alternating borderline bowel dilatation and narrowing,  suggesting of multiple areas of partial obstruction. He also had 3.9L of non-malignant fluid drained from his abdomen. He is in severe pain today. Dr. Russ in to see him to discuss gaining some strength over the next month. He has lost weight and muscle mass and giving him any type of treatment right now would harm him right now than do him any good. Palliative care saw him and adjusted his medications.      Plan:   Metastatic colon cancer to the liver  - AMY pump placement and R hemicolectomy on 5/3/23  - Fill AMY pump with Glycerin 2/5/24 and every 6 weeks -> due today 4/1/24  - Discontinue FOLFIRI  - Discontinue Lonsurf + bevacizumab   - Phase 1 referral  - RTC in 4 weeks to assess his overall performance status    Nausea and vomiting due to chemotherapy, neoplasm pain, anorexia, Neuropathic pain due to shingles  - managed by palliative care

## 2024-04-08 ENCOUNTER — PATIENT MESSAGE (OUTPATIENT)
Dept: PALLIATIVE MEDICINE | Facility: HOSPITAL | Age: 59
End: 2024-04-08
Payer: MEDICARE

## 2024-04-08 NOTE — TELEPHONE ENCOUNTER
From: Narciso Marrero  To: Rosalinda Barksdale, APRN-CNP  Sent: 4/8/2024 12:14 PM EDT  Subject: Zofran refill    Not sure who to contact for this. I am down 9 pills for this and need some refills for it. Hope you can point me in the correct direction. Thanks    Memo

## 2024-04-10 ENCOUNTER — HOSPITAL ENCOUNTER (EMERGENCY)
Facility: HOSPITAL | Age: 59
Discharge: OTHER INSTITUTION | End: 2024-04-11
Attending: EMERGENCY MEDICINE
Payer: MEDICARE

## 2024-04-10 ENCOUNTER — TELEPHONE (OUTPATIENT)
Dept: ADMISSION | Facility: HOSPITAL | Age: 59
End: 2024-04-10
Payer: MEDICARE

## 2024-04-10 ENCOUNTER — APPOINTMENT (OUTPATIENT)
Dept: RADIOLOGY | Facility: HOSPITAL | Age: 59
End: 2024-04-10
Payer: MEDICARE

## 2024-04-10 DIAGNOSIS — K56.690 OTHER PARTIAL INTESTINAL OBSTRUCTION (MULTI): Primary | ICD-10-CM

## 2024-04-10 LAB
ALBUMIN SERPL BCP-MCNC: 3 G/DL (ref 3.4–5)
ALP SERPL-CCNC: 611 U/L (ref 33–120)
ALT SERPL W P-5'-P-CCNC: 21 U/L (ref 10–52)
ANION GAP SERPL CALC-SCNC: 16 MMOL/L (ref 10–20)
AST SERPL W P-5'-P-CCNC: 53 U/L (ref 9–39)
BASOPHILS # BLD AUTO: 0.01 X10*3/UL (ref 0–0.1)
BASOPHILS NFR BLD AUTO: 0.1 %
BILIRUB SERPL-MCNC: 0.9 MG/DL (ref 0–1.2)
BUN SERPL-MCNC: 18 MG/DL (ref 6–23)
CALCIUM SERPL-MCNC: 8.6 MG/DL (ref 8.6–10.3)
CHLORIDE SERPL-SCNC: 96 MMOL/L (ref 98–107)
CO2 SERPL-SCNC: 28 MMOL/L (ref 21–32)
CREAT SERPL-MCNC: 0.57 MG/DL (ref 0.5–1.3)
EGFRCR SERPLBLD CKD-EPI 2021: >90 ML/MIN/1.73M*2
EOSINOPHIL # BLD AUTO: 0 X10*3/UL (ref 0–0.7)
EOSINOPHIL NFR BLD AUTO: 0 %
ERYTHROCYTE [DISTWIDTH] IN BLOOD BY AUTOMATED COUNT: 18.4 % (ref 11.5–14.5)
GLUCOSE SERPL-MCNC: 161 MG/DL (ref 74–99)
HCT VFR BLD AUTO: 32.7 % (ref 41–52)
HGB BLD-MCNC: 10.2 G/DL (ref 13.5–17.5)
IMM GRANULOCYTES # BLD AUTO: 0.19 X10*3/UL (ref 0–0.7)
IMM GRANULOCYTES NFR BLD AUTO: 1.2 % (ref 0–0.9)
LACTATE SERPL-SCNC: 1.2 MMOL/L (ref 0.4–2)
LYMPHOCYTES # BLD AUTO: 0.59 X10*3/UL (ref 1.2–4.8)
LYMPHOCYTES NFR BLD AUTO: 3.7 %
MAGNESIUM SERPL-MCNC: 1.49 MG/DL (ref 1.6–2.4)
MCH RBC QN AUTO: 28.3 PG (ref 26–34)
MCHC RBC AUTO-ENTMCNC: 31.2 G/DL (ref 32–36)
MCV RBC AUTO: 91 FL (ref 80–100)
MONOCYTES # BLD AUTO: 1.26 X10*3/UL (ref 0.1–1)
MONOCYTES NFR BLD AUTO: 7.9 %
NEUTROPHILS # BLD AUTO: 13.95 X10*3/UL (ref 1.2–7.7)
NEUTROPHILS NFR BLD AUTO: 87.1 %
NRBC BLD-RTO: 0 /100 WBCS (ref 0–0)
PHOSPHATE SERPL-MCNC: 4.1 MG/DL (ref 2.5–4.9)
PLATELET # BLD AUTO: 309 X10*3/UL (ref 150–450)
POTASSIUM SERPL-SCNC: 3.7 MMOL/L (ref 3.5–5.3)
PROT SERPL-MCNC: 6.5 G/DL (ref 6.4–8.2)
RBC # BLD AUTO: 3.61 X10*6/UL (ref 4.5–5.9)
SODIUM SERPL-SCNC: 136 MMOL/L (ref 136–145)
WBC # BLD AUTO: 16 X10*3/UL (ref 4.4–11.3)

## 2024-04-10 PROCEDURE — 2500000004 HC RX 250 GENERAL PHARMACY W/ HCPCS (ALT 636 FOR OP/ED): Performed by: STUDENT IN AN ORGANIZED HEALTH CARE EDUCATION/TRAINING PROGRAM

## 2024-04-10 PROCEDURE — 96361 HYDRATE IV INFUSION ADD-ON: CPT

## 2024-04-10 PROCEDURE — 2500000004 HC RX 250 GENERAL PHARMACY W/ HCPCS (ALT 636 FOR OP/ED): Performed by: EMERGENCY MEDICINE

## 2024-04-10 PROCEDURE — 36415 COLL VENOUS BLD VENIPUNCTURE: CPT | Performed by: EMERGENCY MEDICINE

## 2024-04-10 PROCEDURE — 85025 COMPLETE CBC W/AUTO DIFF WBC: CPT | Performed by: EMERGENCY MEDICINE

## 2024-04-10 PROCEDURE — 96376 TX/PRO/DX INJ SAME DRUG ADON: CPT

## 2024-04-10 PROCEDURE — 83735 ASSAY OF MAGNESIUM: CPT | Performed by: EMERGENCY MEDICINE

## 2024-04-10 PROCEDURE — 87040 BLOOD CULTURE FOR BACTERIA: CPT | Mod: PORLAB | Performed by: EMERGENCY MEDICINE

## 2024-04-10 PROCEDURE — 99291 CRITICAL CARE FIRST HOUR: CPT | Mod: 25 | Performed by: EMERGENCY MEDICINE

## 2024-04-10 PROCEDURE — 80053 COMPREHEN METABOLIC PANEL: CPT | Performed by: EMERGENCY MEDICINE

## 2024-04-10 PROCEDURE — 96367 TX/PROPH/DG ADDL SEQ IV INF: CPT

## 2024-04-10 PROCEDURE — 84100 ASSAY OF PHOSPHORUS: CPT | Performed by: EMERGENCY MEDICINE

## 2024-04-10 PROCEDURE — 74177 CT ABD & PELVIS W/CONTRAST: CPT | Performed by: STUDENT IN AN ORGANIZED HEALTH CARE EDUCATION/TRAINING PROGRAM

## 2024-04-10 PROCEDURE — 96375 TX/PRO/DX INJ NEW DRUG ADDON: CPT

## 2024-04-10 PROCEDURE — 96366 THER/PROPH/DIAG IV INF ADDON: CPT

## 2024-04-10 PROCEDURE — 74177 CT ABD & PELVIS W/CONTRAST: CPT

## 2024-04-10 PROCEDURE — 96365 THER/PROPH/DIAG IV INF INIT: CPT

## 2024-04-10 PROCEDURE — 99291 CRITICAL CARE FIRST HOUR: CPT

## 2024-04-10 PROCEDURE — 83605 ASSAY OF LACTIC ACID: CPT | Performed by: EMERGENCY MEDICINE

## 2024-04-10 PROCEDURE — 2550000001 HC RX 255 CONTRASTS: Performed by: EMERGENCY MEDICINE

## 2024-04-10 RX ORDER — MORPHINE SULFATE 4 MG/ML
4 INJECTION INTRAVENOUS ONCE
Status: COMPLETED | OUTPATIENT
Start: 2024-04-10 | End: 2024-04-10

## 2024-04-10 RX ORDER — ONDANSETRON HYDROCHLORIDE 2 MG/ML
4 INJECTION, SOLUTION INTRAVENOUS ONCE
Status: COMPLETED | OUTPATIENT
Start: 2024-04-10 | End: 2024-04-10

## 2024-04-10 RX ORDER — PROPOFOL 10 MG/ML
5-20 INJECTION, EMULSION INTRAVENOUS CONTINUOUS
Status: DISCONTINUED | OUTPATIENT
Start: 2024-04-10 | End: 2024-04-10

## 2024-04-10 RX ORDER — SODIUM CHLORIDE 9 MG/ML
125 INJECTION, SOLUTION INTRAVENOUS CONTINUOUS
Status: DISCONTINUED | OUTPATIENT
Start: 2024-04-10 | End: 2024-04-11 | Stop reason: HOSPADM

## 2024-04-10 RX ORDER — FENTANYL CITRATE 50 UG/ML
50 INJECTION, SOLUTION INTRAMUSCULAR; INTRAVENOUS ONCE
Status: COMPLETED | OUTPATIENT
Start: 2024-04-10 | End: 2024-04-10

## 2024-04-10 RX ORDER — MAGNESIUM SULFATE HEPTAHYDRATE 40 MG/ML
2 INJECTION, SOLUTION INTRAVENOUS ONCE
Status: COMPLETED | OUTPATIENT
Start: 2024-04-10 | End: 2024-04-10

## 2024-04-10 RX ADMIN — ONDANSETRON 4 MG: 2 INJECTION INTRAMUSCULAR; INTRAVENOUS at 21:46

## 2024-04-10 RX ADMIN — MORPHINE SULFATE 4 MG: 4 INJECTION INTRAVENOUS at 21:48

## 2024-04-10 RX ADMIN — MORPHINE SULFATE 4 MG: 4 INJECTION, SOLUTION INTRAMUSCULAR; INTRAVENOUS at 16:20

## 2024-04-10 RX ADMIN — MAGNESIUM SULFATE HEPTAHYDRATE 2 G: 40 INJECTION, SOLUTION INTRAVENOUS at 17:57

## 2024-04-10 RX ADMIN — SODIUM CHLORIDE 1000 ML: 9 INJECTION, SOLUTION INTRAVENOUS at 13:55

## 2024-04-10 RX ADMIN — MORPHINE SULFATE 4 MG: 4 INJECTION INTRAVENOUS at 17:29

## 2024-04-10 RX ADMIN — ONDANSETRON 4 MG: 2 INJECTION INTRAMUSCULAR; INTRAVENOUS at 16:15

## 2024-04-10 RX ADMIN — FENTANYL CITRATE 50 MCG: 50 INJECTION INTRAMUSCULAR; INTRAVENOUS at 13:55

## 2024-04-10 RX ADMIN — IOHEXOL 75 ML: 350 INJECTION, SOLUTION INTRAVENOUS at 14:58

## 2024-04-10 RX ADMIN — SODIUM CHLORIDE 125 ML/HR: 9 INJECTION, SOLUTION INTRAVENOUS at 17:27

## 2024-04-10 RX ADMIN — PIPERACILLIN SODIUM AND TAZOBACTAM SODIUM 3.38 G: 3; .375 INJECTION, SOLUTION INTRAVENOUS at 17:27

## 2024-04-10 ASSESSMENT — PAIN - FUNCTIONAL ASSESSMENT
PAIN_FUNCTIONAL_ASSESSMENT: 0-10

## 2024-04-10 ASSESSMENT — PAIN SCALES - GENERAL
PAINLEVEL_OUTOF10: 7
PAINLEVEL_OUTOF10: 2
PAINLEVEL_OUTOF10: 3
PAINLEVEL_OUTOF10: 3
PAINLEVEL_OUTOF10: 7
PAINLEVEL_OUTOF10: 7
PAINLEVEL_OUTOF10: 4
PAINLEVEL_OUTOF10: 7
PAINLEVEL_OUTOF10: 3

## 2024-04-10 ASSESSMENT — PAIN DESCRIPTION - LOCATION
LOCATION: ABDOMEN
LOCATION: ABDOMEN

## 2024-04-10 ASSESSMENT — PAIN DESCRIPTION - PAIN TYPE
TYPE: CHRONIC PAIN
TYPE: CHRONIC PAIN

## 2024-04-10 ASSESSMENT — COLUMBIA-SUICIDE SEVERITY RATING SCALE - C-SSRS
2. HAVE YOU ACTUALLY HAD ANY THOUGHTS OF KILLING YOURSELF?: NO
6. HAVE YOU EVER DONE ANYTHING, STARTED TO DO ANYTHING, OR PREPARED TO DO ANYTHING TO END YOUR LIFE?: NO
1. IN THE PAST MONTH, HAVE YOU WISHED YOU WERE DEAD OR WISHED YOU COULD GO TO SLEEP AND NOT WAKE UP?: NO

## 2024-04-10 ASSESSMENT — LIFESTYLE VARIABLES
EVER FELT BAD OR GUILTY ABOUT YOUR DRINKING: NO
EVER HAD A DRINK FIRST THING IN THE MORNING TO STEADY YOUR NERVES TO GET RID OF A HANGOVER: NO
HAVE PEOPLE ANNOYED YOU BY CRITICIZING YOUR DRINKING: NO
HAVE YOU EVER FELT YOU SHOULD CUT DOWN ON YOUR DRINKING: NO
TOTAL SCORE: 0

## 2024-04-10 ASSESSMENT — PAIN DESCRIPTION - DESCRIPTORS
DESCRIPTORS: ACHING
DESCRIPTORS: ACHING

## 2024-04-10 ASSESSMENT — PAIN DESCRIPTION - ORIENTATION: ORIENTATION: LOWER

## 2024-04-10 NOTE — ED PROVIDER NOTES
HPI   Chief Complaint   Patient presents with    Abdominal Pain     C/O abdominal pain, nausea, vomiting, and constipation.    Hypotension     C/O low blood pressure and hypotension.       Patient presents emergency department for abdominal pain, no bowel movement the last 4 days.  The patient has known colorectal cancer.  Talk to his care team recommended coming to the emergency department for evaluation.  Patient's also associated with nausea, vomiting.  Pain has not been controlled with oral morphine.  Patient has had similar this in the past and had a bowel obstruction which required admission to [facility].                          No data recorded                   Patient History   Past Medical History:   Diagnosis Date    Cancer (CMS/HCC)     Diabetes mellitus (CMS/HCC)     History of transfusion      Past Surgical History:   Procedure Laterality Date    COLON SURGERY      CT ABDOMEN ANGIOGRAM W AND/OR WO IV CONTRAST  04/13/2023    CT ABDOMEN ANGIOGRAM W AND/OR WO IV CONTRAST POR [id number] CT    FRACTURE SURGERY       No family history on file.  Social History     Tobacco Use    Smoking status: Former     Types: Cigarettes    Smokeless tobacco: Never   Vaping Use    Vaping status: Never Used   Substance Use Topics    Alcohol use: Never    Drug use: Never       Physical Exam   ED Triage Vitals [04/10/24 1246]   Temperature Heart Rate Respirations BP   36.8 °C (98.2 °F) (!) 117 (!) 22 77/53      Pulse Ox Temp Source Heart Rate Source Patient Position   95 % Tympanic -- Sitting      BP Location FiO2 (%)     Left arm --       Physical Exam  Vitals and nursing note reviewed.   Constitutional:       General: He is not in acute distress.     Appearance: He is well-developed. He is obese.   HENT:      Head: Normocephalic and atraumatic.      Right Ear: External ear normal.      Left Ear: External ear normal.      Mouth/Throat:      Mouth: Mucous membranes are moist.      Pharynx: Oropharynx is clear.   Eyes:      Extraocular  Movements: Extraocular movements intact.      Conjunctiva/sclera: Conjunctivae normal.   Neck:      Trachea: Trachea normal.   Cardiovascular:      Rate and Rhythm: Normal rate.   Pulmonary:      Effort: Pulmonary effort is normal. No respiratory distress.      Breath sounds: Normal breath sounds.   Abdominal:      General: Bowel sounds are normal. There is distension.      Palpations: Abdomen is soft.      Tenderness: There is no abdominal tenderness.      Comments: Well-healed midline surgical scar,   Musculoskeletal:         General: No swelling or tenderness.      Cervical back: No tenderness.   Skin:     General: Skin is warm and dry.      Capillary Refill: Capillary refill takes less than 2 seconds.   Neurological:      General: No focal deficit present.      Mental Status: He is alert and oriented to person, place, and time.      GCS: GCS eye subscore is 4. GCS verbal subscore is 5. GCS motor subscore is 6.   Psychiatric:         Mood and Affect: Mood normal.         Thought Content: Thought content does not include homicidal or suicidal ideation.         ED Course & MDM   Diagnoses as of 04/10/24 1732   Other partial intestinal obstruction (CMS/HCC)       Medical Decision Making  Patient presents with abdominal pain, no bowel movement for 4 days and nausea vomiting. Available chart reviewed. On initial assessment the patient was found mildly-toxic, no acute distress, hypotensive and tachycardic and afebrile. Initial concern for bowel obstruction, bowel perforation, sepsis.  Blood cultures obtained.  Patient given fluids and pulse and blood pressure improved.  Pain controlled initially with fentanyl and then morphine.  CT shows partial bowel obstruction.  Discussed with local surgeon here or deferred to go to [place].  Patient care signed out pending colorectal consult with likely transfer to [facility].        Procedure  Critical Care    Performed by: Benson Roca MD  Authorized by: Benson Roca MD    Critical  care provider statement:     Critical care time (minutes):  30    Critical care was necessary to treat or prevent imminent or life-threatening deterioration of the following conditions:  Shock    Critical care was time spent personally by me on the following activities:  Ordering and review of laboratory studies, ordering and review of radiographic studies, ordering and performing treatments and interventions, discussions with consultants, development of treatment plan with patient or surrogate and re-evaluation of patient's condition       Benson Roca MD  04/10/24 5436

## 2024-04-10 NOTE — ED TRIAGE NOTES
"Pt to ER from home with c/o low abdominal pain, nausea, vomiting, and constipation. \"I think I have a bowel blockage\". Pt hypotensive, bp low in triage, pt c/o dizziness.  "

## 2024-04-10 NOTE — TELEPHONE ENCOUNTER
Patient called in to report constipation. He states his last BM was 04/05/24.  Patient reports taking Senna once daily,and Miralax twice daily.   Yesterday the patient administered both a fleet suppository and a fleet enema without any results.   This morning the patient states he became nauseous and took zofran & compazine without relief.   Approximately 30 minutes ago, patient reports emesis. He states the it was dark green in color.   Advised patient to seek treatment at the ED, and patient is in agreement with this plan.   Secure chat sent to notify the team.

## 2024-04-10 NOTE — PROGRESS NOTES
Emergency Medicine Transition of Care Note.    I received Narciso Marrero in signout from Dr. Roca.  Please see the previous ED provider note for all HPI, PE and MDM up to the time of signout at 1700. This is in addition to the primary record.    In brief Narciso Marrero is an 58 y.o. male presenting for   Chief Complaint   Patient presents with    Abdominal Pain     C/O abdominal pain, nausea, vomiting, and constipation.    Hypotension     C/O low blood pressure and hypotension.     At the time of signout we were awaiting: callback from colorectal surgery at [facility]     Diagnoses as of 04/10/24 1737   Other partial intestinal obstruction (CMS/HCC)       Medical Decision Making  I took over for the patient's care at 1700.  Antibiotics were placed as the patient has a tachycardia with leukocytosis, magnesium was ordered replacement as well as well as maintenance IV fluids.  I did speak with the colorectal surgeon on-call Dr. Keith, he is happy to see the patient as a consult however the patient was recently admitted to oncology and is recommending admission back to their service.  The patient was then accepted by Dr. Galeana.  The patient will be transferred there as soon as a bed becomes available.  Patient monitored here in the emergency department    Addendum: Patient having additional pain and vomiting.  Antiemetics and pain meds were ordered.  Patient continuing to vomit.  NG tube will be placed by nursing and additional antiemetics ordered.    Final diagnoses:   [K56.690] Other partial intestinal obstruction (CMS/HCC)           Procedure  Procedures    Ayush Colby MD    No

## 2024-04-11 ENCOUNTER — HOSPITAL ENCOUNTER (INPATIENT)
Facility: HOSPITAL | Age: 59
LOS: 3 days | Discharge: HOSPICE | DRG: 871 | End: 2024-04-14
Attending: STUDENT IN AN ORGANIZED HEALTH CARE EDUCATION/TRAINING PROGRAM | Admitting: STUDENT IN AN ORGANIZED HEALTH CARE EDUCATION/TRAINING PROGRAM
Payer: MEDICARE

## 2024-04-11 ENCOUNTER — APPOINTMENT (OUTPATIENT)
Dept: RADIOLOGY | Facility: HOSPITAL | Age: 59
End: 2024-04-11
Payer: MEDICARE

## 2024-04-11 ENCOUNTER — APPOINTMENT (OUTPATIENT)
Dept: CARDIOLOGY | Facility: HOSPITAL | Age: 59
End: 2024-04-11
Payer: MEDICARE

## 2024-04-11 VITALS
DIASTOLIC BLOOD PRESSURE: 94 MMHG | SYSTOLIC BLOOD PRESSURE: 145 MMHG | HEIGHT: 73 IN | TEMPERATURE: 98.1 F | BODY MASS INDEX: 25.05 KG/M2 | HEART RATE: 123 BPM | OXYGEN SATURATION: 95 % | RESPIRATION RATE: 18 BRPM | WEIGHT: 189 LBS

## 2024-04-11 DIAGNOSIS — C18.2 MALIGNANT NEOPLASM OF ASCENDING COLON (MULTI): ICD-10-CM

## 2024-04-11 DIAGNOSIS — K56.600 PARTIAL SMALL BOWEL OBSTRUCTION (MULTI): Primary | ICD-10-CM

## 2024-04-11 LAB
ALBUMIN SERPL BCP-MCNC: 2.6 G/DL (ref 3.4–5)
ALP SERPL-CCNC: 551 U/L (ref 33–120)
ALT SERPL W P-5'-P-CCNC: 21 U/L (ref 10–52)
ANION GAP SERPL CALC-SCNC: 17 MMOL/L (ref 10–20)
AST SERPL W P-5'-P-CCNC: 45 U/L (ref 9–39)
BASOPHILS # BLD AUTO: 0.01 X10*3/UL (ref 0–0.1)
BASOPHILS NFR BLD AUTO: 0.1 %
BILIRUB SERPL-MCNC: 0.8 MG/DL (ref 0–1.2)
BUN SERPL-MCNC: 19 MG/DL (ref 6–23)
CALCIUM SERPL-MCNC: 7.8 MG/DL (ref 8.6–10.3)
CHLORIDE SERPL-SCNC: 100 MMOL/L (ref 98–107)
CO2 SERPL-SCNC: 25 MMOL/L (ref 21–32)
CREAT SERPL-MCNC: 0.45 MG/DL (ref 0.5–1.3)
EGFRCR SERPLBLD CKD-EPI 2021: >90 ML/MIN/1.73M*2
EOSINOPHIL # BLD AUTO: 0 X10*3/UL (ref 0–0.7)
EOSINOPHIL NFR BLD AUTO: 0 %
ERYTHROCYTE [DISTWIDTH] IN BLOOD BY AUTOMATED COUNT: 18.4 % (ref 11.5–14.5)
GLUCOSE SERPL-MCNC: 130 MG/DL (ref 74–99)
HCT VFR BLD AUTO: 30.2 % (ref 41–52)
HGB BLD-MCNC: 9.3 G/DL (ref 13.5–17.5)
IMM GRANULOCYTES # BLD AUTO: 0.09 X10*3/UL (ref 0–0.7)
IMM GRANULOCYTES NFR BLD AUTO: 0.8 % (ref 0–0.9)
LYMPHOCYTES # BLD AUTO: 0.58 X10*3/UL (ref 1.2–4.8)
LYMPHOCYTES NFR BLD AUTO: 4.9 %
MAGNESIUM SERPL-MCNC: 1.92 MG/DL (ref 1.6–2.4)
MCH RBC QN AUTO: 27.5 PG (ref 26–34)
MCHC RBC AUTO-ENTMCNC: 30.8 G/DL (ref 32–36)
MCV RBC AUTO: 89 FL (ref 80–100)
MONOCYTES # BLD AUTO: 0.83 X10*3/UL (ref 0.1–1)
MONOCYTES NFR BLD AUTO: 7 %
NEUTROPHILS # BLD AUTO: 10.29 X10*3/UL (ref 1.2–7.7)
NEUTROPHILS NFR BLD AUTO: 87.2 %
NRBC BLD-RTO: 0 /100 WBCS (ref 0–0)
PLATELET # BLD AUTO: 255 X10*3/UL (ref 150–450)
POTASSIUM SERPL-SCNC: 3.7 MMOL/L (ref 3.5–5.3)
PROT SERPL-MCNC: 5.9 G/DL (ref 6.4–8.2)
RBC # BLD AUTO: 3.38 X10*6/UL (ref 4.5–5.9)
SODIUM SERPL-SCNC: 138 MMOL/L (ref 136–145)
WBC # BLD AUTO: 11.8 X10*3/UL (ref 4.4–11.3)

## 2024-04-11 PROCEDURE — 80053 COMPREHEN METABOLIC PANEL: CPT | Performed by: EMERGENCY MEDICINE

## 2024-04-11 PROCEDURE — 93005 ELECTROCARDIOGRAM TRACING: CPT

## 2024-04-11 PROCEDURE — 2500000004 HC RX 250 GENERAL PHARMACY W/ HCPCS (ALT 636 FOR OP/ED): Performed by: STUDENT IN AN ORGANIZED HEALTH CARE EDUCATION/TRAINING PROGRAM

## 2024-04-11 PROCEDURE — 85025 COMPLETE CBC W/AUTO DIFF WBC: CPT | Performed by: EMERGENCY MEDICINE

## 2024-04-11 PROCEDURE — 85610 PROTHROMBIN TIME: CPT

## 2024-04-11 PROCEDURE — 2500000005 HC RX 250 GENERAL PHARMACY W/O HCPCS: Performed by: STUDENT IN AN ORGANIZED HEALTH CARE EDUCATION/TRAINING PROGRAM

## 2024-04-11 PROCEDURE — 96376 TX/PRO/DX INJ SAME DRUG ADON: CPT

## 2024-04-11 PROCEDURE — 93010 ELECTROCARDIOGRAM REPORT: CPT | Performed by: INTERNAL MEDICINE

## 2024-04-11 PROCEDURE — 74018 RADEX ABDOMEN 1 VIEW: CPT

## 2024-04-11 PROCEDURE — 84100 ASSAY OF PHOSPHORUS: CPT

## 2024-04-11 PROCEDURE — 82248 BILIRUBIN DIRECT: CPT

## 2024-04-11 PROCEDURE — 96375 TX/PRO/DX INJ NEW DRUG ADDON: CPT

## 2024-04-11 PROCEDURE — 74018 RADEX ABDOMEN 1 VIEW: CPT | Performed by: RADIOLOGY

## 2024-04-11 PROCEDURE — 83735 ASSAY OF MAGNESIUM: CPT

## 2024-04-11 PROCEDURE — 85025 COMPLETE CBC W/AUTO DIFF WBC: CPT

## 2024-04-11 PROCEDURE — 83735 ASSAY OF MAGNESIUM: CPT | Performed by: EMERGENCY MEDICINE

## 2024-04-11 PROCEDURE — 2500000004 HC RX 250 GENERAL PHARMACY W/ HCPCS (ALT 636 FOR OP/ED): Performed by: EMERGENCY MEDICINE

## 2024-04-11 PROCEDURE — 86901 BLOOD TYPING SEROLOGIC RH(D): CPT

## 2024-04-11 PROCEDURE — 1170000001 HC PRIVATE ONCOLOGY ROOM DAILY

## 2024-04-11 PROCEDURE — 99223 1ST HOSP IP/OBS HIGH 75: CPT

## 2024-04-11 PROCEDURE — 2500000004 HC RX 250 GENERAL PHARMACY W/ HCPCS (ALT 636 FOR OP/ED)

## 2024-04-11 PROCEDURE — 96366 THER/PROPH/DIAG IV INF ADDON: CPT

## 2024-04-11 RX ORDER — POLYETHYLENE GLYCOL 3350 17 G/17G
17 POWDER, FOR SOLUTION ORAL DAILY
Status: DISCONTINUED | OUTPATIENT
Start: 2024-04-12 | End: 2024-04-12

## 2024-04-11 RX ORDER — LIDOCAINE HYDROCHLORIDE 20 MG/ML
1 JELLY TOPICAL ONCE
Status: COMPLETED | OUTPATIENT
Start: 2024-04-11 | End: 2024-04-11

## 2024-04-11 RX ORDER — METOCLOPRAMIDE HYDROCHLORIDE 5 MG/ML
10 INJECTION INTRAMUSCULAR; INTRAVENOUS ONCE
Status: COMPLETED | OUTPATIENT
Start: 2024-04-11 | End: 2024-04-11

## 2024-04-11 RX ORDER — ENOXAPARIN SODIUM 100 MG/ML
40 INJECTION SUBCUTANEOUS EVERY 24 HOURS
Status: DISCONTINUED | OUTPATIENT
Start: 2024-04-11 | End: 2024-04-14

## 2024-04-11 RX ORDER — MORPHINE SULFATE 4 MG/ML
4 INJECTION INTRAVENOUS EVERY 4 HOURS PRN
Status: DISCONTINUED | OUTPATIENT
Start: 2024-04-11 | End: 2024-04-11 | Stop reason: HOSPADM

## 2024-04-11 RX ORDER — MORPHINE SULFATE 4 MG/ML
4 INJECTION INTRAVENOUS ONCE
Status: COMPLETED | OUTPATIENT
Start: 2024-04-11 | End: 2024-04-11

## 2024-04-11 RX ORDER — ONDANSETRON HYDROCHLORIDE 2 MG/ML
4 INJECTION, SOLUTION INTRAVENOUS EVERY 6 HOURS PRN
Status: DISCONTINUED | OUTPATIENT
Start: 2024-04-11 | End: 2024-04-11 | Stop reason: HOSPADM

## 2024-04-11 RX ORDER — PROCHLORPERAZINE EDISYLATE 5 MG/ML
10 INJECTION INTRAMUSCULAR; INTRAVENOUS ONCE
Status: COMPLETED | OUTPATIENT
Start: 2024-04-11 | End: 2024-04-11

## 2024-04-11 RX ADMIN — PROCHLORPERAZINE EDISYLATE 10 MG: 5 INJECTION INTRAMUSCULAR; INTRAVENOUS at 00:40

## 2024-04-11 RX ADMIN — PIPERACILLIN SODIUM AND TAZOBACTAM SODIUM 3.38 G: 3; .375 INJECTION, SOLUTION INTRAVENOUS at 09:33

## 2024-04-11 RX ADMIN — SODIUM CHLORIDE 125 ML/HR: 9 INJECTION, SOLUTION INTRAVENOUS at 01:28

## 2024-04-11 RX ADMIN — METOCLOPRAMIDE 10 MG: 5 INJECTION, SOLUTION INTRAMUSCULAR; INTRAVENOUS at 08:22

## 2024-04-11 RX ADMIN — MORPHINE SULFATE 4 MG: 4 INJECTION, SOLUTION INTRAMUSCULAR; INTRAVENOUS at 11:50

## 2024-04-11 RX ADMIN — PIPERACILLIN SODIUM AND TAZOBACTAM SODIUM 3.38 G: 3; .375 INJECTION, SOLUTION INTRAVENOUS at 17:39

## 2024-04-11 RX ADMIN — ONDANSETRON 4 MG: 2 INJECTION INTRAMUSCULAR; INTRAVENOUS at 14:52

## 2024-04-11 RX ADMIN — PIPERACILLIN SODIUM AND TAZOBACTAM SODIUM 3.38 G: 3; .375 INJECTION, SOLUTION INTRAVENOUS at 02:50

## 2024-04-11 RX ADMIN — LIDOCAINE HYDROCHLORIDE 1 APPLICATION: 20 JELLY TOPICAL at 00:46

## 2024-04-11 RX ADMIN — SODIUM CHLORIDE 125 ML/HR: 9 INJECTION, SOLUTION INTRAVENOUS at 18:12

## 2024-04-11 RX ADMIN — ONDANSETRON 4 MG: 2 INJECTION INTRAMUSCULAR; INTRAVENOUS at 10:08

## 2024-04-11 RX ADMIN — MORPHINE SULFATE 4 MG: 4 INJECTION, SOLUTION INTRAMUSCULAR; INTRAVENOUS at 04:20

## 2024-04-11 RX ADMIN — MORPHINE SULFATE 4 MG: 4 INJECTION INTRAVENOUS at 14:47

## 2024-04-11 RX ADMIN — MORPHINE SULFATE 4 MG: 4 INJECTION, SOLUTION INTRAMUSCULAR; INTRAVENOUS at 19:25

## 2024-04-11 RX ADMIN — SODIUM CHLORIDE 125 ML/HR: 9 INJECTION, SOLUTION INTRAVENOUS at 10:08

## 2024-04-11 RX ADMIN — ONDANSETRON 4 MG: 2 INJECTION INTRAMUSCULAR; INTRAVENOUS at 04:19

## 2024-04-11 RX ADMIN — MORPHINE SULFATE 4 MG: 4 INJECTION, SOLUTION INTRAMUSCULAR; INTRAVENOUS at 08:24

## 2024-04-11 RX ADMIN — ENOXAPARIN SODIUM 40 MG: 100 INJECTION SUBCUTANEOUS at 23:28

## 2024-04-11 RX ADMIN — ONDANSETRON 4 MG: 2 INJECTION INTRAMUSCULAR; INTRAVENOUS at 19:25

## 2024-04-11 SDOH — SOCIAL STABILITY: SOCIAL INSECURITY: DO YOU FEEL ANYONE HAS EXPLOITED OR TAKEN ADVANTAGE OF YOU FINANCIALLY OR OF YOUR PERSONAL PROPERTY?: NO

## 2024-04-11 SDOH — SOCIAL STABILITY: SOCIAL INSECURITY: HAS ANYONE EVER THREATENED TO HURT YOUR FAMILY OR YOUR PETS?: NO

## 2024-04-11 SDOH — SOCIAL STABILITY: SOCIAL INSECURITY: WERE YOU ABLE TO COMPLETE ALL THE BEHAVIORAL HEALTH SCREENINGS?: YES

## 2024-04-11 SDOH — SOCIAL STABILITY: SOCIAL INSECURITY: HAVE YOU HAD THOUGHTS OF HARMING ANYONE ELSE?: NO

## 2024-04-11 SDOH — SOCIAL STABILITY: SOCIAL INSECURITY: DOES ANYONE TRY TO KEEP YOU FROM HAVING/CONTACTING OTHER FRIENDS OR DOING THINGS OUTSIDE YOUR HOME?: NO

## 2024-04-11 SDOH — SOCIAL STABILITY: SOCIAL INSECURITY: DO YOU FEEL UNSAFE GOING BACK TO THE PLACE WHERE YOU ARE LIVING?: NO

## 2024-04-11 SDOH — SOCIAL STABILITY: SOCIAL INSECURITY: ARE THERE ANY APPARENT SIGNS OF INJURIES/BEHAVIORS THAT COULD BE RELATED TO ABUSE/NEGLECT?: NO

## 2024-04-11 SDOH — SOCIAL STABILITY: SOCIAL INSECURITY: ABUSE: ADULT

## 2024-04-11 SDOH — SOCIAL STABILITY: SOCIAL INSECURITY: ARE YOU OR HAVE YOU BEEN THREATENED OR ABUSED PHYSICALLY, EMOTIONALLY, OR SEXUALLY BY ANYONE?: NO

## 2024-04-11 ASSESSMENT — PAIN SCALES - GENERAL
PAINLEVEL_OUTOF10: 7
PAINLEVEL_OUTOF10: 4
PAINLEVEL_OUTOF10: 5 - MODERATE PAIN
PAINLEVEL_OUTOF10: 4
PAINLEVEL_OUTOF10: 5 - MODERATE PAIN
PAINLEVEL_OUTOF10: 6
PAINLEVEL_OUTOF10: 3

## 2024-04-11 ASSESSMENT — ACTIVITIES OF DAILY LIVING (ADL)
ASSISTIVE_DEVICE: BRACE LLE
JUDGMENT_ADEQUATE_SAFELY_COMPLETE_DAILY_ACTIVITIES: YES
WALKS IN HOME: INDEPENDENT
PATIENT'S MEMORY ADEQUATE TO SAFELY COMPLETE DAILY ACTIVITIES?: YES
DRESSING YOURSELF: INDEPENDENT
GROOMING: INDEPENDENT
HEARING - RIGHT EAR: FUNCTIONAL
LACK_OF_TRANSPORTATION: NO
FEEDING YOURSELF: INDEPENDENT
BATHING: NEEDS ASSISTANCE
ADEQUATE_TO_COMPLETE_ADL: YES
HEARING - LEFT EAR: FUNCTIONAL
TOILETING: INDEPENDENT

## 2024-04-11 ASSESSMENT — PAIN DESCRIPTION - LOCATION: LOCATION: ABDOMEN

## 2024-04-11 ASSESSMENT — COGNITIVE AND FUNCTIONAL STATUS - GENERAL
DAILY ACTIVITIY SCORE: 23
PATIENT BASELINE BEDBOUND: NO
CLIMB 3 TO 5 STEPS WITH RAILING: A LOT
WALKING IN HOSPITAL ROOM: A LITTLE
MOBILITY SCORE: 21
HELP NEEDED FOR BATHING: A LITTLE

## 2024-04-11 ASSESSMENT — LIFESTYLE VARIABLES
AUDIT-C TOTAL SCORE: 0
HOW OFTEN DO YOU HAVE 6 OR MORE DRINKS ON ONE OCCASION: NEVER
HOW OFTEN DO YOU HAVE A DRINK CONTAINING ALCOHOL: NEVER
AUDIT-C TOTAL SCORE: 0
HOW MANY STANDARD DRINKS CONTAINING ALCOHOL DO YOU HAVE ON A TYPICAL DAY: PATIENT DOES NOT DRINK
SKIP TO QUESTIONS 9-10: 1

## 2024-04-11 ASSESSMENT — PAIN - FUNCTIONAL ASSESSMENT
PAIN_FUNCTIONAL_ASSESSMENT: 0-10

## 2024-04-11 NOTE — PROGRESS NOTES
Emergency Medicine Transition of Care Note.    I received Narciso Marrero in signout from Dr. Siegel.  Please see the previous ED provider note for all HPI, PE and MDM up to the time of signout at 1600. This is in addition to the primary record.    In brief Narciso Marrero is an 58 y.o. male presenting for   Chief Complaint   Patient presents with    Abdominal Pain     C/O abdominal pain, nausea, vomiting, and constipation.    Hypotension     C/O low blood pressure and hypotension.     At the time of signout we were awaiting: trn to [facility]    Diagnoses as of 04/11/24 1757   Other partial intestinal obstruction (CMS/HCC)       Medical Decision Making  Still pending a bed at [facility].  NG tube still in place.  I did consult medicine as the patient has been boarding in the emergency department for over a day.  Patient got a bed at [facility]. Was transferred in otherwise stable condition    Final diagnoses:   [K56.690] Other partial intestinal obstruction (CMS/HCC)           Procedure  Procedures    Ayush Colby MD

## 2024-04-11 NOTE — ED NOTES
This nurse was called into Pt room and was asked to remove NG tube that was previously placed. Risks and benefits of the NG tube was explained to the Pt with no change. NG tube was removed per Pt with 350 ml of stool colored bile.      Aaron Ambrocio RN  04/11/24 3032

## 2024-04-12 ENCOUNTER — APPOINTMENT (OUTPATIENT)
Dept: CARDIOLOGY | Facility: HOSPITAL | Age: 59
End: 2024-04-12
Payer: MEDICARE

## 2024-04-12 ENCOUNTER — APPOINTMENT (OUTPATIENT)
Dept: RADIOLOGY | Facility: HOSPITAL | Age: 59
DRG: 871 | End: 2024-04-12
Payer: MEDICARE

## 2024-04-12 LAB
ABO GROUP (TYPE) IN BLOOD: NORMAL
ALBUMIN SERPL BCP-MCNC: 2.6 G/DL (ref 3.4–5)
ALBUMIN SERPL BCP-MCNC: 2.6 G/DL (ref 3.4–5)
ALP SERPL-CCNC: 502 U/L (ref 33–120)
ALT SERPL W P-5'-P-CCNC: 16 U/L (ref 10–52)
AMMONIA PLAS-SCNC: 30 UMOL/L (ref 16–53)
ANION GAP SERPL CALC-SCNC: 22 MMOL/L (ref 10–20)
ANION GAP SERPL CALC-SCNC: 23 MMOL/L (ref 10–20)
ANTIBODY SCREEN: NORMAL
APTT PPP: 25 SECONDS (ref 27–38)
AST SERPL W P-5'-P-CCNC: 32 U/L (ref 9–39)
BASOPHILS # BLD AUTO: 0.01 X10*3/UL (ref 0–0.1)
BASOPHILS NFR BLD AUTO: 0.1 %
BILIRUB DIRECT SERPL-MCNC: 0.3 MG/DL (ref 0–0.3)
BILIRUB SERPL-MCNC: 0.7 MG/DL (ref 0–1.2)
BUN SERPL-MCNC: 19 MG/DL (ref 6–23)
BUN SERPL-MCNC: 19 MG/DL (ref 6–23)
CALCIUM SERPL-MCNC: 8.1 MG/DL (ref 8.6–10.6)
CALCIUM SERPL-MCNC: 8.1 MG/DL (ref 8.6–10.6)
CHLORIDE SERPL-SCNC: 102 MMOL/L (ref 98–107)
CHLORIDE SERPL-SCNC: 104 MMOL/L (ref 98–107)
CO2 SERPL-SCNC: 21 MMOL/L (ref 21–32)
CO2 SERPL-SCNC: 22 MMOL/L (ref 21–32)
CREAT SERPL-MCNC: 0.48 MG/DL (ref 0.5–1.3)
CREAT SERPL-MCNC: 0.48 MG/DL (ref 0.5–1.3)
EGFRCR SERPLBLD CKD-EPI 2021: >90 ML/MIN/1.73M*2
EGFRCR SERPLBLD CKD-EPI 2021: >90 ML/MIN/1.73M*2
EOSINOPHIL # BLD AUTO: 0 X10*3/UL (ref 0–0.7)
EOSINOPHIL NFR BLD AUTO: 0 %
ERYTHROCYTE [DISTWIDTH] IN BLOOD BY AUTOMATED COUNT: 18.4 % (ref 11.5–14.5)
GLUCOSE BLD MANUAL STRIP-MCNC: 120 MG/DL (ref 74–99)
GLUCOSE BLD MANUAL STRIP-MCNC: 128 MG/DL (ref 74–99)
GLUCOSE BLD MANUAL STRIP-MCNC: 145 MG/DL (ref 74–99)
GLUCOSE BLD MANUAL STRIP-MCNC: 146 MG/DL (ref 74–99)
GLUCOSE BLD MANUAL STRIP-MCNC: 148 MG/DL (ref 74–99)
GLUCOSE BLD MANUAL STRIP-MCNC: 150 MG/DL (ref 74–99)
GLUCOSE SERPL-MCNC: 122 MG/DL (ref 74–99)
GLUCOSE SERPL-MCNC: 132 MG/DL (ref 74–99)
HCT VFR BLD AUTO: 30.6 % (ref 41–52)
HGB BLD-MCNC: 9.6 G/DL (ref 13.5–17.5)
IMM GRANULOCYTES # BLD AUTO: 0.08 X10*3/UL (ref 0–0.7)
IMM GRANULOCYTES NFR BLD AUTO: 0.6 % (ref 0–0.9)
INR PPP: 1.2 (ref 0.9–1.1)
LEGIONELLA AG UR QL: NEGATIVE
LYMPHOCYTES # BLD AUTO: 0.6 X10*3/UL (ref 1.2–4.8)
LYMPHOCYTES NFR BLD AUTO: 4.7 %
MAGNESIUM SERPL-MCNC: 1.73 MG/DL (ref 1.6–2.4)
MAGNESIUM SERPL-MCNC: 1.84 MG/DL (ref 1.6–2.4)
MCH RBC QN AUTO: 28.1 PG (ref 26–34)
MCHC RBC AUTO-ENTMCNC: 31.4 G/DL (ref 32–36)
MCV RBC AUTO: 90 FL (ref 80–100)
MONOCYTES # BLD AUTO: 0.9 X10*3/UL (ref 0.1–1)
MONOCYTES NFR BLD AUTO: 7.1 %
NEUTROPHILS # BLD AUTO: 11.07 X10*3/UL (ref 1.2–7.7)
NEUTROPHILS NFR BLD AUTO: 87.5 %
NRBC BLD-RTO: 0 /100 WBCS (ref 0–0)
PHOSPHATE SERPL-MCNC: 2.8 MG/DL (ref 2.5–4.9)
PHOSPHATE SERPL-MCNC: 2.9 MG/DL (ref 2.5–4.9)
PLATELET # BLD AUTO: 300 X10*3/UL (ref 150–450)
POTASSIUM SERPL-SCNC: 3.5 MMOL/L (ref 3.5–5.3)
POTASSIUM SERPL-SCNC: 3.6 MMOL/L (ref 3.5–5.3)
PROCALCITONIN SERPL-MCNC: 0.81 NG/ML
PROT SERPL-MCNC: 5.6 G/DL (ref 6.4–8.2)
PROTHROMBIN TIME: 13.3 SECONDS (ref 9.8–12.8)
RBC # BLD AUTO: 3.42 X10*6/UL (ref 4.5–5.9)
RH FACTOR (ANTIGEN D): NORMAL
S PNEUM AG UR QL: NEGATIVE
SODIUM SERPL-SCNC: 142 MMOL/L (ref 136–145)
SODIUM SERPL-SCNC: 144 MMOL/L (ref 136–145)
WBC # BLD AUTO: 12.7 X10*3/UL (ref 4.4–11.3)

## 2024-04-12 PROCEDURE — 93005 ELECTROCARDIOGRAM TRACING: CPT

## 2024-04-12 PROCEDURE — 74018 RADEX ABDOMEN 1 VIEW: CPT

## 2024-04-12 PROCEDURE — 1170000001 HC PRIVATE ONCOLOGY ROOM DAILY

## 2024-04-12 PROCEDURE — C9113 INJ PANTOPRAZOLE SODIUM, VIA: HCPCS

## 2024-04-12 PROCEDURE — 76770 US EXAM ABDO BACK WALL COMP: CPT

## 2024-04-12 PROCEDURE — 0W9G3ZZ DRAINAGE OF PERITONEAL CAVITY, PERCUTANEOUS APPROACH: ICD-10-PCS | Performed by: PHYSICIAN ASSISTANT

## 2024-04-12 PROCEDURE — 2500000004 HC RX 250 GENERAL PHARMACY W/ HCPCS (ALT 636 FOR OP/ED)

## 2024-04-12 PROCEDURE — 2500000002 HC RX 250 W HCPCS SELF ADMINISTERED DRUGS (ALT 637 FOR MEDICARE OP, ALT 636 FOR OP/ED)

## 2024-04-12 PROCEDURE — 87449 NOS EACH ORGANISM AG IA: CPT

## 2024-04-12 PROCEDURE — 2500000005 HC RX 250 GENERAL PHARMACY W/O HCPCS

## 2024-04-12 PROCEDURE — 76770 US EXAM ABDO BACK WALL COMP: CPT | Performed by: STUDENT IN AN ORGANIZED HEALTH CARE EDUCATION/TRAINING PROGRAM

## 2024-04-12 PROCEDURE — 83735 ASSAY OF MAGNESIUM: CPT

## 2024-04-12 PROCEDURE — 2500000001 HC RX 250 WO HCPCS SELF ADMINISTERED DRUGS (ALT 637 FOR MEDICARE OP)

## 2024-04-12 PROCEDURE — 84145 PROCALCITONIN (PCT): CPT

## 2024-04-12 PROCEDURE — 80069 RENAL FUNCTION PANEL: CPT

## 2024-04-12 PROCEDURE — 82947 ASSAY GLUCOSE BLOOD QUANT: CPT

## 2024-04-12 PROCEDURE — 82140 ASSAY OF AMMONIA: CPT

## 2024-04-12 PROCEDURE — 99223 1ST HOSP IP/OBS HIGH 75: CPT | Performed by: NURSE PRACTITIONER

## 2024-04-12 PROCEDURE — 49083 ABD PARACENTESIS W/IMAGING: CPT

## 2024-04-12 PROCEDURE — 87899 AGENT NOS ASSAY W/OPTIC: CPT

## 2024-04-12 RX ORDER — OLANZAPINE 10 MG/1
10 TABLET, ORALLY DISINTEGRATING ORAL NIGHTLY
Status: DISCONTINUED | OUTPATIENT
Start: 2024-04-12 | End: 2024-04-14 | Stop reason: HOSPADM

## 2024-04-12 RX ORDER — ACETAMINOPHEN 10 MG/ML
1000 INJECTION, SOLUTION INTRAVENOUS EVERY 12 HOURS
Qty: 400 ML | Refills: 0 | Status: COMPLETED | OUTPATIENT
Start: 2024-04-12 | End: 2024-04-13

## 2024-04-12 RX ORDER — INSULIN LISPRO 100 [IU]/ML
0-10 INJECTION, SOLUTION INTRAVENOUS; SUBCUTANEOUS
Status: DISCONTINUED | OUTPATIENT
Start: 2024-04-12 | End: 2024-04-12

## 2024-04-12 RX ORDER — PANTOPRAZOLE SODIUM 40 MG/10ML
40 INJECTION, POWDER, LYOPHILIZED, FOR SOLUTION INTRAVENOUS DAILY
Status: DISCONTINUED | OUTPATIENT
Start: 2024-04-12 | End: 2024-04-14

## 2024-04-12 RX ORDER — OXYCODONE AND ACETAMINOPHEN 10; 325 MG/1; MG/1
1 TABLET ORAL EVERY 6 HOURS PRN
Status: DISCONTINUED | OUTPATIENT
Start: 2024-04-12 | End: 2024-04-12

## 2024-04-12 RX ORDER — NALOXONE HYDROCHLORIDE 0.4 MG/ML
0.2 INJECTION, SOLUTION INTRAMUSCULAR; INTRAVENOUS; SUBCUTANEOUS AS NEEDED
Status: DISCONTINUED | OUTPATIENT
Start: 2024-04-12 | End: 2024-04-14 | Stop reason: HOSPADM

## 2024-04-12 RX ORDER — PROCHLORPERAZINE EDISYLATE 5 MG/ML
10 INJECTION INTRAMUSCULAR; INTRAVENOUS EVERY 6 HOURS PRN
Status: DISCONTINUED | OUTPATIENT
Start: 2024-04-12 | End: 2024-04-14 | Stop reason: HOSPADM

## 2024-04-12 RX ORDER — MORPHINE SULFATE 4 MG/ML
4 INJECTION INTRAVENOUS EVERY 4 HOURS PRN
Status: DISCONTINUED | OUTPATIENT
Start: 2024-04-12 | End: 2024-04-12

## 2024-04-12 RX ORDER — OLANZAPINE 5 MG/1
10 TABLET ORAL NIGHTLY
Status: DISCONTINUED | OUTPATIENT
Start: 2024-04-12 | End: 2024-04-12

## 2024-04-12 RX ORDER — DICYCLOMINE HYDROCHLORIDE 10 MG/1
10 CAPSULE ORAL 4 TIMES DAILY
Status: DISCONTINUED | OUTPATIENT
Start: 2024-04-12 | End: 2024-04-12

## 2024-04-12 RX ORDER — INSULIN LISPRO 100 [IU]/ML
0-10 INJECTION, SOLUTION INTRAVENOUS; SUBCUTANEOUS EVERY 4 HOURS
Status: DISCONTINUED | OUTPATIENT
Start: 2024-04-12 | End: 2024-04-12

## 2024-04-12 RX ORDER — INSULIN LISPRO 100 [IU]/ML
0-10 INJECTION, SOLUTION INTRAVENOUS; SUBCUTANEOUS 4 TIMES DAILY
Status: DISCONTINUED | OUTPATIENT
Start: 2024-04-12 | End: 2024-04-14

## 2024-04-12 RX ORDER — PREGABALIN 75 MG/1
150 CAPSULE ORAL 2 TIMES DAILY
Status: DISCONTINUED | OUTPATIENT
Start: 2024-04-12 | End: 2024-04-14

## 2024-04-12 RX ORDER — LOSARTAN POTASSIUM 25 MG/1
25 TABLET ORAL DAILY
Status: DISCONTINUED | OUTPATIENT
Start: 2024-04-12 | End: 2024-04-14

## 2024-04-12 RX ORDER — POLYETHYLENE GLYCOL 3350 17 G/17G
17 POWDER, FOR SOLUTION ORAL DAILY
Status: DISCONTINUED | OUTPATIENT
Start: 2024-04-12 | End: 2024-04-13

## 2024-04-12 RX ORDER — MORPHINE SULFATE 4 MG/ML
4 INJECTION INTRAVENOUS EVERY 2 HOUR PRN
Status: DISCONTINUED | OUTPATIENT
Start: 2024-04-12 | End: 2024-04-14

## 2024-04-12 RX ORDER — AMOXICILLIN 250 MG
2 CAPSULE ORAL EVERY 12 HOURS
Status: DISCONTINUED | OUTPATIENT
Start: 2024-04-12 | End: 2024-04-12

## 2024-04-12 RX ORDER — ONDANSETRON HYDROCHLORIDE 2 MG/ML
8 INJECTION, SOLUTION INTRAVENOUS EVERY 6 HOURS PRN
Status: DISCONTINUED | OUTPATIENT
Start: 2024-04-12 | End: 2024-04-14 | Stop reason: HOSPADM

## 2024-04-12 RX ORDER — HYOSCYAMINE SULFATE 0.12 MG/1
0.12 TABLET, ORALLY DISINTEGRATING ORAL EVERY 6 HOURS
Status: DISCONTINUED | OUTPATIENT
Start: 2024-04-12 | End: 2024-04-14 | Stop reason: HOSPADM

## 2024-04-12 RX ORDER — DEXTROSE 50 % IN WATER (D50W) INTRAVENOUS SYRINGE
12.5
Status: DISCONTINUED | OUTPATIENT
Start: 2024-04-12 | End: 2024-04-14

## 2024-04-12 RX ORDER — SIMVASTATIN 40 MG/1
40 TABLET, FILM COATED ORAL NIGHTLY
Status: DISCONTINUED | OUTPATIENT
Start: 2024-04-12 | End: 2024-04-14

## 2024-04-12 RX ORDER — DEXTROSE 50 % IN WATER (D50W) INTRAVENOUS SYRINGE
25
Status: DISCONTINUED | OUTPATIENT
Start: 2024-04-12 | End: 2024-04-14

## 2024-04-12 RX ORDER — FENTANYL CITRATE-0.9 % NACL/PF 1100MCG/55
PATIENT CONTROLLED ANALGESIA SYRINGE INJECTION CONTINUOUS
Status: DISCONTINUED | OUTPATIENT
Start: 2024-04-12 | End: 2024-04-14

## 2024-04-12 RX ORDER — DEXAMETHASONE 4 MG/1
2 TABLET ORAL
Status: DISCONTINUED | OUTPATIENT
Start: 2024-04-12 | End: 2024-04-12

## 2024-04-12 RX ADMIN — ENOXAPARIN SODIUM 40 MG: 100 INJECTION SUBCUTANEOUS at 22:39

## 2024-04-12 RX ADMIN — PIPERACILLIN SODIUM AND TAZOBACTAM SODIUM 3.38 G: 3; .375 INJECTION, SOLUTION INTRAVENOUS at 02:45

## 2024-04-12 RX ADMIN — PREGABALIN 150 MG: 75 CAPSULE ORAL at 20:35

## 2024-04-12 RX ADMIN — PIPERACILLIN SODIUM AND TAZOBACTAM SODIUM 3.38 G: 3; .375 INJECTION, SOLUTION INTRAVENOUS at 13:50

## 2024-04-12 RX ADMIN — ACETAMINOPHEN 1000 MG: 10 INJECTION INTRAVENOUS at 02:22

## 2024-04-12 RX ADMIN — OLANZAPINE 10 MG: 10 TABLET, ORALLY DISINTEGRATING ORAL at 20:35

## 2024-04-12 RX ADMIN — PIPERACILLIN SODIUM AND TAZOBACTAM SODIUM 3.38 G: 3; .375 INJECTION, SOLUTION INTRAVENOUS at 20:29

## 2024-04-12 RX ADMIN — PANTOPRAZOLE SODIUM 40 MG: 40 INJECTION, POWDER, FOR SOLUTION INTRAVENOUS at 08:29

## 2024-04-12 RX ADMIN — MORPHINE SULFATE 4 MG: 4 INJECTION, SOLUTION INTRAMUSCULAR; INTRAVENOUS at 18:46

## 2024-04-12 RX ADMIN — PIPERACILLIN SODIUM AND TAZOBACTAM SODIUM 3.38 G: 3; .375 INJECTION, SOLUTION INTRAVENOUS at 08:30

## 2024-04-12 RX ADMIN — PROCHLORPERAZINE EDISYLATE 10 MG: 5 INJECTION INTRAMUSCULAR; INTRAVENOUS at 15:31

## 2024-04-12 RX ADMIN — ACETAMINOPHEN 1000 MG: 10 INJECTION INTRAVENOUS at 13:49

## 2024-04-12 RX ADMIN — SIMVASTATIN 40 MG: 20 TABLET, FILM COATED ORAL at 20:35

## 2024-04-12 RX ADMIN — ONDANSETRON 8 MG: 2 INJECTION INTRAMUSCULAR; INTRAVENOUS at 13:49

## 2024-04-12 RX ADMIN — SODIUM CHLORIDE, POTASSIUM CHLORIDE, SODIUM LACTATE AND CALCIUM CHLORIDE 500 ML: 600; 310; 30; 20 INJECTION, SOLUTION INTRAVENOUS at 02:22

## 2024-04-12 RX ADMIN — HYOSCYAMINE SULFATE 0.12 MG: 0.12 TABLET SUBLINGUAL at 20:36

## 2024-04-12 RX ADMIN — MORPHINE SULFATE 4 MG: 4 INJECTION INTRAVENOUS at 15:37

## 2024-04-12 RX ADMIN — FENTANYL CITRATE: 50 INJECTION, SOLUTION INTRAMUSCULAR; INTRAVENOUS at 19:51

## 2024-04-12 RX ADMIN — ONDANSETRON 8 MG: 2 INJECTION INTRAMUSCULAR; INTRAVENOUS at 18:46

## 2024-04-12 RX ADMIN — PROCHLORPERAZINE EDISYLATE 10 MG: 5 INJECTION INTRAMUSCULAR; INTRAVENOUS at 02:23

## 2024-04-12 RX ADMIN — DEXAMETHASONE SODIUM PHOSPHATE 2 MG: 4 INJECTION, SOLUTION INTRAMUSCULAR; INTRAVENOUS at 08:29

## 2024-04-12 ASSESSMENT — ENCOUNTER SYMPTOMS
NAUSEA: 1
VOMITING: 1
ANAL BLEEDING: 0
PALPITATIONS: 0
CONSTIPATION: 1
CHILLS: 0
ABDOMINAL DISTENTION: 1
ABDOMINAL PAIN: 1
SHORTNESS OF BREATH: 1
BLOOD IN STOOL: 0
FEVER: 0

## 2024-04-12 ASSESSMENT — COGNITIVE AND FUNCTIONAL STATUS - GENERAL
DAILY ACTIVITIY SCORE: 24
MOBILITY SCORE: 24

## 2024-04-12 ASSESSMENT — PAIN - FUNCTIONAL ASSESSMENT
PAIN_FUNCTIONAL_ASSESSMENT: 0-10

## 2024-04-12 ASSESSMENT — PAIN SCALES - GENERAL
PAINLEVEL_OUTOF10: 7
PAINLEVEL_OUTOF10: 2
PAINLEVEL_OUTOF10: 7
PAINLEVEL_OUTOF10: 10 - WORST POSSIBLE PAIN
PAINLEVEL_OUTOF10: 4

## 2024-04-12 ASSESSMENT — ACTIVITIES OF DAILY LIVING (ADL): LACK_OF_TRANSPORTATION: NO

## 2024-04-12 NOTE — CONSULTS
SUPPORTIVE AND PALLIATIVE ONCOLOGY CONSULT    Inpatient consult to [facility] Adult Supportive Oncology  Consult performed by: TORRI Garduno, DNP  Consult ordered by: Laney Russ MD PhD        SERVICE DATE: 4/12/2024      PALLIATIVE MEDICINE OUTPATIENT PROVIDER:  TORRI Bonds  CURRENT ATTENDING PROVIDER: Laney Russ MD*     Medical Oncologist: Laney Russ MD PhD  Brooklynn Enciso MD   Radiation Oncologist: No care team member to display  Primary Physician: Jonny Murdock  [phone]    REASON FOR CONSULT/CHIEF CONSULT COMPLAINT: pain management with SOB    Subjective   HISTORY OF PRESENT ILLNESS: Narciso Marrero is a 58 y.o. male diagnosed with metastatic ascending colon cancer with liver and peritoneal involvement. He has an AMY pump and is s/p multiple lines of systemic treatment. He was started on FOLFIRI 12/26/23, though this is now on hold d/t disease progression (noted 2/19/24). PMH significant for T2DM, HTN, GERD, chronic pain. Admitted 4/11/2024 for further evaluation and management of partial small bowel obstruction. Course complicated by worsening metastasis including peritoneal carcinomatosis. Supportive and Palliative Oncology is consulted for pain management, management of symptoms related to SBO.      Morphine ER dose recently increased to 45 mg TID on 4/1, reports pain was better controlled and was needing less percocet (2-3x per day). Patient was started on Morphine 4 mg IV prn for pain management. Reports pain not as well controlled on current regimen.  No emesis since NGT replaced.   CRS at bedside to discuss venting PEG, no surgical options for SBO.  Plan for paracentesis today.    Wife shared that hospice was recommended by Dr. Russ.    Pain Assessment:  Onset: chronic   Location: lower abdomen, radiates around (cancer-related); L foot and R shoulder (chronic- work injury, shingles), + CIPN in feet   Duration:  Chronic  Characteristics:   Ratin   Descriptors: aching, cramping, and sharp   Aggravating: movement    Relieving: Analgesics morphine and percocet   Treatment/Home Regimen: Morphine ER 45 mg TID, Pregabalin 150 mg BID, and Percocet 10/325 mg q4h prn, Dicyclomine 10mg QID with meals and at bedtime for abdominal cramping, Dexamethasone 2mg daily in AM   Intolerances:Narciso Marrero has No Known Allergies.   Personal Pain Goal: 2    Interference with Function:  Including: walking, activity, and working   Coping Strategies: Relaxation and Prayer   Emotional Response: Depression   Barriers to Pain Management:  side effects, worsening disease process    Opioid Use  Past 24 h prn opioid use: Morphine 4 mg IV x 5 doses = 20 mg = 50 OME  Total 24h OME use:  50    Note: OME calculations based on equianalgesic table below. Please note this table is based on best available evidence but conversions are still approximate. These are NOT opioid DOSES for individual patient use; this is equivalency information.  Drug Parenteral Enteral   Morphine 10 25   Oxycodone N/A 20   Hydromorphone 2 5   Fentanyl 0.15 N/A   Tramadol N/A 120   Citation: Faye JAY. Demystifying opioid conversion calculations: A guide for effective dosing, Second edition. [place], [place]: American Society of Health-System Pharmacists, 2018.    OARRS/PDMP reviewed no aberrant behavior noted.    Symptom Assessment:    Pain: chronic and cancer related pain  Headache: none  Dizziness:none  Lack of energy: very much  Difficulty sleeping: a little  Worrying: none  Anxiety: none  Depression: a little  Pain in mouth/swallowing:  NGT, NPO  Dry mouth: somewhat  Taste changes:  N/A, NPO  Shortness of breath: none  Lack of appetite: somewhat   Nausea: a little  Vomiting: none with NGT to suction  Constipation:  LBM 24  Diarrhea: none  Sore muscles: a little  Numbness or tingling in hands/feet/other: somewhat  Weight loss: very much  Other:         Information  obtained from: interview of patient and interview of family  ______________________________________________________________________     Oncology History   Malignant neoplasm of ascending colon (Multi)   9/29/2023 Initial Diagnosis    Malignant neoplasm of ascending colon (CMS/HCC)     10/4/2023 - 12/13/2023 Chemotherapy    Floxuridine Hepatic Arterial Infusion + mFOLFOX6, 14 Day Cycles     12/26/2023 - 2/7/2024 Chemotherapy    AMY Glycerin + FOLFIRI (Fluorouracil Continuous Infusion / Leucovorin / Irinotecan), 14 Day Cycles     3/4/2024 - 3/4/2024 Chemotherapy    Bevacizumab + Trifluridine and tipiracil, 28 Day Cycles         Past Medical History:   Diagnosis Date    Cancer (Multi)     Diabetes mellitus (Multi)     History of transfusion      Past Surgical History:   Procedure Laterality Date    COLON SURGERY      CT ABDOMEN ANGIOGRAM W AND/OR WO IV CONTRAST  04/13/2023    CT ABDOMEN ANGIOGRAM W AND/OR WO IV CONTRAST POR [id number] CT    FRACTURE SURGERY       No family history on file.     SOCIAL HISTORY:  Children 3 of his own, 1 with his current wife   Social History:  reports that he has quit smoking. His smoking use included cigarettes. He has never used smokeless tobacco. He reports that he does not drink alcohol and does not use drugs.    Confucianism and Importance of Confucianism:  Hinduism     REVIEW OF SYSTEMS:  Review of systems negative unless noted in HPI.       Objective       Lab Results   Component Value Date    WBC 12.7 (H) 04/11/2024    HGB 9.6 (L) 04/11/2024    HCT 30.6 (L) 04/11/2024    MCV 90 04/11/2024     04/11/2024      Lab Results   Component Value Date    GLUCOSE 132 (H) 04/12/2024    CALCIUM 8.1 (L) 04/12/2024     04/12/2024    K 3.5 04/12/2024    CO2 22 04/12/2024     04/12/2024    BUN 19 04/12/2024    CREATININE 0.48 (L) 04/12/2024     Lab Results   Component Value Date    ALT 16 04/11/2024    AST 32 04/11/2024    ALKPHOS 502 (H) 04/11/2024    BILITOT 0.7 04/11/2024  "    Estimated Creatinine Clearance: 125 mL/min (A) (by C-G formula based on SCr of 0.48 mg/dL (L)).     Current Outpatient Medications   Medication Instructions    acetaminophen (TYLENOL) 975 mg, oral, Every 6 hours PRN    bisacodyl (DULCOLAX) 5 mg, oral, Daily PRN    blood-glucose sensor (FreeStyle Alessia 3 Sensor) device Use to check blood sugars 4 times per day.  Change every 14 days.    dexAMETHasone (DECADRON) 2 mg, oral, Daily with breakfast, This is to help with energy, appetite, nausea, and pain.    dicyclomine (BENTYL) 10 mg, oral, 4 times daily, As need for abdominal cramping    insulin lispro (HumaLOG KwikPen Insulin) 100 unit/mL injection Inject under the skin. Inject 3 units under the skin three times a day with meals + SS if blood sugar over 150 mg/dL (3 units if 151-200 mg/dL; 6 units if 201-250 mg/dL; 9 units if 251-300 mg/dL).    Lantus Solostar U-100 Insulin 100 unit/mL (3 mL) pen Inject 15 Units under the skin once daily at bedtime.    LORazepam (ATIVAN) 0.5 mg, oral, Every 8 hours PRN    losartan (COZAAR) 25 mg, oral, Daily    morphine CR (MS CONTIN) 30 mg, oral, Every 12 hours scheduled, Do not crush, chew, or split.    morphine CR (MS CONTIN) 30 mg, oral, 3 times daily, Do not crush, chew, or split.    morphine CR (MS CONTIN) 15 mg, oral, 3 times daily, Do not crush, chew, or split. Take with 30mg dose for total of 45mg.    OLANZapine (ZYPREXA) 10 mg, oral, Nightly    ondansetron (ZOFRAN) 8 mg, oral, Every 8 hours PRN    oxyCODONE-acetaminophen (Percocet)  mg tablet 1 tablet, oral, Every 6 hours PRN    pantoprazole (PROTONIX) 40 mg, oral, Daily before breakfast    pen needle, diabetic 31 gauge x 5/16\" needle USE FOUR TIMES A DAY    polyethylene glycol (Miralax) 17 gram/dose powder Take 17 g and mix with 8 oz of water by mouth once daily.    pregabalin (LYRICA) 150 mg, oral, 2 times daily    prochlorperazine (COMPAZINE) 10 mg, oral, Every 6 hours PRN    sennosides (SENOKOT) 17.2 mg, oral, " Nightly, This is to help manage constipation. HOLD for diarrhea.    sennosides-docusate sodium (Leila-Colace) 8.6-50 mg tablet 2 tablets, oral, Every 12 hours    simvastatin (ZOCOR) 40 mg, oral, Nightly    trifluridine-tipiraciL (LONSURF) 35 mg/m2, oral, [facility] ONC BID for 10 Days in a 28 Day Cycle, Take on Days 1-5 and Days 8-12 of each 28 day treatment cycle. Take within one hour of completion of morning and evening meals.     Scheduled medications   acetaminophen, 1,000 mg, intravenous, q12h  dexAMETHasone, 2 mg, intravenous, q AM  dicyclomine, 10 mg, oral, 4x daily  enoxaparin, 40 mg, subcutaneous, q24h  insulin lispro, 0-10 Units, subcutaneous, 4x daily  losartan, 25 mg, oral, Daily  OLANZapine, 10 mg, oral, Nightly  pantoprazole, 40 mg, intravenous, Daily  piperacillin-tazobactam, 3.375 g, intravenous, q6h  polyethylene glycol, 17 g, oral, Daily  pregabalin, 150 mg, oral, BID  sennosides-docusate sodium, 2 tablet, oral, q12h  simvastatin, 40 mg, oral, Nightly      Continuous medications     PRN medications  alteplase, 2 mg, PRN  dextrose, 12.5 g, q15 min PRN  dextrose, 25 g, q15 min PRN  glucagon, 1 mg, q15 min PRN  glucagon, 1 mg, q15 min PRN  morphine, 4 mg, q4h PRN  ondansetron, 8 mg, q6h PRN  prochlorperazine, 10 mg, q6h PRN         Allergies: No Known Allergies          CT AP 4/10/2024  IMPRESSION:  1. Diffusely dilated fluid-filled small bowel loops throughout the  abdomen and pelvis with suggestion of mild transition of luminal  caliber just before the level of ileocolonic anastomosis in the right  lower quadrant (best seen on coronal image 59/121). There is also a  long segment fecalized small bowel loop of the distal ileum before  the level of anastomosis. Constellation of these findings raises  concern for degree of partial small bowel obstruction.  2. Interval evidence of a punctate partially obstructing calculus in  the distal left ureter with moderate left-sided hydroureteronephrosis.  3. Overall  interval progression of metastatic disease compared to  prior CT examination dated 03/02/2024. There is significant interval  worsening of pulmonary, hepatic and peritoneal metastasis compared to  prior CT examination as described above.  4. Interval worsening of bilateral pleural effusions with near  complete compressive atelectasis of the left lower lobe as described  above.  5. Moderate to large volume peritoneal ascites, similar compared to  prior CT examination.  6. Interval increase in diffuse edematous thickening of the gastric  wall and distal thoracic esophagus could be related to 3rd spacing of  fluids. Recommend correlation with fluid status.     PHYSICAL EXAMINATION:  Vital Signs:   Vital signs reviewed  Vitals:    04/12/24 1037   BP: 130/85   Pulse: (!) 126   Resp: 18   Temp: 36.5 °C (97.7 °F)   SpO2: 93%     Pain Score: 2     Physical Exam  Vitals reviewed.   Constitutional:       Appearance: He is ill-appearing.   HENT:      Head: Normocephalic.      Mouth/Throat:      Mouth: Mucous membranes are dry.      Comments: NGT  Eyes:      Pupils: Pupils are equal, round, and reactive to light.   Pulmonary:      Effort: Pulmonary effort is normal.   Abdominal:      General: There is distension.      Palpations: Abdomen is soft.      Comments: Ascites; NGT to suction with bilious brown material   Musculoskeletal:         General: Normal range of motion.      Cervical back: Normal range of motion.   Skin:     General: Skin is warm and dry.   Neurological:      General: No focal deficit present.      Mental Status: He is alert and oriented to person, place, and time.   Psychiatric:         Attention and Perception: Attention normal.         Mood and Affect: Affect is flat.         Speech: Speech normal.         Behavior: Behavior is withdrawn.         Thought Content: Thought content normal.         Cognition and Memory: Cognition normal.         Judgment: Judgment normal.         ASSESSMENT/PLAN:  Narciso GU  Janes is a 58 y.o. male diagnosed with metastatic ascending colon cancer with liver and peritoneal involvement. He has an AMY pump and is s/p multiple lines of systemic treatment. He was started on FOLFIRI 12/26/23, though this is now on hold d/t disease progression (noted 2/19/24). PMH significant for T2DM, HTN, GERD, chronic pain. Admitted 4/11/2024 for further evaluation and management of partial small bowel obstruction. Course complicated by worsening metastasis including peritoneal carcinomatosis. Supportive and Palliative Oncology is consulted for pain management, management of symptoms related to SBO.      Pain:  Lower abdominal pain that radiates around is related to metastatic colon cancer with liver and peritoneal mets, ascites.   L foot and R shoulder pain is chronic related to a work injury and shingles  CIPN bilateral fingers and feet, right face/head pain related to post herpetic neuralgia  Pain is: cancer related pain and chronic  Type: visceral, somatic, and neuropathic  Pain control: sub-optimally controlled  Home regimen:  Morphine ER 45 mg TID, Pregabalin 150 mg BID, and Percocet 10/325 mg q4h prn, Dicyclomine 10mg QID with meals and at bedtime for abdominal cramping, Dexamethasone 2mg daily in AM to help with: pain, loss of appetite, nausea, fatigue.    Intolerances/previously tried: none  Personalized pain goal: 2  Total OME usage for the past 24 hours:  50  Now with SBO, possible plan for venting PEG. Recommend rotating Morphine ER to Fentanyl TD due to absorption issues  Recommend starting Fentanyl PCA BASAL ONLY 50 mcg/hr to replace his Morphine ER 45 mg TID. No demand doses on PCA, will use prn IV Morphine because it will lhave longer duratoin and be more similar to the ultimate plan of morphine concentrated liquid.   Change Morphine 4 mg IV  to Q2 PRN for breakthrough pain   Once pain is controlled on the Fentanyl basal rate, plan to switch to a Fentanyl patch (Fentanyl basal rate directly  translates into patch dose) over 12h  Continue acetaminophen 1,000 mg IV Q12hr x 4 doses  Continue dexamethasone 2 mg IV daily in AM  Continue pregabalin 150 mg BID  Continue to monitor pain scores and administer PRN medications as appropriate  Continue/initiate nonpharmacologic pain management strategies including ice/heat therapy, distraction techniques, deep breathing/relaxation techniques, calming music, and repositioning  Continue to monitor for signs of opioid efficacy (pain scores, improved functionality) and toxicity (pinpoint pupils, excess sedation/drowsiness/confusion, respiratory depression, etc.)    Nausea/GERD/Loss of Appetite:  Intermittent nausea with vomiting related to constipation and bowel obstruction. Now controlled with NGT to suction.   Home regimen:  Pantoprazole 40 mg daily, Ondansetron 8 mg q8 PRN, Olanzapine 10 mg daily in evening with food, dexamethasone 2 mg IV daily in AM.  Change Olanzapine 10 mg to Olanzapine Zydis disintegrating tablet qhs  Continue Ondansetron 8 mg PO q6h prn- 1st line   Continue Compazine 10 mg PO q6h prn- second line  Dexamethasone 2 mg IV daily in AM    Constipation/Diarrhea  At risk for constipation related to small bowel obstruction (likely r/t metastasis) and opioids, currently constipated.  Usual bowel pattern: every other day  Home regimen: Senna 2 tabs 1-2x/day PRN for constipation, Loperamide 2mg TID as needed - MAX 8 caps/day for diarrhea  LBM 4/5/24 (7 days), prior to that weeks  Stop Senna, can cause cramping  Stop Bentyl  Start Hyoscyamine 0.125 mg SL Q6hr scheduled for cramping relief  Continue Miralax 17 gm daily  Monitor BM frequency, adjust regimen as needed  Goal to have BM without straining q48-72h      Medical Decision Making/Goals of Care/Advance Care Planning:  Patient's current clinical condition, including diagnosis, prognosis, and management plan, and goals of care were discussed.   Life limiting disease: metastatic malignancy  Family:  Supportive wife, Emperatriz Marrero  Performance status: Major  limitations due to pain, N/V, fatigue, and disease process  Understanding of health: Demonstrates good prognostic understanding of disease process, understands plan for venting PEG and transitioning into hospice care.   Information:  Wife wants to know prognosis, but patient does not want to know  Medical update: Not a surgical candidate for SBO, although offered placing a venting peg tube to aid with N/V and allow for pleasure feeds.  Goals: symptom control Wife hoping for hospice facility due to patient's level of care needed, concerned about cost and insurance coverage.  Code status discussion:  GOC discussion today at bedside. Patient wants to be a DNR, DNI and his wife supports the code status change.    Advance Directives  Existence of Advance Directives: Yes, but NOT documented in medical record   Decision maker: HCPOA is Emperatriz Marrero (wife) [phone]   Code Status: Full Code    Supportive and Palliative Oncology encounter:  Spoke with patient at bedside  Emotional support provided  Coordination of care     Supportive Interventions: Interventions: Social work referral for: Hospice coordination    Disposition:  Please  start the process of having prior authorization with meds to beds deliver medications to patient prior to discharge via [facility] pharmacy. Prescriptions will need to be sent 48-72 hours prior to discharge so that a prior authorization can be completed.     Discharge date: unknown pending acute issues  Patient has an appointment with Outpatient Supportive Oncology TORRI Bonds 4/29/2024 pending decision about hospice. If room and board is cost prohibitive possibility of following with Supportive Oncology and enroll in hospice care when prognosis is shorter.    Signature and billing:  Thank you for allowing us to participate in the care of this patient. Recommendations will be communicated back to the consulting  service by way of shared electronic medical record or face-to-face.    Medical complexity was high level due to due to complexity of problems, extensive data review, and high risk of management/treatment.    I spent 75 minutes in the care of this patient which included chart review, interviewing patient/family, discussion with primary team, coordination of care, and documentation.      DATA   Diagnostic tests and information reviewed for today's visit:  Conversation with primary team, Conversation with bedside RN, social work, Most recent labs and imaging results, Medications, Notes.       Some elements copied from my note on 3/21/2024, the elements have been updated and all reflect current decision making from today, 4/12/2024.    Plan of Care discussed with: Provider, RN, Patient and Family/Significant Other: wife    Thank you for asking Supportive and Palliative Oncology to assist with care of this patient.  We will continue to follow.  Please contact us for additional questions or concerns.      SIGNATURE: May TOVAR Student    TEACHING PROVIDER (Physician/PA/APRN) NOTE OF PERSONAL INVOLVEMENT IN CARE:  I have personally seen and examined the patient and performed the medical decision-making components.  I have reviewed the Advanced Practice Registered Nurse (APRN) student's documentation and verified the findings in the note as written. Edits/additions made where necessary.      SIGNATURE: Mini East, APRN-CNP, DNP   PAGER/CONTACT:  Contact information:  Supportive and Palliative Oncology  Monday-Friday 8 AM-5 PM  Epic Secure chat or pager [phone].  After hours and weekends:  pager [phone]

## 2024-04-12 NOTE — PROGRESS NOTES
04/12/24 0800   Discharge Planning   Living Arrangements Spouse/significant other   Support Systems Spouse/significant other   Type of Residence Private residence   Number of Stairs to Enter Residence 4   Number of Stairs Within Residence 15   Do you have animals or pets at home? Yes   Type of Animals or Pets dogs   Who is requesting discharge planning? Provider   Home or Post Acute Services None   Financial Resource Strain   How hard is it for you to pay for the very basics like food, housing, medical care, and heating? Not hard   Housing Stability   In the last 12 months, was there a time when you were not able to pay the mortgage or rent on time? N   In the last 12 months, how many places have you lived? 1   In the last 12 months, was there a time when you did not have a steady place to sleep or slept in a shelter (including now)? N   Transportation Needs   In the past 12 months, has lack of transportation kept you from medical appointments or from getting medications? no   In the past 12 months, has lack of transportation kept you from meetings, work, or from getting things needed for daily living? No     Patient admitted with CT A/P showing diffusely dilated SBO and continued nausea/vomiting.   He has also had severe constipation with no bowel movement for past 5 weeks. Patient has also noticed his abdomen has gotten more distended again after fluid was removed last month. NPO,NG placed to intermittent suction. IV pain and anti-emetics. May get colorectal surg consult. Will continue to follow for discharge needs.  ADOD TBD at this time. Danelle Heath RN, TCC

## 2024-04-12 NOTE — H&P
History Of Present Illness  Narciso Marrero is a 58 y.o. male presenting with PMHx of Stage IV KRAS + Ascending colon cancer  s/p Right hemicolectomy (5/3/23), metastatic disease to the liver, and numerous peritoneal implants s/p AMY pump, DMII, HTN, GERD, Chronic pain presents to  as a transfer from [place] for further evaluation of partial small bowel obstruction. Notably, pt recently admitted in 03/24 for partial SBO managed supportively. Pt initially presented to [facility] ED on 4/10/24 due to severe nausea and vomiting and abdominal that was getting progressively worse for past day. He has also had severe constipation with no bowel movement for past 5 weeks. Pt worried about repeat bowel blockage so he sought care. Patient's abdominal pain was not controlled with his home morphine prescription. He has also noticed his abdomen has gotten more distended again after fluid was removed last month.     Patient otherwise denied any recent fevers, chills, headaches, dysuria, difficulty with urination, chest pain, lower extremity swelling. Denied any blood in urine.    Most recently, pt admitted from 3/15-3/21 to oncology for evaluation of partial small bowel obstruction and altered mental status. It was thought SBO likely due to peritoneal metastatic disease. Due to concern for intracranial metastatic disease CT head obtained negative and MRI brain 3/18/24 negative for metastasis. Hospital course c/b hypertension tx with PO hydralazine and pt Received US guided paracentesis on 3/18 with 3900 mL removed. Cause for mental status change possibly related to chemotherapy vs. metabolic. Overall, Pt mental status improved and he was discharged.     In terms of his colorectal cancer with metastatic to liver pt Primary oncologist is Dr. Russ and he has followed with Surg onc Dr. Rajinder Arenas. Patient was recently treated with FOLFIRI (2/19/2024), however due to interval progression of multiple peritoneal nodules and  pulmonary nodules his chemotherapy was changed to Lonsurf + bevacizumab (3/4). Per last oncology note 02/20/24 pt started on AMY pump treatment with glycerin every 6 weeks. However, pt says currently not on lonsurf. He follows with Dr. Russ as his primary oncologist.       [place] ED: 4/10-4/11:  - Vitals:   T 36.8, , BP 77/53, RR 22, SpO2 95 on room air à 2 L NC  - Labs:   CBC: WBC 16.0, Hgb 10.2, plt 309   BMP: Na 136, K 3.7, Cl 96, HCO3 28, BUN 18, Cr 0.57, glu 161   LFT: Ca 8.6, tprot 6.5, alb 3.0, alkphos 611, AST 53, ALT 21, tbili 0.9     Electrolytes: Mg .49, phos 4.1  lactate 1.2   BCx x2 4/10/24 - pending    In the  [place] ED, as patient was hypotensive, tachycardic and dizzy upon presentation pt received IV 1 L fluid bolus with improvement in BP. Pt also given fentanyl and morphine, Zofran and Compazine, reglan Patient also started on zosyn due to c/f sepsis vs. abdominal infection  Gen surgeon at OSH ED recommended transfer for partial bowel obstruction.   Pt initially had NG tube placed with 350 ml of stool colored bile. He request it to be removed but he started having additional vomiting and abdominal pain so NG tube was replaced.     OSH ED Imaging:    CT A/P IV contrast 4/10/24:  IMPRESSION:  1. Diffusely dilated fluid-filled small bowel loops throughout the  abdomen and pelvis with suggestion of mild transition of luminal  caliber just before the level of ileocolic anastomosis in the right  lower quadrant (best seen on coronal image 59/121). There is also a  long segment fecalized small bowel loop of the distal ileum before  the level of anastomosis. Constellation of these findings raises  concern for degree of partial small bowel obstruction.  2. Interval evidence of a punctate partially obstructing calculus in  the distal left ureter with moderate left-sided hydroureteronephrosis.  3. Overall interval progression of metastatic disease compared to  prior CT examination dated  03/02/2024. There is significant interval  worsening of pulmonary, hepatic and peritoneal metastasis compared to  prior CT examination as described above.  4. Interval worsening of bilateral pleural effusions with near  complete compressive atelectasis of the left lower lobe as described  above.  5. Moderate to large volume peritoneal ascites, similar compared to  prior CT examination.  6. Interval increase in diffuse edematous thickening of the gastric  wall and distal thoracic esophagus could be related to 3rd spacing of  fluids. Recommend correlation with fluid status.    KUB 4/11: IMPRESSION: NG tube as above. Distended gas-filled loops of bowel in the central abdomen and left lower chest opacity again noted.      On the floor/unit, patient arrived with normal BP, but mild tachycardia so given 500 cc fluid bolus.     Oncologic Timeline:  1/2023: Presented with 20 pound unexpected weight loss and RUQ pain. Imaging showed large liver lesion. Biopsy c/w metastatic colon cancer. C-scope showed infiltrative, ulcerated, fungating mass in ascending colon.   Baseline CEA 2322. Started FOLFOX + bevacizumab  3/16/23: MRI chest and liver showed no evidence of metastatic disease in the chest. Focal circumferential wall thickening   5/3/23: AMY (Hepatic artery infusion) pump placed, right hemicolectomy, open cholecystectomy and peritonectomy    CURRENT THERAPY:  12.26.2023 : Started on FOLFIRI  Last Glycerin in AMY pump on 2/5/24     PREVIOUS THERAPY:  1/2023-4/12/23: FOLFOX x6 cycles. First 2 cycles with bevacizumab  AMY pump placed 5/3/23. Flow rate 1.2 mL/day. (Serial # [id number]).First FUdR via AMY pump 5/17/23. Systemic chemotherapy being held due to wound vac, Started FOLFOX on 8/9/23. On 11/1/23 dose reduction in Oxaliplatin and infusion 5FU only due to chemotherapy colitis. Treatment DC due to PD. Last treatment on 12.11.2023. Total cycles of FUdR (if applicable): 7    3/18/24: MRI brain w and w/o contrast: IMPRESSION:  No acute intracranial pathology or evidence of intracranial metastatic disease.    PROVIDERS:  Surg onc: Rajinder Arenas  CRS: Regina Raymundo  [facility] med onc: Dia Kendall    PMH: see above HPI  Allergies: NKDA  SocHx: Former tobacco user. But quit in 2017. Denies any recreational drug use. Denies any current alcohol use.    Home Medications  Decadron 2 mg daily with breakfast  Bentyl 10 mg QID  Lispro 3 units TID with meals + SSI   Lantus 15 units at bedtime  Losartan 25 mg daily  Morphine CR 45 mg (12 hr tablet) - TID (15 mg+30 mg pill)  Zyprexa 10 mg daily at bedtime  Zofran 8 mg Q8H PRN  Oxycodone-acetaminophen  mg Q6H PRN  Pantoprazole 40 mg daily  Miralax 17 g daily  Pregabalin 150 mg BID  Compazine 10 mg Q6H PRN  Sennoside 17.2 mg at bedtime  Simvastatin 40 mg at bedtime     Past Medical History  Past Medical History:   Diagnosis Date    Cancer (CMS/HCC)     Diabetes mellitus (CMS/HCC)     History of transfusion        Surgical History  Past Surgical History:   Procedure Laterality Date    COLON SURGERY      CT ABDOMEN ANGIOGRAM W AND/OR WO IV CONTRAST  04/13/2023    CT ABDOMEN ANGIOGRAM W AND/OR WO IV CONTRAST POR [id number] CT    FRACTURE SURGERY          Social History  He reports that he has quit smoking. His smoking use included cigarettes. He has never used smokeless tobacco. He reports that he does not drink alcohol and does not use drugs.    Family History  No family history on file.     Allergies  Patient has no known allergies.    Review of Systems   Constitutional:  Negative for chills and fever.   Respiratory:  Positive for shortness of breath.    Cardiovascular:  Negative for chest pain, palpitations and leg swelling.   Gastrointestinal:  Positive for abdominal distention, abdominal pain, constipation, nausea and vomiting. Negative for anal bleeding and blood in stool.        Physical Exam  Constitutional: in NAD  HEENT: sclerae anicteric, EOM grossly intact,   CV: tachycardic, regular  rhythm no murmurs noted  Pulm: Decreased breath sounds bilaterally especially at bases. No wheezes or rhonchi, no increased WOB  GI: Distended abdomen, soft, mildly tender to palpation  Skin: warm and dry  Ext: bilateral LE without pitting edema   Neuro: alert and conversant, A&OX4, CN II-XII grossly intact  Psych: affect appropriate    Last Recorded Vitals  Blood pressure 153/88, pulse (!) 123, temperature 36.9 °C (98.4 °F), temperature source Temporal, resp. rate 18, SpO2 94%.    Relevant Results  Scheduled medications  acetaminophen, 1,000 mg, intravenous, q12h  dexAMETHasone, 2 mg, intravenous, q AM  dicyclomine, 10 mg, oral, 4x daily  enoxaparin, 40 mg, subcutaneous, q24h  insulin lispro, 0-10 Units, subcutaneous, TID with meals  insulin lispro, 0-10 Units, subcutaneous, q4h  lactated Ringer's, 500 mL, intravenous, Once  losartan, 25 mg, oral, Daily  OLANZapine, 10 mg, oral, Nightly  pantoprazole, 40 mg, intravenous, Daily  piperacillin-tazobactam, 3.375 g, intravenous, q6h  polyethylene glycol, 17 g, oral, Daily  pregabalin, 150 mg, oral, BID  sennosides-docusate sodium, 2 tablet, oral, q12h  simvastatin, 40 mg, oral, Nightly      Continuous medications     PRN medications  PRN medications: alteplase, dextrose, dextrose, glucagon, glucagon, morphine, ondansetron, prochlorperazine    Results for orders placed or performed during the hospital encounter of 04/11/24 (from the past 24 hour(s))   CBC and Auto Differential   Result Value Ref Range    WBC 12.7 (H) 4.4 - 11.3 x10*3/uL    nRBC 0.0 0.0 - 0.0 /100 WBCs    RBC 3.42 (L) 4.50 - 5.90 x10*6/uL    Hemoglobin 9.6 (L) 13.5 - 17.5 g/dL    Hematocrit 30.6 (L) 41.0 - 52.0 %    MCV 90 80 - 100 fL    MCH 28.1 26.0 - 34.0 pg    MCHC 31.4 (L) 32.0 - 36.0 g/dL    RDW 18.4 (H) 11.5 - 14.5 %    Platelets 300 150 - 450 x10*3/uL    Neutrophils % 87.5 40.0 - 80.0 %    Immature Granulocytes %, Automated 0.6 0.0 - 0.9 %    Lymphocytes % 4.7 13.0 - 44.0 %    Monocytes % 7.1 2.0  - 10.0 %    Eosinophils % 0.0 0.0 - 6.0 %    Basophils % 0.1 0.0 - 2.0 %    Neutrophils Absolute 11.07 (H) 1.20 - 7.70 x10*3/uL    Immature Granulocytes Absolute, Automated 0.08 0.00 - 0.70 x10*3/uL    Lymphocytes Absolute 0.60 (L) 1.20 - 4.80 x10*3/uL    Monocytes Absolute 0.90 0.10 - 1.00 x10*3/uL    Eosinophils Absolute 0.00 0.00 - 0.70 x10*3/uL    Basophils Absolute 0.01 0.00 - 0.10 x10*3/uL   Hepatic Function Panel   Result Value Ref Range    Albumin 2.6 (L) 3.4 - 5.0 g/dL    Bilirubin, Total 0.7 0.0 - 1.2 mg/dL    Bilirubin, Direct 0.3 0.0 - 0.3 mg/dL    Alkaline Phosphatase 502 (H) 33 - 120 U/L    ALT 16 10 - 52 U/L    AST 32 9 - 39 U/L    Total Protein 5.6 (L) 6.4 - 8.2 g/dL   Magnesium   Result Value Ref Range    Magnesium 1.84 1.60 - 2.40 mg/dL   Type And Screen   Result Value Ref Range    ABO TYPE A     Rh TYPE NEG     ANTIBODY SCREEN NEG    Coagulation Screen   Result Value Ref Range    Protime 13.3 (H) 9.8 - 12.8 seconds    INR 1.2 (H) 0.9 - 1.1    aPTT 25 (L) 27 - 38 seconds   Phosphorus   Result Value Ref Range    Phosphorus 2.9 2.5 - 4.9 mg/dL   Basic Metabolic Panel   Result Value Ref Range    Glucose 122 (H) 74 - 99 mg/dL    Sodium 142 136 - 145 mmol/L    Potassium 3.6 3.5 - 5.3 mmol/L    Chloride 102 98 - 107 mmol/L    Bicarbonate 21 21 - 32 mmol/L    Anion Gap 23 (H) 10 - 20 mmol/L    Urea Nitrogen 19 6 - 23 mg/dL    Creatinine 0.48 (L) 0.50 - 1.30 mg/dL    eGFR >90 >60 mL/min/1.73m*2    Calcium 8.1 (L) 8.6 - 10.6 mg/dL   POCT GLUCOSE   Result Value Ref Range    POCT Glucose 146 (H) 74 - 99 mg/dL       XR abdomen 1 view    Result Date: 4/11/2024  Interpreted By:  Danelle Carrasco, STUDY: XR ABDOMEN 1 VIEW;  4/11/2024 3:35 pm   INDICATION: Signs/Symptoms:ng placement.   COMPARISON: 12:49 a.m. the same day   ACCESSION NUMBER(S): [id number]   ORDERING CLINICIAN: JAZMINE SCHROEDER   FINDINGS: Single AP portable upright view of the upper abdomen obtained.   Artifact from overlying monitoring leads again  noted. Left sided port with catheter overlying the right mid abdomen again seen. Probable slight interval repositioning of NG tube, tip now overlying the region of the mid stomach. Side port is not well visualized. Distended gas-filled loops of bowel overlying the central abdomen. Left lower chest opacity with pleural density laterally again seen. Small nodular shadow overlies the right lower chest.       NG tube as above. Distended gas-filled loops of bowel in the central abdomen and left lower chest opacity again noted.   MACRO: None.   Signed by: Danelle Carrasco 4/11/2024 3:49 PM Dictation workstation:   [id number]    XR abdomen 1 view    Result Date: 4/11/2024  Interpreted By:  Rosales Choi, STUDY: XR ABDOMEN 1 VIEW;  4/11/2024 12:55 am   INDICATION: Signs/Symptoms:NG placement.   COMPARISON: CT abdomen and pelvis 04/10/2024   ACCESSION NUMBER(S): [id number]   ORDERING CLINICIAN: CHERRI FLORENCE   FINDINGS: Limited AP view of the upper abdomen is obtained.   Tip of the nasogastric tube is subdiaphragmatic in position projecting over the region of the mid stomach. The side hole is not well visualized.   Mild gaseous distention of the visualized upper abdominal bowel loops.   Left basilar opacity.   Metallic device overlies the left lower abdomen with catheter extending over the right abdomen.       The NG tube is subdiaphragmatic in position. The side hole is not well visualized.   Gaseous distention visualized upper abdominal bowel loops.   Left basilar opacity.   Signed by: Rosales Choi 4/11/2024 1:09 AM Dictation workstation:   [id number]    CT abdomen pelvis w IV contrast    Result Date: 4/10/2024    1. Diffusely dilated fluid-filled small bowel loops throughout the abdomen and pelvis with suggestion of mild transition of luminal caliber just before the level of ileocolonic anastomosis in the right lower quadrant (best seen on coronal image 59/121). There is also a long segment fecalized small  bowel loop of the distal ileum before the level of anastomosis. Constellation of these findings raises concern for degree of partial small bowel obstruction. 2. Interval evidence of a punctate partially obstructing calculus in the distal left ureter with moderate left-sided hydroureteronephrosis. 3. Overall interval progression of metastatic disease compared to prior CT examination dated 03/02/2024. There is significant interval worsening of pulmonary, hepatic and peritoneal metastasis compared to prior CT examination as described above. 4. Interval worsening of bilateral pleural effusions with near complete compressive atelectasis of the left lower lobe as described above. 5. Moderate to large volume peritoneal ascites, similar compared to prior CT examination. 6. Interval increase in diffuse edematous thickening of the gastric wall and distal thoracic esophagus could be related to 3rd spacing of fluids. Recommend correlation with fluid status.   MACRO: None   Signed by: Maria Thomas 4/10/2024 3:50 PM Dictation workstation:   [id number]                 Assessment/Plan   Active Problems:  There are no active Hospital Problems.    Narciso Marrero is a 58 y.o. male with PMHx of Stage IV KRAS + Ascending colon cancer  s/p Right hemicolectomy (5/3/23), metastatic disease to the liver, and numerous peritoneal implants s/p AMY pump, T2DM, HTN, GERD, chronic pain presents to  as a transfer from [place] for partial small bowel obstruction based on CT A/P showing diffusely dilated SBO and continued nausea/vomiting. Etiology of SBO likely due to diffuse metastatic peritoneal diseases and ascites. Pt initially hypotensive and tachycardic with leukocytosis c/f hypovolemia 2/2 continued emesis vs. sepsis vs. bowel perf with intrabdominal infection vs. SBP given ascites pt started on IV antibiotic at OSH. With fluid resuscitation pt blood pressure improved and NG placed to intermittent suction. Given leukocytosis and  hypotension on admission will continue abx and follow up blood cultures. Overall, patient appears to have significant ascites and would likely benefit from therapeutic tap. Will continue supportive care by maintaining NG and NPO with IV pain and anti-emetics. Pt otherwise HDS.     #Partial SBO  #Constipation  #Nausea/Vomiting  :: CT scan 4/10: Showing overall interval worsening of metastatic disease of lungs, liver and peritoneal masses  :: S/p fluid resuscitation in OSH ED and placement of NG to IMWS  Plan:  - c/h Miralax once daily, Doc-Senna BID due to NPO  - c/h dexamethasone 2 mg QAM for nausea and pain - IV form while NPO  - c/h Zofran Q6H PRN first line  - c/h Compazine 10 mg Q6H PRN second line  - 500 ml LR bolus  - Continue Zosyn (4/10 - **) pending blood cultures   - Continue to monitor for acute abdomen   - Maintain NG to intermittent wall suction  [ ] Fu blood cx 4/10  [ ] Consider colorectal surg consult in AM  [ ] KUB AM  [ ] RFP, Mg, correct electrolyte abnormalities    #Stage IV Ascending Colon Cancer  # Hepatic Masses   # Splenic Masses  #Hilar Mass   #Numerous Peritoneal Masses   #Large Volume Ascites   :: CT A/P 4/10: Showing overall interval worsening of metastatic disease of lungs, liver and peritoneal masses and   :: Primary oncologist Dr. Russ; Surg onc Rajinder Arenas  - Most recent chemo: Lonsurf + Bevacizumab start 3/4/24 and Last Glycerin in AMY pump on 2/5/24- Chronically elevated alk phos and ascites likely related metastatic disease to liver and peritoneum  Plan  - consider consult for paracentesis therapeutic with IR     #Left ureter partially obstructing calculus  ::CT A/P 4/10: Evidence of moderate left sided hydro-ureteronephrosis  :: BL Cr 0.4-0.5  :: Cr 0.45 on admission   Plan:  [   ] renal US r/o hydronephrosis    #Left Pleural Effusion  #Small R. Pleural Effusion   #Pulmonary Nodule   CT A/P 4.10: Interval worsening of bilateral pleural effusions with near complete compressive  atelectasis of the left lower lobe as described above.  Plan:   -continue to monitor  - stable on room air    #HTN  #HLD  Plan:  - C/w home simvastatin 40 mg daily   - c/h losartan 25 mg daily    #Chronic Pain  ::Cancer related and has crushed shoulder injury 2/2 work-related accident  ::Follows with supportive onc outpatient  Home: morphine CR 45 mg TID and oxycodone-acetaminophen  mg Q6H PRN  Plan:   [ ] Supportive onc consult in AM  - C/h pregabalin 150 mg BID  - IV Tylenol 1000 mg BID  - Morphine 4 mg Q4H PRN given NPO    #GERD  :: Home pantoprazole 40 mg daily  - IV pantoprazole 40 mg daily while NPO     #T2DM  :: Home regimen: lantus 15 units, lispro 3 units w/ meals+ SSI  PLAN:  - Hypoglycemia protocol   - Mild insulin sliding scale #2      Miscellaneous:  - c/h Zyprexa 10 mg at bedtime    F: PRN  E: PRN  N: NPO  A:pIV      Bowel Regimen: Miralax, DocSenna (held while NPO)  DVT prophylaxis: Lovenox subq  Code Status: FULL CODE (confirmed on admission 4/11/24)  NOK: Wife, Emperatriz Marrero [phone]           Laura Green MD

## 2024-04-12 NOTE — PROGRESS NOTES
04/12/24 1400   Discharge Planning   Living Arrangements Spouse/significant other   Support Systems Spouse/significant other;Children;Family members;Friends/neighbors   Type of Residence Private residence   Number of Stairs to Enter Residence 4   Number of Stairs Within Residence 15   Do you have animals or pets at home? Yes   Type of Animals or Pets dogs   Home or Post Acute Services Other (Comment)  (Hospice)     SW received referral for Hospice care.  SW met with pt and spouse at bedside.  Pt is alert and oriented x3.  Both report care team is recommending Hospice care.  SW discussed hospice care, philosophy, and services.  Spouse desires a referral to a hospice agency in the [facility].  Spouse reports she would like pt to be considered for inpatient Hospice unit and make sure insurance covers these services.  Support provided. Referrals made to [facility] and [facility] via [facility].  Will await response.    SANDRA Holbrook

## 2024-04-12 NOTE — PROGRESS NOTES
Narciso Marrero is a 58 y.o. male on day 1 of admission presenting with Partial small bowel obstruction (Multi).    Subjective   NAEON. Patient endorsing he still is feverish and diaphoretic. Endorses mostly R sided abdominal pain, although diffuse.    Discussion with patient, wife, and attending in AM. From an oncological perspective, there is not much more that can be done for the patient. Decision was to pursue comfort and provide the patient with information regarding hospice.       Objective     Physical Exam  Constitutional: in NAD  HEENT: sclerae anicteric, EOM grossly intact,   CV: tachycardic, regular rhythm no murmurs noted  Pulm: Decreased breath sounds bilaterally especially at bases. No wheezes or rhonchi, no increased WOB  GI: Distended abdomen, soft, mildly tender to palpation mostly on R side  Skin: warm and dry  Ext: bilateral LE without pitting edema   Neuro: alert and conversant, A&OX4, CN II-XII grossly intact, strength 5/5 except in L foot (prior accident 23 years ago, baseline for patient), sensation to light touch intact  Psych: affect appropriate    Last Recorded Vitals  Blood pressure 145/85, pulse (!) 120, temperature 36.7 °C (98.1 °F), temperature source Temporal, resp. rate 18, height 1.829 m (6'), weight 86 kg (189 lb 9.5 oz), SpO2 94%.  Intake/Output last 3 Shifts:  I/O last 3 completed shifts:  In: 533.3 (6.2 mL/kg) [IV Piggyback:533.3]  Out: 400 (4.7 mL/kg) [Urine:400 (0.1 mL/kg/hr)]  Weight: 86 kg     Relevant Results  Results from last 72 hours   Lab Units 04/11/24  2332 04/11/24  0623 04/10/24  1357   WBC AUTO x10*3/uL 12.7* 11.8* 16.0*   HEMOGLOBIN g/dL 9.6* 9.3* 10.2*   HEMATOCRIT % 30.6* 30.2* 32.7*   PLATELETS AUTO x10*3/uL 300 255 309        Results from last 72 hours   Lab Units 04/12/24  0548 04/11/24  2332 04/11/24  0623 04/10/24  1357   SODIUM mmol/L 144 142 138 136   POTASSIUM mmol/L 3.5 3.6 3.7 3.7   CHLORIDE mmol/L 104 102 100 96*   CO2 mmol/L 22 21 25 28   BUN mg/dL  19 19 19 18   CREATININE mg/dL 0.48* 0.48* 0.45* 0.57   MAGNESIUM mg/dL 1.73 1.84 1.92 1.49*   PHOSPHORUS mg/dL 2.8 2.9  --  4.1      Scheduled medications  acetaminophen, 1,000 mg, intravenous, q12h  dexAMETHasone, 2 mg, intravenous, q AM  enoxaparin, 40 mg, subcutaneous, q24h  hyoscyamine, 0.125 mg, sublingual, q6h  insulin lispro, 0-10 Units, subcutaneous, 4x daily  losartan, 25 mg, oral, Daily  OLANZapine zydis, 10 mg, oral, Nightly  pantoprazole, 40 mg, intravenous, Daily  piperacillin-tazobactam, 3.375 g, intravenous, q6h  polyethylene glycol, 17 g, oral, Daily  pregabalin, 150 mg, oral, BID  simvastatin, 40 mg, oral, Nightly      Continuous medications  fentaNYL,       PRN medications  PRN medications: alteplase, dextrose, dextrose, glucagon, glucagon, morphine, naloxone, ondansetron, phenoL, prochlorperazine      Assessment/Plan   Narciso Marrero is a 58 y.o. male with PMHx of Stage IV KRAS + Ascending colon cancer  s/p Right hemicolectomy (5/3/23), metastatic disease to the liver, and numerous peritoneal implants s/p AMY pump, T2DM, HTN, GERD, chronic pain [facility] on CT A/P showing diffusely dilated SBO and continued nausea/vomiting. Etiology of SBO likely due to diffuse metastatic peritoneal diseases and ascites. Pt initially hypotensive and tachycardic with leukocytosis c/f hypovolemia 2/2 continued emesis vs. sepsis vs. bowel perf with intrabdominal infection vs. SBP given ascites pt started on IV antibiotic at OSH. With fluid resuscitation pt blood pressure improved and NG placed to intermittent suction. Given leukocytosis and hypotension on admission will continue abx and follow up blood cultures. Discussion with attending (patient's primary oncologist) and patient on 4/12. Patient will be pursuing comfort care. S/p palliative paracentesis on 4/12.                #Partial SBO  #Constipation  #Nausea/Vomiting  :: CT scan 4/10:  Showing overall interval worsening of metastatic disease of lungs, liver and peritoneal masses  :: S/p fluid resuscitation in OSH ED and placement of NG to IMWS  :: s/p palliative paracentesis 4/12 with IR  Plan:  - fentanyl PCA  - morphine 4mg IV q2h for breakthrough pain  - c/w compazine and zofran for nausea  - CRS recommending palliative venting PEG     #Stage IV Ascending Colon Cancer  # Hepatic Masses   # Splenic Masses  #Hilar Mass   #Numerous Peritoneal Masses   #Large Volume Ascites   :: CT A/P 4/10: Showing overall interval worsening of metastatic disease of lungs, liver and peritoneal masses and   :: Primary oncologist Dr. Russ; Surg onc Rajinder Ocuin  :: Most recent chemo: Lonsurf + Bevacizumab start 3/4/24 and Last Glycerin in AMY pump on 2/5/24- Chronically elevated alk phos and ascites likely related metastatic disease to liver and peritoneum  :: s/p palliative paracentesis 4/12  :: s/p GOC with supportive oncology, now DNR DNI  Plan  - discussion with attending, plan to pursue comfort  - SW consult to discuss hospice     #Left ureter partially obstructing calculus  ::CT A/P 4/10: Evidence of moderate left sided hydro-ureteronephrosis  :: BL Cr 0.4-0.5  :: Cr 0.45 on admission   :: stone not visualized on Renal US  Plan:  - CTM, treat pain     #Left Pleural Effusion  #Small R. Pleural Effusion   #Pulmonary Nodule   CT A/P 4.10: Interval worsening of bilateral pleural effusions with near complete compressive atelectasis of the left lower lobe as described above.  Plan:   - continue to monitor  - stable on room air     #HTN  #HLD  Plan:  - c/w home simvastatin 40 mg daily  - c/w losartan 25 mg daily     #Chronic Pain  ::Cancer related and has crushed shoulder injury 2/2 work-related accident  ::Follows with supportive onc outpatient  Home: morphine CR 45 mg TID and oxycodone-acetaminophen  mg Q6H PRN  Plan:  - appreciate supportive oncology     #GERD  :: Home pantoprazole 40 mg daily  - IV  pantoprazole 40 mg daily while NPO     #T2DM  :: Home regimen: lantus 15 units, lispro 3 units w/ meals+ SSI  PLAN:  - Hypoglycemia protocol   - Mild insulin sliding scale #2      Miscellaneous:  - c/h Zyprexa 10 mg at bedtime     F: PRN  E: PRN  N: NPO  A: pIV      Bowel Regimen: Miralax, DocSenna (held while NPO)  DVT prophylaxis: Lovenox subq  Code Status: FULL CODE (confirmed on admission 4/11/24)  NOK: Wife, Emperatriz Marrero [phone]    May Akins MD  PGY-1             May Akins MD

## 2024-04-12 NOTE — POST-PROCEDURE NOTE
INTERVENTIONAL RADIOLOGY ADVANCED PRACTICE PROCEDURE  [facility]    A time out was performed and Left Hemiabdomen was examined with US and appropriate entry point was confirmed and marked.   The patient was prepped and draped in a sterile manner, 1% lidocaine was used to anesthesize the skin and subcutaneous tissue.   A 5F Centesis needle was then introduced through the skin into the peritoneal space, the centesis catheter was then threaded without difficulty.   400 ml of yellow fluid was removed without difficulty. The catheter was then removed.   No immediate complications were noted during and immediately following the procedure.

## 2024-04-12 NOTE — CARE PLAN
The patient's goals for the shift include  to resolve fullness in Abd    The clinical goals for the shift include  pain management, decrease fullness in Abd with NG suction, and pt to not have any episodes of vomiting during shift

## 2024-04-12 NOTE — CONSULTS
Colorectal Surgery Consult H&P  Narciso Marrero  [id number]     Inpatient consult to Colorectal Surgery  Consult performed by: Evette Morel MD  Consult ordered by: Laney Russ MD PhD      Reason for consult:  small bowel obstruction , patient known to Dr. Raymundo who performed right hemicolectomy with primary anastomosis in combination case with surg onc 05/2023.    Subjective  Mr. Narciso Marrero is a 58-year-old male presenting to [facility] with small bowel obstruction.  Colorectal surgery was consulted given he is known to our service, previously had surgery with Dr. Raymundo in May 2023.  At that time he underwent right hemicolectomy with primary ileocolic anastomosis (colorectal surgery) and AMY pump placement (surgical oncology, Dr. Arenas).    Patient was transferred from OSH earlier this morning, at outside hospital NG tube was placed for decompression.  He states that 7 days prior to 4/12, he had increasing nausea, vomiting, abdominal distention and discomfort, and at this time he stopped passing gas or bowel movements.  These symptoms worsened 3 days prior which prompted his presentation to OSH ED. Patient has been n.p.o. x 2 days since and NG placement, prior to this he has had decreased appetite, self regulates and mainly tolerates fluids. CT scan obtained at OSH shows findings of SBO which radiology team read having transition point near ileocolic anastomosis. Additionally this CT scan showed worsening of his b/l pleural effusion, ascites burden, and overall metastatic disease as he now has carcinomatosis (patient aware of these findings).    Review of Systems   General: negative for chills, fever. Does endorse weakness, decreased to no appetite  ENT: negative for changes in smell or taste, difficulty swallowing  Cardiovascular: negative for chest pain, palpitations  Respiratory: negative for cough, difficulty breathing, shortness of breath  Gastrointestinal: negative for  vomiting,, passing flatus or bowel movements (previously regular daily Bms),positive for nausea, hiccupping, abdominal bloating/distention  Genitourinary: negative for bloody urine, painful urination  Musculoskeletal: negative for muscle or joint pain  Hematological and Lymphatic: negative for prolonged bleeding or easy bruising, no known bleeding or clotting disorders  Dermatological: negative for lesions, rashes or itching in skin  Endocrine: negative for diabetes and thyroid disease    Objective  Temp:  [36.5 °C (97.7 °F)-37.1 °C (98.7 °F)] 36.5 °C (97.7 °F)  Heart Rate:  [111-126] 126  Resp:  [16-18] 18  BP: (130-153)/(85-96) 130/85    Physical Exam:  Gen: frail, cachectic, mild distress  HEENT: NGT tube in place with opulent brown/bile stained output   Resp: Lungs clear to auscultation bilaterally  Card: Regular rate and rhythm, no murmurs, rubs or gallops, capillary refill 2-3 sec  Abdomen: Soft, mildly tender in all 4 quadrants, distended. AMY pump palpated in left mid-abdomen, well-healed mid-line incision  Anorectal: Deferred.    I/O last 3 completed shifts:  In: 533.3 (6.2 mL/kg) [IV Piggyback:533.3]  Out: 400 (4.7 mL/kg) [Urine:400 (0.1 mL/kg/hr)]  Weight: 86 kg   I/O this shift:  In: 100 [IV Piggyback:100]  Out: 0     Data Review:  CBC:   Lab Results   Component Value Date    WBC 12.7 (H) 04/11/2024    HGB 9.6 (L) 04/11/2024    HCT 30.6 (L) 04/11/2024    MCV 90 04/11/2024     04/11/2024       BMP:   Lab Results   Component Value Date    GLUCOSE 132 (H) 04/12/2024    CALCIUM 8.1 (L) 04/12/2024     04/12/2024    K 3.5 04/12/2024    CO2 22 04/12/2024     04/12/2024    BUN 19 04/12/2024    CREATININE 0.48 (L) 04/12/2024       Coagulation:   Lab Results   Component Value Date    INR 1.2 (H) 04/11/2024    APTT 25 (L) 04/11/2024     CEA:   6215 (04/01/2024)  2775 (02/19/24)  2439 (02/05/24)    Imaging:  CT AP w/ IV contast (04/10/24 at OSH):  IMPRESSION:  1. Diffusely dilated fluid-filled  small bowel loops throughout the  abdomen and pelvis with suggestion of mild transition of luminal  caliber just before the level of ileocolonic anastomosis in the right  lower quadrant (best seen on coronal image 59/121). There is also a  long segment fecalized small bowel loop of the distal ileum before  the level of anastomosis. Constellation of these findings raises  concern for degree of partial small bowel obstruction.  2. Interval evidence of a punctate partially obstructing calculus in  the distal left ureter with moderate left-sided hydroureteronephrosis.  3. Overall interval progression of metastatic disease compared to  prior CT examination dated 03/02/2024. There is significant interval  worsening of pulmonary, hepatic and peritoneal metastasis compared to  prior CT examination as described above.  4. Interval worsening of bilateral pleural effusions with near  complete compressive atelectasis of the left lower lobe as described  above.  5. Moderate to large volume peritoneal ascites, similar compared to  prior CT examination.  6. Interval increase in diffuse edematous thickening of the gastric  wall and distal thoracic esophagus could be related to 3rd spacing of  fluids. Recommend correlation with fluid status.    Assessment & Plan:  58 y.o. male w/ PMH of metastatic colon cancer with mets to liver, lung, peritoneum s/p x4 cycles of FOLFox, and s/p (05/20230 open right hemicolectomy with primary ileocolic anastomosis and AMY pump placement, who is now presenting with malignant SBO. NGT is in place and draining light brown, bile stained fluid in large volume. This patient's presentation is likely due to obstruction/compression of bowels due to carcinomatosis. His SBO is unlikely to resolve with non operative (decompressive) management, but he is also not an appropriate surgical candidate given his frailty. It is recommended to continue to follow NGT output for appropriate quantity and character of  output with intent. Give this obstruction is likely incited by his intraabdominal malignancy, it is not likely to resolve in the near future. Recommend palliative PEG placement so that NG tube can be removed with continued bowel decompression.    -no acute surgical intervention from CRS standpoint  - recommend strict I/Os for NGT output  -agree with NPO while NG tube in place  - recommend palliative PEG for patient  ( IR consult)  - recommend continued GOC discussion  - remainder of care per primary team    Plans discussed with staff, Dr. Zackary Morel MD, MD  Colorectal Surgery  [facility] Service p [phone]

## 2024-04-13 LAB
BASOPHILS # BLD AUTO: 0.01 X10*3/UL (ref 0–0.1)
BASOPHILS NFR BLD AUTO: 0.1 %
EOSINOPHIL # BLD AUTO: 0 X10*3/UL (ref 0–0.7)
EOSINOPHIL NFR BLD AUTO: 0 %
ERYTHROCYTE [DISTWIDTH] IN BLOOD BY AUTOMATED COUNT: 18.2 % (ref 11.5–14.5)
GLUCOSE BLD MANUAL STRIP-MCNC: 104 MG/DL (ref 74–99)
GLUCOSE BLD MANUAL STRIP-MCNC: 135 MG/DL (ref 74–99)
HCT VFR BLD AUTO: 31.5 % (ref 41–52)
HGB BLD-MCNC: 9.2 G/DL (ref 13.5–17.5)
IMM GRANULOCYTES # BLD AUTO: 0.1 X10*3/UL (ref 0–0.7)
IMM GRANULOCYTES NFR BLD AUTO: 0.7 % (ref 0–0.9)
LYMPHOCYTES # BLD AUTO: 0.5 X10*3/UL (ref 1.2–4.8)
LYMPHOCYTES NFR BLD AUTO: 3.5 %
MCH RBC QN AUTO: 27.8 PG (ref 26–34)
MCHC RBC AUTO-ENTMCNC: 29.2 G/DL (ref 32–36)
MCV RBC AUTO: 95 FL (ref 80–100)
MONOCYTES # BLD AUTO: 0.5 X10*3/UL (ref 0.1–1)
MONOCYTES NFR BLD AUTO: 3.5 %
NEUTROPHILS # BLD AUTO: 13.16 X10*3/UL (ref 1.2–7.7)
NEUTROPHILS NFR BLD AUTO: 92.2 %
NRBC BLD-RTO: 0 /100 WBCS (ref 0–0)
PLATELET # BLD AUTO: 242 X10*3/UL (ref 150–450)
RBC # BLD AUTO: 3.31 X10*6/UL (ref 4.5–5.9)
WBC # BLD AUTO: 14.3 X10*3/UL (ref 4.4–11.3)

## 2024-04-13 PROCEDURE — C9113 INJ PANTOPRAZOLE SODIUM, VIA: HCPCS

## 2024-04-13 PROCEDURE — 82947 ASSAY GLUCOSE BLOOD QUANT: CPT

## 2024-04-13 PROCEDURE — 2500000001 HC RX 250 WO HCPCS SELF ADMINISTERED DRUGS (ALT 637 FOR MEDICARE OP)

## 2024-04-13 PROCEDURE — 1170000001 HC PRIVATE ONCOLOGY ROOM DAILY

## 2024-04-13 PROCEDURE — 99232 SBSQ HOSP IP/OBS MODERATE 35: CPT

## 2024-04-13 PROCEDURE — 2500000004 HC RX 250 GENERAL PHARMACY W/ HCPCS (ALT 636 FOR OP/ED)

## 2024-04-13 PROCEDURE — 85025 COMPLETE CBC W/AUTO DIFF WBC: CPT

## 2024-04-13 PROCEDURE — 2500000005 HC RX 250 GENERAL PHARMACY W/O HCPCS

## 2024-04-13 PROCEDURE — 2500000002 HC RX 250 W HCPCS SELF ADMINISTERED DRUGS (ALT 637 FOR MEDICARE OP, ALT 636 FOR OP/ED)

## 2024-04-13 RX ORDER — POLYETHYLENE GLYCOL 3350 17 G/17G
17 POWDER, FOR SOLUTION ORAL 2 TIMES DAILY
Status: DISCONTINUED | OUTPATIENT
Start: 2024-04-13 | End: 2024-04-14

## 2024-04-13 RX ORDER — FENTANYL 50 UG/1
1 PATCH TRANSDERMAL
Status: DISCONTINUED | OUTPATIENT
Start: 2024-04-13 | End: 2024-04-14 | Stop reason: HOSPADM

## 2024-04-13 RX ADMIN — HYOSCYAMINE SULFATE 0.12 MG: 0.12 TABLET SUBLINGUAL at 17:05

## 2024-04-13 RX ADMIN — PIPERACILLIN SODIUM AND TAZOBACTAM SODIUM 3.38 G: 3; .375 INJECTION, SOLUTION INTRAVENOUS at 08:45

## 2024-04-13 RX ADMIN — PIPERACILLIN SODIUM AND TAZOBACTAM SODIUM 3.38 G: 3; .375 INJECTION, SOLUTION INTRAVENOUS at 13:59

## 2024-04-13 RX ADMIN — ENOXAPARIN SODIUM 40 MG: 100 INJECTION SUBCUTANEOUS at 22:02

## 2024-04-13 RX ADMIN — MORPHINE SULFATE 4 MG: 4 INJECTION, SOLUTION INTRAMUSCULAR; INTRAVENOUS at 08:45

## 2024-04-13 RX ADMIN — HYOSCYAMINE SULFATE 0.12 MG: 0.12 TABLET SUBLINGUAL at 01:22

## 2024-04-13 RX ADMIN — FENTANYL CITRATE: 50 INJECTION, SOLUTION INTRAMUSCULAR; INTRAVENOUS at 14:54

## 2024-04-13 RX ADMIN — ACETAMINOPHEN 1000 MG: 10 INJECTION INTRAVENOUS at 17:05

## 2024-04-13 RX ADMIN — PIPERACILLIN SODIUM AND TAZOBACTAM SODIUM 3.38 G: 3; .375 INJECTION, SOLUTION INTRAVENOUS at 01:22

## 2024-04-13 RX ADMIN — ACETAMINOPHEN 1000 MG: 10 INJECTION INTRAVENOUS at 01:22

## 2024-04-13 RX ADMIN — FENTANYL 1 PATCH: 50 PATCH TRANSDERMAL at 18:59

## 2024-04-13 RX ADMIN — HYOSCYAMINE SULFATE 0.12 MG: 0.12 TABLET SUBLINGUAL at 22:02

## 2024-04-13 RX ADMIN — PANTOPRAZOLE SODIUM 40 MG: 40 INJECTION, POWDER, FOR SOLUTION INTRAVENOUS at 08:45

## 2024-04-13 RX ADMIN — HYOSCYAMINE SULFATE 0.12 MG: 0.12 TABLET SUBLINGUAL at 10:09

## 2024-04-13 RX ADMIN — OLANZAPINE 10 MG: 10 TABLET, ORALLY DISINTEGRATING ORAL at 22:00

## 2024-04-13 RX ADMIN — PIPERACILLIN SODIUM AND TAZOBACTAM SODIUM 3.38 G: 3; .375 INJECTION, SOLUTION INTRAVENOUS at 20:27

## 2024-04-13 RX ADMIN — DEXAMETHASONE SODIUM PHOSPHATE 2 MG: 4 INJECTION, SOLUTION INTRAMUSCULAR; INTRAVENOUS at 08:45

## 2024-04-13 ASSESSMENT — COGNITIVE AND FUNCTIONAL STATUS - GENERAL
MOBILITY SCORE: 17
WALKING IN HOSPITAL ROOM: A LITTLE
STANDING UP FROM CHAIR USING ARMS: A LOT
DAILY ACTIVITIY SCORE: 24
CLIMB 3 TO 5 STEPS WITH RAILING: A LOT
MOBILITY SCORE: 20
MOVING TO AND FROM BED TO CHAIR: A LITTLE
DAILY ACTIVITIY SCORE: 24
MOVING TO AND FROM BED TO CHAIR: A LITTLE
CLIMB 3 TO 5 STEPS WITH RAILING: A LITTLE
STANDING UP FROM CHAIR USING ARMS: A LITTLE
WALKING IN HOSPITAL ROOM: A LOT

## 2024-04-13 ASSESSMENT — PAIN DESCRIPTION - LOCATION
LOCATION: ABDOMEN

## 2024-04-13 ASSESSMENT — PAIN - FUNCTIONAL ASSESSMENT
PAIN_FUNCTIONAL_ASSESSMENT: 0-10

## 2024-04-13 ASSESSMENT — PAIN SCALES - GENERAL
PAINLEVEL_OUTOF10: 6
PAINLEVEL_OUTOF10: 6
PAINLEVEL_OUTOF10: 3
PAINLEVEL_OUTOF10: 4
PAINLEVEL_OUTOF10: 7

## 2024-04-13 ASSESSMENT — PAIN DESCRIPTION - ORIENTATION
ORIENTATION: LOWER

## 2024-04-13 NOTE — PROGRESS NOTES
SUPPORTIVE AND PALLIATIVE ONCOLOGY INPATIENT FOLLOW-UP    SERVICE DATE: 24     SUBJECTIVE:  HISTORY OF PRESENT ILLNESS: Narciso Marrero is a 58 y.o. male diagnosed with metastatic ascending colon cancer with liver and peritoneal involvement. He has an AMY pump and is s/p multiple lines of systemic treatment. He was started on FOLFIRI 23, though this is now on hold d/t disease progression (noted 24). PMHx significant for T2DM, HTN, GERD, and chronic pain. Admitted 2024 for further evaluation and management of partial small bowel obstruction. Course complicated by worsening metastasis including peritoneal carcinomatosis. Supportive and Palliative Oncology is consulted for pain management, management of symptoms related to SBO.     Interval Events:  Pt's pain currently well-controlled. Plans to start fentanyl 50mcg/h TD patch q72h this afternoon. Detailed instructions for weaning fentanyl PCA present within Assessment/Plan. Discussed with pt and he is agreeable to this plan.    Pain Assessment:  Location: Transverse lower abdomen  Duration: Constant, with flares  Characteristics:  Ratin/10  Descriptors: Dull, aching  Aggravating: Use of abdominal muscles, sometimes just spontaneous   Relieving: Analgesics --see MAR, Positioning, and Modifying activity  Interference with Function: A little    Opioid Use  Past 24h opioid use:   (-, 5614-7523)  fentanyl PCA 50mcg/h x 24h = 1,200mcg  morphine 4mg IV x 1 = 10 OME    Total 24h OME use: 10 OME, fentanyl 1,200mcg IV    Note: OME calculations based on equianalgesic table below. Please note this table is based on best available evidence but conversions are still approximate. These are NOT opioid DOSES for individual patient use; this is equivalency information.  Drug Parenteral Enteral   Morphine 10 25   Oxycodone N/A 20   Hydromorphone 2 5   Fentanyl 0.15 N/A   Tramadol N/A 120   Citation: Faye JAY. Demystifying opioid conversion  calculations: A guide for effective dosing, Second edition. MD Nancy: [employer] of Health-System Pharmacists, 2018.    Symptom Assessment:  Nausea: none--states NGT to LIWS has been helping  Constipation: very much     Information obtained from: chart review, interview of patient, discussion with RN, and discussion with primary team  ______________________________________________________________________   OBJECTIVE:    Lab Results   Component Value Date    WBC 14.3 (H) 04/13/2024    HGB 9.2 (L) 04/13/2024    HCT 31.5 (L) 04/13/2024    MCV 95 04/13/2024     04/13/2024      Lab Results   Component Value Date    GLUCOSE 132 (H) 04/12/2024    CALCIUM 8.1 (L) 04/12/2024     04/12/2024    K 3.5 04/12/2024    CO2 22 04/12/2024     04/12/2024    BUN 19 04/12/2024    CREATININE 0.48 (L) 04/12/2024     Lab Results   Component Value Date    ALT 16 04/11/2024    AST 32 04/11/2024    ALKPHOS 502 (H) 04/11/2024    BILITOT 0.7 04/11/2024     Estimated Creatinine Clearance: 125 mL/min (A) (by C-G formula based on SCr of 0.48 mg/dL (L)).     Scheduled medications  acetaminophen, 1,000 mg, intravenous, q12h  dexAMETHasone, 2 mg, intravenous, q AM  enoxaparin, 40 mg, subcutaneous, q24h  hyoscyamine, 0.125 mg, sublingual, q6h  insulin lispro, 0-10 Units, subcutaneous, 4x daily  losartan, 25 mg, oral, Daily  OLANZapine zydis, 10 mg, oral, Nightly  pantoprazole, 40 mg, intravenous, Daily  piperacillin-tazobactam, 3.375 g, intravenous, q6h  polyethylene glycol, 17 g, oral, Daily  pregabalin, 150 mg, oral, BID  simvastatin, 40 mg, oral, Nightly    Continuous medications  fentaNYL,     PRN medications  alteplase, 2 mg, PRN  dextrose, 12.5 g, q15 min PRN  dextrose, 25 g, q15 min PRN  glucagon, 1 mg, q15 min PRN  glucagon, 1 mg, q15 min PRN  morphine, 4 mg, q2h PRN  naloxone, 0.2 mg, PRN  ondansetron, 8 mg, q6h PRN  phenoL, 1 spray, q2h PRN  prochlorperazine, 10 mg, q6h PRN    PHYSICAL EXAMINATION:    Vital  Signs:   Vital signs reviewed  Visit Vitals  BP (!) 152/94 (BP Location: Right arm, Patient Position: Lying)   Pulse (!) 114   Temp 36 °C (96.8 °F) (Temporal)   Resp 18     Pain Score: 6     Physical Exam  Vitals and nursing note reviewed.   Constitutional:       Comments: Alert, awake, ill appearing gentleman laying in bed. No signs of acute distress. Pleasant, cooperative, and participating in interview.     HENT:      Head:      Comments: Normocephalic       Nose:      Comments: NGT present and secured in R nare.   Eyes:      Comments: Sclera clear, EOM intact.   Neck:      Comments: Trachea midline.  Pulmonary:      Comments: Symmetrical chest rise. Regular rate and depth of respirations. 2.5L NC.  Abdominal:      Comments: Abdomen distended, lower transverse abdomen tender, and firm. Positive for ascites.    Musculoskeletal:      Comments: Generalized muscle weakness and atrophy. FLORIAN x4. Non-pitting BLE edema.   Skin:     Comments: No lesions, rash, or abrasions present on visible skin. Skin color appropriate for ethnicity.   Neurological:      Comments: A&Ox3, follows commands, baseline CIPN in hands, feet, and R face.    Psychiatric:      Comments: Mood and behavior appropriate.        ASSESSMENT/PLAN:  Narciso Marrero is a 58 y.o. male diagnosed with metastatic ascending colon cancer with liver and peritoneal involvement. He has an AMY pump and is s/p multiple lines of systemic treatment. He was started on FOLFIRI 12/26/23, though this is now on hold d/t disease progression (noted 2/19/24). PMHx significant for T2DM, HTN, GERD, and chronic pain. Admitted 4/11/2024 for further evaluation and management of partial small bowel obstruction. Course complicated by worsening metastasis including peritoneal carcinomatosis. Supportive and Palliative Oncology is consulted for pain management, management of symptoms related to SBO.     Cancer Related Pain  Lower abdominal pain that radiates around is related to  metastatic colon cancer with liver and peritoneal mets, ascites.   L foot and R shoulder pain is chronic related to a work injury and shingles  CIPN bilateral fingers and feet, right face/head pain related to post herpetic neuralgia.  Follows with Rosalinda Barksdale CNP, for outpatient Supportive Oncology  Pain is: Acute on chronic, cancer related pain  Type: Visceral, somatic, and neuropathic  Pain control: Sub-optimally controlled  Home regimen:  Morphine ER 45mg TID, Pregabalin 150mg BID, and Percocet 10/325mg q4h PRN, Dicyclomine 10mg QID with meals and HS for abdominal cramping, Dexamethasone 2mg daily in AM to help with: pain, loss of appetite, nausea, fatigue.    Intolerances/previously tried: None  Personalized pain goal: 2/10  Total OME usage for the past 24 hours: 10 OME, fentanyl 1,200mcg IV  Reviewed: (4/12/24) Estimated Creatinine Clearance: 125 mL/min (A) (by C-G formula based on SCr of 0.48 mg/dL (L)).  Reviewed: (4/11/24) Alk Phos elevated at 502, other LFTs unremarkable  Now with SBO, possible plan for venting PEG. Recommend rotating Morphine ER to Fentanyl TD due to absorption issues.  Start fentanyl 50mcg/h TD patch q72h this afternoon. Discontinuation of fentanyl PCA as follows:  6h post initial fentanyl TD patch placement, please decrease fentanyl PCA basal rate to 25mcg/h.  12h post initial fentanyl TD patch placement, please discontinue fentanyl PCA completely.  Continue morphine 4mg IV q2h PRN for breakthrough pain   Continue dexamethasone 2mg IV daily in AM  Continue pregabalin 150mg PO BID  Continue to monitor pain scores and administer PRN medications as appropriate  Continue/initiate nonpharmacologic pain management strategies including ice/heat therapy, distraction techniques, deep breathing/relaxation techniques, calming music, and repositioning  Continue to monitor for signs of opioid efficacy (pain scores, improved functionality) and toxicity (pinpoint pupils, excess  sedation/drowsiness/confusion, respiratory depression, etc.)     Nausea  GERD  Loss of Appetite  Intermittent nausea with vomiting related to constipation and bowel obstruction. Now controlled with NGT to LIWS.   Home regimen:  Pantoprazole 40mg daily, Ondansetron 8mg q8h PRN, Olanzapine 10mg once daily in evening with food, dexamethasone 2mg daily in AM  EKG reviewed from 3/15/24, QTc 477. Recommend obtaining updated EKG and uploading to EMR or placing copy in paper chart. If QTc >500 please contact Supportive Oncology at #[phone] for updated recs.   PPI per primary  Continue olanzapine Zydis disintegrating tablet 10mg PO HS  Continue ondansetron 8mg PO q6h PRN for n/v, first line   Continue prochlorperazine 10mg PO q6h PRN for n/v, second line  Dexamethasone 2mg IV daily in AM     Constipation/Diarrhea  At risk for constipation related to small bowel obstruction (likely r/t metastasis) and opioids, currently constipated.  Usual bowel pattern: Every other day  Home regimen: Senna 2 tabs 1-2x/day PRN for constipation, Loperamide 2mg TID PRN - MAX 8 caps/day for diarrhea  LBM: 4/5/24 (7 days), prior to that weeks  Continue scheduled hyoscyamine 0.125mg SL q6h for cramping relief  Increase Miralax 17g PO BID  Monitor BM frequency, adjust regimen as needed  Goal to have BM without straining q48-72h     Medical Decision Making/Goals of Care/Advance Care Planning:  (4/12/24, per Mini East, CNP's, note)  Patient's current clinical condition, including diagnosis, prognosis, and management plan, and goals of care were discussed.   Life limiting disease: metastatic malignancy  Family: Supportive wife, Emperatriz Marrero  Performance status: Major  limitations due to pain, N/V, fatigue, and disease process  Understanding of health: Demonstrates good prognostic understanding of disease process, understands plan for venting PEG and transitioning into hospice care.   Information:  Wife wants to know prognosis, but patient does not  want to know  Medical update: Not a surgical candidate for SBO, although offered placing a venting peg tube to aid with N/V and allow for pleasure feeds.  Goals: symptom control Wife hoping for hospice facility due to patient's level of care needed, concerned about cost and insurance coverage.  Code status discussion:  GOC discussion today at bedside. Patient wants to be a DNR, DNI and his wife supports the code status change.     Advance Directives  Existence of Advance Directives: Yes, but NOT documented in medical record   Decision maker: HCPOA is Emperatriz Marrero (wife) [phone]   Code Status: DNR-DNI     Disposition:  Please start the process of having prior authorization with meds to beds deliver medications to patient prior to discharge via [facility]. Prescriptions will need to be sent 48-72 hours prior to discharge so that a prior authorization can be completed.      Discharge date: unknown pending acute issues  Patient has an existing appointment with Outpatient Supportive Oncology [facility]s 4/29/2024 at 2:30p pending decision about hospice. If room and board is cost prohibitive possibility of following with Supportive Oncology and enroll in hospice care when prognosis is shorter.    Signature and billing:  Thank you for allowing us to participate in the care of this patient. Recommendations will be communicated back to the consulting service by way of shared electronic medical record or face-to-face.    Medical complexity was high level due to due to complexity of problems, extensive data review, and high risk of management/treatment.    I spent 50 minutes in the care of this patient which included chart review, interviewing patient/family, discussion with primary team, coordination of care, and documentation.    Data:   Diagnostic tests and information reviewed for today's visit:  Conversation with primary team, Most recent labs and imaging results, Most recent EKG,  Medications    Some elements copied from Mini East CNP's, note on 4/12/24, the elements have been updated and all reflect current decision making from today, 04/13/24     Plan of Care discussed with: Primary team, pt    Thank you for asking Supportive and Palliative Oncology to assist with care of this patient.  We will continue to follow.  Please contact us for additional questions or concerns.    SIGNATURE: LA Pina-THOMAS   PAGER/CONTACT:  Contact information:  Supportive [facility] Palliative Oncology  Monday-Friday 8 AM-5 PM  Epic Secure chat or pager [phone].  After hours and weekends:  pager [phone]

## 2024-04-13 NOTE — PROGRESS NOTES
Narciso Marrero is a 58 y.o. male on day 2 of admission presenting with Partial small bowel obstruction (Multi).    Subjective   NAEON. Pain improved with current regimen and palliative paracentesis.       Objective     Physical Exam  Constitutional: in NAD  HEENT: sclerae anicteric, EOM grossly intact,   CV: tachycardic, regular rhythm no murmurs noted  Pulm: Decreased breath sounds bilaterally especially at bases. No wheezes or rhonchi, no increased WOB  GI: Distended abdomen, soft, mildly tender to palpation mostly on R side  Skin: warm and dry  Ext: bilateral LE without pitting edema   Neuro: alert and conversant, A&OX4, CN II-XII grossly intact, strength 5/5 except in L foot (prior accident 23 years ago, baseline for patient), sensation to light touch intact  Psych: affect appropriate    Last Recorded Vitals  Blood pressure (!) 152/94, pulse (!) 114, temperature 36 °C (96.8 °F), temperature source Temporal, resp. rate 18, height 1.829 m (6'), weight 86 kg (189 lb 9.5 oz), SpO2 91%.  Intake/Output last 3 Shifts:  I/O last 3 completed shifts:  In: 783.3 (9.1 mL/kg) [IV Piggyback:783.3]  Out: 1300 (15.1 mL/kg) [Urine:400 (0.1 mL/kg/hr); Emesis/NG output:900]  Weight: 86 kg     Relevant Results  Results from last 72 hours   Lab Units 04/13/24  1053 04/11/24  2332 04/11/24  0623   WBC AUTO x10*3/uL 14.3* 12.7* 11.8*   HEMOGLOBIN g/dL 9.2* 9.6* 9.3*   HEMATOCRIT % 31.5* 30.6* 30.2*   PLATELETS AUTO x10*3/uL 242 300 255        Results from last 72 hours   Lab Units 04/12/24  0548 04/11/24  2332 04/11/24  0623 04/10/24  1357   SODIUM mmol/L 144 142 138 136   POTASSIUM mmol/L 3.5 3.6 3.7 3.7   CHLORIDE mmol/L 104 102 100 96*   CO2 mmol/L 22 21 25 28   BUN mg/dL 19 19 19 18   CREATININE mg/dL 0.48* 0.48* 0.45* 0.57   MAGNESIUM mg/dL 1.73 1.84 1.92 1.49*   PHOSPHORUS mg/dL 2.8 2.9  --  4.1      Scheduled medications  acetaminophen, 1,000 mg, intravenous, q12h  dexAMETHasone, 2 mg, intravenous, q AM  enoxaparin, 40 mg,  subcutaneous, q24h  hyoscyamine, 0.125 mg, sublingual, q6h  insulin lispro, 0-10 Units, subcutaneous, 4x daily  losartan, 25 mg, oral, Daily  OLANZapine zydis, 10 mg, oral, Nightly  pantoprazole, 40 mg, intravenous, Daily  piperacillin-tazobactam, 3.375 g, intravenous, q6h  polyethylene glycol, 17 g, oral, Daily  pregabalin, 150 mg, oral, BID  simvastatin, 40 mg, oral, Nightly      Continuous medications  fentaNYL,       PRN medications  PRN medications: alteplase, dextrose, dextrose, glucagon, glucagon, morphine, naloxone, ondansetron, phenoL, prochlorperazine    Assessment/Plan   Narciso Marrero is a 58 y.o. male with PMHx of Stage IV KRAS + Ascending colon cancer  s/p Right hemicolectomy (5/3/23), metastatic disease to the liver, and numerous peritoneal implants s/p AMY pump, T2DM, HTN, GERD, chronic pain presents to  as a transfer from [place] for partial small bowel obstruction based on CT A/P showing diffusely dilated SBO and continued nausea/vomiting. Etiology of SBO likely due to diffuse metastatic peritoneal diseases and ascites. Pt initially hypotensive and tachycardic with leukocytosis c/f hypovolemia 2/2 continued emesis vs. sepsis vs. bowel perf with intrabdominal infection vs. SBP given ascites pt started on IV antibiotic at OSH. With fluid resuscitation pt blood pressure improved and NG placed to intermittent suction. Given leukocytosis and hypotension on admission will continue abx and follow up blood cultures. Discussion with attending (patient's primary oncologist) and patient on 4/12. Patient will be pursuing comfort care. S/p palliative paracentesis on 4/12.    Updates 4/13:  -patient still debating whether to pursue hospice without tubes vs go to a facility that can place an NG tube vs venting PEG with PleurX placement  -continue appreciating supportive oncology recs                #Partial SBO  #Constipation  #Nausea/Vomiting  :: CT scan 4/10: Showing overall interval worsening of metastatic  disease of lungs, liver and peritoneal masses  :: S/p fluid resuscitation in OSH ED and placement of NG to IMWS  :: s/p palliative paracentesis 4/12 with IR  Plan:  - fentanyl PCA  - morphine 4mg IV q2h for breakthrough pain  - c/w compazine and zofran for nausea  - CRS recommending palliative venting PEG     #Stage IV Ascending Colon Cancer  # Hepatic Masses   # Splenic Masses  #Hilar Mass   #Numerous Peritoneal Masses   #Large Volume Ascites   :: CT A/P 4/10: Showing overall interval worsening of metastatic disease of lungs, liver and peritoneal masses and   :: Primary oncologist Dr. Russ; Surg onc Rajinder Ocuin  :: Most recent chemo: Lonsurf + Bevacizumab start 3/4/24 and Last Glycerin in AMY pump on 2/5/24- Chronically elevated alk phos and ascites likely related metastatic disease to liver and peritoneum  :: s/p palliative paracentesis 4/12  :: s/p GOC with supportive oncology, now DNR DNI  Plan  - discussion with attending, plan to pursue comfort  - SW consult to discuss hospice     #Left ureter partially obstructing calculus  ::CT A/P 4/10: Evidence of moderate left sided hydro-ureteronephrosis  :: BL Cr 0.4-0.5  :: Cr 0.45 on admission   :: stone not visualized on Renal US  Plan:  - CTM, treat pain     #Left Pleural Effusion  #Small R. Pleural Effusion   #Pulmonary Nodule   CT A/P 4.10: Interval worsening of bilateral pleural effusions with near complete compressive atelectasis of the left lower lobe as described above.  Plan:   - continue to monitor  - stable on room air     #HTN  #HLD  Plan:  - c/w home simvastatin 40 mg daily  - c/w losartan 25 mg daily     #Chronic Pain  ::Cancer related and has crushed shoulder injury 2/2 work-related accident  ::Follows with supportive onc outpatient  Home: morphine CR 45 mg TID and oxycodone-acetaminophen  mg Q6H PRN  Plan:  - appreciate supportive oncology     #GERD  :: Home pantoprazole 40 mg daily  - IV pantoprazole 40 mg daily while NPO     #T2DM  :: Home  regimen: lantus 15 units, lispro 3 units w/ meals+ SSI  PLAN:  - Hypoglycemia protocol   - Mild insulin sliding scale #2      Miscellaneous:  - c/h Zyprexa 10 mg at bedtime     F: PRN  E: PRN  N: NPO with sips of water  A: pIV      Bowel Regimen: Miralax, DocSenna (held while NPO)  DVT prophylaxis: Lovenox subq  Code Status: FULL CODE (confirmed on admission 4/11/24)  NOK: Wife, Emperatriz Marrero [phone]     May Akins MD  PGY-1             May Akins MD

## 2024-04-13 NOTE — CARE PLAN
Problem: Pain  Goal: My pain/discomfort is manageable  Outcome: Progressing     Problem: Safety  Goal: Patient will be injury free during hospitalization  Outcome: Progressing  Goal: I will remain free of falls  Outcome: Progressing     Problem: Daily Care  Goal: Daily care needs are met  Outcome: Progressing     Problem: Psychosocial Needs  Goal: Demonstrates ability to cope with hospitalization/illness  Outcome: Progressing  Goal: Collaborate with me, my family, and caregiver to identify my specific goals  Outcome: Progressing     Problem: Discharge Barriers  Goal: My discharge needs are met  Outcome: Progressing     Problem: Pain  Goal: Takes deep breaths with improved pain control throughout the shift  Outcome: Progressing  Goal: Turns in bed with improved pain control throughout the shift  Outcome: Progressing  Goal: Walks with improved pain control throughout the shift  Outcome: Progressing  Goal: Performs ADL's with improved pain control throughout shift  Outcome: Progressing  Goal: Participates in PT with improved pain control throughout the shift  Outcome: Progressing  Goal: Free from opioid side effects throughout the shift  Outcome: Progressing  Goal: Free from acute confusion related to pain meds throughout the shift  Outcome: Progressing     Problem: Fall/Injury  Goal: Not fall by end of shift  Outcome: Progressing  Goal: Be free from injury by end of the shift  Outcome: Progressing  Goal: Verbalize understanding of personal risk factors for fall in the hospital  Outcome: Progressing  Goal: Verbalize understanding of risk factor reduction measures to prevent injury from fall in the home  Outcome: Progressing  Goal: Use assistive devices by end of the shift  Outcome: Progressing  Goal: Pace activities to prevent fatigue by end of the shift  Outcome: Progressing     Problem: Skin  Goal: Decreased wound size/increased tissue granulation at next dressing change  Outcome: Progressing  Goal: Participates in  plan/prevention/treatment measures  Outcome: Progressing  Goal: Prevent/manage excess moisture  Outcome: Progressing  Goal: Prevent/minimize sheer/friction injuries  Outcome: Progressing  Goal: Promote/optimize nutrition  Outcome: Progressing  Goal: Promote skin healing  Outcome: Progressing   The patient's goals for the shift include      The clinical goals for the shift include Will remain HDS and pain controlled throughout shift.

## 2024-04-13 NOTE — HOSPITAL COURSE
Narciso Marrero is a 58 y.o. male with PMHx of Stage IV KRAS + Ascending colon cancer  s/p Right hemicolectomy (5/3/23), metastatic disease to the liver, and numerous peritoneal implants s/p AMY pump, T2DM, HTN, GERD, chronic pain presents to  as a transfer from [place] for partial small bowel obstruction based on CT A/P showing diffusely dilated SBO and continued nausea/vomiting. Etiology of SBO likely due to diffuse metastatic peritoneal diseases and ascites. Pt initially hypotensive and tachycardic with leukocytosis c/f hypovolemia 2/2 continued emesis vs. sepsis vs. bowel perf with intrabdominal infection vs. SBP given ascites pt started on IV antibiotic at OSH. With fluid resuscitation pt blood pressure improved and NG placed to intermittent suction. Given leukocytosis and hypotension on admission will continue abx and follow up blood cultures. Discussion with attending (patient's primary oncologist) and patient on 4/12. Patient will be pursuing comfort care. S/p palliative paracentesis on 4/12.

## 2024-04-13 NOTE — SIGNIFICANT EVENT
04/13/24 1241   Onset Documentation   Rapid Response Initiated By Radar auto page   Location/Room [facility]  ([facility] 7540)   Pager Time [phone]   Arrival Time 1241   Event End Time 1250   Level II Called No   Primary Reason for Call Radar auto page     Rapid Response Note    Radar auto-page received for a radar score of 6 with the following vital signs: 36.0, 114, 18, 152/94, 91%.  Vital signs were confirmed and reviewed with primary RN.  At the bedside patient is in no respiratory distress.  RN to start on 2 liters of O2 via NC.  No additional interventions are indicated by Rapid Response at this time.  RN to contact Rapid Response with any future concerns or signs of clinical decompensation.

## 2024-04-14 ENCOUNTER — HOSPITAL ENCOUNTER (INPATIENT)
Facility: HOSPITAL | Age: 59
LOS: 3 days | Discharge: HOSPICE | DRG: 951 | End: 2024-04-17
Attending: STUDENT IN AN ORGANIZED HEALTH CARE EDUCATION/TRAINING PROGRAM | Admitting: STUDENT IN AN ORGANIZED HEALTH CARE EDUCATION/TRAINING PROGRAM
Payer: MEDICARE

## 2024-04-14 ENCOUNTER — APPOINTMENT (OUTPATIENT)
Dept: RADIOLOGY | Facility: HOSPITAL | Age: 59
DRG: 871 | End: 2024-04-14
Payer: MEDICARE

## 2024-04-14 VITALS
BODY MASS INDEX: 25.68 KG/M2 | SYSTOLIC BLOOD PRESSURE: 165 MMHG | WEIGHT: 189.6 LBS | TEMPERATURE: 97.7 F | HEIGHT: 72 IN | HEART RATE: 122 BPM | RESPIRATION RATE: 20 BRPM | DIASTOLIC BLOOD PRESSURE: 114 MMHG | OXYGEN SATURATION: 97 %

## 2024-04-14 DIAGNOSIS — C18.2 MALIGNANT NEOPLASM OF ASCENDING COLON (MULTI): Primary | ICD-10-CM

## 2024-04-14 LAB
BACTERIA BLD CULT: NORMAL
BACTERIA BLD CULT: NORMAL
GLUCOSE BLD MANUAL STRIP-MCNC: 114 MG/DL (ref 74–99)
GLUCOSE BLD MANUAL STRIP-MCNC: 119 MG/DL (ref 74–99)

## 2024-04-14 PROCEDURE — 2500000004 HC RX 250 GENERAL PHARMACY W/ HCPCS (ALT 636 FOR OP/ED)

## 2024-04-14 PROCEDURE — 2500000005 HC RX 250 GENERAL PHARMACY W/O HCPCS

## 2024-04-14 PROCEDURE — 99232 SBSQ HOSP IP/OBS MODERATE 35: CPT

## 2024-04-14 PROCEDURE — 82947 ASSAY GLUCOSE BLOOD QUANT: CPT

## 2024-04-14 PROCEDURE — 2500000001 HC RX 250 WO HCPCS SELF ADMINISTERED DRUGS (ALT 637 FOR MEDICARE OP)

## 2024-04-14 PROCEDURE — 1150000001 HC HOSPICE PRIVATE ROOM DAILY

## 2024-04-14 PROCEDURE — C9113 INJ PANTOPRAZOLE SODIUM, VIA: HCPCS

## 2024-04-14 RX ORDER — MORPHINE SULFATE 2 MG/ML
2 INJECTION, SOLUTION INTRAMUSCULAR; INTRAVENOUS
Status: CANCELLED | OUTPATIENT
Start: 2024-04-14

## 2024-04-14 RX ORDER — NALOXONE HYDROCHLORIDE 0.4 MG/ML
0.2 INJECTION, SOLUTION INTRAMUSCULAR; INTRAVENOUS; SUBCUTANEOUS AS NEEDED
Status: DISCONTINUED | OUTPATIENT
Start: 2024-04-14 | End: 2024-04-17 | Stop reason: HOSPADM

## 2024-04-14 RX ORDER — OLANZAPINE 10 MG/1
10 TABLET, ORALLY DISINTEGRATING ORAL NIGHTLY
Status: DISCONTINUED | OUTPATIENT
Start: 2024-04-14 | End: 2024-04-17 | Stop reason: HOSPADM

## 2024-04-14 RX ORDER — MORPHINE SULFATE 4 MG/ML
4 INJECTION INTRAVENOUS
Status: DISCONTINUED | OUTPATIENT
Start: 2024-04-14 | End: 2024-04-14 | Stop reason: HOSPADM

## 2024-04-14 RX ORDER — MORPHINE SULFATE 2 MG/ML
2 INJECTION, SOLUTION INTRAMUSCULAR; INTRAVENOUS EVERY 4 HOURS
Status: DISCONTINUED | OUTPATIENT
Start: 2024-04-14 | End: 2024-04-14

## 2024-04-14 RX ORDER — MORPHINE SULFATE 2 MG/ML
2 INJECTION, SOLUTION INTRAMUSCULAR; INTRAVENOUS
Status: DISCONTINUED | OUTPATIENT
Start: 2024-04-14 | End: 2024-04-14 | Stop reason: HOSPADM

## 2024-04-14 RX ORDER — PROCHLORPERAZINE EDISYLATE 5 MG/ML
10 INJECTION INTRAMUSCULAR; INTRAVENOUS EVERY 6 HOURS PRN
Status: CANCELLED | OUTPATIENT
Start: 2024-04-14

## 2024-04-14 RX ORDER — LORAZEPAM 2 MG/ML
0.5 INJECTION INTRAMUSCULAR EVERY 4 HOURS PRN
Status: CANCELLED | OUTPATIENT
Start: 2024-04-14

## 2024-04-14 RX ORDER — ONDANSETRON HYDROCHLORIDE 2 MG/ML
8 INJECTION, SOLUTION INTRAVENOUS EVERY 6 HOURS PRN
Status: DISCONTINUED | OUTPATIENT
Start: 2024-04-14 | End: 2024-04-17 | Stop reason: HOSPADM

## 2024-04-14 RX ORDER — MORPHINE SULFATE 2 MG/ML
2 INJECTION, SOLUTION INTRAMUSCULAR; INTRAVENOUS
Status: DISCONTINUED | OUTPATIENT
Start: 2024-04-14 | End: 2024-04-16

## 2024-04-14 RX ORDER — LORAZEPAM 2 MG/ML
0.5 INJECTION INTRAMUSCULAR EVERY 4 HOURS PRN
Status: DISCONTINUED | OUTPATIENT
Start: 2024-04-14 | End: 2024-04-14 | Stop reason: HOSPADM

## 2024-04-14 RX ORDER — MORPHINE SULFATE 4 MG/ML
4 INJECTION INTRAVENOUS EVERY 2 HOUR PRN
Status: DISCONTINUED | OUTPATIENT
Start: 2024-04-14 | End: 2024-04-15

## 2024-04-14 RX ORDER — FENTANYL 50 UG/1
1 PATCH TRANSDERMAL
Status: DISCONTINUED | OUTPATIENT
Start: 2024-04-16 | End: 2024-04-15

## 2024-04-14 RX ORDER — MORPHINE SULFATE 4 MG/ML
4 INJECTION INTRAVENOUS EVERY 2 HOUR PRN
Status: CANCELLED | OUTPATIENT
Start: 2024-04-14

## 2024-04-14 RX ORDER — LORAZEPAM 2 MG/ML
0.5 INJECTION INTRAMUSCULAR EVERY 4 HOURS PRN
Status: DISCONTINUED | OUTPATIENT
Start: 2024-04-14 | End: 2024-04-17 | Stop reason: HOSPADM

## 2024-04-14 RX ORDER — NALOXONE HYDROCHLORIDE 0.4 MG/ML
0.2 INJECTION, SOLUTION INTRAMUSCULAR; INTRAVENOUS; SUBCUTANEOUS AS NEEDED
Status: CANCELLED | OUTPATIENT
Start: 2024-04-14

## 2024-04-14 RX ORDER — HYOSCYAMINE SULFATE 0.12 MG/1
0.12 TABLET, ORALLY DISINTEGRATING ORAL EVERY 6 HOURS
Status: DISCONTINUED | OUTPATIENT
Start: 2024-04-14 | End: 2024-04-15

## 2024-04-14 RX ORDER — MORPHINE SULFATE 4 MG/ML
4 INJECTION INTRAVENOUS
Status: CANCELLED | OUTPATIENT
Start: 2024-04-14

## 2024-04-14 RX ORDER — PROCHLORPERAZINE EDISYLATE 5 MG/ML
10 INJECTION INTRAMUSCULAR; INTRAVENOUS EVERY 6 HOURS PRN
Status: DISCONTINUED | OUTPATIENT
Start: 2024-04-14 | End: 2024-04-17 | Stop reason: HOSPADM

## 2024-04-14 RX ORDER — MORPHINE SULFATE 4 MG/ML
4 INJECTION INTRAVENOUS
Status: DISCONTINUED | OUTPATIENT
Start: 2024-04-14 | End: 2024-04-15

## 2024-04-14 RX ORDER — OLANZAPINE 10 MG/1
10 TABLET, ORALLY DISINTEGRATING ORAL NIGHTLY
Status: CANCELLED | OUTPATIENT
Start: 2024-04-14

## 2024-04-14 RX ORDER — HYOSCYAMINE SULFATE 0.12 MG/1
0.12 TABLET, ORALLY DISINTEGRATING ORAL EVERY 6 HOURS
Status: CANCELLED | OUTPATIENT
Start: 2024-04-14

## 2024-04-14 RX ORDER — FENTANYL 50 UG/1
1 PATCH TRANSDERMAL
Status: CANCELLED | OUTPATIENT
Start: 2024-04-16

## 2024-04-14 RX ORDER — ONDANSETRON HYDROCHLORIDE 2 MG/ML
8 INJECTION, SOLUTION INTRAVENOUS EVERY 6 HOURS PRN
Status: CANCELLED | OUTPATIENT
Start: 2024-04-14

## 2024-04-14 RX ORDER — MORPHINE SULFATE 4 MG/ML
4 INJECTION INTRAVENOUS EVERY 2 HOUR PRN
Status: DISCONTINUED | OUTPATIENT
Start: 2024-04-14 | End: 2024-04-14 | Stop reason: HOSPADM

## 2024-04-14 RX ORDER — MORPHINE SULFATE 2 MG/ML
2 INJECTION, SOLUTION INTRAMUSCULAR; INTRAVENOUS
Status: DISCONTINUED | OUTPATIENT
Start: 2024-04-14 | End: 2024-04-15

## 2024-04-14 RX ADMIN — PANTOPRAZOLE SODIUM 40 MG: 40 INJECTION, POWDER, FOR SOLUTION INTRAVENOUS at 09:08

## 2024-04-14 RX ADMIN — MORPHINE SULFATE 4 MG: 4 INJECTION, SOLUTION INTRAMUSCULAR; INTRAVENOUS at 12:13

## 2024-04-14 RX ADMIN — MORPHINE SULFATE 4 MG: 4 INJECTION, SOLUTION INTRAMUSCULAR; INTRAVENOUS at 19:52

## 2024-04-14 RX ADMIN — DEXAMETHASONE SODIUM PHOSPHATE 2 MG: 4 INJECTION, SOLUTION INTRAMUSCULAR; INTRAVENOUS at 09:08

## 2024-04-14 RX ADMIN — Medication: at 20:00

## 2024-04-14 RX ADMIN — PIPERACILLIN SODIUM AND TAZOBACTAM SODIUM 3.38 G: 3; .375 INJECTION, SOLUTION INTRAVENOUS at 03:00

## 2024-04-14 RX ADMIN — PIPERACILLIN SODIUM AND TAZOBACTAM SODIUM 3.38 G: 3; .375 INJECTION, SOLUTION INTRAVENOUS at 09:15

## 2024-04-14 RX ADMIN — ONDANSETRON 8 MG: 2 INJECTION INTRAMUSCULAR; INTRAVENOUS at 19:52

## 2024-04-14 RX ADMIN — PHENOL 1 SPRAY: 1.4 SPRAY ORAL at 18:33

## 2024-04-14 RX ADMIN — MORPHINE SULFATE 4 MG: 4 INJECTION INTRAVENOUS at 10:55

## 2024-04-14 RX ADMIN — HYOSCYAMINE SULFATE 0.12 MG: 0.12 TABLET SUBLINGUAL at 04:59

## 2024-04-14 RX ADMIN — MORPHINE SULFATE 4 MG: 4 INJECTION, SOLUTION INTRAMUSCULAR; INTRAVENOUS at 18:11

## 2024-04-14 RX ADMIN — MORPHINE SULFATE 4 MG: 4 INJECTION, SOLUTION INTRAMUSCULAR; INTRAVENOUS at 21:51

## 2024-04-14 RX ADMIN — MORPHINE SULFATE 4 MG: 4 INJECTION, SOLUTION INTRAMUSCULAR; INTRAVENOUS at 14:27

## 2024-04-14 ASSESSMENT — RESPIRATORY DISTRESS OBSERVATION SCALE (RDOS)
RESPIRATORY RATE PER MINUTE: 1 - 19-30 BREATHS
PARADOXICAL BREATHING PATTERN: 2 - PRESENT
GRUNTING AT END OF EXPIRATION: 0 - NONE
PARADOXICAL BREATHING PATTERN: 2 - PRESENT
RDOS TOTAL SCORE: 8
LOOK OF FEAR: 0 - NONE
LOOK OF FEAR: 0 - NONE
GRUNTING AT END OF EXPIRATION: 0 - NONE
ACCESSORY MUSCLE RISE IN CLAVICLE DURING INSPIRATION: 2 - PRONOUNCED RISE
PARADOXICAL BREATHING PATTERN: 2 - PRESENT
RESTLESS NONPURPOSEFUL MOVEMENTS: 1 - OCCASIONAL, SLIGHT MOVEMENTS
HEART RATE PER MINUTE: 2 - >109 BEATS
INVOLUNTARY NASAL FLARING: 0 - NONE
ACCESSORY MUSCLE RISE IN CLAVICLE DURING INSPIRATION: 2 - PRONOUNCED RISE
RDOS TOTAL SCORE: 8
RESTLESS NONPURPOSEFUL MOVEMENTS: 1 - OCCASIONAL, SLIGHT MOVEMENTS
LOOK OF FEAR: 0 - NONE
ACCESSORY MUSCLE RISE IN CLAVICLE DURING INSPIRATION: 2 - PRONOUNCED RISE
GRUNTING AT END OF EXPIRATION: 0 - NONE
RESPIRATORY RATE PER MINUTE: 1 - 19-30 BREATHS
GRUNTING AT END OF EXPIRATION: 0 - NONE
INVOLUNTARY NASAL FLARING: 0 - NONE
RDOS TOTAL SCORE: 8
PARADOXICAL BREATHING PATTERN: 2 - PRESENT
INVOLUNTARY NASAL FLARING: 0 - NONE
RDOS TOTAL SCORE: 8
HEART RATE PER MINUTE: 2 - >109 BEATS
RESPIRATORY RATE PER MINUTE: 1 - 19-30 BREATHS
HEART RATE PER MINUTE: 2 - >109 BEATS
RESTLESS NONPURPOSEFUL MOVEMENTS: 1 - OCCASIONAL, SLIGHT MOVEMENTS
LOOK OF FEAR: 0 - NONE
HEART RATE PER MINUTE: 2 - >109 BEATS
RESTLESS NONPURPOSEFUL MOVEMENTS: 1 - OCCASIONAL, SLIGHT MOVEMENTS
ACCESSORY MUSCLE RISE IN CLAVICLE DURING INSPIRATION: 2 - PRONOUNCED RISE
RESPIRATORY RATE PER MINUTE: 1 - 19-30 BREATHS
INVOLUNTARY NASAL FLARING: 0 - NONE

## 2024-04-14 ASSESSMENT — PAIN SCALES - GENERAL: PAINLEVEL_OUTOF10: 7

## 2024-04-14 ASSESSMENT — COGNITIVE AND FUNCTIONAL STATUS - GENERAL
DAILY ACTIVITIY SCORE: 24
STANDING UP FROM CHAIR USING ARMS: A LITTLE
CLIMB 3 TO 5 STEPS WITH RAILING: A LOT
MOBILITY SCORE: 19
WALKING IN HOSPITAL ROOM: A LITTLE
MOVING TO AND FROM BED TO CHAIR: A LITTLE

## 2024-04-14 NOTE — NURSING NOTE
RN Hospice Note     Narciso Marrero is a Hospice Patient.   Hospice terminal diagnosis: Colorectal cancer  Physician: Dr Russ  Visit type: Admitted [facility] for pain management     Comments/recommendations: Met with pt and spouse Emperatriz to discuss hospice services/benefit and goals of care. Discussed possible transfer to hospice facility but both declined. Pt has increasing pain with movement and does not feel comfortable with moving at this time. Pt and spouse are aware that pt will need a discharge plan if stable to move. They are considering hospice facility vs hospice closer to [place]. Consents signed and chart flipped to [facility]-hospice. Follow up nurse visit for tomorrow. Please call [phone] for questions/concerns or pt death.      Discharge Planning:  Patient to be discharged to  [facility]     Plan of care reviewed with patient/family members: Spouse, Emperatriz at bedside  Plan of care reviewed with hospital staff members: Jai Loyola MD, Kathy López RN     [facility] of any changes in condition. Thank you.  Office:  [phone] (8 am-6:30 pm M-F and 8 am-4:30 pm weekends and holidays)              [phone] (6:30 pm-8 am M-F and 4:30 pm-8 am weekends and holidays)     Kathi Chandler RN

## 2024-04-14 NOTE — PROGRESS NOTES
Narciso Marrero is a 58 y.o. male on day 3 of admission presenting with Partial small bowel obstruction (Multi).    Subjective   Patient seen and examined this morning. Patient resting in bed.     Patient with episode of subjective dyspnea this morning. Vitals were stable, no worsening hypoxia. EKG with sinus tachycardia. Patient without pain or further sx. Suspect subjective dyspnea likely from worsening abdominal ascites increased pressure in thorax.     Upon further discussion, patients states that he wants things to get better. He means that he wants to spend the rest of the time he has at home with his wife and family. He does not want any further procedures or invasive measures.          Objective     Physical Exam  Constitutional: in NAD  HEENT: sclerae anicteric, EOM grossly intact,   CV: tachycardic, regular rhythm no murmurs noted  Pulm: Decreased breath sounds bilaterally especially at bases. No wheezes or rhonchi, no increased WOB  GI: Distended abdomen, soft, mildly tender to palpation mostly on R side  Skin: warm and dry  Ext: bilateral LE without pitting edema   Neuro: alert and conversant, A&OX4, CN II-XII grossly intact, strength 5/5 except in L foot (prior accident 23 years ago, baseline for patient), sensation to light touch intact  Psych: affect appropriate    Last Recorded Vitals  Blood pressure (!) 165/114, pulse (!) 122, temperature 36.5 °C (97.7 °F), temperature source Temporal, resp. rate 20, height 1.829 m (6'), weight 86 kg (189 lb 9.5 oz), SpO2 97%.  Intake/Output last 3 Shifts:  I/O last 3 completed shifts:  In: 0 (0 mL/kg)   Out: 1350 (15.7 mL/kg) [Urine:1150 (0.4 mL/kg/hr); Emesis/NG output:200]  Weight: 86 kg     Relevant Results  Results from last 72 hours   Lab Units 04/13/24  1053 04/11/24  2332   WBC AUTO x10*3/uL 14.3* 12.7*   HEMOGLOBIN g/dL 9.2* 9.6*   HEMATOCRIT % 31.5* 30.6*   PLATELETS AUTO x10*3/uL 242 300        Results from last 72 hours   Lab Units 04/12/24  0567  04/11/24  2332   SODIUM mmol/L 144 142   POTASSIUM mmol/L 3.5 3.6   CHLORIDE mmol/L 104 102   CO2 mmol/L 22 21   BUN mg/dL 19 19   CREATININE mg/dL 0.48* 0.48*   MAGNESIUM mg/dL 1.73 1.84   PHOSPHORUS mg/dL 2.8 2.9      Scheduled medications  dexAMETHasone, 2 mg, intravenous, q AM  fentaNYL, 1 patch, transdermal, q72h  hyoscyamine, 0.125 mg, sublingual, q6h  OLANZapine zydis, 10 mg, oral, Nightly  oxygen, , inhalation, Continuous - Inhalation      Continuous medications       PRN medications  PRN medications: LORazepam, LORazepam, morphine, morphine, morphine, morphine, naloxone, ondansetron, phenoL, prochlorperazine    Assessment/Plan   Narciso Marrero is a 58 y.o. male with PMHx of Stage IV KRAS + Ascending colon cancer  s/p Right hemicolectomy (5/3/23), metastatic disease to the liver, and numerous peritoneal implants s/p AMY pump, T2DM, HTN, GERD, chronic pain presents to  as a transfer from [place] for partial small bowel obstruction based on CT A/P showing diffusely dilated SBO and continued nausea/vomiting. Etiology of SBO likely due to diffuse metastatic peritoneal diseases and ascites. Pt initially hypotensive and tachycardic with leukocytosis c/f hypovolemia 2/2 continued emesis vs. sepsis vs. bowel perf with intrabdominal infection vs. SBP given ascites pt started on IV antibiotic at OSH. With fluid resuscitation pt blood pressure improved and NG placed to intermittent suction. Given leukocytosis and hypotension on admission will continue abx and follow up blood cultures. Discussion with attending (patient's primary oncologist) and patient on 4/12. Patient will be pursuing comfort care. S/p palliative paracentesis on 4/12. Initiate comfort care order set and GIP.    Updates 4/14:  - comfort care - order set placed   - pending hospice referral    #Comfort care  #hospice   - comfort care order set placed   - hopeful for GIP initiation today - SW aware and working on it     Previous Problems:                 #Partial SBO  #Constipation  #Nausea/Vomiting  :: CT scan 4/10: Showing overall interval worsening of metastatic disease of lungs, liver and peritoneal masses  :: S/p fluid resuscitation in OSH ED and placement of NG to IMWS  :: s/p palliative paracentesis 4/12 with IR  Plan:  - fentanyl PCA  - morphine 4mg IV q2h for breakthrough pain  - c/w compazine and zofran for nausea  - CRS recommending palliative venting PEG     #Stage IV Ascending Colon Cancer  # Hepatic Masses   # Splenic Masses  #Hilar Mass   #Numerous Peritoneal Masses   #Large Volume Ascites   :: CT A/P 4/10: Showing overall interval worsening of metastatic disease of lungs, liver and peritoneal masses and   :: Primary oncologist Dr. Russ; Surg onc Rajinder Ocuin  :: Most recent chemo: Lonsurf + Bevacizumab start 3/4/24 and Last Glycerin in AMY pump on 2/5/24- Chronically elevated alk phos and ascites likely related metastatic disease to liver and peritoneum  :: s/p palliative paracentesis 4/12  :: s/p GOC with supportive oncology, now DNR DNI  Plan  - discussion with attending, plan to pursue comfort  - SW consult to discuss hospice     #Left ureter partially obstructing calculus  ::CT A/P 4/10: Evidence of moderate left sided hydro-ureteronephrosis  :: BL Cr 0.4-0.5  :: Cr 0.45 on admission   :: stone not visualized on Renal US  Plan:  - CTM, treat pain     #Left Pleural Effusion  #Small R. Pleural Effusion   #Pulmonary Nodule   CT A/P 4.10: Interval worsening of bilateral pleural effusions with near complete compressive atelectasis of the left lower lobe as described above.  Plan:   - continue to monitor  - stable on room air     #HTN  #HLD  Plan:  - c/w home simvastatin 40 mg daily  - c/w losartan 25 mg daily     #Chronic Pain  ::Cancer related and has crushed shoulder injury 2/2 work-related accident  ::Follows with supportive onc outpatient  Home: morphine CR 45 mg TID and oxycodone-acetaminophen  mg Q6H PRN  Plan:  - appreciate supportive  oncology     #GERD  :: Home pantoprazole 40 mg daily  - IV pantoprazole 40 mg daily while NPO     #T2DM  :: Home regimen: lantus 15 units, lispro 3 units w/ meals+ SSI  PLAN:  - Hypoglycemia protocol   - Mild insulin sliding scale #2      Miscellaneous:  - c/h Zyprexa 10 mg at bedtime     F: PRN  E: PRN  N: NPO with sips of water  A: pIV      Bowel Regimen: Miralax, DocSenna (held while NPO)  DVT prophylaxis: Lovenox subq  Code Status: FULL CODE (confirmed on admission 4/11/24)  NOK: Wife, Emperatriz Marrero [phone]              Percy Balderrama MD   Internal Medicine, PGY-2  [facility]

## 2024-04-15 PROCEDURE — 2500000004 HC RX 250 GENERAL PHARMACY W/ HCPCS (ALT 636 FOR OP/ED): Performed by: PHARMACIST

## 2024-04-15 PROCEDURE — 99233 SBSQ HOSP IP/OBS HIGH 50: CPT | Performed by: NURSE PRACTITIONER

## 2024-04-15 PROCEDURE — 2500000004 HC RX 250 GENERAL PHARMACY W/ HCPCS (ALT 636 FOR OP/ED)

## 2024-04-15 PROCEDURE — 99233 SBSQ HOSP IP/OBS HIGH 50: CPT

## 2024-04-15 PROCEDURE — 2500000005 HC RX 250 GENERAL PHARMACY W/O HCPCS

## 2024-04-15 PROCEDURE — 1150000001 HC HOSPICE PRIVATE ROOM DAILY

## 2024-04-15 RX ORDER — NALOXONE HYDROCHLORIDE 0.4 MG/ML
0.2 INJECTION, SOLUTION INTRAMUSCULAR; INTRAVENOUS; SUBCUTANEOUS AS NEEDED
Status: DISCONTINUED | OUTPATIENT
Start: 2024-04-15 | End: 2024-04-17 | Stop reason: HOSPADM

## 2024-04-15 RX ORDER — MORPHINE SULFATE IN 0.9 % NACL 30 MG/30ML
PATIENT CONTROLLED ANALGESIA SYRINGE INTRAVENOUS CONTINUOUS
Status: DISCONTINUED | OUTPATIENT
Start: 2024-04-15 | End: 2024-04-15

## 2024-04-15 RX ORDER — HYOSCYAMINE SULFATE 0.12 MG/1
0.12 TABLET, ORALLY DISINTEGRATING ORAL EVERY 6 HOURS PRN
Status: DISCONTINUED | OUTPATIENT
Start: 2024-04-15 | End: 2024-04-17 | Stop reason: HOSPADM

## 2024-04-15 RX ORDER — MORPHINE SULFATE 4 MG/ML
4 INJECTION INTRAVENOUS
Status: CANCELLED | OUTPATIENT
Start: 2024-04-15

## 2024-04-15 RX ORDER — MORPHINE SULFATE IN 0.9 % NACL 30 MG/30ML
PATIENT CONTROLLED ANALGESIA SYRINGE INTRAVENOUS CONTINUOUS
Status: DISCONTINUED | OUTPATIENT
Start: 2024-04-15 | End: 2024-04-17 | Stop reason: HOSPADM

## 2024-04-15 RX ADMIN — ONDANSETRON 8 MG: 2 INJECTION INTRAMUSCULAR; INTRAVENOUS at 03:00

## 2024-04-15 RX ADMIN — MORPHINE SULFATE 4 MG: 4 INJECTION, SOLUTION INTRAMUSCULAR; INTRAVENOUS at 06:52

## 2024-04-15 RX ADMIN — MORPHINE SULFATE 4 MG: 4 INJECTION, SOLUTION INTRAMUSCULAR; INTRAVENOUS at 09:16

## 2024-04-15 RX ADMIN — MORPHINE SULFATE 2 MG: 2 INJECTION, SOLUTION INTRAMUSCULAR; INTRAVENOUS at 14:29

## 2024-04-15 RX ADMIN — MORPHINE SULFATE 4 MG: 4 INJECTION, SOLUTION INTRAMUSCULAR; INTRAVENOUS at 00:15

## 2024-04-15 RX ADMIN — ONDANSETRON 8 MG: 2 INJECTION INTRAMUSCULAR; INTRAVENOUS at 13:05

## 2024-04-15 RX ADMIN — MORPHINE SULFATE 4 MG: 4 INJECTION, SOLUTION INTRAMUSCULAR; INTRAVENOUS at 07:54

## 2024-04-15 RX ADMIN — PROCHLORPERAZINE EDISYLATE 10 MG: 5 INJECTION INTRAMUSCULAR; INTRAVENOUS at 06:54

## 2024-04-15 RX ADMIN — Medication: at 20:00

## 2024-04-15 RX ADMIN — MORPHINE SULFATE 2 MG: 2 INJECTION, SOLUTION INTRAMUSCULAR; INTRAVENOUS at 12:55

## 2024-04-15 RX ADMIN — Medication: at 14:30

## 2024-04-15 RX ADMIN — MORPHINE SULFATE 4 MG: 4 INJECTION, SOLUTION INTRAMUSCULAR; INTRAVENOUS at 01:57

## 2024-04-15 ASSESSMENT — RESPIRATORY DISTRESS OBSERVATION SCALE (RDOS)
PARADOXICAL BREATHING PATTERN: 0 - NONE
GRUNTING AT END OF EXPIRATION: 0 - NONE
GRUNTING AT END OF EXPIRATION: 0 - NONE
LOOK OF FEAR: 0 - NONE
HEART RATE PER MINUTE: 2 - >109 BEATS
RDOS TOTAL SCORE: 8
RDOS TOTAL SCORE: 8
PARADOXICAL BREATHING PATTERN: 2 - PRESENT
PARADOXICAL BREATHING PATTERN: 0 - NONE
RESTLESS NONPURPOSEFUL MOVEMENTS: 1 - OCCASIONAL, SLIGHT MOVEMENTS
RESTLESS NONPURPOSEFUL MOVEMENTS: 1 - OCCASIONAL, SLIGHT MOVEMENTS
LOOK OF FEAR: 0 - NONE
RESTLESS NONPURPOSEFUL MOVEMENTS: 1 - OCCASIONAL, SLIGHT MOVEMENTS
HEART RATE PER MINUTE: 2 - >109 BEATS
ACCESSORY MUSCLE RISE IN CLAVICLE DURING INSPIRATION: 1 - SLIGHT RISE
RESPIRATORY RATE PER MINUTE: 1 - 19-30 BREATHS
RESPIRATORY RATE PER MINUTE: 1 - 19-30 BREATHS
RDOS TOTAL SCORE: 8
RESPIRATORY RATE PER MINUTE: 1 - 19-30 BREATHS
RESTLESS NONPURPOSEFUL MOVEMENTS: 1 - OCCASIONAL, SLIGHT MOVEMENTS
LOOK OF FEAR: 0 - NONE
INVOLUNTARY NASAL FLARING: 0 - NONE
ACCESSORY MUSCLE RISE IN CLAVICLE DURING INSPIRATION: 2 - PRONOUNCED RISE
RDOS TOTAL SCORE: 6
HEART RATE PER MINUTE: 2 - >109 BEATS
ACCESSORY MUSCLE RISE IN CLAVICLE DURING INSPIRATION: 2 - PRONOUNCED RISE
RESPIRATORY RATE PER MINUTE: 1 - 19-30 BREATHS
HEART RATE PER MINUTE: 2 - >109 BEATS
RDOS TOTAL SCORE: 6
INVOLUNTARY NASAL FLARING: 0 - NONE
RDOS TOTAL SCORE: 5
GRUNTING AT END OF EXPIRATION: 0 - NONE
INVOLUNTARY NASAL FLARING: 0 - NONE
GRUNTING AT END OF EXPIRATION: 0 - NONE
LOOK OF FEAR: 0 - NONE
ACCESSORY MUSCLE RISE IN CLAVICLE DURING INSPIRATION: 2 - PRONOUNCED RISE
PARADOXICAL BREATHING PATTERN: 0 - NONE
HEART RATE PER MINUTE: 2 - >109 BEATS
ACCESSORY MUSCLE RISE IN CLAVICLE DURING INSPIRATION: 2 - PRONOUNCED RISE
LOOK OF FEAR: 0 - NONE
HEART RATE PER MINUTE: 2 - >109 BEATS
PARADOXICAL BREATHING PATTERN: 2 - PRESENT
INVOLUNTARY NASAL FLARING: 0 - NONE
INVOLUNTARY NASAL FLARING: 0 - NONE
ACCESSORY MUSCLE RISE IN CLAVICLE DURING INSPIRATION: 2 - PRONOUNCED RISE
RESTLESS NONPURPOSEFUL MOVEMENTS: 1 - OCCASIONAL, SLIGHT MOVEMENTS
RESPIRATORY RATE PER MINUTE: 1 - 19-30 BREATHS
INVOLUNTARY NASAL FLARING: 0 - NONE
LOOK OF FEAR: 0 - NONE
PARADOXICAL BREATHING PATTERN: 2 - PRESENT
RESPIRATORY RATE PER MINUTE: 1 - 19-30 BREATHS
RESTLESS NONPURPOSEFUL MOVEMENTS: 1 - OCCASIONAL, SLIGHT MOVEMENTS
GRUNTING AT END OF EXPIRATION: 0 - NONE
GRUNTING AT END OF EXPIRATION: 0 - NONE

## 2024-04-15 ASSESSMENT — PAIN SCALES - GENERAL: PAINLEVEL_OUTOF10: 4

## 2024-04-15 NOTE — NURSING NOTE
RN Hospice Note    Narciso Marrero is a Hospice Patient.   Hospice terminal diagnosis: Colorectal Cancer  Physician: Laney Bates MD  Visit type: GIP day 2    Comments/recommendations: Patient assessed and had received multiple doses of Morphine in past 24 hours.   Patient will be converted to PCA for pain management.  Spoke with spouse by phone who states that if discharge becomes necessary she would like him to be transferred to hospice facility in [place].  Contact to be made with facilities in that area.    Discharge Planning:  Patient to be discharged to  to be determined    The following is to be completed:  Discharge order: Y  State DNR signed by MD: [facility]  Nursing facility referral/transfer form: NA  Medication reconciliation: Y  PAS/RR or convalescent stay form: NA  Prescriptions for al narcotics/new medications: Y  Transportation: Y  Other:      Plan of care reviewed with patient/family members Emperatriz, spouse   Plan of care reviewed with hospital staff members: Kathy, bedside nurse,, Mini East CNP, Juanita Olguin RN care mgr, greg Ruth SW, Laney Bates MD , Rosalinda LOYD palliative care.    Please notify [facility] of any changes in condition. Thank you.  Office: [phone] (8 am-6:30 pm M-F and 8 am-4:30 pm weekends and holidays)   [phone] (6:30 pm-8 am M-F and 4:30 pm-8 am weekends and holidays)    Sultana Katz RN

## 2024-04-15 NOTE — PROGRESS NOTES
LSW received update from medical team that patient is rapidly declining.  Family would like to pursue hospice services and have patient remain in GIP status at the hospital.  LSW spoke with patient's wife via phone and she was agreeable with a referral to hospice of [facility].  LSW placed referral via care port and plan is for hospice representative to meet with patient and wife this evening around 5:30pm.  LSW updated medical team.  Social work will continue to follow.

## 2024-04-15 NOTE — PROGRESS NOTES
SUPPORTIVE AND PALLIATIVE ONCOLOGY INPATIENT FOLLOW-UP    SERVICE DATE: 04/15/24     SUBJECTIVE:    Interval Events:  Transitioned to DNRCC and GIP hospice with [facility]  Requiring IV Morphine 4 mg more frequently for pain and SOB, RN and pt inquiring about PCA.  NGT out yesterday per pt request, reports mild nausea earlier relieved with zofran, no emesis    Discussed plan with [facility] ALPHONSE Garcia re: starting Morphine PCA, likely plan for [facility]    Pain Assessment:  Location: Transverse lower abdomen  Duration: Constant, with flares  Characteristics:  Ratin/10  Descriptors: Dull, aching  Aggravating: Use of abdominal muscles, sometimes just spontaneous   Relieving: Analgesics  , Positioning, and Modifying activity  Interference with Function: A little    Opioid Use  Past 24h opioid use:   (-4/15, 7540-7964)  Fentanyl TD 50mcg/h=100 OME  morphine 4mg IV x 10 = 40 mg = 100 OME    Total 24h OME use: 200    Note: OME calculations based on equianalgesic table below. Please note this table is based on best available evidence but conversions are still approximate. These are NOT opioid DOSES for individual patient use; this is equivalency information.  Drug Parenteral Enteral   Morphine 10 25   Oxycodone N/A 20   Hydromorphone 2 5   Fentanyl 0.15 N/A   Tramadol N/A 120   Citation: Faye JAY. Demystifying opioid conversion calculations: A guide for effective dosing, Second edition. [place]: American Society of Health-System Pharmacists, 2018.    Symptom Assessment:  Nausea: a little  Constipation: very much     Information obtained from: chart review, interview of patient, interview of family, discussion with RN, and discussion with primary team  ______________________________________________________________________   OBJECTIVE:    Lab Results   Component Value Date    WBC 14.3 (H) 2024    HGB 9.2 (L) 2024    HCT 31.5 (L) 2024    MCV 95 2024     2024      Lab Results   Component  Value Date    GLUCOSE 132 (H) 04/12/2024    CALCIUM 8.1 (L) 04/12/2024     04/12/2024    K 3.5 04/12/2024    CO2 22 04/12/2024     04/12/2024    BUN 19 04/12/2024    CREATININE 0.48 (L) 04/12/2024     Lab Results   Component Value Date    ALT 16 04/11/2024    AST 32 04/11/2024    ALKPHOS 502 (H) 04/11/2024    BILITOT 0.7 04/11/2024     CrCl cannot be calculated (Patient's most recent lab result is older than the maximum 3 days allowed.).     Scheduled medications  [START ON 4/16/2024] fentaNYL, 1 patch, transdermal, q72h  hyoscyamine, 0.125 mg, sublingual, q6h  OLANZapine zydis, 10 mg, oral, Nightly  oxygen, , inhalation, Continuous - Inhalation    Continuous medications     PRN medications  LORazepam, 0.5 mg, q4h PRN  LORazepam, 0.5 mg, q4h PRN  morphine, 2 mg, q15 min PRN  morphine, 2 mg, q15 min PRN  morphine, 4 mg, q2h PRN  morphine, 4 mg, q15 min PRN  naloxone, 0.2 mg, PRN  ondansetron, 8 mg, q6h PRN  phenoL, 1 spray, q2h PRN  prochlorperazine, 10 mg, q6h PRN    PHYSICAL EXAMINATION:    Vital Signs:   Vital signs reviewed  Visit Vitals  BP (!) 162/106   Pulse (!) 118   Temp 36.3 °C (97.3 °F)   Resp 18     Pain Score: 7     Physical Exam  Vitals and nursing note reviewed.   Constitutional:       General: He is not in acute distress.     Appearance: He is ill-appearing.      Comments:      HENT:      Head: Normocephalic.      Nose:      Comments: NGT present and secured in R nare.      Mouth/Throat:      Mouth: Mucous membranes are moist.   Eyes:      Conjunctiva/sclera: Conjunctivae normal.   Neck:      Comments: Trachea midline.  Pulmonary:      Effort: Pulmonary effort is normal. No respiratory distress.      Comments: NC  Abdominal:      General: There is no distension.      Comments: Positive for ascites.    Musculoskeletal:      Right lower leg: Edema present.      Left lower leg: Edema present.   Skin:     General: Skin is dry.      Coloration: Skin is pale.      Comments: LE cool    Neurological:      Mental Status: He is alert and oriented to person, place, and time.   Psychiatric:         Mood and Affect: Mood normal.         Behavior: Behavior normal.         Thought Content: Thought content normal.         Cognition and Memory: Cognition normal.         Judgment: Judgment normal.        ASSESSMENT/PLAN:  Narciso Marreor is a 58 y.o. male diagnosed with metastatic ascending colon cancer with liver and peritoneal involvement. He has an AMY pump and is s/p multiple lines of systemic treatment. He was started on FOLFIRI 12/26/23, though this is now on hold d/t disease progression (noted 2/19/24). PMHx significant for T2DM, HTN, GERD, and chronic pain. Admitted 4/11/2024 for further evaluation and management of partial small bowel obstruction. Course complicated by worsening metastasis including peritoneal carcinomatosis. Supportive and Palliative Oncology is consulted for pain management, management of symptoms related to SBO.     Cancer Related Pain  Lower abdominal pain that radiates around is related to metastatic colon cancer with liver and peritoneal mets, ascites.   L foot and R shoulder pain is chronic related to a work injury and shingles  CIPN bilateral fingers and feet, right face/head pain related to post herpetic neuralgia.  Pain is: Acute on chronic, cancer related pain  Type: Visceral, somatic, and neuropathic  Pain control: Sub-optimally controlled  Home regimen:  Morphine ER 45mg TID, Pregabalin 150mg BID, and Percocet 10/325mg q4h PRN, Dicyclomine 10mg QID with meals and HS for abdominal cramping, Dexamethasone 2mg daily in AM to help with: pain, loss of appetite, nausea, fatigue.    Intolerances/previously tried: None  Personalized pain goal: 2/10  Total OME usage for the past 24 hours: 200  Now with bowel obstruction, enrolled in hospice transition to Morphine PCA  Discontinue fentanyl 50mcg/h TD patch q72h   Start Morphine PCA demand dose 2 mg q30 min. Six hours after  removal of Fentanyl patch start basal rate of 2 mg/h (Fentanyl TD will remain effective after removal)  Continue Morphine 2 mg IV q15 min prn RDOS >/=3 or mild to moderate pain  Discontinue morphine 4mg IV q15h PRN for severe/breakthrough pain   Continue dexamethasone 2mg IV daily in AM  Continue pregabalin 150mg PO BID if able to take PO  Continue to monitor pain scores and administer PRN medications as appropriate  Continue/initiate nonpharmacologic pain management strategies including ice/heat therapy, distraction techniques, deep breathing/relaxation techniques, calming music, and repositioning  Continue to monitor for signs of opioid efficacy (pain scores, improved functionality) and toxicity (pinpoint pupils, excess sedation/drowsiness/confusion, respiratory depression, etc.)     Nausea  GERD  Loss of Appetite  Intermittent nausea with vomiting related to constipation and bowel obstruction.   Declined venting PEG, NGT   Home regimen:  Pantoprazole 40mg daily, Ondansetron 8mg q8h PRN, Olanzapine 10mg once daily in evening with food, dexamethasone 2mg daily in AM  PPI per primary  Continue olanzapine Zydis disintegrating tablet 10mg PO HS  Continue ondansetron 8mg PO q6h PRN for n/v, first line   Continue prochlorperazine 10mg PO q6h PRN for n/v, second line  Dexamethasone 2mg IV daily in AM     Constipation/Diarrhea  At risk for constipation related to small bowel obstruction (likely r/t metastasis) and opioids, currently constipated.  Usual bowel pattern: Every other day  Home regimen: Senna 2 tabs 1-2x/day PRN for constipation, Loperamide 2mg TID PRN - MAX 8 caps/day for diarrhea  LBM: 4/5/24 (10 days), prior to that weeks  Change hyoscyamine 0.125mg SL q6h to PRN for cramping relief  Start Miralax 17 gm BID PRN  Monitor BM frequency, adjust regimen as needed     Medical Decision Making/Goals of Care/Advance Care Planning:  (4/12/24, per Mini East CNP's, note)  Patient's current clinical condition,  including diagnosis, prognosis, and management plan, and goals of care were discussed.   Life limiting disease: metastatic malignancy  Family: Supportive wife, Emperatriz Marrero  Performance status: Major  limitations due to pain, N/V, fatigue, and disease process  Understanding of health: Demonstrates good prognostic understanding of disease process, understands plan for venting PEG and transitioning into hospice care.   Information:  Wife wants to know prognosis, but patient does not want to know  Medical update: Not a surgical candidate for SBO, although offered placing a venting peg tube to aid with N/V and allow for pleasure feeds.  Goals: symptom control Wife hoping for hospice facility due to patient's level of care needed, concerned about cost and insurance coverage.  Code status discussion:  GOC discussion today at bedside. Patient wants to be a DNR, DNI and his wife supports the code status change.     4/15/2024:  Now DNRCC, enrolled in [facility] with [facility]. Pt hesitant to be moved, wife understanding of likely need to move to inpatient hospice unit, plan for [facility] if moved.    Advance Directives  Existence of Advance Directives: Yes, but NOT documented in medical record   Decision maker: HCPOA is Emperatriz Marrero (wife) [phone]   Code Status: DNRCC     Disposition:  Please start the process of having prior authorization with meds to beds deliver medications to patient prior to discharge via [facility] pharmacy. Prescriptions will need to be sent 48-72 hours prior to discharge so that a prior authorization can be completed.      Discharge date: unknown pending acute issues  Patient has an existing appointment with Outpatient Supportive Oncology TORRI Bonds 4/29/2024 at 2:30p will cancel appointment since enrolling in hospice.    Signature and billing:  Thank you for allowing us to participate in the care of this patient. Recommendations will be communicated back to the consulting service by way of  shared electronic medical record or face-to-face.    Medical complexity was high level due to due to complexity of problems, extensive data review, and high risk of management/treatment.    I spent 50 minutes in the care of this patient which included chart review, interviewing patient/family, discussion with primary team, coordination of care, and documentation.    Data:   Diagnostic tests and information reviewed for today's visit:  Conversation with primary team, Conversation with RN Kathy, [facility] RN Radha FRENCH , Most recent labs and imaging results, Medications    Some elements copied from Shirley Alarcon CNP's, note on 4/13/24, the elements have been updated and all reflect current decision making from today, 04/15/24     Plan of Care discussed with: Primary team, pt    Thank you for asking Supportive and Palliative Oncology to assist with care of this patient.  We will continue to follow.  Please contact us for additional questions or concerns.    SIGNATURE: LA Garduno-CNP, VAIBHAV   PAGER/CONTACT:  Contact information:  Supportive and Palliative Oncology  Monday-Friday 8 AM-5 PM  Epic Secure chat or pager [phone].  After hours and weekends:  pager [phone]

## 2024-04-15 NOTE — CARE PLAN
The clinical goals for the shift include pt will remain safe and free from injury during shift    Over the shift, the patient was switched to morphine PCA, he reported better pain management. His wife was at bedside for most of the day, he seems to be resting comfortably. Will continue to monitor         Problem: Safety  Goal: Patient will be injury free during hospitalization  Outcome: Progressing     Problem: Psychosocial Needs  Goal: Demonstrates ability to cope with hospitalization/illness  Outcome: Progressing

## 2024-04-15 NOTE — PROGRESS NOTES
Narciso Marrero is a 58 y.o. male on day 1 of admission presenting with No Principal Problem: There is no principal problem currently on the Problem List. Please update the Problem List and refresh..    Subjective   Patient seen and examined this morning. Patient was sleeping when I saw him. Wife reported that he was doing okay. Pain is more controlled now, although he does require frequent administration of morphine. Reached out to Supportive oncology. Abdomen is still distended    Objective     Physical Exam  Constitutional: in NAD  HEENT: sclerae anicteric, EOM grossly intact,   CV: tachycardic, regular rhythm no murmurs noted  Pulm: Decreased breath sounds bilaterally especially at bases. No wheezes or rhonchi, no increased WOB  GI: Distended abdomen, soft, mildly tender to palpation mostly on R side  Skin: warm and dry  Ext: bilateral LE without pitting edema   Neuro: alert and conversant, A&OX4, CN II-XII grossly intact, strength 5/5 except in L foot (prior accident 23 years ago, baseline for patient), sensation to light touch intact  Psych: affect appropriate    Last Recorded Vitals  Blood pressure (!) 162/106, pulse (!) 118, temperature 36.3 °C (97.3 °F), resp. rate 18, SpO2 98%.  Intake/Output last 3 Shifts:  I/O last 3 completed shifts:  In: 100 [P.O.:100]  Out: 0     Relevant Results  Results from last 72 hours   Lab Units 04/13/24  1053   WBC AUTO x10*3/uL 14.3*   HEMOGLOBIN g/dL 9.2*   HEMATOCRIT % 31.5*   PLATELETS AUTO x10*3/uL 242               Scheduled medications  OLANZapine zydis, 10 mg, oral, Nightly  oxygen, , inhalation, Continuous - Inhalation      Continuous medications  morphine,         PRN medications  PRN medications: hyoscyamine, LORazepam, LORazepam, morphine, naloxone, naloxone, ondansetron, phenoL, prochlorperazine    Assessment/Plan   Narciso Marrero is a 58 y.o. male with PMHx of Stage IV KRAS + Ascending colon cancer  s/p Right hemicolectomy (5/3/23), metastatic disease to the  liver, and numerous peritoneal implants s/p AMY pump, T2DM, HTN, GERD, chronic pain presents to  as a transfer from [place] for partial small bowel obstruction based on CT A/P showing diffusely dilated SBO and continued nausea/vomiting. Etiology of SBO likely due to diffuse metastatic peritoneal diseases and ascites. Pt initially hypotensive and tachycardic with leukocytosis c/f hypovolemia 2/2 continued emesis vs. sepsis vs. bowel perf with intrabdominal infection vs. SBP given ascites pt started on IV antibiotic at OSH. With fluid resuscitation pt blood pressure improved and NG placed to intermittent suction. Given leukocytosis and hypotension on admission will continue abx and follow up blood cultures. Discussion with attending (patient's primary oncologist) and patient on 4/12. Patient will be pursuing comfort care. S/p palliative paracentesis on 4/12. Initiate comfort care order set and GIP.    Updates 4/15  - Patient on morphine PCA demand dose 2 mg q30 min (total hourly dose 4 mg), RN dose 4 mg q1h prn breakthrough pain.   - Fentanyl patch discontinued    #Comfort care  #hospice   - comfort care order set placed   - hopeful for GIP initiation today - SW aware and working on it     Previous Problems:                #Partial SBO  #Constipation  #Nausea/Vomiting  :: CT scan 4/10: Showing overall interval worsening of metastatic disease of lungs, liver and peritoneal masses  :: S/p fluid resuscitation in OSH ED and placement of NG to IMWS  :: s/p palliative paracentesis 4/12 with IR  Plan:  - fentanyl PCA  - morphine 4mg IV q2h for breakthrough pain  - c/w compazine and zofran for nausea  - CRS recommending palliative venting PEG     #Stage IV Ascending Colon Cancer  # Hepatic Masses   # Splenic Masses  #Hilar Mass   #Numerous Peritoneal Masses   #Large Volume Ascites   :: CT A/P 4/10: Showing overall interval worsening of metastatic disease of lungs, liver and peritoneal masses and   :: Primary oncologist   [place]; Surg onc Rajinder Ocuin  :: Most recent chemo: Lonsurf + Bevacizumab start 3/4/24 and Last Glycerin in AMY pump on 2/5/24- Chronically elevated alk phos and ascites likely related metastatic disease to liver and peritoneum  :: s/p palliative paracentesis 4/12  :: s/p GOC with supportive oncology, now DNR DNI  Plan  - discussion with attending, plan to pursue comfort  - SW consult to discuss hospice     #Left ureter partially obstructing calculus  ::CT A/P 4/10: Evidence of moderate left sided hydro-ureteronephrosis  :: BL Cr 0.4-0.5  :: Cr 0.45 on admission   :: stone not visualized on Renal US  Plan:  - CTM, treat pain     #Left Pleural Effusion  #Small R. Pleural Effusion   #Pulmonary Nodule   CT A/P 4.10: Interval worsening of bilateral pleural effusions with near complete compressive atelectasis of the left lower lobe as described above.  Plan:   - continue to monitor  - stable on room air     #HTN  #HLD  Plan:  - c/w home simvastatin 40 mg daily  - c/w losartan 25 mg daily     #Chronic Pain  ::Cancer related and has crushed shoulder injury 2/2 work-related accident  ::Follows with supportive onc outpatient  Home: morphine CR 45 mg TID and oxycodone-acetaminophen  mg Q6H PRN  Plan:  - appreciate supportive oncology     #GERD  :: Home pantoprazole 40 mg daily  - IV pantoprazole 40 mg daily while NPO     #T2DM  :: Home regimen: lantus 15 units, lispro 3 units w/ meals+ SSI  PLAN:  - Hypoglycemia protocol   - Mild insulin sliding scale #2      Miscellaneous:  - c/h Zyprexa 10 mg at bedtime     F: PRN  E: PRN  N: NPO with sips of water  A: pIV      Bowel Regimen: Miralax, DocSenna (held while NPO)  DVT prophylaxis: Lovenox subq  Code Status: FULL CODE (confirmed on admission 4/11/24)  NOK: Wife, Emperatriz Marrero [phone]     Bennie Henderson MD  PGY-1

## 2024-04-16 LAB
ATRIAL RATE: 121 BPM
P AXIS: 35 DEGREES
P OFFSET: 196 MS
P ONSET: 124 MS
PR INTERVAL: 186 MS
Q ONSET: 217 MS
QRS COUNT: 20 BEATS
QRS DURATION: 96 MS
QT INTERVAL: 304 MS
QTC CALCULATION(BAZETT): 431 MS
QTC FREDERICIA: 384 MS
R AXIS: 2 DEGREES
T AXIS: 75 DEGREES
T OFFSET: 369 MS
VENTRICULAR RATE: 121 BPM

## 2024-04-16 PROCEDURE — 1150000001 HC HOSPICE PRIVATE ROOM DAILY

## 2024-04-16 PROCEDURE — 2500000004 HC RX 250 GENERAL PHARMACY W/ HCPCS (ALT 636 FOR OP/ED)

## 2024-04-16 PROCEDURE — 99233 SBSQ HOSP IP/OBS HIGH 50: CPT

## 2024-04-16 PROCEDURE — 99233 SBSQ HOSP IP/OBS HIGH 50: CPT | Performed by: NURSE PRACTITIONER

## 2024-04-16 RX ORDER — MORPHINE SULFATE 2 MG/ML
2 INJECTION, SOLUTION INTRAMUSCULAR; INTRAVENOUS
Status: DISCONTINUED | OUTPATIENT
Start: 2024-04-16 | End: 2024-04-17 | Stop reason: HOSPADM

## 2024-04-16 RX ORDER — MORPHINE SULFATE 4 MG/ML
4 INJECTION INTRAVENOUS
Status: DISCONTINUED | OUTPATIENT
Start: 2024-04-16 | End: 2024-04-16

## 2024-04-16 RX ORDER — MORPHINE SULFATE 4 MG/ML
4 INJECTION INTRAVENOUS
Status: DISCONTINUED | OUTPATIENT
Start: 2024-04-16 | End: 2024-04-17 | Stop reason: HOSPADM

## 2024-04-16 RX ADMIN — LORAZEPAM 0.5 MG: 2 INJECTION INTRAMUSCULAR; INTRAVENOUS at 10:28

## 2024-04-16 RX ADMIN — Medication: at 16:27

## 2024-04-16 RX ADMIN — Medication: at 04:17

## 2024-04-16 ASSESSMENT — RESPIRATORY DISTRESS OBSERVATION SCALE (RDOS)
GRUNTING AT END OF EXPIRATION: 0 - NONE
PARADOXICAL BREATHING PATTERN: 0 - NONE
GRUNTING AT END OF EXPIRATION: 0 - NONE
RESTLESS NONPURPOSEFUL MOVEMENTS: 0 - NONE
PARADOXICAL BREATHING PATTERN: 0 - NONE
RESTLESS NONPURPOSEFUL MOVEMENTS: 1 - OCCASIONAL, SLIGHT MOVEMENTS
RDOS TOTAL SCORE: 3
RDOS TOTAL SCORE: 3
HEART RATE PER MINUTE: 2 - >109 BEATS
LOOK OF FEAR: 0 - NONE
HEART RATE PER MINUTE: 2 - >109 BEATS
INVOLUNTARY NASAL FLARING: 0 - NONE
RESPIRATORY RATE PER MINUTE: 0 - <19 BREATHS
ACCESSORY MUSCLE RISE IN CLAVICLE DURING INSPIRATION: 1 - SLIGHT RISE
ACCESSORY MUSCLE RISE IN CLAVICLE DURING INSPIRATION: 0 - NONE
LOOK OF FEAR: 0 - NONE
RESPIRATORY RATE PER MINUTE: 0 - <19 BREATHS
INVOLUNTARY NASAL FLARING: 0 - NONE

## 2024-04-16 NOTE — CARE PLAN
The clinical goals for the shift include pt will remain safe and free from injury      Problem: Safety  Goal: I will remain free of falls  Outcome: Progressing     Problem: Pain  Goal: My pain/discomfort is manageable  Outcome: Progressing

## 2024-04-16 NOTE — PROGRESS NOTES
SUPPORTIVE AND PALLIATIVE ONCOLOGY INPATIENT FOLLOW-UP    SERVICE DATE: 24     SUBJECTIVE:    Interval Events:  Transitioned from Fentanyl TD to Morphine PCA yesterday        Pain Assessment:  Location: Transverse lower abdomen  Duration: Constant, with flares  Characteristics:  Ratin/10  Descriptors: Dull, aching  Aggravating: Use of abdominal muscles, sometimes just spontaneous   Relieving: Analgesics  , Positioning, and Modifying activity  Interference with Function: A little    Opioid Use  Past 24h opioid use:   (4/15-, 0431-1325)  Morphine 2 mg IV prn RDOS x2 doses =4 mg =10 OME  morphine 4mg IV x 1 = 4 mg = 10 OME  Morphine PCA basal rate 2 mg/h, 18 demand doses of 2 mg, 41 attempts =63 mg =157    Total 24h OME use: 157 OME    Note: OME calculations based on equianalgesic table below. Please note this table is based on best available evidence but conversions are still approximate. These are NOT opioid DOSES for individual patient use; this is equivalency information.  Drug Parenteral Enteral   Morphine 10 25   Oxycodone N/A 20   Hydromorphone 2 5   Fentanyl 0.15 N/A   Tramadol N/A 120   Citation: Faye JAY. Demystifying opioid conversion calculations: A guide for effective dosing, Second edition. [place]: American Society of Health-System Pharmacists, 2018.    Symptom Assessment:  Nausea: a little  Constipation: very much     Information obtained from: chart review, interview of patient, interview of family, discussion with RN, and discussion with primary team  ______________________________________________________________________   OBJECTIVE:    Lab Results   Component Value Date    WBC 14.3 (H) 2024    HGB 9.2 (L) 2024    HCT 31.5 (L) 2024    MCV 95 2024     2024      Lab Results   Component Value Date    GLUCOSE 132 (H) 2024    CALCIUM 8.1 (L) 2024     2024    K 3.5 2024    CO2 22 2024     2024    BUN  19 04/12/2024    CREATININE 0.48 (L) 04/12/2024     Lab Results   Component Value Date    ALT 16 04/11/2024    AST 32 04/11/2024    ALKPHOS 502 (H) 04/11/2024    BILITOT 0.7 04/11/2024     CrCl cannot be calculated (Patient's most recent lab result is older than the maximum 3 days allowed.).     Scheduled medications  OLANZapine zydis, 10 mg, oral, Nightly  oxygen, , inhalation, Continuous - Inhalation    Continuous medications  morphine,       PRN medications  hyoscyamine, 0.125 mg, q6h PRN  LORazepam, 0.5 mg, q4h PRN  LORazepam, 0.5 mg, q4h PRN  morphine, 2 mg, q15 min PRN  naloxone, 0.2 mg, PRN  naloxone, 0.2 mg, PRN  ondansetron, 8 mg, q6h PRN  phenoL, 1 spray, q2h PRN  prochlorperazine, 10 mg, q6h PRN    PHYSICAL EXAMINATION:    Vital Signs:   Vital signs reviewed  Visit Vitals  BP (!) 158/102   Pulse (!) 119   Temp 36 °C (96.8 °F)   Resp 20     Pain Score: 4     Physical Exam  Vitals and nursing note reviewed.   Constitutional:       General: He is not in acute distress.     Appearance: He is ill-appearing.      Comments:      HENT:      Head: Normocephalic.      Mouth/Throat:      Mouth: Mucous membranes are dry.   Neck:      Comments: Trachea midline.  Pulmonary:      Effort: Pulmonary effort is normal. No respiratory distress.      Comments: NC  Abdominal:      General: There is no distension.      Comments: Mottling on abdomen   Musculoskeletal:      Right lower leg: Edema present.      Left lower leg: Edema present.   Skin:     General: Skin is dry.      Coloration: Skin is mottled and pale.      Comments: LE cool   Neurological:      Mental Status: He is alert. He is disoriented.   Psychiatric:         Cognition and Memory: Cognition normal.      Comments: Restless at times        ASSESSMENT/PLAN:  Narciso Marrero is a 58 y.o. male diagnosed with metastatic ascending colon cancer with liver and peritoneal involvement. He has an AMY pump and is s/p multiple lines of systemic treatment. He was started on  FOLFIRI 12/26/23, though this is now on hold d/t disease progression (noted 2/19/24). PMHx significant for T2DM, HTN, GERD, and chronic pain. Admitted 4/11/2024 for further evaluation and management of partial small bowel obstruction. Course complicated by worsening metastasis including peritoneal carcinomatosis. Supportive and Palliative Oncology is consulted for pain management, management of symptoms related to SBO.     Cancer Related Pain  Lower abdominal pain that radiates around is related to metastatic colon cancer with liver and peritoneal mets, ascites.   L foot and R shoulder pain is chronic related to a work injury and shingles  CIPN bilateral fingers and feet, right face/head pain related to post herpetic neuralgia.  Pain is: Acute on chronic, cancer related pain  Type: Visceral, somatic, and neuropathic  Pain control: Sub-optimally controlled  Home regimen:  Morphine ER 45mg TID, Pregabalin 150mg BID, and Percocet 10/325mg q4h PRN, Dicyclomine 10mg QID with meals and HS for abdominal cramping, Dexamethasone 2mg daily in AM to help with: pain, loss of appetite, nausea, fatigue.    Intolerances/previously tried: None  Personalized pain goal: 2/10  Total OME usage for the past 24 hours: 157  Now with bowel obstruction, enrolled in hospice   Increase Morphine PCA basal rate to 3 mg/h, stop demand dose pt unable  Start morphine 4mg IV q1h PRN for severe/breakthrough pain   Continue Morphine 2 mg IV q15 min prn RDOS >/=3 or mild to moderate pain    Nausea  GERD  Loss of Appetite  Intermittent nausea with vomiting related to constipation and bowel obstruction.   Declined venting PEG, NGT   Home regimen:  Pantoprazole 40mg daily, Ondansetron 8mg q8h PRN, Olanzapine 10mg once daily in evening with food, dexamethasone 2mg daily in AM  PPI per primary  Continue olanzapine Zydis disintegrating tablet 10mg PO HS if able  Continue ondansetron 8mg PO q6h PRN for n/v, first line   Continue prochlorperazine 10mg IV q6h  PRN for n/v, second line     Constipation/Diarrhea  At risk for constipation related to small bowel obstruction (likely r/t metastasis) and opioids, currently constipated.  Usual bowel pattern: Every other day  Home regimen: Senna 2 tabs 1-2x/day PRN for constipation, Loperamide 2mg TID PRN - MAX 8 caps/day for diarrhea  LBM: 4/5/24 (10 days), prior to that weeks  Continue hyoscyamine 0.125mg SL q6h PRN for cramping relief  Start Miralax 17 gm BID PRN  Monitor BM frequency, adjust regimen as needed     Medical Decision Making/Goals of Care/Advance Care Planning:  (4/12/24, per Mini East, CNP's, note)  Patient's current clinical condition, including diagnosis, prognosis, and management plan, and goals of care were discussed.   Life limiting disease: metastatic malignancy  Family: Supportive wife, Emperatriz Marrero  Performance status: Major  limitations due to pain, N/V, fatigue, and disease process  Understanding of health: Demonstrates good prognostic understanding of disease process, understands plan for venting PEG and transitioning into hospice care.   Information:  Wife wants to know prognosis, but patient does not want to know  Medical update: Not a surgical candidate for SBO, although offered placing a venting peg tube to aid with N/V and allow for pleasure feeds.  Goals: symptom control Wife hoping for hospice facility due to patient's level of care needed, concerned about cost and insurance coverage.  Code status discussion:  GOC discussion today at bedside. Patient wants to be a DNR, DNI and his wife supports the code status change.     4/15/2024:  Now DNRCC, enrolled in GIP with [facility]. Pt hesitant to be moved, wife understanding of likely need to move to inpatient hospice unit, plan for [facility] if moved.    4/16 Pt with rapid decline, staying at  for GIP, prognosis hours to days    Advance Directives  Existence of Advance Directives: Yes, but NOT documented in medical record   Decision maker: MANISH is  Emperatriz Marrero (wife) [phone]   Code Status: DNRCC     Disposition:  [facility] hospice      Signature and billing:  Thank you for allowing us to participate in the care of this patient. Recommendations will be communicated back to the consulting service by way of shared electronic medical record or face-to-face.    Medical complexity was high level due to due to complexity of problems, extensive data review, and high risk of management/treatment.    I spent 50 minutes in the care of this patient which included chart review, interviewing patient/family, discussion with primary team, coordination of care, and documentation.    Data:   Diagnostic tests and information reviewed for today's visit:  Conversation with primary team, Conversation with RN Kathy, [facility] ALPHONSE Amezcua , Most recent labs and imaging results, Medications    Some elements copied from my note on 4/15/24, the elements have been updated and all reflect current decision making from today, 04/16/24     Plan of Care discussed with: Primary team, pt    Thank you for asking Supportive and Palliative Oncology to assist with care of this patient.  We will continue to follow.  Please contact us for additional questions or concerns.    SIGNATURE: LA Garduno-CNP, DNP   PAGER/CONTACT:  Contact information:  [facility] Oncology  Monday-Friday 8 AM-5 PM  Epic Secure chat or pager [phone].  After hours and weekends:  pager [phone]

## 2024-04-16 NOTE — NURSING NOTE
GIP day 3 visit.   Pt is alert and oriented and able to participate in conversation. Discussed discharge plan with him and his wife, Emperatriz Marrero. She stated she would like to have pt closer to their home in [place]. She mentioned [facility]. I spoke with Sonia Park, lead hospice RN. She stated they do have an available bed for pt. I faxed medical records, MAR, DNRCC as well as hospice to hospice transfer form to Sonia at  [phone]. Her phone number is [phone]. Collaborated with Lissy ROLON in discharge planning. Please inform [facility] if pt ends up not discharging today so we can send a nurse out to see him tomorrow.     Thank you,   Goldie Olivier RN

## 2024-04-16 NOTE — PROGRESS NOTES
Narciso Marrero is a 58 y.o. male on day 2 of admission presenting with No Principal Problem: There is no principal problem currently on the Problem List. Please update the Problem List and refresh..    Subjective   Patient seen and examined this morning. Patient was sleeping when I saw him. Pain seems to be more under control today.    Objective     Physical Exam  Constitutional: in NAD  HEENT: sclerae anicteric, EOM grossly intact,   CV: tachycardic, regular rhythm no murmurs noted  Pulm: Decreased breath sounds bilaterally especially at bases. No wheezes or rhonchi, no increased WOB  GI: Distended abdomen, soft, mildly tender to palpation mostly on R side  Skin: warm and dry  Ext: bilateral LE without pitting edema   Neuro: alert and conversant, A&OX4, CN II-XII grossly intact, strength 5/5 except in L foot (prior accident 23 years ago, baseline for patient), sensation to light touch intact  Psych: affect appropriate    Last Recorded Vitals  Blood pressure (!) 158/102, pulse (!) 119, temperature 36 °C (96.8 °F), resp. rate 20, SpO2 98%.  Intake/Output last 3 Shifts:  I/O last 3 completed shifts:  In: 450 [P.O.:450]  Out: 300 [Urine:300]    Relevant Results                  Scheduled medications  OLANZapine zydis, 10 mg, oral, Nightly  oxygen, , inhalation, Continuous - Inhalation      Continuous medications  morphine,         PRN medications  PRN medications: hyoscyamine, LORazepam, LORazepam, morphine, morphine, naloxone, naloxone, ondansetron, phenoL, prochlorperazine    Assessment/Plan   Narciso Marrero is a 58 y.o. male with PMHx of Stage IV KRAS + Ascending colon cancer  s/p Right hemicolectomy (5/3/23), metastatic disease to the liver, and numerous peritoneal implants s/p AMY pump, T2DM, HTN, GERD, chronic pain presents to  as a transfer from [place] for partial small bowel obstruction based on CT A/P showing diffusely dilated SBO and continued nausea/vomiting. Etiology of SBO likely due to diffuse  metastatic peritoneal diseases and ascites. Pt initially hypotensive and tachycardic with leukocytosis c/f hypovolemia 2/2 continued emesis vs. sepsis vs. bowel perf with intrabdominal infection vs. SBP given ascites pt started on IV antibiotic at OSH. With fluid resuscitation pt blood pressure improved and NG placed to intermittent suction. Given leukocytosis and hypotension on admission will continue abx and follow up blood cultures. Discussion with attending (patient's primary oncologist) and patient on 4/12. Patient will be pursuing comfort care. S/p palliative paracentesis on 4/12. Initiated comfort care order set and GIP.    Updates 4/16  - Patient on morphine PCA  - His condition is rapidly declining and going to stay here. Made adjustments to PCA pump as per supportive onc. He is at a point where he can't use the demand dose       #Comfort care  #hospice   - comfort care order set placed   - hopeful for GIP initiated SW aware     Previous Problems:                #Partial SBO  #Constipation  #Nausea/Vomiting  :: CT scan 4/10: Showing overall interval worsening of metastatic disease of lungs, liver and peritoneal masses  :: S/p fluid resuscitation in OSH ED and placement of NG to IMWS  :: s/p palliative paracentesis 4/12 with IR  Plan:  - fentanyl PCA  - morphine 4mg IV q2h for breakthrough pain  - c/w compazine and zofran for nausea  - CRS recommending palliative venting PEG     #Stage IV Ascending Colon Cancer  # Hepatic Masses   # Splenic Masses  #Hilar Mass   #Numerous Peritoneal Masses   #Large Volume Ascites   :: CT A/P 4/10: Showing overall interval worsening of metastatic disease of lungs, liver and peritoneal masses and   :: Primary oncologist Dr. Russ; Surg onc Rajinder Ocuin  :: Most recent chemo: Lonsurf + Bevacizumab start 3/4/24 and Last Glycerin in AMY pump on 2/5/24- Chronically elevated alk phos and ascites likely related metastatic disease to liver and peritoneum  :: s/p palliative  paracentesis 4/12  :: s/p GOC with supportive oncology, now DNR DNI  Plan  - discussion with attending, plan to pursue comfort  - SW consult to discuss hospice     #Left ureter partially obstructing calculus  ::CT A/P 4/10: Evidence of moderate left sided hydro-ureteronephrosis  :: BL Cr 0.4-0.5  :: Cr 0.45 on admission   :: stone not visualized on Renal US  Plan:  - CTM, treat pain     #Left Pleural Effusion  #Small R. Pleural Effusion   #Pulmonary Nodule   CT A/P 4.10: Interval worsening of bilateral pleural effusions with near complete compressive atelectasis of the left lower lobe as described above.  Plan:   - continue to monitor  - stable on room air     #HTN  #HLD  Plan:  - c/w home simvastatin 40 mg daily  - c/w losartan 25 mg daily     #Chronic Pain  ::Cancer related and has crushed shoulder injury 2/2 work-related accident  ::Follows with supportive onc outpatient  Home: morphine CR 45 mg TID and oxycodone-acetaminophen  mg Q6H PRN  Plan:  - appreciate supportive oncology     #GERD  :: Home pantoprazole 40 mg daily  - IV pantoprazole 40 mg daily while NPO     #T2DM  :: Home regimen: lantus 15 units, lispro 3 units w/ meals+ SSI  PLAN:  - Hypoglycemia protocol   - Mild insulin sliding scale #2      Miscellaneous:  - c/h Zyprexa 10 mg at bedtime     F: PRN  E: PRN  N: NPO with sips of water  A: pIV      Bowel Regimen: Miralax, DocSenna (held while NPO)  DVT prophylaxis: Lovenox subq  Code Status: FULL CODE (confirmed on admission 4/11/24)  NOK: Wife, Emperatriz Marrero [phone]     Bennie Henderson MD  PGY-1

## 2024-04-16 NOTE — PROGRESS NOTES
04/16/24 1200   Discharge Planning   Living Arrangements Spouse/significant other   Support Systems Spouse/significant other   Type of Residence Private residence   Home or Post Acute Services In home services   Type of Home Care Services Hospice   Patient expects to be discharged to: [facility]   Does the patient need discharge transport arranged? Yes   RoundTrip coordination needed? Yes   Has discharge transport been arranged? No     Pt was accepted to transfer to [facility] however d/t change in condition will remain here under [facility] GIP.

## 2024-04-17 VITALS
DIASTOLIC BLOOD PRESSURE: 78 MMHG | SYSTOLIC BLOOD PRESSURE: 130 MMHG | RESPIRATION RATE: 16 BRPM | HEART RATE: 133 BPM | OXYGEN SATURATION: 99 % | TEMPERATURE: 97.3 F

## 2024-04-17 PROCEDURE — 2500000004 HC RX 250 GENERAL PHARMACY W/ HCPCS (ALT 636 FOR OP/ED)

## 2024-04-17 PROCEDURE — 2500000005 HC RX 250 GENERAL PHARMACY W/O HCPCS

## 2024-04-17 PROCEDURE — 99239 HOSP IP/OBS DSCHRG MGMT >30: CPT

## 2024-04-17 RX ORDER — NALOXONE HYDROCHLORIDE 0.4 MG/ML
0.2 INJECTION, SOLUTION INTRAMUSCULAR; INTRAVENOUS; SUBCUTANEOUS AS NEEDED
Qty: 1 ML | Status: SHIPPED | OUTPATIENT
Start: 2024-04-17

## 2024-04-17 RX ORDER — MORPHINE SULFATE 2 MG/ML
2 INJECTION, SOLUTION INTRAMUSCULAR; INTRAVENOUS
Start: 2024-04-17

## 2024-04-17 RX ORDER — LORAZEPAM 2 MG/ML
0.5 INJECTION INTRAMUSCULAR EVERY 4 HOURS PRN
Start: 2024-04-17

## 2024-04-17 RX ORDER — HYOSCYAMINE SULFATE 0.12 MG/1
0.12 TABLET, ORALLY DISINTEGRATING ORAL EVERY 6 HOURS PRN
Start: 2024-04-17

## 2024-04-17 RX ORDER — MORPHINE SULFATE 4 MG/ML
4 INJECTION INTRAVENOUS
Start: 2024-04-17

## 2024-04-17 RX ORDER — ONDANSETRON HYDROCHLORIDE 2 MG/ML
8 INJECTION, SOLUTION INTRAVENOUS EVERY 6 HOURS PRN
Start: 2024-04-17

## 2024-04-17 RX ORDER — OLANZAPINE 10 MG/1
10 TABLET, ORALLY DISINTEGRATING ORAL NIGHTLY
Start: 2024-04-17

## 2024-04-17 RX ORDER — PROCHLORPERAZINE EDISYLATE 5 MG/ML
10 INJECTION INTRAMUSCULAR; INTRAVENOUS EVERY 6 HOURS PRN
Start: 2024-04-17

## 2024-04-17 RX ADMIN — Medication: at 04:14

## 2024-04-17 RX ADMIN — LORAZEPAM 0.5 MG: 2 INJECTION INTRAMUSCULAR; INTRAVENOUS at 10:49

## 2024-04-17 RX ADMIN — MORPHINE SULFATE 4 MG: 4 INJECTION INTRAVENOUS at 12:03

## 2024-04-17 RX ADMIN — ONDANSETRON 8 MG: 2 INJECTION INTRAMUSCULAR; INTRAVENOUS at 11:47

## 2024-04-17 RX ADMIN — Medication: at 08:00

## 2024-04-17 ASSESSMENT — RESPIRATORY DISTRESS OBSERVATION SCALE (RDOS)
PARADOXICAL BREATHING PATTERN: 0 - NONE
INVOLUNTARY NASAL FLARING: 0 - NONE
RESTLESS NONPURPOSEFUL MOVEMENTS: 0 - NONE
HEART RATE PER MINUTE: 2 - >109 BEATS
LOOK OF FEAR: 0 - NONE
GRUNTING AT END OF EXPIRATION: 0 - NONE
RESPIRATORY RATE PER MINUTE: 0 - <19 BREATHS
RDOS TOTAL SCORE: 3
RESTLESS NONPURPOSEFUL MOVEMENTS: 0 - NONE
RESPIRATORY RATE PER MINUTE: 0 - <19 BREATHS
PARADOXICAL BREATHING PATTERN: 0 - NONE
HEART RATE PER MINUTE: 2 - >109 BEATS
LOOK OF FEAR: 0 - NONE
INVOLUNTARY NASAL FLARING: 0 - NONE
GRUNTING AT END OF EXPIRATION: 0 - NONE
ACCESSORY MUSCLE RISE IN CLAVICLE DURING INSPIRATION: 1 - SLIGHT RISE
ACCESSORY MUSCLE RISE IN CLAVICLE DURING INSPIRATION: 1 - SLIGHT RISE
RDOS TOTAL SCORE: 3

## 2024-04-17 ASSESSMENT — COGNITIVE AND FUNCTIONAL STATUS - GENERAL
STANDING UP FROM CHAIR USING ARMS: A LITTLE
MOBILITY SCORE: 19
MOVING TO AND FROM BED TO CHAIR: A LITTLE
WALKING IN HOSPITAL ROOM: A LITTLE
CLIMB 3 TO 5 STEPS WITH RAILING: A LOT

## 2024-04-17 ASSESSMENT — PAIN - FUNCTIONAL ASSESSMENT: PAIN_FUNCTIONAL_ASSESSMENT: 0-10

## 2024-04-17 ASSESSMENT — PAIN SCALES - GENERAL: PAINLEVEL_OUTOF10: 0 - NO PAIN

## 2024-04-17 NOTE — HOSPITAL COURSE
Narciso Marrero is a 58 y.o. male with PMHx of Stage IV KRAS + Ascending colon cancer  s/p Right hemicolectomy (5/3/23), metastatic disease to the liver, and numerous peritoneal implants s/p AMY pump, T2DM, HTN, GERD, chronic pain presents to  as a transfer from [place] for partial small bowel obstruction based on CT A/P showing diffusely dilated SBO and continued nausea/vomiting. Etiology of SBO likely due to diffuse metastatic peritoneal diseases and ascites. Pt initially hypotensive and tachycardic with leukocytosis c/f hypovolemia 2/2 continued emesis vs. sepsis vs. bowel perf with intrabdominal infection vs. SBP given ascites pt started on IV antibiotic at OSH. With fluid resuscitation pt blood pressure improved and NG placed to intermittent suction. Given leukocytosis and hypotension on admission will continue abx and follow up blood cultures. Discussion with attending (patient's primary oncologist) and patient on 4/12, decision was made to pursue comfort care. S/p palliative paracentesis on 4/12. Supportive oncology helped with pain management and he was discharged to [facility] on 4/17.

## 2024-04-17 NOTE — NURSING NOTE
Patient discharged to [facility], AVS given to wife for review, given PRN ativan, zofran, morphine before PCA stopped, right medport accessed per provider team - flushed and clamped, patient belongings returned, left via stretcher

## 2024-04-17 NOTE — DISCHARGE SUMMARY
Discharge Diagnosis  Metastatic cancer    Test Results Pending At Discharge  Pending Labs       No current pending labs.            Hospital Course  Narciso Marrero is a 58 y.o. male with PMHx of Stage IV KRAS + Ascending colon cancer  s/p Right hemicolectomy (5/3/23), metastatic disease to the liver, and numerous peritoneal implants s/p AMY pump, T2DM, HTN, GERD, chronic pain presents to  as a transfer from [place] for partial small bowel obstruction based on CT A/P showing diffusely dilated SBO and continued nausea/vomiting. Etiology of SBO likely due to diffuse metastatic peritoneal diseases and ascites. Pt initially hypotensive and tachycardic with leukocytosis c/f hypovolemia 2/2 continued emesis vs. sepsis vs. bowel perf with intrabdominal infection vs. SBP given ascites pt started on IV antibiotic at OSH. With fluid resuscitation pt blood pressure improved and NG placed to intermittent suction. Given leukocytosis and hypotension on admission will continue abx and follow up blood cultures. Discussion with attending (patient's primary oncologist) and patient on 4/12, decision was made to pursue comfort care. S/p palliative paracentesis on 4/12. Supportive oncology helped with pain management and he was discharged to [facility] on 4/17.    Pertinent Physical Exam At Time of Discharge  Physical Exam    Constitutional: in NAD  HEENT: sclerae anicteric, EOM grossly intact,   CV: tachycardic, regular rhythm no murmurs noted  Pulm: Decreased breath sounds bilaterally especially at bases. No wheezes or rhonchi, no increased WOB  GI: Distended abdomen, soft, mildly tender to palpation mostly on R side  Skin: warm and dry  Ext: bilateral LE without pitting edema   Neuro: alert and conversant, A&OX4, CN II-XII grossly intact, strength 5/5 except in L foot (prior accident 23 years ago, baseline for patient), sensation to light touch intact  Psych: affect appropriate    Home Medications     Medication List       START taking these medications     hyoscyamine 0.125 mg disintegrating tablet; Commonly known as: Anaspaz;   Place 1 tablet (0.125 mg) under the tongue every 6 hours if needed   (Nasuea).   * naloxone 0.4 mg/mL injection; Commonly known as: Narcan; Infuse 0.5 mL   (0.2 mg) into a venous catheter if needed for respiratory depression (Once   PRN patient is unarousable, and respiratory rate less than 8).   * naloxone 0.4 mg/mL injection; Commonly known as: Narcan; Infuse 0.5 mL   (0.2 mg) into a venous catheter if needed for respiratory depression (Once   PRN patient is unarousable, and respiratory rate less than 8).   ondansetron 4 mg/2 mL injection; Commonly known as: Zofran; Infuse 4 mL   (8 mg) into a venous catheter every 6 hours if needed for nausea or   vomiting.   oxygen gas therapy; Commonly known as: O2; Inhale 1 each every 12 hours.   phenoL 1.4 % aerosol,spray; Commonly known as: Chloraseptic; Use 1 spray   in the mouth or throat every 2 hours if needed for sore throat.   prochlorperazine 10 mg/2 mL (5 mg/mL) solution; Commonly known as:   Compazine; Infuse 2 mL (10 mg) into a venous catheter every 6 hours if   needed for nausea or vomiting.  * This list has 2 medication(s) that are the same as other medications   prescribed for you. Read the directions carefully, and ask your doctor or   other care provider to review them with you.     CHANGE how you take these medications     * LORazepam 0.5 mg tablet; Commonly known as: Ativan; What changed:   Another medication with the same name was added. Make sure you understand   how and when to take each.   * LORazepam 2 mg/mL injection; Commonly known as: Ativan; Infuse 0.25 mL   (0.5 mg) over 5 minutes into a venous catheter every 4 hours if needed   (nausea, vomiting).; What changed: You were already taking a medication   with the same name, and this prescription was added. Make sure you   understand how and when to take each.   * LORazepam 2 mg/mL  injection; Commonly known as: Ativan; Infuse 0.25 mL   (0.5 mg) over 5 minutes into a venous catheter every 4 hours if needed   (anxiety, restlessness, insomnia).; What changed: You were already taking   a medication with the same name, and this prescription was added. Make   sure you understand how and when to take each.   * morphine CR 30 mg 12 hr tablet; Commonly known as: MS Contin; Take 1   tablet (30 mg) by mouth every 12 hours. Do not crush, chew, or split.;   What changed: Another medication with the same name was added. Make sure   you understand how and when to take each.   * morphine CR 30 mg 12 hr tablet; Commonly known as: MS Contin; Take 1   tablet (30 mg) by mouth 3 times a day. Do not crush, chew, or split.; What   changed: Another medication with the same name was added. Make sure you   understand how and when to take each.   * morphine CR 15 mg 12 hr tablet; Commonly known as: MS Contin; Take 1   tablet (15 mg) by mouth 3 times a day. Do not crush, chew, or split. Take   with 30mg dose for total of 45mg.; What changed: Another medication with   the same name was added. Make sure you understand how and when to take   each.   * morphine 2 mg/mL injection; Infuse 1 mL (2 mg) into a venous catheter   every 15 minutes if needed (RDOS > or equal to 3).; What changed: You were   already taking a medication with the same name, and this prescription was   added. Make sure you understand how and when to take each.   * morphine 4 mg/mL injection; Infuse 1 mL (4 mg) into a venous catheter   every 1 hour if needed for moderate pain (4 - 6) or severe pain (7 - 10).;   What changed: You were already taking a medication with the same name, and   this prescription was added. Make sure you understand how and when to take   each.   * OLANZapine 10 mg tablet; Commonly known as: ZyPREXA; Take 1 tablet (10   mg) by mouth once daily at bedtime.; What changed: Another medication with   the same name was added. Make sure  "you understand how and when to take   each.   * OLANZapine zydis 10 mg disintegrating tablet; Commonly known as:   ZyPREXA; Take 1 tablet (10 mg) by mouth once daily at bedtime.; What   changed: You were already taking a medication with the same name, and this   prescription was added. Make sure you understand how and when to take   each.  * This list has 10 medication(s) that are the same as other medications   prescribed for you. Read the directions carefully, and ask your doctor or   other care provider to review them with you.     CONTINUE taking these medications     acetaminophen 325 mg tablet; Commonly known as: Tylenol; Take 3 tablets   (975 mg) by mouth every 6 hours if needed for mild pain (1 - 3) or   moderate pain (4 - 6).   BD Ultra-Fine Short Pen Needle 31 gauge x 5/16\" needle; Generic drug:   pen needle, diabetic; USE FOUR TIMES A DAY   bisacodyl 5 mg EC tablet; Commonly known as: Dulcolax   dexAMETHasone 2 mg tablet; Commonly known as: Decadron; Take 1 tablet (2   mg) by mouth once daily with breakfast for 15 days. This is to help with   energy, appetite, nausea, and pain.   dicyclomine 10 mg capsule; Commonly known as: Bentyl; Take 1 capsule (10   mg) by mouth 4 times a day. As need for abdominal cramping   FreeStyle Alessia 3 Sensor device; Generic drug: blood-glucose sensor; Use   to check blood sugars 4 times per day.  Change every 14 days.   ondansetron 8 mg tablet; Commonly known as: Zofran; Take 1 tablet (8 mg)   by mouth every 8 hours if needed for nausea or vomiting.   oxyCODONE-acetaminophen  mg tablet; Commonly known as: Percocet;   Take 1 tablet by mouth every 6 hours if needed for severe pain (7 - 10).   pantoprazole 40 mg EC tablet; Commonly known as: ProtoNix   polyethylene glycol 17 gram/dose powder; Commonly known as: Miralax;   Take 17 g and mix with 8 oz of water by mouth once daily.   pregabalin 150 mg capsule; Commonly known as: Lyrica; Take 1 capsule   (150 mg) by mouth 2 " times a day.   prochlorperazine 10 mg tablet; Commonly known as: Compazine; Take 1   tablet (10 mg) by mouth every 6 hours if needed for nausea or vomiting.   sennosides 8.6 mg tablet; Commonly known as: Senokot; Take 2 tablets   (17.2 mg) by mouth once daily at bedtime. This is to help manage   constipation. HOLD for diarrhea.   sennosides-docusate sodium 8.6-50 mg tablet; Commonly known as:   Leila-Colace; Take 2 tablets by mouth every 12 hours.     STOP taking these medications     HumaLOG KwikPen Insulin 100 unit/mL injection; Generic drug: insulin   lispro   Lantus Solostar U-100 Insulin 100 unit/mL (3 mL) pen; Generic drug:   insulin glargine   losartan 25 mg tablet; Commonly known as: Cozaar   simvastatin 40 mg tablet; Commonly known as: Zocor   trifluridine-tipiraciL 15-6.14 mg tablet; Commonly known as: Lonsurf   trifluridine-tipiraciL 20-8.19 mg tablet; Commonly known as: Lonsurf       Outpatient Follow-Up  Future Appointments   Date Time Provider Department Center   4/29/2024 12:00 PM [facility] CENTRAL LINE 02 [id number] [facility]   4/29/2024 12:20 PM Laney Russ MD PhD [id number] [facility]   4/29/2024  2:30 PM Rosalinda Barksdale APRN-CNP [id number] [facility]   4/29/2024  2:45 [id number]   6/7/2024  2:00 PM Marisa Parry PharmD [id number] [facility]   9/4/2024 11:45 AM LA Santamaria-CNP [id number] [facility]       Bennie Henderson MD

## 2024-04-17 NOTE — DISCHARGE INSTRUCTIONS
Radha Mr. Marrero,  You were admitted for bowel obstruction because of extensive spread of cancer from your colon. Given the complexity of your condition and treatment, a discussion was made about your goals of care. The decision was made to pursue comfort care and your being discharged with hospice care.    It was a pleasure taking care of you.    Best,   Care Team

## 2024-04-17 NOTE — CARE PLAN
The clinical goals for the shift include pt will remain safe and free from injury by the end of the shift    Over the shift, the patient did make progress toward the following goals.

## 2024-04-19 ENCOUNTER — TELEPHONE (OUTPATIENT)
Dept: PALLIATIVE MEDICINE | Facility: HOSPITAL | Age: 59
End: 2024-04-19
Payer: MEDICARE

## 2024-04-19 NOTE — TELEPHONE ENCOUNTER
Called [facility] and informed the pharmacy of prior authorization received for Zofran quantity. Pharmacy noted and will refill    Coverage has been approved for this patient's medication from 3/18/24 until 4/18/25.  The approval notice from the insurance plan is attached for your reference. We are unable to run a test claim at this time. Please let me know if  you continue to have issues. Thank you for your help!

## 2024-04-29 ENCOUNTER — APPOINTMENT (OUTPATIENT)
Dept: HEMATOLOGY/ONCOLOGY | Facility: HOSPITAL | Age: 59
End: 2024-04-29
Payer: MEDICARE

## 2024-04-29 ENCOUNTER — APPOINTMENT (OUTPATIENT)
Dept: PALLIATIVE MEDICINE | Facility: HOSPITAL | Age: 59
End: 2024-04-29
Payer: MEDICARE

## 2024-05-18 NOTE — DISCHARGE SUMMARY
"Discharge Diagnosis  Partial small bowel obstruction (Multi)    Issues Requiring Follow-Up  Inpatient hospice    Test Results Pending At Discharge  Pending Labs       No current pending labs.            Hospital Course  Narciso Marrero is a 58 y.o. male with PMHx of Stage IV KRAS + Ascending colon cancer  s/p Right hemicolectomy (5/3/23), metastatic disease to the liver, and numerous peritoneal implants s/p AMY pump, T2DM, HTN, GERD, chronic pain [facility] on CT A/P showing diffusely dilated SBO and continued nausea/vomiting. Etiology of SBO likely due to diffuse metastatic peritoneal diseases and ascites. Pt initially hypotensive and tachycardic with leukocytosis c/f hypovolemia 2/2 continued emesis vs. sepsis vs. bowel perf with intrabdominal infection vs. SBP given ascites pt started on IV antibiotic at OSH. With fluid resuscitation pt blood pressure improved and NG placed to intermittent suction. Given leukocytosis and hypotension on admission will continue abx and follow up blood cultures. Discussion with attending (patient's primary oncologist) and patient on 4/12. Patient will be pursuing comfort care. S/p palliative paracentesis on 4/12.    Pertinent Physical Exam At Time of Discharge  Physical Exam    Home Medications     Medication List      CONTINUE taking these medications     FreeStyle Alessia 3 Sensor device; Generic drug: blood-glucose sensor; Use   to check blood sugars 4 times per day.  Change every 14 days.     ASK your doctor about these medications     acetaminophen 325 mg tablet; Commonly known as: Tylenol; Take 3 tablets   (975 mg) by mouth every 6 hours if needed for mild pain (1 - 3) or   moderate pain (4 - 6).   BD Ultra-Fine Short Pen Needle 31 gauge x 5/16\" needle; Generic drug:   pen needle, diabetic; USE FOUR TIMES A DAY; Ask about: Should I take this   medication?   bisacodyl 5 mg EC tablet; Commonly known as: " Dulcolax   dexAMETHasone 2 mg tablet; Commonly known as: Decadron; Take 1 tablet (2   mg) by mouth once daily with breakfast for 15 days. This is to help with   energy, appetite, nausea, and pain.   dicyclomine 10 mg capsule; Commonly known as: Bentyl; Take 1 capsule (10   mg) by mouth 4 times a day. As need for abdominal cramping   LORazepam 0.5 mg tablet; Commonly known as: Ativan   * morphine CR 30 mg 12 hr tablet; Commonly known as: MS Contin; Take 1   tablet (30 mg) by mouth every 12 hours. Do not crush, chew, or split.   * morphine CR 30 mg 12 hr tablet; Commonly known as: MS Contin; Take 1   tablet (30 mg) by mouth 3 times a day. Do not crush, chew, or split.   * morphine CR 15 mg 12 hr tablet; Commonly known as: MS Contin; Take 1   tablet (15 mg) by mouth 3 times a day. Do not crush, chew, or split. Take   with 30mg dose for total of 45mg.   OLANZapine 10 mg tablet; Commonly known as: ZyPREXA; Take 1 tablet (10   mg) by mouth once daily at bedtime.   ondansetron 8 mg tablet; Commonly known as: Zofran; Take 1 tablet (8 mg)   by mouth every 8 hours if needed for nausea or vomiting.   oxyCODONE-acetaminophen  mg tablet; Commonly known as: Percocet;   Take 1 tablet by mouth every 6 hours if needed for severe pain (7 - 10).   pantoprazole 40 mg EC tablet; Commonly known as: ProtoNix   polyethylene glycol 17 gram/dose powder; Commonly known as: Miralax;   Take 17 g and mix with 8 oz of water by mouth once daily.   pregabalin 150 mg capsule; Commonly known as: Lyrica; Take 1 capsule   (150 mg) by mouth 2 times a day.   prochlorperazine 10 mg tablet; Commonly known as: Compazine; Take 1   tablet (10 mg) by mouth every 6 hours if needed for nausea or vomiting.   sennosides 8.6 mg tablet; Commonly known as: Senokot; Take 2 tablets   (17.2 mg) by mouth once daily at bedtime. This is to help manage   constipation. HOLD for diarrhea.   sennosides-docusate sodium 8.6-50 mg tablet; Commonly known as:   Leila-Colace;  Take 2 tablets by mouth every 12 hours.; Ask about: Should I   take this medication?  * This list has 3 medication(s) that are the same as other medications   prescribed for you. Read the directions carefully, and ask your doctor or   other care provider to review them with you.       Outpatient Follow-Up  Future Appointments   Date Time Provider Department Center   6/7/2024  2:00 PM Kristin CookD [id number] [facility]   6/10/2024  1:00 PM INF 05 [place] [id number] [facility]   9/4/2024 11:45 AM Mariela Meyers, APRN-CNP [id number] [facility]       Laney Russ MD PhD

## 2024-06-07 ENCOUNTER — APPOINTMENT (OUTPATIENT)
Dept: ENDOCRINOLOGY | Facility: CLINIC | Age: 59
End: 2024-06-07
Payer: OTHER MISCELLANEOUS

## 2024-06-10 ENCOUNTER — APPOINTMENT (OUTPATIENT)
Dept: HEMATOLOGY/ONCOLOGY | Facility: HOSPITAL | Age: 59
End: 2024-06-10
Payer: OTHER MISCELLANEOUS

## 2024-06-10 ENCOUNTER — APPOINTMENT (OUTPATIENT)
Dept: HEMATOLOGY/ONCOLOGY | Facility: CLINIC | Age: 59
End: 2024-06-10
Payer: OTHER MISCELLANEOUS

## 2024-07-22 ENCOUNTER — APPOINTMENT (OUTPATIENT)
Dept: HEMATOLOGY/ONCOLOGY | Facility: HOSPITAL | Age: 59
End: 2024-07-22
Payer: OTHER MISCELLANEOUS

## 2024-07-22 ENCOUNTER — APPOINTMENT (OUTPATIENT)
Dept: HEMATOLOGY/ONCOLOGY | Facility: CLINIC | Age: 59
End: 2024-07-22
Payer: OTHER MISCELLANEOUS

## 2024-09-04 ENCOUNTER — APPOINTMENT (OUTPATIENT)
Dept: ENDOCRINOLOGY | Facility: CLINIC | Age: 59
End: 2024-09-04
Payer: OTHER MISCELLANEOUS